# Patient Record
Sex: FEMALE | Race: WHITE | Employment: FULL TIME | URBAN - METROPOLITAN AREA
[De-identification: names, ages, dates, MRNs, and addresses within clinical notes are randomized per-mention and may not be internally consistent; named-entity substitution may affect disease eponyms.]

---

## 2017-01-18 ENCOUNTER — TELEPHONE (OUTPATIENT)
Dept: FAMILY MEDICINE CLINIC | Age: 37
End: 2017-01-18

## 2017-01-18 RX ORDER — DESOGESTREL AND ETHINYL ESTRADIOL 21-5 (28)
1 KIT ORAL DAILY
Qty: 3 PACKET | Refills: 3 | Status: SHIPPED | OUTPATIENT
Start: 2017-01-18 | End: 2017-05-17

## 2017-02-16 ENCOUNTER — OFFICE VISIT (OUTPATIENT)
Dept: FAMILY MEDICINE CLINIC | Age: 37
End: 2017-02-16

## 2017-02-16 VITALS
DIASTOLIC BLOOD PRESSURE: 68 MMHG | HEART RATE: 76 BPM | WEIGHT: 263 LBS | BODY MASS INDEX: 38.84 KG/M2 | TEMPERATURE: 98.2 F | RESPIRATION RATE: 14 BRPM | SYSTOLIC BLOOD PRESSURE: 110 MMHG

## 2017-02-16 DIAGNOSIS — J01.90 ACUTE SINUSITIS, RECURRENCE NOT SPECIFIED, UNSPECIFIED LOCATION: Primary | ICD-10-CM

## 2017-02-16 DIAGNOSIS — K50.819 CROHN'S DISEASE OF SMALL AND LARGE INTESTINES WITH COMPLICATION (HCC): ICD-10-CM

## 2017-02-16 PROCEDURE — 99213 OFFICE O/P EST LOW 20 MIN: CPT | Performed by: FAMILY MEDICINE

## 2017-02-16 RX ORDER — HYOSCYAMINE SULFATE 0.125 MG
125 TABLET ORAL DAILY
COMMUNITY
End: 2017-05-17

## 2017-02-16 RX ORDER — AMOXICILLIN AND CLAVULANATE POTASSIUM 875; 125 MG/1; MG/1
1 TABLET, FILM COATED ORAL EVERY 12 HOURS
Qty: 20 TABLET | Refills: 0 | Status: SHIPPED | OUTPATIENT
Start: 2017-02-16 | End: 2017-02-26

## 2017-03-27 ENCOUNTER — PATIENT MESSAGE (OUTPATIENT)
Dept: FAMILY MEDICINE CLINIC | Age: 37
End: 2017-03-27

## 2017-05-17 ENCOUNTER — OFFICE VISIT (OUTPATIENT)
Dept: FAMILY MEDICINE CLINIC | Age: 37
End: 2017-05-17

## 2017-05-17 VITALS
OXYGEN SATURATION: 98 % | WEIGHT: 264.4 LBS | SYSTOLIC BLOOD PRESSURE: 120 MMHG | BODY MASS INDEX: 39.05 KG/M2 | DIASTOLIC BLOOD PRESSURE: 77 MMHG | HEART RATE: 73 BPM | RESPIRATION RATE: 20 BRPM

## 2017-05-17 DIAGNOSIS — M17.11 PRIMARY OSTEOARTHRITIS OF RIGHT KNEE: ICD-10-CM

## 2017-05-17 DIAGNOSIS — R06.83 SNORING: ICD-10-CM

## 2017-05-17 DIAGNOSIS — K58.9 IRRITABLE BOWEL SYNDROME, UNSPECIFIED TYPE: ICD-10-CM

## 2017-05-17 DIAGNOSIS — F41.1 GENERALIZED ANXIETY DISORDER: ICD-10-CM

## 2017-05-17 DIAGNOSIS — E66.9 NON MORBID OBESITY, UNSPECIFIED OBESITY TYPE: ICD-10-CM

## 2017-05-17 DIAGNOSIS — E28.319 PREMATURE MENOPAUSE: ICD-10-CM

## 2017-05-17 DIAGNOSIS — K50.819 CROHN'S DISEASE OF SMALL AND LARGE INTESTINES WITH COMPLICATION (HCC): Primary | ICD-10-CM

## 2017-05-17 LAB
A/G RATIO: 1.9 (ref 1.1–2.2)
ALBUMIN SERPL-MCNC: 4.4 G/DL (ref 3.4–5)
ALP BLD-CCNC: 58 U/L (ref 40–129)
ALT SERPL-CCNC: 14 U/L (ref 10–40)
ANION GAP SERPL CALCULATED.3IONS-SCNC: 16 MMOL/L (ref 3–16)
AST SERPL-CCNC: 17 U/L (ref 15–37)
BILIRUB SERPL-MCNC: 0.4 MG/DL (ref 0–1)
BUN BLDV-MCNC: 8 MG/DL (ref 7–20)
CALCIUM SERPL-MCNC: 8.8 MG/DL (ref 8.3–10.6)
CHLORIDE BLD-SCNC: 102 MMOL/L (ref 99–110)
CHOLESTEROL, TOTAL: 212 MG/DL (ref 0–199)
CO2: 22 MMOL/L (ref 21–32)
CREAT SERPL-MCNC: 0.7 MG/DL (ref 0.6–1.1)
ESTIMATED AVERAGE GLUCOSE: 99.7 MG/DL
GFR AFRICAN AMERICAN: >60
GFR NON-AFRICAN AMERICAN: >60
GLOBULIN: 2.3 G/DL
GLUCOSE BLD-MCNC: 86 MG/DL (ref 70–99)
HBA1C MFR BLD: 5.1 %
HCT VFR BLD CALC: 40.4 % (ref 36–48)
HDLC SERPL-MCNC: 79 MG/DL (ref 40–60)
HEMOGLOBIN: 13.2 G/DL (ref 12–16)
LDL CHOLESTEROL CALCULATED: 103 MG/DL
MCH RBC QN AUTO: 30.8 PG (ref 26–34)
MCHC RBC AUTO-ENTMCNC: 32.8 G/DL (ref 31–36)
MCV RBC AUTO: 94.1 FL (ref 80–100)
PDW BLD-RTO: 13 % (ref 12.4–15.4)
PLATELET # BLD: 190 K/UL (ref 135–450)
PMV BLD AUTO: 9.9 FL (ref 5–10.5)
POTASSIUM SERPL-SCNC: 4.5 MMOL/L (ref 3.5–5.1)
RBC # BLD: 4.3 M/UL (ref 4–5.2)
SODIUM BLD-SCNC: 140 MMOL/L (ref 136–145)
T4 FREE: 1.1 NG/DL (ref 0.9–1.8)
TOTAL PROTEIN: 6.7 G/DL (ref 6.4–8.2)
TRIGL SERPL-MCNC: 151 MG/DL (ref 0–150)
TSH SERPL DL<=0.05 MIU/L-ACNC: 2.3 UIU/ML (ref 0.27–4.2)
VLDLC SERPL CALC-MCNC: 30 MG/DL
WBC # BLD: 5.3 K/UL (ref 4–11)

## 2017-05-17 PROCEDURE — 99214 OFFICE O/P EST MOD 30 MIN: CPT | Performed by: FAMILY MEDICINE

## 2017-05-17 RX ORDER — HYOSCYAMINE SULFATE 0.125 MG
125 TABLET ORAL DAILY
Qty: 180 TABLET | Refills: 5
Start: 2017-05-17 | End: 2018-02-15

## 2017-05-17 RX ORDER — NORETHINDRONE ACETATE AND ETHINYL ESTRADIOL 1MG-20(21)
1 KIT ORAL DAILY
Qty: 3 PACKET | Refills: 3 | Status: SHIPPED | OUTPATIENT
Start: 2017-05-17 | End: 2017-05-17

## 2017-05-17 RX ORDER — BUPROPION HYDROCHLORIDE 150 MG/1
150 TABLET ORAL EVERY MORNING
Qty: 90 TABLET | Refills: 2
Start: 2017-05-17 | End: 2017-08-22 | Stop reason: SDUPTHER

## 2017-05-17 RX ORDER — NORETHINDRONE ACETATE AND ETHINYL ESTRADIOL 1MG-20(21)
1 KIT ORAL DAILY
Qty: 3 PACKET | Refills: 3
Start: 2017-05-17 | End: 2018-02-15

## 2017-06-15 ENCOUNTER — TELEPHONE (OUTPATIENT)
Dept: BARIATRICS/WEIGHT MGMT | Age: 37
End: 2017-06-15

## 2017-06-22 ENCOUNTER — OFFICE VISIT (OUTPATIENT)
Dept: BARIATRICS/WEIGHT MGMT | Age: 37
End: 2017-06-22

## 2017-06-22 VITALS
HEIGHT: 68 IN | HEART RATE: 87 BPM | WEIGHT: 267.4 LBS | DIASTOLIC BLOOD PRESSURE: 75 MMHG | BODY MASS INDEX: 40.53 KG/M2 | SYSTOLIC BLOOD PRESSURE: 122 MMHG

## 2017-06-22 DIAGNOSIS — E66.01 MORBID OBESITY WITH BMI OF 40.0-44.9, ADULT (HCC): Primary | ICD-10-CM

## 2017-06-22 DIAGNOSIS — M17.11 PRIMARY OSTEOARTHRITIS OF RIGHT KNEE: ICD-10-CM

## 2017-06-22 DIAGNOSIS — E78.2 MIXED HYPERLIPIDEMIA: ICD-10-CM

## 2017-06-22 DIAGNOSIS — Z01.818 PRE-OPERATIVE CLEARANCE: ICD-10-CM

## 2017-06-22 PROCEDURE — 99244 OFF/OP CNSLTJ NEW/EST MOD 40: CPT | Performed by: SURGERY

## 2017-06-27 ENCOUNTER — HOSPITAL ENCOUNTER (OUTPATIENT)
Dept: ULTRASOUND IMAGING | Age: 37
Discharge: HOME | End: 2017-06-27
Attending: SURGERY | Admitting: SURGERY

## 2017-06-27 DIAGNOSIS — E78.2 MIXED HYPERLIPIDEMIA: ICD-10-CM

## 2017-06-27 DIAGNOSIS — E66.01 MORBID OBESITY WITH BMI OF 40.0-44.9, ADULT (HCC): ICD-10-CM

## 2017-06-27 DIAGNOSIS — M17.11 PRIMARY OSTEOARTHRITIS OF RIGHT KNEE: ICD-10-CM

## 2017-06-27 DIAGNOSIS — E66.01 MORBID (SEVERE) OBESITY DUE TO EXCESS CALORIES (HCC): ICD-10-CM

## 2017-06-27 DIAGNOSIS — Z01.818 PRE-OPERATIVE CLEARANCE: ICD-10-CM

## 2017-06-27 LAB
A/G RATIO: 1.4 (ref 1.1–2.2)
ALBUMIN SERPL-MCNC: 4.3 G/DL (ref 3.4–5)
ALP BLD-CCNC: 59 U/L (ref 40–129)
ALT SERPL-CCNC: 17 U/L (ref 10–40)
ANION GAP SERPL CALCULATED.3IONS-SCNC: 14 MMOL/L (ref 3–16)
AST SERPL-CCNC: 18 U/L (ref 15–37)
BASOPHILS ABSOLUTE: 0 K/UL (ref 0–0.2)
BASOPHILS RELATIVE PERCENT: 0.4 %
BILIRUB SERPL-MCNC: 0.4 MG/DL (ref 0–1)
BUN BLDV-MCNC: 15 MG/DL (ref 7–20)
CALCIUM SERPL-MCNC: 9 MG/DL (ref 8.3–10.6)
CHLORIDE BLD-SCNC: 104 MMOL/L (ref 99–110)
CHOLESTEROL, TOTAL: 220 MG/DL (ref 0–199)
CO2: 23 MMOL/L (ref 21–32)
CREAT SERPL-MCNC: 0.7 MG/DL (ref 0.6–1.1)
EOSINOPHILS ABSOLUTE: 0.1 K/UL (ref 0–0.6)
EOSINOPHILS RELATIVE PERCENT: 1.1 %
ESTIMATED AVERAGE GLUCOSE: 102.5 MG/DL
FOLATE: 19.16 NG/ML (ref 4.78–24.2)
GFR AFRICAN AMERICAN: >60
GFR NON-AFRICAN AMERICAN: >60
GLOBULIN: 3 G/DL
GLUCOSE BLD-MCNC: 82 MG/DL (ref 70–99)
HBA1C MFR BLD: 5.2 %
HCT VFR BLD CALC: 39.4 % (ref 36–48)
HDLC SERPL-MCNC: 77 MG/DL (ref 40–60)
HEMOGLOBIN: 13.2 G/DL (ref 12–16)
IRON SATURATION: 30 % (ref 15–50)
IRON: 107 UG/DL (ref 37–145)
LDL CHOLESTEROL CALCULATED: 125 MG/DL
LYMPHOCYTES ABSOLUTE: 2.1 K/UL (ref 1–5.1)
LYMPHOCYTES RELATIVE PERCENT: 34.7 %
MCH RBC QN AUTO: 31.1 PG (ref 26–34)
MCHC RBC AUTO-ENTMCNC: 33.5 G/DL (ref 31–36)
MCV RBC AUTO: 92.9 FL (ref 80–100)
MONOCYTES ABSOLUTE: 0.4 K/UL (ref 0–1.3)
MONOCYTES RELATIVE PERCENT: 6.8 %
NEUTROPHILS ABSOLUTE: 3.5 K/UL (ref 1.7–7.7)
NEUTROPHILS RELATIVE PERCENT: 57 %
PDW BLD-RTO: 13.7 % (ref 12.4–15.4)
PLATELET # BLD: 208 K/UL (ref 135–450)
PMV BLD AUTO: 10.1 FL (ref 5–10.5)
POTASSIUM SERPL-SCNC: 4.1 MMOL/L (ref 3.5–5.1)
RBC # BLD: 4.24 M/UL (ref 4–5.2)
SODIUM BLD-SCNC: 141 MMOL/L (ref 136–145)
TOTAL IRON BINDING CAPACITY: 357 UG/DL (ref 260–445)
TOTAL PROTEIN: 7.3 G/DL (ref 6.4–8.2)
TRIGL SERPL-MCNC: 88 MG/DL (ref 0–150)
TSH REFLEX: 3.46 UIU/ML (ref 0.27–4.2)
VITAMIN B-12: 216 PG/ML (ref 211–911)
VITAMIN D 25-HYDROXY: 48 NG/ML
VLDLC SERPL CALC-MCNC: 18 MG/DL
WBC # BLD: 6.2 K/UL (ref 4–11)

## 2017-07-10 ENCOUNTER — TELEPHONE (OUTPATIENT)
Dept: BARIATRICS/WEIGHT MGMT | Age: 37
End: 2017-07-10

## 2017-07-10 DIAGNOSIS — E53.8 VITAMIN B12 DEFICIENCY: Primary | ICD-10-CM

## 2017-07-10 RX ORDER — LANOLIN ALCOHOL/MO/W.PET/CERES
1000 CREAM (GRAM) TOPICAL DAILY
Qty: 90 TABLET | Refills: 0 | Status: SHIPPED | OUTPATIENT
Start: 2017-07-10 | End: 2018-10-14

## 2017-07-14 ENCOUNTER — OFFICE VISIT (OUTPATIENT)
Dept: BARIATRICS/WEIGHT MGMT | Age: 37
End: 2017-07-14

## 2017-07-14 VITALS
WEIGHT: 268 LBS | HEART RATE: 80 BPM | SYSTOLIC BLOOD PRESSURE: 110 MMHG | HEIGHT: 68 IN | DIASTOLIC BLOOD PRESSURE: 70 MMHG | BODY MASS INDEX: 40.62 KG/M2

## 2017-07-14 DIAGNOSIS — E66.01 MORBID OBESITY WITH BMI OF 40.0-44.9, ADULT (HCC): Primary | ICD-10-CM

## 2017-07-14 DIAGNOSIS — E53.8 VITAMIN B12 DEFICIENCY: ICD-10-CM

## 2017-07-14 DIAGNOSIS — E78.2 MIXED HYPERLIPIDEMIA: ICD-10-CM

## 2017-07-14 PROCEDURE — 99213 OFFICE O/P EST LOW 20 MIN: CPT | Performed by: NURSE PRACTITIONER

## 2017-07-14 RX ORDER — NORETHINDRONE ACETATE AND ETHINYL ESTRADIOL 1MG-20(21)
1 KIT ORAL DAILY
COMMUNITY
End: 2018-11-02 | Stop reason: SDUPTHER

## 2017-07-14 ASSESSMENT — ENCOUNTER SYMPTOMS
EYES NEGATIVE: 1
GASTROINTESTINAL NEGATIVE: 1
RESPIRATORY NEGATIVE: 1

## 2017-07-25 ENCOUNTER — TELEPHONE (OUTPATIENT)
Dept: BARIATRICS/WEIGHT MGMT | Age: 37
End: 2017-07-25

## 2017-08-17 ENCOUNTER — OFFICE VISIT (OUTPATIENT)
Dept: BARIATRICS/WEIGHT MGMT | Age: 37
End: 2017-08-17

## 2017-08-17 VITALS
BODY MASS INDEX: 41.22 KG/M2 | RESPIRATION RATE: 16 BRPM | HEIGHT: 68 IN | DIASTOLIC BLOOD PRESSURE: 72 MMHG | WEIGHT: 272 LBS | HEART RATE: 80 BPM | SYSTOLIC BLOOD PRESSURE: 114 MMHG

## 2017-08-17 DIAGNOSIS — E53.8 VITAMIN B12 DEFICIENCY: ICD-10-CM

## 2017-08-17 DIAGNOSIS — E78.2 MIXED HYPERLIPIDEMIA: ICD-10-CM

## 2017-08-17 DIAGNOSIS — E66.01 MORBID OBESITY WITH BMI OF 40.0-44.9, ADULT (HCC): Primary | ICD-10-CM

## 2017-08-17 PROCEDURE — 99213 OFFICE O/P EST LOW 20 MIN: CPT | Performed by: NURSE PRACTITIONER

## 2017-08-17 ASSESSMENT — ENCOUNTER SYMPTOMS
RESPIRATORY NEGATIVE: 1
GASTROINTESTINAL NEGATIVE: 1
EYES NEGATIVE: 1

## 2017-08-22 DIAGNOSIS — F41.1 GENERALIZED ANXIETY DISORDER: ICD-10-CM

## 2017-08-22 RX ORDER — BUPROPION HYDROCHLORIDE 150 MG/1
150 TABLET ORAL EVERY MORNING
Qty: 90 TABLET | Refills: 3 | Status: SHIPPED | OUTPATIENT
Start: 2017-08-22 | End: 2017-08-30 | Stop reason: SDUPTHER

## 2017-08-30 DIAGNOSIS — F41.1 GENERALIZED ANXIETY DISORDER: ICD-10-CM

## 2017-08-30 RX ORDER — BUPROPION HYDROCHLORIDE 150 MG/1
150 TABLET ORAL EVERY MORNING
Qty: 90 TABLET | Refills: 1 | Status: SHIPPED | OUTPATIENT
Start: 2017-08-30 | End: 2019-01-29

## 2017-08-31 ENCOUNTER — TELEPHONE (OUTPATIENT)
Dept: FAMILY MEDICINE CLINIC | Age: 37
End: 2017-08-31

## 2017-09-11 ENCOUNTER — TELEPHONE (OUTPATIENT)
Dept: BARIATRICS/WEIGHT MGMT | Age: 37
End: 2017-09-11

## 2017-09-12 ENCOUNTER — INITIAL CONSULT (OUTPATIENT)
Dept: BARIATRICS/WEIGHT MGMT | Age: 37
End: 2017-09-12

## 2017-09-12 DIAGNOSIS — E66.01 MORBID OBESITY WITH BMI OF 40.0-44.9, ADULT (HCC): ICD-10-CM

## 2017-09-12 DIAGNOSIS — F41.9 ANXIETY AND DEPRESSION: Primary | ICD-10-CM

## 2017-09-12 DIAGNOSIS — F32.A ANXIETY AND DEPRESSION: Primary | ICD-10-CM

## 2017-09-12 PROCEDURE — 96101 PR PSYCHOLOGIC TESTING BY PSYCH/PHYS: CPT | Performed by: PSYCHOLOGIST

## 2017-09-12 PROCEDURE — 90791 PSYCH DIAGNOSTIC EVALUATION: CPT | Performed by: PSYCHOLOGIST

## 2017-09-14 ENCOUNTER — OFFICE VISIT (OUTPATIENT)
Dept: BARIATRICS/WEIGHT MGMT | Age: 37
End: 2017-09-14

## 2017-09-14 VITALS
HEIGHT: 68 IN | SYSTOLIC BLOOD PRESSURE: 110 MMHG | BODY MASS INDEX: 40.92 KG/M2 | DIASTOLIC BLOOD PRESSURE: 60 MMHG | HEART RATE: 78 BPM | WEIGHT: 270 LBS | RESPIRATION RATE: 16 BRPM

## 2017-09-14 DIAGNOSIS — E53.8 VITAMIN B12 DEFICIENCY: ICD-10-CM

## 2017-09-14 DIAGNOSIS — E66.01 MORBID OBESITY WITH BMI OF 40.0-44.9, ADULT (HCC): Primary | ICD-10-CM

## 2017-09-14 DIAGNOSIS — E78.2 MIXED HYPERLIPIDEMIA: ICD-10-CM

## 2017-09-14 PROCEDURE — 99213 OFFICE O/P EST LOW 20 MIN: CPT | Performed by: NURSE PRACTITIONER

## 2017-09-14 ASSESSMENT — ENCOUNTER SYMPTOMS
EYES NEGATIVE: 1
GASTROINTESTINAL NEGATIVE: 1
RESPIRATORY NEGATIVE: 1

## 2017-10-05 ENCOUNTER — HOSPITAL ENCOUNTER (OUTPATIENT)
Dept: NUCLEAR MEDICINE | Age: 37
Discharge: HOME | End: 2017-10-05
Attending: FAMILY MEDICINE | Admitting: INTERNAL MEDICINE

## 2017-10-05 VITALS — WEIGHT: 265 LBS | BODY MASS INDEX: 39.25 KG/M2 | HEIGHT: 69 IN

## 2017-10-05 DIAGNOSIS — K50.019 CROHN'S DISEASE OF SMALL INTESTINE WITH COMPLICATION (HCC): ICD-10-CM

## 2017-10-05 DIAGNOSIS — K50.00 CROHN'S DISEASE OF SMALL INTESTINE WITHOUT COMPLICATION (HCC): ICD-10-CM

## 2017-10-05 DIAGNOSIS — R10.0 ABDOMINAL CRISIS: ICD-10-CM

## 2017-10-05 DIAGNOSIS — R10.10 INTERMITTENT UPPER ABDOMINAL PAIN: ICD-10-CM

## 2017-10-05 RX ADMIN — Medication 6 MILLICURIE: at 09:11

## 2017-10-10 ENCOUNTER — OFFICE VISIT (OUTPATIENT)
Dept: BARIATRICS/WEIGHT MGMT | Age: 37
End: 2017-10-10

## 2017-10-10 VITALS
WEIGHT: 271.4 LBS | SYSTOLIC BLOOD PRESSURE: 120 MMHG | HEIGHT: 69 IN | HEART RATE: 78 BPM | DIASTOLIC BLOOD PRESSURE: 79 MMHG | BODY MASS INDEX: 40.2 KG/M2 | RESPIRATION RATE: 16 BRPM

## 2017-10-10 DIAGNOSIS — K50.812 CROHN'S DISEASE OF BOTH SMALL AND LARGE INTESTINE WITH INTESTINAL OBSTRUCTION (HCC): ICD-10-CM

## 2017-10-10 DIAGNOSIS — E66.01 MORBID OBESITY WITH BMI OF 40.0-44.9, ADULT (HCC): Primary | ICD-10-CM

## 2017-10-10 PROCEDURE — 99213 OFFICE O/P EST LOW 20 MIN: CPT | Performed by: SURGERY

## 2017-10-10 NOTE — PROGRESS NOTES
[address] [facility]  [employer]         Chief Complaint   Patient presents with   [facility] Post Op Follow Up     possible bowel obstruction UGI completed         HPI:     Norma Sykes is a very pleasant 42 y.o. female with Body mass index is 40.08 kg/m². / Chronic Obesity. Alverto Holm has been struggling for several years now with obesity. Alverto Holm feels the weight is an obstacle to achieve and perform things in daily living as well risk on health. Patient  is very determined to lose weight and be healthy, and is working towards  surgical weight loss to achieve this goal. Pre-operative clearance and work up pending. Working hard to keep good dietary habits as well level of activity. Patient denies any nausea, vomiting, fevers, chills, shortness of breath, chest pain, cough, constipation or difficulty urinating.     Pain Assessment   Diffuse on/off dull abdominal pain for past few weeks      Past Medical History:   Diagnosis Date    Anxiety     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     Crohn's disease ([address]     Depression     Early menopause     follows with endocrine/gyne    IBS (irritable bowel syndrome)     IBS (irritable bowel syndrome)     Mixed hyperlipidemia 6/22/2017    Obesity     Osteoarthritis, knee      Past Surgical History:   Procedure Laterality Date    ANKLE SURGERY Left     APPENDECTOMY      BREAST REDUCTION SURGERY      KNEE ARTHROSCOPY Right 1/2015    KNEE ARTHROSCOPY Right 7/2015    SALPINGECTOMY Left 2009    d/t scar tissue    TOTAL KNEE ARTHROPLASTY Right 9/2015     Family History   Problem Relation Age of Onset    Hypertension Father     Elevated Lipids Father     Coronary Art Dis Maternal Grandfather 36    Heart Failure Paternal Grandfather 76    Hypertension Brother     Coronary Art Dis Maternal Uncle      Social History   Substance Use Topics    Smoking status: Former Smoker     Packs/day: 0.50     Years: 15.00 Types: Cigarettes    Smokeless tobacco: Never Used    Alcohol use Yes      Comment: rare     I counseled the patient on the importance of not smoking and risks of ETOH. Allergies   Allergen Reactions    Topamax [Topiramate] Hives, Itching and Rash     Vitals:    10/10/17 0912   BP: 120/79   Pulse: 78   Resp: 16   Weight: 271 lb 6.4 oz (123.1 kg)   Height: 5' 9\" (1.753 m)       Body mass index is 40.08 kg/m². Current Outpatient Prescriptions:     buPROPion (WELLBUTRIN XL) 150 MG extended release tablet, TAKE 1 TABLET EVERY MORNING, Disp: 90 tablet, Rfl: 1    buPROPion (WELLBUTRIN XL) 150 MG extended release tablet, Take 1 tablet by mouth every morning, Disp: 90 tablet, Rfl: 1    norethindrone-ethinyl estradiol (JUNEL FE 1/20) 1-20 MG-MCG per tablet, Take 1 tablet by mouth daily, Disp: , Rfl:     aspirin 81 MG tablet, Take 81 mg by mouth daily, Disp: , Rfl:     norethindrone-ethinyl estradiol (LOESTRIN FE 1/20) 1-20 MG-MCG per tablet, Take 1 tablet by mouth daily, Disp: 3 packet, Rfl: 3    adalimumab (HUMIRA) 40 MG/0.8ML injection, Inject 0.8 mLs into the skin every 14 days, Disp: 1 kit, Rfl: 5    hyoscyamine (LEVSIN) 125 MCG tablet, Take 1 tablet by mouth daily, Disp: 180 tablet, Rfl: 5    vitamin B-12 (CYANOCOBALAMIN) 1000 MCG tablet, Take 1 tablet by mouth daily, Disp: 90 tablet, Rfl: 0      Review of Systems - History obtained from the patient  General ROS: negative  Psychological ROS: negative  Ophthalmic ROS: negative  Neurological ROS: negative  ENT ROS: negative  Allergy and Immunology ROS: negative  Hematological and Lymphatic ROS: negative  Endocrine ROS: negative  Breast ROS: negative  Respiratory ROS: negative  Cardiovascular ROS: negative  Gastrointestinal ROS:negative  Genito-Urinary ROS: negative  Musculoskeletal ROS: negative   Skin ROS: negative    Physical Exam   Vitals Reviewed   Constitutional: Patient is oriented to person, place, and time.  Patient appears well-developed and including:  Patient Active Problem List   Diagnosis    Premature menopause    Anxiety disorder    Osteoarthritis, knee    [address]'s [address] [address]    IBS (irritable bowel syndrome)    Morbid obesity with BMI of 40.0-44.9, adult ([facility] [address]    Pre-operative clearance    Mixed hyperlipidemia    Vitamin B12 deficiency    and importance of weight loss to alleviate those co morbid conditions. I encouraged the patient to continue exercise and keeping healthy eating habits. Discussed pre-op labs and work up till now. Also counseled the patient extensively on Surgery. I spent a total of 15 minutes face to face with the patient and greater than 50% of the time was spent in counseling, answering questions, addressing concerns, reviewing labs and/or other studies with the patient as well as coordinating care. John Ada overall stable and trying hard to keep good dietary habits despite being now facing potential bowel surgery due to her crohn's. She had recent SBFT showing possible stricture, she does feel bloating and mild pain over past few weeks, still having bowel movements and passing flatus as well. On exam today she is not distended nor peritoneal, does have mild pain on deep palpation. At this point she needs to see colorectal (Dr. Araceli Rodriguez) to discuss surgical option ( e.g. Laparoscopic Stricturoplasty vs other options),  I discussed the case with him and he will kindly see her this week. Sleeve surgery will have to be put off for now with all this going on. Recommend the patient meets with our [facility] Dr. Silvia Prieto for medical weight loss to continue weight loss towards their goal. Patient understands that there is no further surgical options at this point that we can offer. RTC in 4 weeks for Medical weight loss  Diet and Exercise   F/U with Dr. Araceli Rodriguez ( colorectal)       Patient advised that its their responsibility to follow up for studies, referrals and/or labs ordered today.

## 2017-10-11 ENCOUNTER — OFFICE VISIT (OUTPATIENT)
Dept: SURGERY | Age: 37
End: 2017-10-11

## 2017-10-11 VITALS
WEIGHT: 270.8 LBS | BODY MASS INDEX: 40.11 KG/M2 | SYSTOLIC BLOOD PRESSURE: 112 MMHG | DIASTOLIC BLOOD PRESSURE: 86 MMHG | HEIGHT: 69 IN

## 2017-10-11 DIAGNOSIS — K50.019 CROHN'S DISEASE OF SMALL INTESTINE WITH COMPLICATION (HCC): Primary | ICD-10-CM

## 2017-10-11 DIAGNOSIS — E66.01 MORBID OBESITY WITH BMI OF 40.0-44.9, ADULT (HCC): ICD-10-CM

## 2017-10-11 PROBLEM — K56.609 SMALL BOWEL OBSTRUCTION (HCC): Status: ACTIVE | Noted: 2017-10-11

## 2017-10-11 PROCEDURE — 99244 OFF/OP CNSLTJ NEW/EST MOD 40: CPT | Performed by: SURGERY

## 2017-10-11 NOTE — Clinical Note
Ramón Beavers, and Teddy,  Plan for MRe/CTe, then surgery in next few weeks. Will coordinate with Dequan Thurman as far as her Crohn's medications. Planning for lap SBR/ileocecectomy. Thanks!  Erna Back

## 2017-10-12 ENCOUNTER — HOSPITAL ENCOUNTER (OUTPATIENT)
Dept: MRI IMAGING | Age: 37
Discharge: HOME | End: 2017-10-12
Attending: SURGERY | Admitting: SURGERY

## 2017-10-12 DIAGNOSIS — K56.609 SMALL BOWEL OBSTRUCTION (HCC): ICD-10-CM

## 2017-10-24 ENCOUNTER — TELEPHONE (OUTPATIENT)
Dept: SURGERY | Age: 37
End: 2017-10-24

## 2017-10-26 ENCOUNTER — TELEPHONE (OUTPATIENT)
Dept: SURGERY | Age: 37
End: 2017-10-26

## 2017-11-17 ENCOUNTER — OFFICE VISIT (OUTPATIENT)
Dept: BARIATRICS/WEIGHT MGMT | Age: 37
End: 2017-11-17

## 2017-11-17 ENCOUNTER — OFFICE VISIT (OUTPATIENT)
Dept: FAMILY MEDICINE CLINIC | Age: 37
End: 2017-11-17

## 2017-11-17 VITALS
BODY MASS INDEX: 40.84 KG/M2 | RESPIRATION RATE: 16 BRPM | SYSTOLIC BLOOD PRESSURE: 120 MMHG | WEIGHT: 268.6 LBS | DIASTOLIC BLOOD PRESSURE: 78 MMHG | TEMPERATURE: 98.2 F | HEART RATE: 84 BPM

## 2017-11-17 VITALS
WEIGHT: 270 LBS | DIASTOLIC BLOOD PRESSURE: 82 MMHG | RESPIRATION RATE: 16 BRPM | SYSTOLIC BLOOD PRESSURE: 130 MMHG | HEIGHT: 68 IN | BODY MASS INDEX: 40.92 KG/M2 | HEART RATE: 86 BPM

## 2017-11-17 DIAGNOSIS — E66.01 MORBID OBESITY WITH BMI OF 40.0-44.9, ADULT (HCC): Primary | ICD-10-CM

## 2017-11-17 DIAGNOSIS — K50.812 CROHN'S DISEASE OF BOTH SMALL AND LARGE INTESTINE WITH INTESTINAL OBSTRUCTION (HCC): ICD-10-CM

## 2017-11-17 DIAGNOSIS — E53.8 VITAMIN B12 DEFICIENCY: ICD-10-CM

## 2017-11-17 DIAGNOSIS — E78.2 MIXED HYPERLIPIDEMIA: ICD-10-CM

## 2017-11-17 DIAGNOSIS — R22.42 MASS OF LEFT LOWER EXTREMITY: Primary | ICD-10-CM

## 2017-11-17 PROBLEM — Z01.818 PRE-OPERATIVE CLEARANCE: Status: RESOLVED | Noted: 2017-06-22 | Resolved: 2017-11-17

## 2017-11-17 PROCEDURE — 99213 OFFICE O/P EST LOW 20 MIN: CPT | Performed by: NURSE PRACTITIONER

## 2017-11-17 PROCEDURE — 99214 OFFICE O/P EST MOD 30 MIN: CPT | Performed by: FAMILY MEDICINE

## 2017-11-17 ASSESSMENT — ENCOUNTER SYMPTOMS
GASTROINTESTINAL NEGATIVE: 1
EYES NEGATIVE: 1
RESPIRATORY NEGATIVE: 1

## 2017-11-17 NOTE — Clinical Note
Dr. Greg Goncalves,  Patient would like to proceed with her sleeve procedure. Your last note indicates no surgical options. I do think she would be a good candidate apart from her GI issues. I have asked her to have her endoscopy results faxed to us. What do you think?   Thanks, Sheryl Lemus

## 2017-11-17 NOTE — PROGRESS NOTES
[facility]) Physicians   [employer]    Subjective:      Patient ID: Keon Garcia is a 42 y.o. female    HPI    Presurgery    Keon Garcia is a very pleasant 42 y.o. female with Body mass index is 41.05 kg/m². .    Of note, she saw Dr. Yong Upton last month after being told she had a bowel obstruction (seen on UGI). She was having intermittent diffuse and sharp abdominal pain. He stated that her bariatric surgery was on hold for now and that she should follow up with Dr. Earline Mckeon for medical weight loss. She was sent to Dr. Dylan Serna who had recommended a lap bowel resection vs stricturoplasty vs ileocectomy vs ileocecectomy. She then had MRI of abdomen and capsule endoscopy with Dr. Mary Haney ,GI, which showed no obstruction. She states Dr. Mary Haney believes her symptoms are due to her Humira because they resolved after she stopped taking this medication. She was told the possible obstruction seen on UGI was likely a twisted bowel which resolved itself. She is now symptom free as she is doing a trial off of Humira. She is working with Dr. Mary Haney to determine whether the Humira is the true cause. She plans to restart Humira in 2 weeks to see if symptoms return. She will continue to work with him to determine new treatment options if this is the case. She would like to continue to proceed with her bariatric surgery, once her new treatment regimen is decided upon.       Past Medical History:   Diagnosis Date    Anxiety     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     [address]'s disease ([facility] [address]     Depression     Early menopause     follows with endocrine/gyne    IBS (irritable bowel syndrome)     IBS (irritable bowel syndrome)     Mixed hyperlipidemia 6/22/2017    Obesity     Osteoarthritis, knee      Past Surgical History:   Procedure Laterality Date    ANKLE SURGERY Left     APPENDECTOMY      BREAST REDUCTION SURGERY      KNEE ARTHROSCOPY Right 1/2015    KNEE ARTHROSCOPY Right 7/2015    SALPINGECTOMY Left 2009    d/t scar tissue    TOTAL KNEE ARTHROPLASTY Right 9/2015     Family History   Problem Relation Age of Onset    Hypertension Father     Elevated Lipids Father     Coronary Art Dis Maternal Grandfather 36    Heart Failure Paternal Grandfather 76    Hypertension Brother     Coronary Art Dis Maternal Uncle      Social History   Substance Use Topics    Smoking status: Former Smoker     Packs/day: 0.50     Years: 15.00     Types: Cigarettes    Smokeless tobacco: Never Used    Alcohol use Yes      Comment: rare     I counseled the patient on the importance of not smoking and risks of ETOH. Allergies   Allergen Reactions    Topamax [Topiramate] Hives, Itching and Rash     Vitals:    11/17/17 1444   BP: 130/82   Pulse: 86   Resp: 16   Weight: 270 lb (122.5 kg)   Height: 5' 8\" (1.727 m)        Body mass index is 41.05 kg/m². Current Outpatient Prescriptions:     vitamin D (CHOLECALCIFEROL) 1000 UNIT TABS tablet, Take 1,000 Units by mouth daily, Disp: , Rfl:     buPROPion (WELLBUTRIN XL) 150 MG extended release tablet, TAKE 1 TABLET EVERY MORNING, Disp: 90 tablet, Rfl: 1    buPROPion (WELLBUTRIN XL) 150 MG extended release tablet, Take 1 tablet by mouth every morning, Disp: 90 tablet, Rfl: 1    norethindrone-ethinyl estradiol (JUNEL FE 1/20) 1-20 MG-MCG per tablet, Take 1 tablet by mouth daily, Disp: , Rfl:     aspirin 81 MG tablet, Take 81 mg by mouth daily, Disp: , Rfl:     norethindrone-ethinyl estradiol (LOESTRIN FE 1/20) 1-20 MG-MCG per tablet, Take 1 tablet by mouth daily, Disp: 3 packet, Rfl: 3    adalimumab (HUMIRA) 40 MG/0.8ML injection, Inject 0.8 mLs into the skin every 14 days, Disp: 1 kit, Rfl: 5    vitamin B-12 (CYANOCOBALAMIN) 1000 MCG tablet, Take 1 tablet by mouth daily, Disp: 90 tablet, Rfl: 0    hyoscyamine (LEVSIN) 125 MCG tablet, Take 1 tablet by mouth daily, Disp: 180 tablet, Rfl: 5      Review of Systems   Constitutional: Negative. HENT: Negative. Eyes: Negative.

## 2017-11-17 NOTE — PATIENT INSTRUCTIONS
Goals in preparing for bariatric surgery    You should be giving up all beverages that have carbonation, sugar, and caffeine. (Refer to the approved liquids list provided at initial visit)   You should be drinking 64 ounces of calorie free fluids per day. Suggestions include:  o Water (you may add fresh lemon or lime)  o Crystal Light  o [place] Liquid Water Enhancer  o Propel Zero  o Powerade Zero  o Isopure  o [id number]  o SOBE Lifewater Zero  o Vitamin Water Zero  o Sugar Free Wayne-Aid      You should be eating 4-6 times per day.  Three small meals plus 1-2 snacks per day is your goal. This balances your calories and nutrients evenly throughout the day and helps to boost your metabolism. Snacking is a good thing if you are choosing healthful options. Refer to the snack list provided at your initial visit. Aim for a protein at every snack, plus a fruit, vegetable or starch. You should be eating protein at every meal and snack.  Protein is typically found in animal sources, i.e. chicken, beef, pork, fish and seafood, eggs. It is also found in low-fat dairy sources such as skim or 1% milk, low-fat yogurt, low-fat cheese, and low-fat cottage cheese. Plant based sources of protein include peanut butter, beans, and soy. You should be utilizing the 9-inch plate method.  Eating on a smaller plate will help you control portion size. But what you put on your plate counts also. Make ¼ of your plate protein, ¼ starch and ½ the plate non-starchy vegetables. You should be eliminating caffeine.  Caffeine is dehydrating. After surgery, its very important to stay hydrated. Giving up caffeine before surgery will help you focus on the changes necessary to be successful after surgery. There are many decaffeinated coffee and tea products available in grocery stores. You should be reducing added fat and sugar in your diet.  Frying foods adds too much fat and calories.  Baking, broiling, or grilling meats add flavor without unhealthy fats. Using cooking oil spray and spray butter products are also healthful changes that will aid in your weight loss. Foods high in sugar are often also high in calories and low in nutrients. Eating habits after surgery will have to be a permanent and long-term change. Eating habits are so ingrained that it can be difficult to change. It is important to practice new eating habits prior to surgery to mentally prepare yourself for the challenge ahead. Also remember that overall health, age, and genetics make each persons weight loss progress different. Do not compare your progress (pre or post-operatively), the amount you eat, or exercise to other patients. Patient received dietary handouts and education.

## 2017-11-17 NOTE — PROGRESS NOTES
GI           Follow-up appointment:   Pending MRI results    Discussed use, benefit, and side effects of all prescribed medications. Barriers to medication compliance addressed. All patient questions answered. Pt voiced understanding. When applicable, patient's outside records were [facility] [place]. The patient has signed appropriate paperworks/consents. Dragon dictation software was used for parts of this progress note. All attempts were made to correct any errors and ensure accuracy.

## 2017-11-21 ENCOUNTER — PATIENT MESSAGE (OUTPATIENT)
Dept: FAMILY MEDICINE CLINIC | Age: 37
End: 2017-11-21

## 2017-11-22 NOTE — TELEPHONE ENCOUNTER
Tried to move appt up.   roman'ding was unable to get pt in today. Per BP ok for pt to wait until Friday. I informed pt. Pt just would like to insure that as soon as BP gets the results she will be called.   I informed pt that she would be contacted once he has them

## 2017-11-24 ENCOUNTER — HOSPITAL ENCOUNTER (OUTPATIENT)
Dept: MRI IMAGING | Age: 37
Discharge: HOME | End: 2017-11-24
Attending: FAMILY MEDICINE | Admitting: FAMILY MEDICINE

## 2017-11-24 DIAGNOSIS — R22.42 MASS OF LEFT LOWER EXTREMITY: ICD-10-CM

## 2017-11-24 DIAGNOSIS — R22.42 LOCALIZED SWELLING, MASS AND LUMP, LEFT LOWER LIMB: ICD-10-CM

## 2017-11-24 DIAGNOSIS — C49.22 SARCOMA OF LEFT LOWER EXTREMITY (HCC): Primary | ICD-10-CM

## 2017-11-30 ENCOUNTER — TELEPHONE (OUTPATIENT)
Dept: BARIATRICS/WEIGHT MGMT | Age: 37
End: 2017-11-30

## 2017-12-04 ENCOUNTER — TELEPHONE (OUTPATIENT)
Dept: FAMILY MEDICINE CLINIC | Age: 37
End: 2017-12-04

## 2017-12-12 ENCOUNTER — TELEPHONE (OUTPATIENT)
Dept: BARIATRICS/WEIGHT MGMT | Age: 37
End: 2017-12-12

## 2018-01-04 ENCOUNTER — HOSPITAL ENCOUNTER (OUTPATIENT)
Dept: MAMMOGRAPHY | Age: 38
Discharge: HOME | End: 2018-01-04
Attending: FAMILY MEDICINE | Admitting: FAMILY MEDICINE

## 2018-01-04 DIAGNOSIS — Z12.31 VISIT FOR SCREENING MAMMOGRAM: ICD-10-CM

## 2018-02-07 ENCOUNTER — TELEPHONE (OUTPATIENT)
Dept: FAMILY MEDICINE CLINIC | Age: 38
End: 2018-02-07

## 2018-02-15 ENCOUNTER — OFFICE VISIT (OUTPATIENT)
Dept: FAMILY MEDICINE CLINIC | Age: 38
End: 2018-02-15

## 2018-02-15 VITALS
SYSTOLIC BLOOD PRESSURE: 128 MMHG | RESPIRATION RATE: 20 BRPM | OXYGEN SATURATION: 96 % | WEIGHT: 274 LBS | HEART RATE: 98 BPM | BODY MASS INDEX: 41.52 KG/M2 | DIASTOLIC BLOOD PRESSURE: 88 MMHG | HEIGHT: 68 IN | TEMPERATURE: 97.6 F

## 2018-02-15 DIAGNOSIS — Z01.818 PREOP GENERAL PHYSICAL EXAM: Primary | ICD-10-CM

## 2018-02-15 DIAGNOSIS — Z00.00 ROUTINE GENERAL MEDICAL EXAMINATION AT A HEALTH CARE FACILITY: ICD-10-CM

## 2018-02-15 DIAGNOSIS — K50.812 CROHN'S DISEASE OF BOTH SMALL AND LARGE INTESTINE WITH INTESTINAL OBSTRUCTION (HCC): ICD-10-CM

## 2018-02-15 DIAGNOSIS — L59.9 DISORDER OF THE SKIN, SUBCU RELATED TO RADIATION, UNSP: ICD-10-CM

## 2018-02-15 DIAGNOSIS — C49.22 SARCOMA OF LEFT LOWER EXTREMITY (HCC): ICD-10-CM

## 2018-02-15 PROCEDURE — 99244 OFF/OP CNSLTJ NEW/EST MOD 40: CPT | Performed by: FAMILY MEDICINE

## 2018-02-15 RX ORDER — LANOLIN ALCOHOL/MO/W.PET/CERES
1000 CREAM (GRAM) TOPICAL DAILY
COMMUNITY
End: 2019-09-19

## 2018-02-15 NOTE — LETTER
[address] [address]  Phone: [phone]  Fax: [phone]    Dennis James MD        February 15, 2018     Patient: Soheila Mims   YOB: 1980   Date of Visit: 2/15/2018       To Whom It May Concern: It is my medical opinion that Jaxon Perry should remain out of work from 2/26/18 through 3/26/18. She is undergoing a medical procedure and will need to be off for surgical procedure and recovery. If you have any questions or concerns, please don't hesitate to call.     Sincerely,        Dennis James MD

## 2018-02-15 NOTE — PROGRESS NOTES
Subjective:    Chief Complaint:     Ashley Garcia is a 42 y.o. female who presents for a preoperative physical examination. She is scheduled to have L post thigh sarcoma done by Dr. Alice Larry at [facility] on 2/26/2018. Pt was seen here for eval of large post leg mass, was referred for MRI. Pt noted to have large mass in hamstring, was referred to Dr. Alice Larry. Pt underwent biopsy of lesion and was dx with osteosarcoma. Pt was referred to XRT, underwent therapy for 5 wks. Pt went M-F, went early in the AM, and had treatment qAM.  Pt did tolerate therapy but has some very significant irritation to to ant thigh. Pt did also have moderate fatigue, but has improved. Pt is done with XRT and now set for resection of the tumor. Pt anticipates will stay in the hospital for 1-2d, and then go home to recover for a few weeks. Pt states that despite stressors, does feel that she is handling the stress very well. Pt does have her moments where she 'loses it'. Pt is functional, and able to go to work, and take care of the kids.   Pt is functional.  pts support system is great and feels that she is ready to take this on aggressively    History of Present Illness:          Past Medical History:   Diagnosis Date    Anxiety     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     Crohn's disease ([address]     Depression     Early menopause     follows with endocrine/gyne    IBS (irritable bowel syndrome)     IBS (irritable bowel syndrome)     Mixed hyperlipidemia 6/22/2017    Obesity     Osteoarthritis, knee     PONV (postoperative nausea and vomiting)     Sarcoma of left lower extremity ([address] 11/24/2017        Review of patient's past surgical history indicates:     Past Surgical History:   Procedure Laterality Date    ANKLE SURGERY Left     APPENDECTOMY      BREAST REDUCTION SURGERY      KNEE ARTHROSCOPY Right 1/2015    KNEE ARTHROSCOPY Right 7/2015    MUSCLE BIOPSY Left 11/2017    for sarcoma    OTHER SURGICAL HISTORY Left     biopsy of left thigh mass    SALPINGECTOMY Left 2009    d/t scar tissue    TOTAL KNEE ARTHROPLASTY Right 9/2015                                                   Current Outpatient Prescriptions   Medication Sig Dispense Refill    vitamin B-12 (CYANOCOBALAMIN) 1000 MCG tablet Take 1,000 mcg by mouth daily      BUDESONIDE PO Take 9 mg by mouth      vitamin D (CHOLECALCIFEROL) 1000 UNIT TABS tablet Take 1,000 Units by mouth daily      buPROPion (WELLBUTRIN XL) 150 MG extended release tablet Take 1 tablet by mouth every morning 90 tablet 1    norethindrone-ethinyl estradiol (JUNEL FE 1/20) 1-20 MG-MCG per tablet Take 1 tablet by mouth daily      aspirin 81 MG tablet Take 81 mg by mouth daily      buPROPion (WELLBUTRIN XL) 150 MG extended release tablet TAKE 1 TABLET EVERY MORNING 90 tablet 1    vitamin B-12 (CYANOCOBALAMIN) 1000 MCG tablet Take 1 tablet by mouth daily 90 tablet 0     No current facility-administered medications for this visit.         Allergies   Allergen Reactions    Nsaids      Pt has Crohn's    Topamax [Topiramate] Hives, Itching and Rash       Social History   Substance Use Topics    Smoking status: Former Smoker     Packs/day: 0.50     Years: 15.00     Types: Cigarettes    Smokeless tobacco: Never Used    Alcohol use Yes      Comment: rare        Family History   Problem Relation Age of Onset    Hypertension Father     Elevated Lipids Father     Coronary Art Dis Maternal Grandfather 36    Heart Failure Paternal Grandfather 76    Hypertension Brother     Coronary Art Dis Maternal Uncle         Review Of Systems    Skin: no abnormal pigmentation, rash, scaling, itching, masses, hair or nail changes  Eyes: negative  Ears/Nose/Throat: negative  Respiratory: negative  Cardiovascular: negative  Gastrointestinal: negative  Genitourinary: negative  Musculoskeletal: + as above   Neurologic: negative  Psychiatric: negative  Hematologic/Lymphatic/Immunologic: negative  Endocrine:

## 2018-02-21 DIAGNOSIS — Z00.00 ROUTINE GENERAL MEDICAL EXAMINATION AT A HEALTH CARE FACILITY: ICD-10-CM

## 2018-02-21 DIAGNOSIS — C49.22 SARCOMA OF LEFT LOWER EXTREMITY (HCC): ICD-10-CM

## 2018-02-21 LAB
A/G RATIO: 1.8 (ref 1.1–2.2)
ALBUMIN SERPL-MCNC: 4.6 G/DL (ref 3.4–5)
ALP BLD-CCNC: 66 U/L (ref 40–129)
ALT SERPL-CCNC: 18 U/L (ref 10–40)
ANION GAP SERPL CALCULATED.3IONS-SCNC: 16 MMOL/L (ref 3–16)
APTT: 28 SEC (ref 24.1–34.9)
AST SERPL-CCNC: 14 U/L (ref 15–37)
BILIRUB SERPL-MCNC: 0.3 MG/DL (ref 0–1)
BUN BLDV-MCNC: 12 MG/DL (ref 7–20)
CALCIUM SERPL-MCNC: 9.4 MG/DL (ref 8.3–10.6)
CHLORIDE BLD-SCNC: 103 MMOL/L (ref 99–110)
CHOLESTEROL, TOTAL: 212 MG/DL (ref 0–199)
CO2: 24 MMOL/L (ref 21–32)
CREAT SERPL-MCNC: 0.7 MG/DL (ref 0.6–1.1)
ESTIMATED AVERAGE GLUCOSE: 105.4 MG/DL
GFR AFRICAN AMERICAN: >60
GFR NON-AFRICAN AMERICAN: >60
GLOBULIN: 2.5 G/DL
GLUCOSE BLD-MCNC: 86 MG/DL (ref 70–99)
HBA1C MFR BLD: 5.3 %
HCT VFR BLD CALC: 38 % (ref 36–48)
HDLC SERPL-MCNC: 100 MG/DL (ref 40–60)
HEMOGLOBIN: 12.9 G/DL (ref 12–16)
INR BLD: 0.92 (ref 0.85–1.15)
LDL CHOLESTEROL CALCULATED: 95 MG/DL
MCH RBC QN AUTO: 31.5 PG (ref 26–34)
MCHC RBC AUTO-ENTMCNC: 34 G/DL (ref 31–36)
MCV RBC AUTO: 92.7 FL (ref 80–100)
PDW BLD-RTO: 13.4 % (ref 12.4–15.4)
PLATELET # BLD: 231 K/UL (ref 135–450)
PMV BLD AUTO: 9.2 FL (ref 5–10.5)
POTASSIUM SERPL-SCNC: 4.5 MMOL/L (ref 3.5–5.1)
PROTHROMBIN TIME: 10.4 SEC (ref 9.6–13)
RBC # BLD: 4.1 M/UL (ref 4–5.2)
SODIUM BLD-SCNC: 143 MMOL/L (ref 136–145)
TOTAL PROTEIN: 7.1 G/DL (ref 6.4–8.2)
TRIGL SERPL-MCNC: 85 MG/DL (ref 0–150)
VLDLC SERPL CALC-MCNC: 17 MG/DL
WBC # BLD: 6.8 K/UL (ref 4–11)

## 2018-02-26 PROBLEM — C49.9 SARCOMA (HCC): Status: ACTIVE | Noted: 2018-02-26

## 2018-03-10 ENCOUNTER — OFFICE VISIT (OUTPATIENT)
Dept: ORTHOPEDIC SURGERY | Age: 38
End: 2018-03-10

## 2018-03-10 VITALS
SYSTOLIC BLOOD PRESSURE: 145 MMHG | HEART RATE: 106 BPM | DIASTOLIC BLOOD PRESSURE: 82 MMHG | HEIGHT: 68 IN | WEIGHT: 274.91 LBS | BODY MASS INDEX: 41.67 KG/M2

## 2018-03-10 DIAGNOSIS — C49.22 SARCOMA OF LEFT LOWER EXTREMITY (HCC): Primary | ICD-10-CM

## 2018-03-10 PROCEDURE — 99024 POSTOP FOLLOW-UP VISIT: CPT | Performed by: PHYSICIAN ASSISTANT

## 2018-03-10 RX ORDER — SULFAMETHOXAZOLE AND TRIMETHOPRIM 800; 160 MG/1; MG/1
1 TABLET ORAL 2 TIMES DAILY
Qty: 20 TABLET | Refills: 0 | Status: SHIPPED | OUTPATIENT
Start: 2018-03-10 | End: 2018-03-10 | Stop reason: SDUPTHER

## 2018-03-10 RX ORDER — SULFAMETHOXAZOLE AND TRIMETHOPRIM 800; 160 MG/1; MG/1
1 TABLET ORAL 2 TIMES DAILY
Qty: 20 TABLET | Refills: 0 | Status: SHIPPED | OUTPATIENT
Start: 2018-03-10 | End: 2018-03-20

## 2018-03-10 RX ORDER — MELOXICAM 7.5 MG/1
7.5 TABLET ORAL DAILY
Qty: 30 TABLET | Refills: 0 | Status: ON HOLD | OUTPATIENT
Start: 2018-03-10 | End: 2018-03-22

## 2018-03-10 RX ORDER — MELOXICAM 7.5 MG/1
7.5 TABLET ORAL DAILY
Qty: 30 TABLET | Refills: 0 | Status: SHIPPED | OUTPATIENT
Start: 2018-03-10 | End: 2018-03-10 | Stop reason: SDUPTHER

## 2018-03-14 ENCOUNTER — HOSPITAL ENCOUNTER (OUTPATIENT)
Dept: SURGERY | Age: 38
Discharge: HOME | End: 2018-03-14
Attending: ORTHOPAEDIC SURGERY | Admitting: ORTHOPAEDIC SURGERY

## 2018-03-14 VITALS
BODY MASS INDEX: 43 KG/M2 | RESPIRATION RATE: 12 BRPM | HEART RATE: 86 BPM | OXYGEN SATURATION: 98 % | HEIGHT: 67 IN | DIASTOLIC BLOOD PRESSURE: 84 MMHG | SYSTOLIC BLOOD PRESSURE: 124 MMHG | WEIGHT: 274 LBS | TEMPERATURE: 97.5 F

## 2018-03-14 RX ORDER — LIDOCAINE HYDROCHLORIDE 10 MG/ML
1 INJECTION, SOLUTION EPIDURAL; INFILTRATION; INTRACAUDAL; PERINEURAL
Status: DISCONTINUED | OUTPATIENT
Start: 2018-03-14 | End: 2018-03-14

## 2018-03-14 RX ORDER — APREPITANT 40 MG/1
40 CAPSULE ORAL DAILY
Status: DISCONTINUED | OUTPATIENT
Start: 2018-03-14 | End: 2018-03-14

## 2018-03-14 RX ORDER — LIDOCAINE HYDROCHLORIDE 10 MG/ML
1 INJECTION, SOLUTION EPIDURAL; INFILTRATION; INTRACAUDAL; PERINEURAL
Status: ACTIVE | OUTPATIENT
Start: 2018-03-14 | End: 2018-03-14

## 2018-03-14 RX ORDER — CEFAZOLIN SODIUM 1 G/3ML
2 INJECTION, POWDER, FOR SOLUTION INTRAMUSCULAR; INTRAVENOUS ONCE
Status: DISCONTINUED | OUTPATIENT
Start: 2018-03-14 | End: 2018-03-14 | Stop reason: ALTCHOICE

## 2018-03-14 RX ORDER — MIDAZOLAM HYDROCHLORIDE 1 MG/ML
2 INJECTION INTRAMUSCULAR; INTRAVENOUS ONCE
Status: COMPLETED | OUTPATIENT
Start: 2018-03-14 | End: 2018-03-14

## 2018-03-14 RX ORDER — SODIUM CHLORIDE 0.9 % (FLUSH) 0.9 %
10 SYRINGE (ML) INJECTION PRN
Status: DISCONTINUED | OUTPATIENT
Start: 2018-03-14 | End: 2018-03-15 | Stop reason: HOSPADM

## 2018-03-14 RX ORDER — SODIUM CHLORIDE, SODIUM LACTATE, POTASSIUM CHLORIDE, CALCIUM CHLORIDE 600; 310; 30; 20 MG/100ML; MG/100ML; MG/100ML; MG/100ML
INJECTION, SOLUTION INTRAVENOUS CONTINUOUS
Status: DISCONTINUED | OUTPATIENT
Start: 2018-03-14 | End: 2018-03-15 | Stop reason: HOSPADM

## 2018-03-14 RX ORDER — APREPITANT 40 MG/1
40 CAPSULE ORAL ONCE
Status: COMPLETED | OUTPATIENT
Start: 2018-03-14 | End: 2018-03-14

## 2018-03-14 RX ORDER — SODIUM CHLORIDE 0.9 % (FLUSH) 0.9 %
10 SYRINGE (ML) INJECTION EVERY 12 HOURS SCHEDULED
Status: DISCONTINUED | OUTPATIENT
Start: 2018-03-14 | End: 2018-03-15 | Stop reason: HOSPADM

## 2018-03-14 RX ORDER — SCOLOPAMINE TRANSDERMAL SYSTEM 1 MG/1
1 PATCH, EXTENDED RELEASE TRANSDERMAL
Status: DISCONTINUED | OUTPATIENT
Start: 2018-03-14 | End: 2018-03-15 | Stop reason: HOSPADM

## 2018-03-14 RX ORDER — DEXAMETHASONE SODIUM PHOSPHATE 4 MG/ML
10 INJECTION, SOLUTION INTRA-ARTICULAR; INTRALESIONAL; INTRAMUSCULAR; INTRAVENOUS; SOFT TISSUE ONCE
Status: COMPLETED | OUTPATIENT
Start: 2018-03-14 | End: 2018-03-14

## 2018-03-14 RX ADMIN — Medication 0.5 MG: at 16:16

## 2018-03-14 RX ADMIN — DEXAMETHASONE SODIUM PHOSPHATE 10 MG: 4 INJECTION, SOLUTION INTRA-ARTICULAR; INTRALESIONAL; INTRAMUSCULAR; INTRAVENOUS; SOFT TISSUE at 16:41

## 2018-03-14 RX ADMIN — MIDAZOLAM HYDROCHLORIDE 2 MG: 1 INJECTION INTRAMUSCULAR; INTRAVENOUS at 16:21

## 2018-03-14 RX ADMIN — APREPITANT 40 MG: 40 CAPSULE ORAL at 16:16

## 2018-03-14 ASSESSMENT — PAIN SCALES - GENERAL
PAINLEVEL_OUTOF10: 0
PAINLEVEL_OUTOF10: 7

## 2018-03-14 ASSESSMENT — PAIN - FUNCTIONAL ASSESSMENT: PAIN_FUNCTIONAL_ASSESSMENT: 0-10

## 2018-03-14 NOTE — ANESTHESIA POST-OP
Anesthesia Post-op Note    Patient Name: Marquis La YOB: 1980   Sex: Female Age: 42 yrs      Medical Record Number: [id number] Acct Number: [de-identified]      Date of Procedure: 03/15/18        Procedure:  INCISION AND DRAINAGE OF LEFT THIGH     Surgeon: Carrie Luu    Anesthesia type: general    Post-op assessment:  Anesthetic Problems: no   Last Vitals:     BP: 124/84 Pulse: 86   Resp: 12 SpO2: 98   Temp: 97.5     Cardiovascular System Stable: yes  Respiratory Function: Airway Patent yes  ETT no  Ventilator no  Level of consciousness: awake, alert and oriented  Post-op pain: adequate analgesia  Hydration Adequate: yes  Nausea/Vomiting:no  Other Issues:     Jaqui Johnson MD

## 2018-03-14 NOTE — PROCEDURES
Left Sciatic Nerve Block      Patient Name: Pavel Bull YOB: 1980   Sex: Female Age: 42 yrs     Medical Record Number: [id number] Acct Number: [de-identified]     Date of Procedure: 03/15/18      Procedure:  INCISION AND DRAINAGE OF LEFT THIGH    Surgeon: Dolly Elaine    --- Pt. agrees to risks, benefits and alternatives to block    -- Block performed at the request of the surgeon for post-operative pain management    --- Immediately prior to procedure a \"time out\" was called to verify the correct patient, procedure, equipment, support staff. --- Site/side marked as required    Side: left    Site/Approach: Posterior thigh in the subgluteal crease approximately 10-12 co from the gluteal cleft    Position: RLD    Sedation: Midazolam 2 mg  (+  Midazolam 2 mg  +  Dilaudid 0.5 mg IV)    Local Anesthetic Dose:  Bupi 0.25% 3 ml sc    Aseptic technique: prepped with chlorhexidine    Ultrasound: no    Needle: 21 g   150 mm insulated block needle    Initial stimulation at 2.0 mA, decreased to 0.5 mA before loss of stimulation. Good stimulation --> Plantar flexion      Local Anesthetic:   0.25%  Bupivacaine   Amount: 35 ml  in 5 ml increments after negative aspiration each time. Easy injection w/o resistance and w/o pain/paresthesias. Pt tolerated procedure well. No complications.     Sapna Canales MD

## 2018-03-15 NOTE — PROGRESS NOTES
Discharge instructions given to patient and patients family. Patient and patients family instructed how to empty and record JONY drain. Both deny any questions at this time. IV d/c'd intact without redness or edema noted.

## 2018-03-19 LAB
ANAEROBIC CULTURE: ABNORMAL
CULTURE SURGICAL: ABNORMAL
GRAM STAIN RESULT: ABNORMAL
ORGANISM: ABNORMAL

## 2018-03-23 PROBLEM — E44.0 MODERATE MALNUTRITION (HCC): Status: ACTIVE | Noted: 2018-03-23

## 2018-04-17 ENCOUNTER — TELEPHONE (OUTPATIENT)
Dept: FAMILY MEDICINE CLINIC | Age: 38
End: 2018-04-17

## 2018-05-02 DIAGNOSIS — R52 PAIN: Primary | ICD-10-CM

## 2018-09-04 ENCOUNTER — HOSPITAL ENCOUNTER (OUTPATIENT)
Dept: OTHER | Age: 38
Discharge: HOME | End: 2018-09-04
Attending: ORTHOPAEDIC SURGERY | Admitting: ORTHOPAEDIC SURGERY

## 2018-09-04 DIAGNOSIS — C49.22 SARCOMA OF LEFT THIGH (HCC): ICD-10-CM

## 2018-09-05 DIAGNOSIS — F41.1 GENERALIZED ANXIETY DISORDER: ICD-10-CM

## 2018-09-05 RX ORDER — BUPROPION HYDROCHLORIDE 150 MG/1
TABLET ORAL
Qty: 90 TABLET | Refills: 1 | Status: SHIPPED | OUTPATIENT
Start: 2018-09-05 | End: 2018-10-14

## 2018-09-17 ENCOUNTER — HOSPITAL ENCOUNTER (OUTPATIENT)
Dept: PHYSICAL THERAPY | Age: 38
Setting detail: THERAPIES SERIES
Discharge: HOME | End: 2018-09-17
Payer: COMMERCIAL

## 2018-09-17 PROCEDURE — 97161 PT EVAL LOW COMPLEX 20 MIN: CPT | Performed by: PHYSICAL THERAPIST

## 2018-09-17 PROCEDURE — G8979 MOBILITY GOAL STATUS: HCPCS | Performed by: PHYSICAL THERAPIST

## 2018-09-17 PROCEDURE — 97140 MANUAL THERAPY 1/> REGIONS: CPT | Performed by: PHYSICAL THERAPIST

## 2018-09-17 PROCEDURE — G8978 MOBILITY CURRENT STATUS: HCPCS | Performed by: PHYSICAL THERAPIST

## 2018-09-17 PROCEDURE — 97110 THERAPEUTIC EXERCISES: CPT | Performed by: PHYSICAL THERAPIST

## 2018-09-17 NOTE — PLAN OF CARE
Disability Index:LEFS: 36%    Pain Scale: 4/10  Easing factors:   Provocative factors: stairs, walking, standing     Type: []Constant   [x]Intermittent  []Radiating []Localized []other:     Numbness/Tingling: Numbness posterior thigh    Functional Limitations/Impairments: []Sitting [x]Standing [x]Walking    [x]Squatting/bending  [x]Stairs           []ADL's  []Transfers [x]Sports/Recreation [x]Other: biking    Occupation/School:  Desk work     Living Status/Prior Level of Function: Independent with ADLs and IADLs  (insert highest prior level of function)    OBJECTIVE:     ROM LEFT RIGHT   HIP Flex     HIP Abd     HIP Ext     HIP IR     HIP ER     Knee ext 0 0   Knee Flex 125 125   Ankle PF     Ankle DF     Ankle In     Ankle Ev     Strength  LEFT RIGHT   HIP Flexors 4 5   HIP Abductors 3+ 5   HIP Ext 4- 5   Hip ER 4- 5   Knee EXT (quad) 4- 5   Knee Flex (HS) 3+ 5   Ankle DF     Ankle PF     Ankle Inv     Ankle EV          Circumference  Mid  7 cm       LE Dermatomes     LE myotomes       Single Leg Squat: n/a    Joint mobility:    [x]Normal    []Hypo   []Hyper    Palpation: Distal aspect of medial hamstrings     Functional Mobility/Transfers: independent    Posture: n/a    Bandages/Dressings/Incisions: 14 inch incision left posterior thigh is well healed    Gait: (include devices/WB status) WNL    Orthopedic Special Tests: n/a                       [x] Patient history, allergies, meds reviewed. Medical chart reviewed. See intake form. Review Of Systems (ROS):  [x]Performed Review of systems (Integumentary, CardioPulmonary, Neurological) by intake and observation. Intake form has been scanned into medical record. Patient has been instructed to contact their primary care physician regarding ROS issues if not already being addressed at this time.         Co-morbidities/Complexities (which will affect course of rehabilitation):   []None           Arthritic conditions   []Rheumatoid arthritis (M05.9)  [x]Osteoarthritis tendinitis/tendinosis    []signs/symptoms consistent with pathology which may benefit from Dry needling      []other:      Prognosis/Rehab Potential:      []Excellent   [x]Good    []Fair   []Poor    Tolerance of evaluation/treatment:    []Excellent   [x]Good    []Fair   []Poor    Physical Therapy Evaluation Complexity Justification  [x] A history of present problem with:  [] no personal factors and/or comorbidities that impact the plan of care;  []1-2 personal factors and/or comorbidities that impact the plan of care  [x]3 personal factors and/or comorbidities that impact the plan of care  [x] An examination of body systems using standardized tests and measures addressing any of the following: body structures and functions (impairments), activity limitations, and/or participation restrictions;:  [] a total of 1-2 or more elements   [] a total of 3 or more elements   [x] a total of 4 or more elements   [x] A clinical presentation with:  [x] stable and/or uncomplicated characteristics   [] evolving clinical presentation with changing characteristics  [] unstable and unpredictable characteristics;   [x] Clinical decision making of [x] low, [] moderate, [] high complexity using standardized patient assessment instrument and/or measurable assessment of functional outcome. [x] EVAL (LOW) 98474 (typically 20 minutes face-to-face)  [] EVAL (MOD) 16327 (typically 30 minutes face-to-face)  [] EVAL (HIGH) 55926 (typically 45 minutes face-to-face)  [] RE-EVAL     PLAN  Frequency/Duration:  2 days per week for 5 Weeks:  Interventions:  1. Therapeutic exercise including: strength training, ROM/flexibility, NMR and proprioception for the proximal hip, core and Lower extremity  2. Manual therapy as indicated including Dry Needling/IASTM, STM, PROM, Gr I-IV mobilizations, spinal mobilization/manipulation. 3. Modalities as needed including: thermal agents, E-stim, US, iontophoresis as indicated.    4. Patient education on joint protection, activity modification, progression of HEP. HEP instruction: Can be found scanned in media file. (see scanned forms)    GOALS:  Patient stated goal: walk up/down stairs with normal gait      Therapist goals for Patient:   Short Term Goals: To be achieved in: 2 weeks  1. Independent in HEP and progression per patient tolerance, in order to prevent re-injury. 2. Patient will have a decrease in pain to facilitate improvement in movement, function, and ADLs as indicated by Functional Deficits. Long Term Goals: To be achieved in: 5 weeks  1. Disability index score of 20% or less for the LEFS to assist with reaching prior level of function. 2. Patient will demonstrate an increase in Strength to 5/5 L knee ext, flex, hip abd to allow for proper functional mobility as indicated by patients Functional Deficits. 3. Patient will return to walking up/down one flight of stairs without increased symptoms or restriction.          Electronically signed by:  Thuan Cee PT

## 2018-09-17 NOTE — FLOWSHEET NOTE
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  · Treatment Diagnosis: PT practice pattern: 4I,   left leg pain/weakness  Insurance/Certification information:  PT Insurance Information: [employer]   30 visits/yr,  $0 copay  Physician Information:  Referring Practitioner: Dr Tuan Cotter of care signed (Y/N):     Date of Patient follow up with Physician:     G-Code (if applicable):  Vianey Cordoba    Date G-Code Applied:  18  PT G-Codes  Functional Assessment Tool Used: LEFS  Score: 36%  Functional Limitation: Mobility: Walking and moving around  Mobility: Walking and Moving Around Current Status (): At least 20 percent but less than 40 percent impaired, limited or restricted  Mobility: Walking and Moving Around Goal Status (): At least 1 percent but less than 20 percent impaired, limited or restricted    Progress Note: [x]  Yes  []  No  Next due by: Visit #10       Latex Allergy:  [x]NO      []YES  Preferred Language for Healthcare:   [x]English       []other:    Visit # Insurance Allowable   1 30     Auth Required   []  Yes    [x] No    Visits Approved  Date Ranged-       Pain level:  4/10     SUBJECTIVE:  See eval    OBJECTIVE: See eval  Observation:   Test measurements:      RESTRICTIONS/PRECAUTIONS: Cancer, OA, R TKR    Exercises/Interventions:     Exercise Sets/Reps Notes Last Progression   Gastroc,/Soleus Stretch 3x30''     Heelslides       LLLD Wallslide      Fig-4 fallout stretch 3x30''     HS Stretch:  Tableside  5x30''     SAQ      LAQ      SLR Flex 3x10     SLR Abd 3x10      SLR Ext      SLR Add.       Clamshells      Bridges 3x10     HR/TR      Ankle Theraband      BAPS      Bike      Elliptical      Standing

## 2018-09-20 DIAGNOSIS — F41.1 GENERALIZED ANXIETY DISORDER: ICD-10-CM

## 2018-09-21 RX ORDER — BUPROPION HYDROCHLORIDE 150 MG/1
TABLET ORAL
Qty: 90 TABLET | Refills: 1 | Status: SHIPPED | OUTPATIENT
Start: 2018-09-21 | End: 2018-10-12 | Stop reason: SDUPTHER

## 2018-09-24 ENCOUNTER — HOSPITAL ENCOUNTER (OUTPATIENT)
Dept: PHYSICAL THERAPY | Age: 38
Setting detail: THERAPIES SERIES
Discharge: HOME | End: 2018-09-24
Payer: COMMERCIAL

## 2018-09-24 PROCEDURE — 97110 THERAPEUTIC EXERCISES: CPT | Performed by: PHYSICAL THERAPIST

## 2018-09-24 PROCEDURE — 97140 MANUAL THERAPY 1/> REGIONS: CPT | Performed by: PHYSICAL THERAPIST

## 2018-09-24 NOTE — FLOWSHEET NOTE
Graham loop 30x     Bridges on SB 3x10     HR/TR      Ankle Theraband      BAPS      Bike 5'     Elliptical      Standing Stretch:  (insert muscles)      Weight Shifting      Lateral Walk Orange loop 2x     Squats      Single Leg Stance Balance 3x30'' floor                          Therapeutic Exercise and NMR EXR  [x] (65985) Provided verbal/tactile cueing for activities related to strengthening, flexibility, endurance, ROM for improvements in LE, proximal hip, and core control with self care, mobility, lifting, ambulation.  [] (02423) Provided verbal/tactile cueing for activities related to improving balance, coordination, kinesthetic sense, posture, motor skill, proprioception  to assist with LE, proximal hip, and core control in self care, mobility, lifting, ambulation and eccentric single leg control.      NMR and Therapeutic Activities:    [] (82912 or 70532) Provided verbal/tactile cueing for activities related to improving balance, coordination, kinesthetic sense, posture, motor skill, proprioception and motor activation to allow for proper function of core, proximal hip and LE with self care and ADLs  [] (03149) Gait Re-education- Provided training and instruction to the patient for proper LE, core and proximal hip recruitment and positioning and eccentric body weight control with ambulation re-education including up and down stairs     Home Exercise Program:    [x] (59217) Reviewed/Progressed HEP activities related to strengthening, flexibility, endurance, ROM of core, proximal hip and LE for functional self-care, mobility, lifting and ambulation/stair navigation   [] (59725)Reviewed/Progressed HEP activities related to improving balance, coordination, kinesthetic sense, posture, motor skill, proprioception of core, proximal hip and LE for self care, mobility, lifting, and ambulation/stair navigation      Manual Treatments:  PROM / Scar Mobs / STM- Medial Hamstring/Rollerstick / Knee (Flex./Ext.)  Stretch: H.S. / ITB / Piriformis / Quad / Groin / Hip Flexor   [] (38835) Provided manual therapy to mobilize LE, proximal hip and/or LS spine soft tissue/joints for the purpose of modulating pain, promoting relaxation,  increasing ROM, reducing/eliminating soft tissue swelling/inflammation/restriction, improving soft tissue extensibility and allowing for proper ROM for normal function with self care, mobility, lifting and ambulation. Modalities:      Charges:  Timed Code Treatment Minutes: 40   Total Treatment Minutes: 40     [] EVAL (LOW) 52512 (typically 20 minutes face-to-face)  [] EVAL (MOD) 49030 (typically 30 minutes face-to-face)  [] EVAL (HIGH) 95215 (typically 45 minutes face-to-face)  [] RE-EVAL      [x] GG(81555) x  2   [] IONTO  [] NMR (31805) x      [] VASO  [x] Manual (95350) x  1    [] Other:  [] TA x       [] Mech Traction (06131)  [] ES(attended) (48347)      [] ES (un) (71006):     GOALS:   Short Term Goals: To be achieved in: 2 weeks  1. Independent in HEP and progression per patient tolerance, in order to prevent re-injury. 2. Patient will have a decrease in pain to facilitate improvement in movement, function, and ADLs as indicated by Functional Deficits. Long Term Goals: To be achieved in: 5 weeks  1. Disability index score of 20% or less for the LEFS to assist with reaching prior level of function. 2. Patient will demonstrate an increase in Strength to 5/5 L knee ext, flex, hip abd to allow for proper functional mobility as indicated by patients Functional Deficits. 3. Patient will return to walking up/down one flight of stairs without increased symptoms or restriction. Progression Towards Functional goals:  [] Patient is progressing as expected towards functional goals listed. [] Progression is slowed due to complexities listed. [] Progression has been slowed due to co-morbidities.   [x] Plan just implemented, too soon to assess goals progression  [] Other:     ASSESSMENT:

## 2018-09-26 ENCOUNTER — HOSPITAL ENCOUNTER (OUTPATIENT)
Dept: PHYSICAL THERAPY | Age: 38
Setting detail: THERAPIES SERIES
Discharge: HOME | End: 2018-09-26
Payer: COMMERCIAL

## 2018-09-26 PROCEDURE — 97110 THERAPEUTIC EXERCISES: CPT | Performed by: SPECIALIST/TECHNOLOGIST

## 2018-09-26 PROCEDURE — 97140 MANUAL THERAPY 1/> REGIONS: CPT | Performed by: SPECIALIST/TECHNOLOGIST

## 2018-09-26 NOTE — FLOWSHEET NOTE
3x10     HR/TR      Ankle Theraband      BAPS      Bike 5'     Elliptical      Standing Stretch:  (insert muscles)      Weight Shifting      Lateral Walk Orange loop 2x     Squats      Single Leg Stance Balance 3x30'' floor    FSU 4' x 15 New                     Therapeutic Exercise and NMR EXR  [x] (37199) Provided verbal/tactile cueing for activities related to strengthening, flexibility, endurance, ROM for improvements in LE, proximal hip, and core control with self care, mobility, lifting, ambulation.  [] (89609) Provided verbal/tactile cueing for activities related to improving balance, coordination, kinesthetic sense, posture, motor skill, proprioception  to assist with LE, proximal hip, and core control in self care, mobility, lifting, ambulation and eccentric single leg control.      NMR and Therapeutic Activities:    [] (27057 or 65330) Provided verbal/tactile cueing for activities related to improving balance, coordination, kinesthetic sense, posture, motor skill, proprioception and motor activation to allow for proper function of core, proximal hip and LE with self care and ADLs  [] (36860) Gait Re-education- Provided training and instruction to the patient for proper LE, core and proximal hip recruitment and positioning and eccentric body weight control with ambulation re-education including up and down stairs     Home Exercise Program:    [x] (16949) Reviewed/Progressed HEP activities related to strengthening, flexibility, endurance, ROM of core, proximal hip and LE for functional self-care, mobility, lifting and ambulation/stair navigation   [] (73608)Reviewed/Progressed HEP activities related to improving balance, coordination, kinesthetic sense, posture, motor skill, proprioception of core, proximal hip and LE for self care, mobility, lifting, and ambulation/stair navigation      Manual Treatments:  PROM / Scar Mobs / STM- Medial Hamstring/Rollerstick / Knee (Flex./Ext.)  Stretch: H.S. / ITB / functional weakness     Treatment/Activity Tolerance:  [x] Patient tolerated treatment well [] Patient limited by fatique  [] Patient limited by pain  [] Patient limited by other medical complications  [] Other:     Prognosis: [x] Good [] Fair  [] Poor    Patient Requires Follow-up: [x] Yes  [] No    PLAN FOR NEXT SESSION:     PLAN: See eval  [x] Continue per plan of care [] Alter current plan (see comments)  [] Plan of care initiated [] Hold pending MD visit [] Discharge    *If patient does not return for further follow ups after this date. Please consider this as the patients discharge from physical therapy.      Electronically signed by: Chelo Barr, Yasmin Foy 85, Anne Tan 1

## 2018-10-01 ENCOUNTER — HOSPITAL ENCOUNTER (OUTPATIENT)
Dept: PHYSICAL THERAPY | Age: 38
Setting detail: THERAPIES SERIES
Discharge: HOME | End: 2018-10-01
Payer: COMMERCIAL

## 2018-10-01 PROCEDURE — 97140 MANUAL THERAPY 1/> REGIONS: CPT | Performed by: PHYSICAL THERAPIST

## 2018-10-01 PROCEDURE — 97110 THERAPEUTIC EXERCISES: CPT | Performed by: PHYSICAL THERAPIST

## 2018-10-01 NOTE — FLOWSHEET NOTE
Piriformis / Quad / Groin / Hip Flexor   [] (99459) Provided manual therapy to mobilize LE, proximal hip and/or LS spine soft tissue/joints for the purpose of modulating pain, promoting relaxation,  increasing ROM, reducing/eliminating soft tissue swelling/inflammation/restriction, improving soft tissue extensibility and allowing for proper ROM for normal function with self care, mobility, lifting and ambulation. Modalities:      Charges:  Timed Code Treatment Minutes: 40   Total Treatment Minutes: 40     [] EVAL (LOW) 04953 (typically 20 minutes face-to-face)  [] EVAL (MOD) 13223 (typically 30 minutes face-to-face)  [] EVAL (HIGH) 00213 (typically 45 minutes face-to-face)  [] RE-EVAL      [x] GA(91745) x  2   [] IONTO  [] NMR (99376) x      [] VASO   [x] Manual (48518) x  1    [] Other:  [] TA x       [] Mech Traction (31972)  [] ES(attended) (96698)      [] ES (un) (29297):     GOALS:   Short Term Goals: To be achieved in: 2 weeks  1. Independent in HEP and progression per patient tolerance, in order to prevent re-injury. 2. Patient will have a decrease in pain to facilitate improvement in movement, function, and ADLs as indicated by Functional Deficits. Long Term Goals: To be achieved in: 5 weeks  1. Disability index score of 20% or less for the LEFS to assist with reaching prior level of function. 2. Patient will demonstrate an increase in Strength to 5/5 L knee ext, flex, hip abd to allow for proper functional mobility as indicated by patients Functional Deficits. 3. Patient will return to walking up/down one flight of stairs without increased symptoms or restriction. Progression Towards Functional goals:  [] Patient is progressing as expected towards functional goals listed. [] Progression is slowed due to complexities listed. [] Progression has been slowed due to co-morbidities.   [x] Plan just implemented, too soon to assess goals progression  [] Other:     ASSESSMENT: BLE functional weakness     Treatment/Activity Tolerance:  [x] Patient tolerated treatment well [] Patient limited by fatique  [] Patient limited by pain  [] Patient limited by other medical complications  [] Other:     Prognosis: [x] Good [] Fair  [] Poor    Patient Requires Follow-up: [x] Yes  [] No    PLAN FOR NEXT SESSION:     PLAN: See eval  [x] Continue per plan of care [] Alter current plan (see comments)  [] Plan of care initiated [] Hold pending MD visit [] Discharge    *If patient does not return for further follow ups after this date. Please consider this as the patients discharge from physical therapy.      Electronically signed by: Gi Lovelace PT [id number]

## 2018-10-03 ENCOUNTER — HOSPITAL ENCOUNTER (OUTPATIENT)
Dept: PHYSICAL THERAPY | Age: 38
Setting detail: THERAPIES SERIES
Discharge: HOME | End: 2018-10-03
Payer: COMMERCIAL

## 2018-10-03 PROCEDURE — 97140 MANUAL THERAPY 1/> REGIONS: CPT | Performed by: SPECIALIST/TECHNOLOGIST

## 2018-10-03 PROCEDURE — 97110 THERAPEUTIC EXERCISES: CPT | Performed by: SPECIALIST/TECHNOLOGIST

## 2018-10-03 NOTE — FLOWSHEET NOTE
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  · Treatment Diagnosis: PT practice pattern: 4I,   left leg pain/weakness  Insurance/Certification information:  PT Insurance Information: [employer]   30 visits/yr,  $0 copay  Physician Information:  Referring Practitioner: Dr Yannick Wick of care signed (Y/N):     Date of Patient follow up with Physician:     G-Code (if applicable):  Stevie Martinez    Date G-Code Applied:  18  PT G-Codes  Functional Assessment Tool Used: LEFS  Score: 36%  Functional Limitation: Mobility: Walking and moving around  Mobility: Walking and Moving Around Current Status (): At least 20 percent but less than 40 percent impaired, limited or restricted  Mobility: Walking and Moving Around Goal Status (): At least 1 percent but less than 20 percent impaired, limited or restricted    Progress Note: [x]  Yes  []  No  Next due by: Visit #10       Latex Allergy:  [x]NO      []YES  Preferred Language for Healthcare:   [x]English       []other:    Visit # Insurance Allowable   5 30     Auth Required   []  Yes    [x] No    Visits Approved  Date Ranged-       Pain level:  5/10     SUBJECTIVE:   Pt. Reports her LLE still feels weak and sore but she has increased her actively level. OBJECTIVE: See eval  Observation:   Test measurements:      RESTRICTIONS/PRECAUTIONS: Cancer, OA, R TKR    Exercises/Interventions:  Rx 10/22/18    Exercise Sets/Reps Notes Last Progression   Gastroc Stretch 3x30''- slant      Heelslides       LLLD Wallslide      Fig-4 fallout stretch 3x30''     HS Stretch:  Tableside  5x30''     SAQ      LAQ      SLR Flex 3x10 staggered    SLR Abd 3x10      SLR Ext      SLR Add.       Clamshells Other: ASSESSMENT: BLE functional weakness     Treatment/Activity Tolerance:  [x] Patient tolerated treatment well [] Patient limited by fatique  [] Patient limited by pain  [] Patient limited by other medical complications  [] Other:     Prognosis: [x] Good [] Fair  [] Poor    Patient Requires Follow-up: [x] Yes  [] No    PLAN FOR NEXT SESSION:     PLAN: See eval  [x] Continue per plan of care [] Alter current plan (see comments)  [] Plan of care initiated [] Hold pending MD visit [] Discharge    *If patient does not return for further follow ups after this date. Please consider this as the patients discharge from physical therapy.      Electronically signed by: Rosalva Sheehan, Yasmin Foy 85, Griselda Costain, [address]

## 2018-10-10 ENCOUNTER — HOSPITAL ENCOUNTER (OUTPATIENT)
Dept: PHYSICAL THERAPY | Age: 38
Setting detail: THERAPIES SERIES
Discharge: HOME | End: 2018-10-10
Payer: COMMERCIAL

## 2018-10-10 PROCEDURE — 97140 MANUAL THERAPY 1/> REGIONS: CPT | Performed by: SPECIALIST/TECHNOLOGIST

## 2018-10-10 PROCEDURE — 97110 THERAPEUTIC EXERCISES: CPT | Performed by: SPECIALIST/TECHNOLOGIST

## 2018-10-12 ENCOUNTER — OFFICE VISIT (OUTPATIENT)
Dept: FAMILY MEDICINE CLINIC | Age: 38
End: 2018-10-12
Payer: COMMERCIAL

## 2018-10-12 VITALS
HEIGHT: 69 IN | TEMPERATURE: 96.9 F | RESPIRATION RATE: 12 BRPM | BODY MASS INDEX: 40.73 KG/M2 | DIASTOLIC BLOOD PRESSURE: 60 MMHG | WEIGHT: 275 LBS | SYSTOLIC BLOOD PRESSURE: 132 MMHG | HEART RATE: 80 BPM | OXYGEN SATURATION: 97 %

## 2018-10-12 DIAGNOSIS — E66.01 MORBID OBESITY WITH BMI OF 40.0-44.9, ADULT (HCC): ICD-10-CM

## 2018-10-12 DIAGNOSIS — C49.22 SARCOMA OF LEFT LOWER EXTREMITY (HCC): ICD-10-CM

## 2018-10-12 DIAGNOSIS — I83.93 VARICOSE VEINS OF BOTH LOWER EXTREMITIES, UNSPECIFIED WHETHER COMPLICATED: ICD-10-CM

## 2018-10-12 DIAGNOSIS — M17.11 PRIMARY OSTEOARTHRITIS OF RIGHT KNEE: ICD-10-CM

## 2018-10-12 DIAGNOSIS — F41.1 GENERALIZED ANXIETY DISORDER: ICD-10-CM

## 2018-10-12 DIAGNOSIS — K50.812 CROHN'S DISEASE OF BOTH SMALL AND LARGE INTESTINE WITH INTESTINAL OBSTRUCTION (HCC): Primary | ICD-10-CM

## 2018-10-12 DIAGNOSIS — L65.9 ALOPECIA: ICD-10-CM

## 2018-10-12 DIAGNOSIS — Z23 NEED FOR INFLUENZA VACCINATION: ICD-10-CM

## 2018-10-12 PROBLEM — C49.9 SARCOMA (HCC): Status: RESOLVED | Noted: 2018-02-26 | Resolved: 2018-10-12

## 2018-10-12 LAB
A/G RATIO: 1.7 (ref 1.1–2.2)
ALBUMIN SERPL-MCNC: 4.6 G/DL (ref 3.4–5)
ALP BLD-CCNC: 74 U/L (ref 40–129)
ALT SERPL-CCNC: 13 U/L (ref 10–40)
ANION GAP SERPL CALCULATED.3IONS-SCNC: 16 MMOL/L (ref 3–16)
AST SERPL-CCNC: 16 U/L (ref 15–37)
BILIRUB SERPL-MCNC: <0.2 MG/DL (ref 0–1)
BUN BLDV-MCNC: 12 MG/DL (ref 7–20)
CALCIUM SERPL-MCNC: 9.4 MG/DL (ref 8.3–10.6)
CHLORIDE BLD-SCNC: 103 MMOL/L (ref 99–110)
CO2: 22 MMOL/L (ref 21–32)
CREAT SERPL-MCNC: 0.8 MG/DL (ref 0.6–1.1)
FOLATE: 8.82 NG/ML (ref 4.78–24.2)
GFR AFRICAN AMERICAN: >60
GFR NON-AFRICAN AMERICAN: >60
GLOBULIN: 2.7 G/DL
GLUCOSE BLD-MCNC: 86 MG/DL (ref 70–99)
HCT VFR BLD CALC: 40.9 % (ref 36–48)
HEMOGLOBIN: 13.1 G/DL (ref 12–16)
MCH RBC QN AUTO: 29.2 PG (ref 26–34)
MCHC RBC AUTO-ENTMCNC: 32.1 G/DL (ref 31–36)
MCV RBC AUTO: 91.1 FL (ref 80–100)
PDW BLD-RTO: 15.4 % (ref 12.4–15.4)
PLATELET # BLD: 272 K/UL (ref 135–450)
PMV BLD AUTO: 9.4 FL (ref 5–10.5)
POTASSIUM SERPL-SCNC: 4.6 MMOL/L (ref 3.5–5.1)
PROLACTIN: 12.4 NG/ML
RBC # BLD: 4.49 M/UL (ref 4–5.2)
SEDIMENTATION RATE, ERYTHROCYTE: 12 MM/HR (ref 0–20)
SODIUM BLD-SCNC: 141 MMOL/L (ref 136–145)
T4 FREE: 1.1 NG/DL (ref 0.9–1.8)
TOTAL PROTEIN: 7.3 G/DL (ref 6.4–8.2)
TSH SERPL DL<=0.05 MIU/L-ACNC: 2.01 UIU/ML (ref 0.27–4.2)
VITAMIN B-12: 326 PG/ML (ref 211–911)
WBC # BLD: 7.4 K/UL (ref 4–11)

## 2018-10-12 PROCEDURE — 90686 IIV4 VACC NO PRSV 0.5 ML IM: CPT | Performed by: FAMILY MEDICINE

## 2018-10-12 PROCEDURE — 90471 IMMUNIZATION ADMIN: CPT | Performed by: FAMILY MEDICINE

## 2018-10-12 PROCEDURE — 99214 OFFICE O/P EST MOD 30 MIN: CPT | Performed by: FAMILY MEDICINE

## 2018-10-12 RX ORDER — BUPROPION HYDROCHLORIDE 150 MG/1
TABLET ORAL
Qty: 90 TABLET | Refills: 3 | Status: SHIPPED | OUTPATIENT
Start: 2018-10-12 | End: 2019-04-09 | Stop reason: SDUPTHER

## 2018-10-12 RX ORDER — HYOSCYAMINE SULFATE EXTENDED-RELEASE 0.38 MG/1
375 TABLET ORAL EVERY 12 HOURS PRN
COMMUNITY
End: 2019-04-09 | Stop reason: SDUPTHER

## 2018-10-12 NOTE — PROGRESS NOTES
Vaccine Information Sheet, \"Influenza - Inactivated\"  given to Kathryn Martinez, or parent/legal guardian of  Kathryn Martinez and verbalized understanding. Patient responses:    Have you ever had a reaction to a flu vaccine? No  Are you able to eat eggs without adverse effects? Yes  Do you have any current illness? 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 Please see immunization tab. Current Influenza vaccine VIS given to patient. Influenza consent form/questionnaire completed and signed. Patient responses to  Influenza consent form / questionnaire  reviewed. Vaccine given per protocol.

## 2018-10-16 ENCOUNTER — HOSPITAL ENCOUNTER (OUTPATIENT)
Dept: PHYSICAL THERAPY | Age: 38
Setting detail: THERAPIES SERIES
Discharge: HOME | End: 2018-10-16
Payer: COMMERCIAL

## 2018-10-16 PROCEDURE — 97110 THERAPEUTIC EXERCISES: CPT | Performed by: PHYSICAL THERAPIST

## 2018-10-16 PROCEDURE — 97140 MANUAL THERAPY 1/> REGIONS: CPT | Performed by: PHYSICAL THERAPIST

## 2018-10-16 NOTE — FLOWSHEET NOTE
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  · Treatment Diagnosis: PT practice pattern: 4I,   left leg pain/weakness  Insurance/Certification information:  PT Insurance Information: [employer]   30 visits/yr,  $0 copay  Physician Information:  Referring Practitioner: Dr Nato Garcia of care signed (Y/N):     Date of Patient follow up with Physician:     G-Code (if applicable):  Manuel Danielson    Date G-Code Applied:  18  PT G-Codes  Functional Assessment Tool Used: LEFS  Score: 36%  Functional Limitation: Mobility: Walking and moving around  Mobility: Walking and Moving Around Current Status (): At least 20 percent but less than 40 percent impaired, limited or restricted  Mobility: Walking and Moving Around Goal Status (): At least 1 percent but less than 20 percent impaired, limited or restricted    Progress Note: [x]  Yes  []  No  Next due by: Visit #10       Latex Allergy:  [x]NO      []YES  Preferred Language for Healthcare:   [x]English       []other:    Visit # Insurance Allowable   7 30     Auth Required   []  Yes    [x] No    Visits Approved  Date Ranged-       Pain level:  5/10     SUBJECTIVE:   Did 13,00 steps over the weekend and was very fatigued the next day. OBJECTIVE: See eval  Observation:   Test measurements:      RESTRICTIONS/PRECAUTIONS: Cancer, OA, R TKR    Exercises/Interventions:  Rx 10/22/18    Exercise Sets/Reps Notes Last Progression   Gastroc Stretch 3x30''- slant      Heelslides       LLLD Wallslide      Fig-4 fallout stretch 3x30''     HS Stretch:  Tableside  4x30''     SAQ      LAQ      SLR Flex 3x10 staggered         SLR Ext      SLR Add.           Bridges on SB 3x10     HR/TR      Ankle

## 2018-10-24 ENCOUNTER — APPOINTMENT (OUTPATIENT)
Dept: PHYSICAL THERAPY | Age: 38
End: 2018-10-24
Payer: COMMERCIAL

## 2018-11-02 RX ORDER — NORETHINDRONE ACETATE AND ETHINYL ESTRADIOL 1MG-20(21)
1 KIT ORAL DAILY
Qty: 3 PACKET | Refills: 3 | Status: SHIPPED | OUTPATIENT
Start: 2018-11-02 | End: 2019-01-23 | Stop reason: SDUPTHER

## 2018-12-18 ENCOUNTER — TELEPHONE (OUTPATIENT)
Dept: FAMILY MEDICINE CLINIC | Age: 38
End: 2018-12-18

## 2018-12-18 RX ORDER — BENZONATATE 200 MG/1
200 CAPSULE ORAL 3 TIMES DAILY PRN
Qty: 30 CAPSULE | Refills: 0 | Status: SHIPPED | OUTPATIENT
Start: 2018-12-18 | End: 2019-01-29

## 2018-12-18 RX ORDER — BENZONATATE 200 MG/1
200 CAPSULE ORAL 3 TIMES DAILY PRN
Qty: 30 CAPSULE | Refills: 0 | Status: SHIPPED | OUTPATIENT
Start: 2018-12-18 | End: 2018-12-18 | Stop reason: SDUPTHER

## 2018-12-18 NOTE — TELEPHONE ENCOUNTER
Please advise    10/12/18 last ov  No upcoming ov __________________________________________________





Discharge Summary


Admission Date


Jan 23, 2018 at 02:50


Discharge Date:  Jan 27, 2018


Admitting Diagnosis





hypoglycemia, elevated troponin





(1) NSTEMI (non-ST elevation myocardial infarction)


ICD Code:  I21.4 - Non-ST elevation (NSTEMI) myocardial infarction


(2) Hypoglycemia


ICD Code:  E16.2 - Hypoglycemia, unspecified


(3) CKD (chronic kidney disease) stage 3, GFR 30-59 ml/min


ICD Code:  N18.3 - Chronic kidney disease, stage 3 (moderate)


Status:  Acute


(4) DM (diabetes mellitus)


ICD Code:  E11.9 - Type 2 diabetes mellitus without complications


Status:  Acute


(5) Syncope


ICD Code:  R55 - Syncope and collapse


Procedures


None


Brief History - From Admission


The patient is a 68-year-old male with known history of coronary artery disease 

and diabetes mellitus. He presented to the emergency department following a 

syncopal episode that occurred at work. He states that his blood sugar dropped 

and he apparently passed out. He states that he does not remember any of the 

syncopal episode. He did not have acute onset of dizziness or lightheadedness 

prior to the syncopal episode. Denies chest pain or dyspnea. He states that he 

takes glimepiride daily. Most days he checks his blood sugar and if the glucose 

is low, he does not take the glimepiride. Over the past 4 days, he has not been 

checking his sugar, but he has been taking the glimepiride. He does report a 

recent head cold.


CBC/BMP:  


1/26/18 0445                                                                   

             1/26/18 0445





Significant Findings





Laboratory Tests








Test


  1/25/18


03:47 1/26/18


04:45


 


Red Blood Count


  4.18 MIL/MM3


(4.50-5.90) 4.33 MIL/MM3


(4.50-5.90)


 


Hemoglobin


  12.3 GM/DL


(13.0-17.0) 12.8 GM/DL


(13.0-17.0)


 


Hematocrit


  37.5 %


(39.0-51.0) 38.7 %


(39.0-51.0)


 


Red Cell Distribution Width


  17.5 %


(11.6-17.2) 


 


 


Platelet Count


  95 TH/MM3


(150-450) 101 TH/MM3


(150-450)


 


Monocytes (%) (Auto)


  8.9 %


(0.0-8.0) 


 


 


Band Neutrophils % 15 % (0-6)  


 


Platelet Estimate LOW (NORMAL)  


 


Ovalocytes 1+ (NORMAL)  


 


Blood Urea Nitrogen


  35 MG/DL


(7-18) 40 MG/DL


(7-18)


 


Creatinine


  2.05 MG/DL


(0.60-1.30) 2.26 MG/DL


(0.60-1.30)


 


Random Glucose


  117 MG/DL


() 


 


 


Chloride Level


  108 MEQ/L


() 


 


 


Estimat Glomerular Filtration


Rate 32 ML/MIN


(>89) 29 ML/MIN


(>89)








Imaging





Last Impressions








Ankle MRI 1/26/18 0000 Signed





Impressions: 





 Service Date/Time:  Friday, January 26, 2018 13:29 - CONCLUSION:  Generalized 





 soft tissue edema as described above with small hematoma along the mid shaft 

of 





 the fibula without occult fracture Fluid along the medial tendon sheaths as 





 described above. No osteomyelitis.     Pilo García MD  FACR


 


Ankle X-Ray 1/25/18 0000 Signed





Impressions: 





 Service Date/Time:  Thursday, January 25, 2018 17:24 - CONCLUSION: No evidence 





 of fracture.     Hugo Holland MD 


 


Lower Extremity Ultrasound 1/24/18 0000 Signed





Impressions: 





 Service Date/Time:  Wednesday, January 24, 2018 09:26 - CONCLUSION:  1. No 

deep 





 venous thrombosis. 2. Hypoechoic lesion along the lateral left ankle could be 





 hematoma or less likely abscess.     Terence Ellis MD 


 


Chest X-Ray 1/23/18 0016 Signed





Impressions: 





 Service Date/Time:  Tuesday, January 23, 2018 00:27 - CONCLUSION: The lungs 

are 





 clear.     Isacc Archuleta MD 








PE at Discharge


General: No acute distress. 


Heart: Regular rate and rhythm. No murmur.


Lungs: Clear to auscultation bilaterally. No wheezes, rales, or rhonchi. 

Breathing is nonlabored.


Abdomen: Soft, nontender, nondistended.


Extremities: Trace left lower extremity edema. 1+ right lower extremity edema 

with a small contusion on the lateral right lower leg. No open wound.


Psych: Alert and oriented.


Pt update on day of discharge


The patient is still having some pain in the right lower leg. No chest pain. 

Some shortness of breath, which he attributes to a head cold. Was coughing 

earlier this morning. Feels that he is ready to go home.


Hospital Course


The patient was admitted for further evaluation of syncopal episode. This was 

felt to be secondary to hypoglycemia due to glimepiride. He was given IV 

dextrose and continued to have low blood sugars. He was transferred to the 

intensive care unit for closer monitoring. His glucose stabilized and he was 

transitioned off of the IV dextrose. Cardiology was consulted regarding non-ST 

elevation MI. Medical management was recommended. Nephrology was consulted 

regarding acute kidney injury superimposed on chronic kidney disease stage III. 

The patient's creatinine was monitored during hospitalization. He complained of 

pain and swelling in the right lower leg. Ultrasound showed no DVT, but did 

show a small fluid collection, likely a hematoma. Podiatry was consulted. MRI 

was done. Patient was felt to not need surgical intervention and was cleared 

for discharge by podiatry. Patient was also cleared for discharge by nephrology.


Pt Condition on Discharge:  Stable


Discharge Disposition:  Discharge Home


Discharge Time:  > 30 minutes


Discharge Instructions


DIET: Follow Instructions for:  Diabetic Diet


Activities you can perform:  Regular-No Restrictions


Follow up Referrals:  


Cardiology - 1 Week with Sara Thomas MD


Nephrology - 1 Week with Vernon Oden MD


PCP Follow-up - 1 Week with Katlin Morrow D.o.


Podiatry - 1 Month with Dedrick Rodriguez DPM





New Medications:  


Carvedilol (Coreg) 3.125 Mg Tab


3.125 MG PO BID for Heart, #60 TAB 0 Refills





 


Continued Medications:  


Aspirin (Aspirin Low Strength) 81 Mg Chew


81 MG PO DAILY for CAD, #30 EA





Atorvastatin (Atorvastatin) 40 Mg Tab


80 MG PO HS for hyperlipidemia, #30 TAB





Finasteride (Finasteride) 5 Mg Tab


5 MG PO DAILY for Manage Prostate Problems, #30 TAB 0 Refills


Do not crush.


Isosorbide Mononitrate (Isosorbide Mononitrate) 20 Mg Tab


60 MG PO BID for Prevent Chest Pain, #60 TAB 0 Refills


Take 2 doses 7 hours apart.


Lisinopril (Lisinopril) 5 Mg Tab


2.5 MG PO DAILY for CAD, #31 TAB





Prasugrel (Effient) 10 Mg Tab


10 MG PO DAILY for CAD, #31 TAB





 


Discontinued Medications:  


Bumetanide (Bumetanide) 0.5 Mg Tab


2 MG PO DAILY, TAB 0 Refills





Glimepiride (Glimepiride) 4 Mg Tab


4 MG PO DAILY for Blood Sugar Management, #30 TAB 0 Refills


Take with breakfast or first main meal














Earnest Padilla MD Jan 27, 2018 09:43

## 2019-01-17 ENCOUNTER — PATIENT MESSAGE (OUTPATIENT)
Dept: FAMILY MEDICINE CLINIC | Age: 39
End: 2019-01-17

## 2019-01-18 RX ORDER — PREDNISONE 20 MG/1
TABLET ORAL
Qty: 18 TABLET | Refills: 0 | Status: SHIPPED | OUTPATIENT
Start: 2019-01-18 | End: 2019-01-28

## 2019-01-22 ENCOUNTER — PATIENT MESSAGE (OUTPATIENT)
Dept: FAMILY MEDICINE CLINIC | Age: 39
End: 2019-01-22

## 2019-01-22 DIAGNOSIS — Z30.9 ENCOUNTER FOR CONTRACEPTIVE MANAGEMENT, UNSPECIFIED TYPE: Primary | ICD-10-CM

## 2019-01-23 RX ORDER — NORETHINDRONE ACETATE AND ETHINYL ESTRADIOL 1MG-20(21)
1 KIT ORAL DAILY
Qty: 3 PACKET | Refills: 3 | Status: SHIPPED | OUTPATIENT
Start: 2019-01-23 | End: 2019-03-13 | Stop reason: SDUPTHER

## 2019-01-28 ENCOUNTER — TELEPHONE (OUTPATIENT)
Dept: FAMILY MEDICINE CLINIC | Age: 39
End: 2019-01-28

## 2019-01-29 ENCOUNTER — TELEPHONE (OUTPATIENT)
Dept: FAMILY MEDICINE CLINIC | Age: 39
End: 2019-01-29

## 2019-01-29 ENCOUNTER — HOSPITAL ENCOUNTER (OUTPATIENT)
Dept: CT IMAGING | Age: 39
Discharge: HOME | End: 2019-01-29
Payer: COMMERCIAL

## 2019-01-29 ENCOUNTER — OFFICE VISIT (OUTPATIENT)
Dept: FAMILY MEDICINE CLINIC | Age: 39
End: 2019-01-29
Payer: COMMERCIAL

## 2019-01-29 VITALS
RESPIRATION RATE: 14 BRPM | OXYGEN SATURATION: 98 % | TEMPERATURE: 99.8 F | BODY MASS INDEX: 40.7 KG/M2 | HEART RATE: 80 BPM | WEIGHT: 275.6 LBS | DIASTOLIC BLOOD PRESSURE: 86 MMHG | SYSTOLIC BLOOD PRESSURE: 138 MMHG

## 2019-01-29 DIAGNOSIS — K50.812 CROHN'S DISEASE OF BOTH SMALL AND LARGE INTESTINE WITH INTESTINAL OBSTRUCTION (HCC): ICD-10-CM

## 2019-01-29 DIAGNOSIS — C49.22 SARCOMA OF LEFT LOWER EXTREMITY (HCC): ICD-10-CM

## 2019-01-29 DIAGNOSIS — R05.3 CHRONIC COUGH: Primary | ICD-10-CM

## 2019-01-29 DIAGNOSIS — R16.0 LIVER MASS: ICD-10-CM

## 2019-01-29 DIAGNOSIS — R05.3 CHRONIC COUGH: ICD-10-CM

## 2019-01-29 PROCEDURE — 71260 CT THORAX DX C+: CPT

## 2019-01-29 PROCEDURE — 6360000004 HC RX CONTRAST MEDICATION: Performed by: FAMILY MEDICINE

## 2019-01-29 PROCEDURE — 99214 OFFICE O/P EST MOD 30 MIN: CPT | Performed by: FAMILY MEDICINE

## 2019-01-29 RX ORDER — BUDESONIDE AND FORMOTEROL FUMARATE DIHYDRATE 160; 4.5 UG/1; UG/1
2 AEROSOL RESPIRATORY (INHALATION) 2 TIMES DAILY
Qty: 3 INHALER | Refills: 1 | Status: SHIPPED | OUTPATIENT
Start: 2019-01-29 | End: 2019-04-07

## 2019-01-29 RX ADMIN — IOPAMIDOL 80 ML: 755 INJECTION, SOLUTION INTRAVENOUS at 10:05

## 2019-01-30 ENCOUNTER — HOSPITAL ENCOUNTER (OUTPATIENT)
Dept: MRI IMAGING | Age: 39
Discharge: HOME | End: 2019-01-30
Payer: COMMERCIAL

## 2019-01-30 PROCEDURE — 74183 MRI ABD W/O CNTR FLWD CNTR: CPT

## 2019-01-30 PROCEDURE — A9581 GADOXETATE DISODIUM INJ: HCPCS | Performed by: FAMILY MEDICINE

## 2019-01-30 PROCEDURE — 6360000004 HC RX CONTRAST MEDICATION: Performed by: FAMILY MEDICINE

## 2019-01-30 RX ADMIN — GADOXETATE DISODIUM 10 ML: 181.43 INJECTION, SOLUTION INTRAVENOUS at 15:00

## 2019-03-13 ENCOUNTER — PATIENT MESSAGE (OUTPATIENT)
Dept: FAMILY MEDICINE CLINIC | Age: 39
End: 2019-03-13

## 2019-03-13 DIAGNOSIS — Z30.9 ENCOUNTER FOR CONTRACEPTIVE MANAGEMENT, UNSPECIFIED TYPE: ICD-10-CM

## 2019-03-13 RX ORDER — NORETHINDRONE ACETATE AND ETHINYL ESTRADIOL 1MG-20(21)
1 KIT ORAL DAILY
Qty: 3 PACKET | Refills: 3 | Status: SHIPPED | OUTPATIENT
Start: 2019-03-13 | End: 2019-09-19

## 2019-04-05 ENCOUNTER — OFFICE VISIT (OUTPATIENT)
Dept: FAMILY MEDICINE CLINIC | Age: 39
End: 2019-04-05
Payer: COMMERCIAL

## 2019-04-05 VITALS
SYSTOLIC BLOOD PRESSURE: 130 MMHG | DIASTOLIC BLOOD PRESSURE: 78 MMHG | BODY MASS INDEX: 41.03 KG/M2 | HEART RATE: 75 BPM | OXYGEN SATURATION: 100 % | RESPIRATION RATE: 8 BRPM | WEIGHT: 277 LBS | TEMPERATURE: 98.1 F | HEIGHT: 69 IN

## 2019-04-05 DIAGNOSIS — R16.0 LIVER MASS: ICD-10-CM

## 2019-04-05 DIAGNOSIS — I10 ELEVATED BLOOD PRESSURE READING IN OFFICE WITH DIAGNOSIS OF HYPERTENSION: ICD-10-CM

## 2019-04-05 DIAGNOSIS — S16.1XXA STRAIN OF STERNOCLEIDOMASTOID MUSCLE, INITIAL ENCOUNTER: Primary | ICD-10-CM

## 2019-04-05 DIAGNOSIS — R51.9 NEW ONSET HEADACHE: ICD-10-CM

## 2019-04-05 DIAGNOSIS — C49.22 SARCOMA OF LEFT LOWER EXTREMITY (HCC): ICD-10-CM

## 2019-04-05 DIAGNOSIS — K50.812 CROHN'S DISEASE OF BOTH SMALL AND LARGE INTESTINE WITH INTESTINAL OBSTRUCTION (HCC): ICD-10-CM

## 2019-04-05 PROCEDURE — 99214 OFFICE O/P EST MOD 30 MIN: CPT | Performed by: FAMILY MEDICINE

## 2019-04-05 PROCEDURE — 1036F TOBACCO NON-USER: CPT | Performed by: FAMILY MEDICINE

## 2019-04-05 PROCEDURE — G8427 DOCREV CUR MEDS BY ELIG CLIN: HCPCS | Performed by: FAMILY MEDICINE

## 2019-04-05 PROCEDURE — G8417 CALC BMI ABV UP PARAM F/U: HCPCS | Performed by: FAMILY MEDICINE

## 2019-04-05 RX ORDER — PREDNISONE 20 MG/1
TABLET ORAL
Qty: 18 TABLET | Refills: 0 | Status: SHIPPED | OUTPATIENT
Start: 2019-04-05 | End: 2019-04-15 | Stop reason: SDUPTHER

## 2019-04-05 NOTE — PROGRESS NOTES
Here for eval of 2 wks of HA. Pt states sx are new onset for about 2 wks. Pt states sx are not daily, intermittent but today has increased. Pt stteas it starts with some pain behind ear and then moves around and up L side of head. Pt has had some increase irritation in L temple and forehead as well. These are different from prior HA. Pt has had no consistent light sensitivity and no noise sensitive but has occurred. Pt noted some discomfort to L and a small lump behind L ear. No fever, chills. No URI sx, sinus congestion and PND. No chest pain, shortness of breath. Pt is taking tylenol prn, but not NSAIDs due to crohns hx. Vision is ok. No nausea/vomiting. These are pretty bad HA. Some mild tightness in back of neck and shoulders. Pt does have sarcoma hx and will have f/u imaging in 6/2019 for leg, liver lesion and chest and will see him in a them afterward. Leg is yearly, chest is every 3 months and liver is just a followup     Pt here for follow up of blood pressure. Pt states doing great with adherence to therapy and feels well. No issues of chest pain, shortness of breath. No vision changes, headache, swelling in legs. Except as noted above in the history of present illness, the review of systems is  negative for headache, vision changes, chest pain, shortness of breath, abdominal pain, urinary sx, bowel changes. Past medical, surgical, and social history reviewed and updated  Medications and allergies reviewed and updated         O: /78   Pulse 75   Temp 98.1 °F (36.7 °C) (Oral)   Resp 8   Ht 5' 9\" (1.753 m)   Wt 277 lb (125.6 kg)   SpO2 100%   Breastfeeding? No   BMI 40.91 kg/m²   GEN: No acute distress, cooperative, well nourished, alert. HEENT: PEERLA, EOMI , normocephalic/atraumatic, nares and oropharynx clear. Mucous membranes normal, Tympanic membranes clear bilaterally. + tender to palpation to L SCM at proximal attachment. No mass, lesion.   No lymph node swelling  Neck: soft, supple, no thyromegaly, mass, no Lymphadenopathy  CV: Regular rate and rhythm, no murmur, rubs, gallops. No edema. Resp: Clear to auscultation bilaterally good air entry bilaterally  No crackles, wheeze. Breathing comfortably. Ext: no clubbing, cyanosis, edema   Psych: mood stable        Current Outpatient Medications   Medication Sig Dispense Refill    predniSONE (DELTASONE) 20 MG tablet Take 3 tabs by mouth po qd x 3d, then 2tabs po qd x 3d, then 1 tab po qd x 3d. 18 tablet 0    norethindrone-ethinyl estradiol (JUNEL FE 1/20) 1-20 MG-MCG per tablet Take 1 tablet by mouth daily 3 packet 3    hyoscyamine (LEVBID) 375 MCG extended release tablet Take 375 mcg by mouth every 12 hours as needed for Cramping      buPROPion (WELLBUTRIN XL) 150 MG extended release tablet TAKE 1 TABLET EVERY MORNING 90 tablet 3    vitamin B-12 (CYANOCOBALAMIN) 1000 MCG tablet Take 1,000 mcg by mouth daily      aspirin 81 MG tablet Take 81 mg by mouth daily       No current facility-administered medications for this visit. ASSESSMENT / PLAN:    1. Strain of sternocleidomastoid muscle, initial encounter  The patient is reassured that these symptoms do not appear to represent a serious or threatening condition. tx with rest, range of motion exercises  Prednisone taper as discussed below  F/u persistent sx    2. New onset headache  D/t above  Monitor with prednisone taper and f/u increased sx    3. Sarcoma of left lower extremity ([address]  Doing well w/o s/s of recurrent, mets  F/u with dr. Perry Conner serial CT scans    4. Crohn's disease of both small and large intestine with intestinal obstruction ([address]  Doing well, stable  F/u with GI as scheduled    5. Liver mass  MRI negative  Will have f/u CT scan in 1-2 months    6.  Elevated blood pressure reading in office with diagnosis of hypertension  Recheck normal           Follow-up appointment:   Call or return to clinic prn if these symptoms worsen or fail to improve as anticipated. Discussed use, benefit, and side effects of all prescribed medications. Barriers to medication compliance addressed. All patient questions answered. Pt voiced understanding. When applicable, patient's outside records were reviewed through [place]. The patient has signed appropriate paperworks/consents.

## 2019-04-09 ENCOUNTER — OFFICE VISIT (OUTPATIENT)
Dept: FAMILY MEDICINE CLINIC | Age: 39
End: 2019-04-09
Payer: COMMERCIAL

## 2019-04-09 VITALS
HEART RATE: 77 BPM | SYSTOLIC BLOOD PRESSURE: 129 MMHG | DIASTOLIC BLOOD PRESSURE: 85 MMHG | RESPIRATION RATE: 12 BRPM | BODY MASS INDEX: 40.32 KG/M2 | TEMPERATURE: 98.2 F | OXYGEN SATURATION: 96 % | WEIGHT: 273 LBS

## 2019-04-09 DIAGNOSIS — B02.9 HERPES ZOSTER WITHOUT COMPLICATION: ICD-10-CM

## 2019-04-09 DIAGNOSIS — F41.1 GENERALIZED ANXIETY DISORDER: ICD-10-CM

## 2019-04-09 DIAGNOSIS — R51.9 LEFT FACIAL PAIN: Primary | ICD-10-CM

## 2019-04-09 PROCEDURE — 1036F TOBACCO NON-USER: CPT | Performed by: FAMILY MEDICINE

## 2019-04-09 PROCEDURE — 99214 OFFICE O/P EST MOD 30 MIN: CPT | Performed by: FAMILY MEDICINE

## 2019-04-09 PROCEDURE — G8427 DOCREV CUR MEDS BY ELIG CLIN: HCPCS | Performed by: FAMILY MEDICINE

## 2019-04-09 PROCEDURE — G8417 CALC BMI ABV UP PARAM F/U: HCPCS | Performed by: FAMILY MEDICINE

## 2019-04-09 RX ORDER — BUPROPION HYDROCHLORIDE 150 MG/1
TABLET ORAL
Qty: 90 TABLET | Refills: 3 | Status: SHIPPED | OUTPATIENT
Start: 2019-04-09 | End: 2019-09-19

## 2019-04-09 RX ORDER — HYOSCYAMINE SULFATE EXTENDED-RELEASE 0.38 MG/1
375 TABLET ORAL EVERY 12 HOURS PRN
Qty: 180 TABLET | Refills: 3 | Status: SHIPPED | OUTPATIENT
Start: 2019-04-09 | End: 2019-09-19

## 2019-04-09 RX ORDER — VALACYCLOVIR HYDROCHLORIDE 1 G/1
1000 TABLET, FILM COATED ORAL 3 TIMES DAILY
Qty: 21 TABLET | Refills: 0 | Status: SHIPPED | OUTPATIENT
Start: 2019-04-09 | End: 2019-09-19

## 2019-04-09 NOTE — PROGRESS NOTES
Here for f/u of neck rash and HA. Pt was seen a few days ago for neck rash and pain to back of head and side of head over insertion of SCM muscle. Pt states that she feels that rash has spread a bit and started directly after visit4/5 and then this AM, felt some facial irritation and discomfort. Now with pain around eye and feels swelling. Vision is off a little bit. Pt did try tylenol without help. Pt did start prednisone and is on day 4, but does not seem to help much at all. No nausea/vomiting but feels poorly. Appetite is low. No sx on R side, everything on the left. Feels sensitivity to the area. No fever, chills. Moderate malaise. On first few days of steroid, felt it was helping around and behind ear. Stabbing pain is better but HA has continued      Except as noted above in the history of present illness, the review of systems is  negative for headache, vision changes, chest pain, shortness of breath, abdominal pain, urinary sx, bowel changes. Past medical, surgical, and social history reviewed and updated  Medications and allergies reviewed and updated         O: /85   Pulse 77   Temp 98.2 °F (36.8 °C) (Oral)   Resp 12   Wt 273 lb (123.8 kg)   SpO2 96%   Breastfeeding? No   BMI 40.32 kg/m²   GEN: No acute distress, cooperative, well nourished, alert. HEENT: PEERLA, EOMI , normocephalic/atraumatic, nares and oropharynx clear. Mucous membranes normal, Tympanic membranes clear bilaterally. L External auditory canals normal.  Minimal swelling around eye w/o focal mass. No active lesions. Top of scalp left with scaly irritated rash w/o discharge. Minimal crusting  Neck: soft, supple, no thyromegaly, mass, no Lymphadenopathy  CV: Regular rate and rhythm, no murmur, rubs, gallops. No edema. Resp: Clear to auscultation bilaterally good air entry bilaterally  No crackles, wheeze. Breathing comfortably.          Current Outpatient Medications   Medication Sig Dispense Refill    hyoscyamine (LEVBID) 375 MCG extended release tablet Take 1 tablet by mouth every 12 hours as needed for Cramping 180 tablet 3    buPROPion (WELLBUTRIN XL) 150 MG extended release tablet TAKE 1 TABLET EVERY MORNING 90 tablet 3    valACYclovir (VALTREX) 1 g tablet Take 1 tablet by mouth 3 times daily 21 tablet 0    predniSONE (DELTASONE) 20 MG tablet Take 3 tabs by mouth po qd x 3d, then 2tabs po qd x 3d, then 1 tab po qd x 3d. 18 tablet 0    norethindrone-ethinyl estradiol (JUNEL FE 1/20) 1-20 MG-MCG per tablet Take 1 tablet by mouth daily 3 packet 3    vitamin B-12 (CYANOCOBALAMIN) 1000 MCG tablet Take 1,000 mcg by mouth daily      aspirin 81 MG tablet Take 81 mg by mouth daily       No current facility-administered medications for this visit. ASSESSMENT / PLAN:    1. Generalized anxiety disorder  Stable w  Current therapy  refills gvien as below  - buPROPion (WELLBUTRIN XL) 150 MG extended release tablet; TAKE 1 TABLET EVERY MORNING  Dispense: 90 tablet; Refill: 3    2. Left facial pain  Concern for shingles d/t neuropathic pain and crusted lesion to scalp. No other vesicles noted  No lesions around eye. tx with supportive therapy and valtrex 1gm TID x 7d  Cont steroid pack  Check bloodwork   Call w/ update in AM and if increased sx or vesicles develop around eye, will need immediate eval by ophth  - CBC Auto Differential; Future  - Sedimentation Rate; Future  - Comprehensive Metabolic Panel; Future    3. Herpes zoster without complication  - CBC Auto Differential; Future  - Sedimentation Rate; Future  - Comprehensive Metabolic Panel; Future           Follow-up appointment:   Pending bloodwork reuslts     Discussed use, benefit, and side effects of all prescribed medications. Barriers to medication compliance addressed. All patient questions answered. Pt voiced understanding. When applicable, patient's outside records were reviewed through [facility].   The patient has signed appropriate

## 2019-04-10 ENCOUNTER — TELEPHONE (OUTPATIENT)
Dept: FAMILY MEDICINE CLINIC | Age: 39
End: 2019-04-10

## 2019-04-10 DIAGNOSIS — B02.9 HERPES ZOSTER WITHOUT COMPLICATION: ICD-10-CM

## 2019-04-10 DIAGNOSIS — R51.9 LEFT FACIAL PAIN: ICD-10-CM

## 2019-04-10 LAB
A/G RATIO: 1.8 (ref 1.1–2.2)
ALBUMIN SERPL-MCNC: 4.8 G/DL (ref 3.4–5)
ALP BLD-CCNC: 69 U/L (ref 40–129)
ALT SERPL-CCNC: 56 U/L (ref 10–40)
ANION GAP SERPL CALCULATED.3IONS-SCNC: 14 MMOL/L (ref 3–16)
AST SERPL-CCNC: 36 U/L (ref 15–37)
BASOPHILS ABSOLUTE: 0 K/UL (ref 0–0.2)
BASOPHILS RELATIVE PERCENT: 0.3 %
BILIRUB SERPL-MCNC: <0.2 MG/DL (ref 0–1)
BUN BLDV-MCNC: 11 MG/DL (ref 7–20)
CALCIUM SERPL-MCNC: 9.9 MG/DL (ref 8.3–10.6)
CHLORIDE BLD-SCNC: 104 MMOL/L (ref 99–110)
CO2: 24 MMOL/L (ref 21–32)
CREAT SERPL-MCNC: 0.8 MG/DL (ref 0.6–1.1)
EOSINOPHILS ABSOLUTE: 0 K/UL (ref 0–0.6)
EOSINOPHILS RELATIVE PERCENT: 0 %
GFR AFRICAN AMERICAN: >60
GFR NON-AFRICAN AMERICAN: >60
GLOBULIN: 2.7 G/DL
GLUCOSE BLD-MCNC: 117 MG/DL (ref 70–99)
HCT VFR BLD CALC: 40.4 % (ref 36–48)
HEMOGLOBIN: 13.4 G/DL (ref 12–16)
LYMPHOCYTES ABSOLUTE: 0.9 K/UL (ref 1–5.1)
LYMPHOCYTES RELATIVE PERCENT: 11 %
MCH RBC QN AUTO: 30.7 PG (ref 26–34)
MCHC RBC AUTO-ENTMCNC: 33.2 G/DL (ref 31–36)
MCV RBC AUTO: 92.5 FL (ref 80–100)
MONOCYTES ABSOLUTE: 0.3 K/UL (ref 0–1.3)
MONOCYTES RELATIVE PERCENT: 2.9 %
NEUTROPHILS ABSOLUTE: 7.3 K/UL (ref 1.7–7.7)
NEUTROPHILS RELATIVE PERCENT: 85.8 %
PDW BLD-RTO: 13.8 % (ref 12.4–15.4)
PLATELET # BLD: 254 K/UL (ref 135–450)
PMV BLD AUTO: 10 FL (ref 5–10.5)
POTASSIUM SERPL-SCNC: 4.3 MMOL/L (ref 3.5–5.1)
RBC # BLD: 4.37 M/UL (ref 4–5.2)
SEDIMENTATION RATE, ERYTHROCYTE: 10 MM/HR (ref 0–20)
SODIUM BLD-SCNC: 142 MMOL/L (ref 136–145)
TOTAL PROTEIN: 7.5 G/DL (ref 6.4–8.2)
WBC # BLD: 8.6 K/UL (ref 4–11)

## 2019-04-10 NOTE — TELEPHONE ENCOUNTER
Status update:  OV 4.9.19  Left eye is blood shot, eyelid is a lot more swollen than it was yesterday  One spot is forming into a blister on bottom part of eyebrow  Pt is taking valtrex

## 2019-04-10 NOTE — TELEPHONE ENCOUNTER
Called Dr. Mayra Rodriguez office   [address] [facility]  2:00 today   Patient advised about appointment     [id number]

## 2019-04-15 RX ORDER — PREDNISONE 20 MG/1
TABLET ORAL
Qty: 18 TABLET | Refills: 0 | Status: SHIPPED | OUTPATIENT
Start: 2019-04-15 | End: 2019-04-25

## 2019-06-18 ENCOUNTER — TELEPHONE (OUTPATIENT)
Dept: FAMILY MEDICINE CLINIC | Age: 39
End: 2019-06-18

## 2019-06-18 DIAGNOSIS — R16.0 LIVER MASS: Primary | ICD-10-CM

## 2019-06-19 ENCOUNTER — HOSPITAL ENCOUNTER (OUTPATIENT)
Dept: MRI IMAGING | Age: 39
Discharge: HOME | End: 2019-06-19
Payer: COMMERCIAL

## 2019-06-19 DIAGNOSIS — R16.0 LIVER MASS: ICD-10-CM

## 2019-06-19 PROCEDURE — 74183 MRI ABD W/O CNTR FLWD CNTR: CPT

## 2019-06-19 PROCEDURE — 6360000004 HC RX CONTRAST MEDICATION: Performed by: FAMILY MEDICINE

## 2019-06-19 PROCEDURE — A9581 GADOXETATE DISODIUM INJ: HCPCS | Performed by: FAMILY MEDICINE

## 2019-06-19 RX ADMIN — GADOXETATE DISODIUM 9 ML: 181.43 INJECTION, SOLUTION INTRAVENOUS at 18:11

## 2019-06-20 DIAGNOSIS — F41.9 ANXIETY: ICD-10-CM

## 2019-06-20 DIAGNOSIS — R16.0 LIVER MASS: Primary | ICD-10-CM

## 2019-06-20 RX ORDER — LORAZEPAM 0.5 MG/1
0.5 TABLET ORAL EVERY 8 HOURS PRN
Qty: 20 TABLET | Refills: 0 | Status: SHIPPED | OUTPATIENT
Start: 2019-06-20 | End: 2019-09-06 | Stop reason: SDUPTHER

## 2019-06-24 ENCOUNTER — OFFICE VISIT (OUTPATIENT)
Dept: FAMILY MEDICINE CLINIC | Age: 39
End: 2019-06-24
Payer: COMMERCIAL

## 2019-06-24 ENCOUNTER — CLINICAL DOCUMENTATION (OUTPATIENT)
Dept: PSYCHOLOGY | Age: 39
End: 2019-06-24

## 2019-06-24 VITALS
RESPIRATION RATE: 20 BRPM | BODY MASS INDEX: 40.54 KG/M2 | SYSTOLIC BLOOD PRESSURE: 126 MMHG | OXYGEN SATURATION: 98 % | DIASTOLIC BLOOD PRESSURE: 86 MMHG | TEMPERATURE: 98.4 F | HEART RATE: 87 BPM | WEIGHT: 274.5 LBS

## 2019-06-24 DIAGNOSIS — R16.0 LIVER MASS: ICD-10-CM

## 2019-06-24 DIAGNOSIS — F41.9 ANXIETY: ICD-10-CM

## 2019-06-24 DIAGNOSIS — R10.12 LUQ ABDOMINAL PAIN: ICD-10-CM

## 2019-06-24 DIAGNOSIS — R16.0 LIVER MASS: Primary | ICD-10-CM

## 2019-06-24 DIAGNOSIS — F41.1 GENERALIZED ANXIETY DISORDER: ICD-10-CM

## 2019-06-24 LAB
A/G RATIO: 1.7 (ref 1.1–2.2)
ALBUMIN SERPL-MCNC: 4.3 G/DL (ref 3.4–5)
ALP BLD-CCNC: 71 U/L (ref 40–129)
ALT SERPL-CCNC: 15 U/L (ref 10–40)
ANION GAP SERPL CALCULATED.3IONS-SCNC: 12 MMOL/L (ref 3–16)
APTT: 29.1 SEC (ref 26–36)
AST SERPL-CCNC: 17 U/L (ref 15–37)
BILIRUB SERPL-MCNC: <0.2 MG/DL (ref 0–1)
BUN BLDV-MCNC: 12 MG/DL (ref 7–20)
CALCIUM SERPL-MCNC: 9.3 MG/DL (ref 8.3–10.6)
CHLORIDE BLD-SCNC: 105 MMOL/L (ref 99–110)
CO2: 23 MMOL/L (ref 21–32)
CREAT SERPL-MCNC: 0.8 MG/DL (ref 0.6–1.1)
GFR AFRICAN AMERICAN: >60
GFR NON-AFRICAN AMERICAN: >60
GLOBULIN: 2.6 G/DL
GLUCOSE BLD-MCNC: 88 MG/DL (ref 70–99)
HCT VFR BLD CALC: 37 % (ref 36–48)
HEMOGLOBIN: 12.4 G/DL (ref 12–16)
INR BLD: 0.91 (ref 0.86–1.14)
MCH RBC QN AUTO: 30.6 PG (ref 26–34)
MCHC RBC AUTO-ENTMCNC: 33.5 G/DL (ref 31–36)
MCV RBC AUTO: 91.2 FL (ref 80–100)
PDW BLD-RTO: 13.8 % (ref 12.4–15.4)
PLATELET # BLD: 250 K/UL (ref 135–450)
PMV BLD AUTO: 9.5 FL (ref 5–10.5)
POTASSIUM SERPL-SCNC: 4.2 MMOL/L (ref 3.5–5.1)
PROTHROMBIN TIME: 10.4 SEC (ref 9.8–13)
RBC # BLD: 4.06 M/UL (ref 4–5.2)
SODIUM BLD-SCNC: 140 MMOL/L (ref 136–145)
TOTAL PROTEIN: 6.9 G/DL (ref 6.4–8.2)
WBC # BLD: 5.7 K/UL (ref 4–11)

## 2019-06-24 PROCEDURE — 1036F TOBACCO NON-USER: CPT | Performed by: FAMILY MEDICINE

## 2019-06-24 PROCEDURE — 99214 OFFICE O/P EST MOD 30 MIN: CPT | Performed by: FAMILY MEDICINE

## 2019-06-24 PROCEDURE — G8417 CALC BMI ABV UP PARAM F/U: HCPCS | Performed by: FAMILY MEDICINE

## 2019-06-24 PROCEDURE — G8427 DOCREV CUR MEDS BY ELIG CLIN: HCPCS | Performed by: FAMILY MEDICINE

## 2019-06-24 RX ORDER — TRAZODONE HYDROCHLORIDE 50 MG/1
50-100 TABLET ORAL NIGHTLY
Qty: 60 TABLET | Refills: 5 | Status: SHIPPED | OUTPATIENT
Start: 2019-06-24 | End: 2019-09-19

## 2019-06-24 NOTE — PROGRESS NOTES
Generalized anxiety disorder  Mild flare of sx d/t stressors, anxiety  Discussed tx options. Will have pt cont ativan TID prn, no refills given today  Trazodone 50-100mg qhs prn insomnia    3. Anxiety  As above    4. LUQ abdominal pain  Exam nonfocal, reviewed MRI and CT of area that are negative  Monitor at this time           Follow-up appointment:   Pending pathology of liver bx    Discussed use, benefit, and side effects of all prescribed medications. Barriers to medication compliance addressed. All patient questions answered. Pt voiced understanding. When applicable, patient's outside records were reviewed through [facility]. The patient has signed appropriate paperworks/consents.

## 2019-06-24 NOTE — PROGRESS NOTES
At PCP's request, this writer provided a warm hand-off to introduce myself and provide information to the patient about [facility] services. Pt has a history of major health issues, including a sarcoma in her leg. Scheduled to get a biopsy of a growing liver mass in 3 days to determine whether the mass is malignant. Pt expressed interest in calling to schedule a [facility] appointment after she gets her biopsy results.

## 2019-06-25 DIAGNOSIS — R73.9 HYPERGLYCEMIA: ICD-10-CM

## 2019-06-25 DIAGNOSIS — R73.9 HYPERGLYCEMIA: Primary | ICD-10-CM

## 2019-06-25 LAB
ESTIMATED AVERAGE GLUCOSE: 114 MG/DL
HBA1C MFR BLD: 5.6 %

## 2019-06-26 LAB — AFP: 3.1 UG/L

## 2019-06-27 ENCOUNTER — HOSPITAL ENCOUNTER (OUTPATIENT)
Dept: CT IMAGING | Age: 39
Discharge: HOME | End: 2019-06-27
Payer: COMMERCIAL

## 2019-06-27 VITALS
OXYGEN SATURATION: 100 % | HEART RATE: 76 BPM | RESPIRATION RATE: 16 BRPM | HEIGHT: 69 IN | SYSTOLIC BLOOD PRESSURE: 122 MMHG | TEMPERATURE: 97.6 F | BODY MASS INDEX: 39.69 KG/M2 | DIASTOLIC BLOOD PRESSURE: 82 MMHG | WEIGHT: 268 LBS

## 2019-06-27 DIAGNOSIS — R16.0 LIVER MASS: ICD-10-CM

## 2019-06-27 LAB
APTT: 30.9 SEC (ref 26–36)
INR BLD: 0.93 (ref 0.86–1.14)
PLATELET # BLD: 227 K/UL (ref 135–450)
PROTHROMBIN TIME: 10.6 SEC (ref 9.8–13)

## 2019-06-27 PROCEDURE — 2709999900 CT NEEDLE BIOPSY LIVER PERCUTANEOUS

## 2019-06-27 PROCEDURE — 88305 TISSUE EXAM BY PATHOLOGIST: CPT

## 2019-06-27 PROCEDURE — 2500000003 HC RX 250 WO HCPCS: Performed by: RADIOLOGY

## 2019-06-27 PROCEDURE — 85730 THROMBOPLASTIN TIME PARTIAL: CPT

## 2019-06-27 PROCEDURE — 36415 COLL VENOUS BLD VENIPUNCTURE: CPT

## 2019-06-27 PROCEDURE — 7100000011 HC PHASE II RECOVERY - ADDTL 15 MIN

## 2019-06-27 PROCEDURE — 7100000010 HC PHASE II RECOVERY - FIRST 15 MIN

## 2019-06-27 PROCEDURE — 88173 CYTOPATH EVAL FNA REPORT: CPT

## 2019-06-27 PROCEDURE — 88360 TUMOR IMMUNOHISTOCHEM/MANUAL: CPT

## 2019-06-27 PROCEDURE — 88341 IMHCHEM/IMCYTCHM EA ADD ANTB: CPT

## 2019-06-27 PROCEDURE — 85610 PROTHROMBIN TIME: CPT

## 2019-06-27 PROCEDURE — 6360000002 HC RX W HCPCS

## 2019-06-27 PROCEDURE — 6360000002 HC RX W HCPCS: Performed by: RADIOLOGY

## 2019-06-27 PROCEDURE — 88307 TISSUE EXAM BY PATHOLOGIST: CPT

## 2019-06-27 PROCEDURE — 85049 AUTOMATED PLATELET COUNT: CPT

## 2019-06-27 PROCEDURE — 6370000000 HC RX 637 (ALT 250 FOR IP): Performed by: RADIOLOGY

## 2019-06-27 PROCEDURE — 88342 IMHCHEM/IMCYTCHM 1ST ANTB: CPT

## 2019-06-27 RX ORDER — MIDAZOLAM HYDROCHLORIDE 1 MG/ML
INJECTION INTRAMUSCULAR; INTRAVENOUS
Status: COMPLETED | OUTPATIENT
Start: 2019-06-27 | End: 2019-06-27

## 2019-06-27 RX ORDER — LIDOCAINE HYDROCHLORIDE 20 MG/ML
INJECTION, SOLUTION EPIDURAL; INFILTRATION; INTRACAUDAL; PERINEURAL
Status: COMPLETED | OUTPATIENT
Start: 2019-06-27 | End: 2019-06-27

## 2019-06-27 RX ORDER — LORAZEPAM 2 MG/ML
INJECTION INTRAMUSCULAR
Status: DISCONTINUED
Start: 2019-06-27 | End: 2019-06-28 | Stop reason: HOSPADM

## 2019-06-27 RX ORDER — FENTANYL CITRATE 50 UG/ML
INJECTION, SOLUTION INTRAMUSCULAR; INTRAVENOUS
Status: COMPLETED | OUTPATIENT
Start: 2019-06-27 | End: 2019-06-27

## 2019-06-27 RX ORDER — MORPHINE SULFATE 2 MG/ML
2 INJECTION, SOLUTION INTRAMUSCULAR; INTRAVENOUS ONCE
Status: DISCONTINUED | OUTPATIENT
Start: 2019-06-27 | End: 2019-06-28 | Stop reason: HOSPADM

## 2019-06-27 RX ORDER — OXYCODONE HYDROCHLORIDE AND ACETAMINOPHEN 5; 325 MG/1; MG/1
1 TABLET ORAL
Status: COMPLETED | OUTPATIENT
Start: 2019-06-27 | End: 2019-06-27

## 2019-06-27 RX ORDER — LORAZEPAM 2 MG/ML
1 INJECTION INTRAMUSCULAR ONCE
Status: COMPLETED | OUTPATIENT
Start: 2019-06-27 | End: 2019-06-27

## 2019-06-27 RX ORDER — MORPHINE SULFATE 2 MG/ML
INJECTION, SOLUTION INTRAMUSCULAR; INTRAVENOUS
Status: COMPLETED
Start: 2019-06-27 | End: 2019-06-27

## 2019-06-27 RX ORDER — ONDANSETRON 2 MG/ML
INJECTION INTRAMUSCULAR; INTRAVENOUS
Status: COMPLETED
Start: 2019-06-27 | End: 2019-06-27

## 2019-06-27 RX ORDER — ONDANSETRON 2 MG/ML
4 INJECTION INTRAMUSCULAR; INTRAVENOUS ONCE
Status: DISCONTINUED | OUTPATIENT
Start: 2019-06-27 | End: 2019-06-28 | Stop reason: HOSPADM

## 2019-06-27 RX ADMIN — LIDOCAINE HYDROCHLORIDE 5 ML: 20 INJECTION, SOLUTION EPIDURAL; INFILTRATION; INTRACAUDAL; PERINEURAL at 09:41

## 2019-06-27 RX ADMIN — LIDOCAINE HYDROCHLORIDE 5 ML: 20 INJECTION, SOLUTION EPIDURAL; INFILTRATION; INTRACAUDAL; PERINEURAL at 09:55

## 2019-06-27 RX ADMIN — LIDOCAINE HYDROCHLORIDE 5 ML: 20 INJECTION, SOLUTION EPIDURAL; INFILTRATION; INTRACAUDAL; PERINEURAL at 09:50

## 2019-06-27 RX ADMIN — ONDANSETRON 4 MG: 2 INJECTION INTRAMUSCULAR; INTRAVENOUS at 12:16

## 2019-06-27 RX ADMIN — FENTANYL CITRATE 25 MCG: 50 INJECTION, SOLUTION INTRAMUSCULAR; INTRAVENOUS at 09:56

## 2019-06-27 RX ADMIN — MIDAZOLAM HYDROCHLORIDE 1 MG: 2 INJECTION, SOLUTION INTRAMUSCULAR; INTRAVENOUS at 09:49

## 2019-06-27 RX ADMIN — MORPHINE SULFATE 2 MG: 2 INJECTION, SOLUTION INTRAMUSCULAR; INTRAVENOUS at 12:17

## 2019-06-27 RX ADMIN — LIDOCAINE HYDROCHLORIDE 5 ML: 20 INJECTION, SOLUTION EPIDURAL; INFILTRATION; INTRACAUDAL; PERINEURAL at 09:47

## 2019-06-27 RX ADMIN — LIDOCAINE HYDROCHLORIDE 5 ML: 20 INJECTION, SOLUTION EPIDURAL; INFILTRATION; INTRACAUDAL; PERINEURAL at 10:08

## 2019-06-27 RX ADMIN — LIDOCAINE HYDROCHLORIDE 5 ML: 20 INJECTION, SOLUTION EPIDURAL; INFILTRATION; INTRACAUDAL; PERINEURAL at 09:54

## 2019-06-27 RX ADMIN — MIDAZOLAM HYDROCHLORIDE 1 MG: 2 INJECTION, SOLUTION INTRAMUSCULAR; INTRAVENOUS at 09:56

## 2019-06-27 RX ADMIN — FENTANYL CITRATE 50 MCG: 50 INJECTION, SOLUTION INTRAMUSCULAR; INTRAVENOUS at 09:31

## 2019-06-27 RX ADMIN — LIDOCAINE HYDROCHLORIDE 5 ML: 20 INJECTION, SOLUTION EPIDURAL; INFILTRATION; INTRACAUDAL; PERINEURAL at 10:06

## 2019-06-27 RX ADMIN — LORAZEPAM 1 MG: 2 INJECTION INTRAMUSCULAR; INTRAVENOUS at 12:32

## 2019-06-27 RX ADMIN — MIDAZOLAM HYDROCHLORIDE 1 MG: 2 INJECTION, SOLUTION INTRAMUSCULAR; INTRAVENOUS at 09:31

## 2019-06-27 RX ADMIN — FENTANYL CITRATE 50 MCG: 50 INJECTION, SOLUTION INTRAMUSCULAR; INTRAVENOUS at 09:49

## 2019-06-27 RX ADMIN — LIDOCAINE HYDROCHLORIDE 5 ML: 20 INJECTION, SOLUTION EPIDURAL; INFILTRATION; INTRACAUDAL; PERINEURAL at 09:52

## 2019-06-27 RX ADMIN — OXYCODONE HYDROCHLORIDE AND ACETAMINOPHEN 1 TABLET: 5; 325 TABLET ORAL at 10:43

## 2019-06-27 ASSESSMENT — PAIN DESCRIPTION - ORIENTATION
ORIENTATION: RIGHT;UPPER
ORIENTATION: ANTERIOR
ORIENTATION: RIGHT;UPPER
ORIENTATION: RIGHT;UPPER
ORIENTATION: MID
ORIENTATION: RIGHT
ORIENTATION: MID
ORIENTATION: RIGHT;UPPER
ORIENTATION: RIGHT
ORIENTATION: MID

## 2019-06-27 ASSESSMENT — PAIN DESCRIPTION - PAIN TYPE
TYPE: ACUTE PAIN
TYPE: ACUTE PAIN
TYPE: ACUTE PAIN;SURGICAL PAIN
TYPE: ACUTE PAIN
TYPE: ACUTE PAIN;SURGICAL PAIN
TYPE: ACUTE PAIN
TYPE: ACUTE PAIN

## 2019-06-27 ASSESSMENT — PAIN SCALES - GENERAL
PAINLEVEL_OUTOF10: 4
PAINLEVEL_OUTOF10: 2
PAINLEVEL_OUTOF10: 6
PAINLEVEL_OUTOF10: 7
PAINLEVEL_OUTOF10: 8
PAINLEVEL_OUTOF10: 4
PAINLEVEL_OUTOF10: 5
PAINLEVEL_OUTOF10: 6
PAINLEVEL_OUTOF10: 4
PAINLEVEL_OUTOF10: 7

## 2019-06-27 ASSESSMENT — PAIN DESCRIPTION - LOCATION
LOCATION: ABDOMEN

## 2019-06-27 ASSESSMENT — PAIN DESCRIPTION - FREQUENCY
FREQUENCY: CONTINUOUS

## 2019-06-27 ASSESSMENT — PAIN DESCRIPTION - DESCRIPTORS
DESCRIPTORS: STABBING;SHOOTING;CONSTANT
DESCRIPTORS: CONSTANT
DESCRIPTORS: CONSTANT;SHOOTING;THROBBING

## 2019-06-27 ASSESSMENT — PAIN - FUNCTIONAL ASSESSMENT
PAIN_FUNCTIONAL_ASSESSMENT: PREVENTS OR INTERFERES SOME ACTIVE ACTIVITIES AND ADLS
PAIN_FUNCTIONAL_ASSESSMENT: PREVENTS OR INTERFERES SOME ACTIVE ACTIVITIES AND ADLS

## 2019-06-27 ASSESSMENT — PAIN DESCRIPTION - PROGRESSION
CLINICAL_PROGRESSION: GRADUALLY IMPROVING
CLINICAL_PROGRESSION: GRADUALLY IMPROVING
CLINICAL_PROGRESSION: GRADUALLY WORSENING

## 2019-06-27 ASSESSMENT — PAIN DESCRIPTION - ONSET
ONSET: ON-GOING

## 2019-06-27 NOTE — H&P
IR  H & P      Patient:  Mani School   :   1980      Relevant patient history reviewed and discussed. CC: Liver mass in need of biopsy    The procedure including risks and benefits was discussed at length with the patient (or designated family member) and all questions were answered. Informed consent to proceed with the procedure was given. Heart : regular rate and rhythm  Lungs : clear, breathing easily  Airway Assessment: Mallampati 2  Condition : stable    Adalgisa Scale: Activity:  2 - Able to move 4 extremities voluntarily on command  Respiration:  2 - Able to breathe deeply and cough freely  Circulation:  2 - BP+/- 20mmHg of normal  Consciousness:  2 - Fully awake  Oxygen Saturation (color):  2 - Able to maintain oxygen saturation >92% on room air      [facility] nurses notes reviewed and agreed. Medications reviewed. Current Outpatient Medications on File Prior to Encounter   Medication Sig Dispense Refill    traZODone (DESYREL) 50 MG tablet Take 1-2 tablets by mouth nightly 60 tablet 5    LORazepam (ATIVAN) 0.5 MG tablet Take 1 tablet by mouth every 8 hours as needed for Anxiety for up to 30 days. 20 tablet 0    hyoscyamine (LEVBID) 375 MCG extended release tablet Take 1 tablet by mouth every 12 hours as needed for Cramping 180 tablet 3    buPROPion (WELLBUTRIN XL) 150 MG extended release tablet TAKE 1 TABLET EVERY MORNING 90 tablet 3    valACYclovir (VALTREX) 1 g tablet Take 1 tablet by mouth 3 times daily 21 tablet 0    norethindrone-ethinyl estradiol (JUNEL FE 1/20) 1-20 MG-MCG per tablet Take 1 tablet by mouth daily 3 packet 3    vitamin B-12 (CYANOCOBALAMIN) 1000 MCG tablet Take 1,000 mcg by mouth daily      aspirin 81 MG tablet Take 81 mg by mouth daily       No current facility-administered medications on file prior to encounter. Allergies:    Allergies   Allergen Reactions    Nsaids      Pt has Crohn's    Topamax [Topiramate] Hives, Itching and Rash     Sedation : Moderate sedation planned  ASA 1 - Normal health patient

## 2019-06-27 NOTE — PROGRESS NOTES
No drainage noted on drsg D&I and pt states 'feeling better at 2 on pain scale of 1-10'. Notified Roxene Early in radiology and states Dr. Mele oBrrero says 'Ok to DC patient' with verbalization of understanding of pt and family. Removed SL per protocol with catheter intact for discharge after pt voids QS X1 with assist. .

## 2019-06-27 NOTE — PROGRESS NOTES
Pt resting with full easy respirations in bed with side rails up, spouse in [facility] in front to take a break. Deshawn to return after next case to evaluate pt for discharge.

## 2019-06-27 NOTE — PROGRESS NOTES
Pt called out c/o nausea then crying c/o pain 7/10 mid abd and rt over drsg  drsg dry Radiology nurse and DR Michael Morrow called  Lips pale IV fluids started LR O2 started 2 L

## 2019-06-27 NOTE — PROGRESS NOTES
Thrashing in Pain states she is going to pass out  IV fluids open wide in contact with DR Manjinder Ferreira

## 2019-06-27 NOTE — PROGRESS NOTES
Ativan given as ordered more calm lips pink 02 at 3 L 139/87 hr 60 38530%     More relaxed pain 6 /10

## 2019-06-27 NOTE — FLOWSHEET NOTE
Patient arrived alert and orientated x 4, ambulatory, steady gait, breathing easily on room air, denies pain. Spoke to Dr. Lazaro Mistry prior to procedure. Labs and medications reviewed. Tolerated procedure well, Liver biopsy done, dsd applied,no bleeding at site, breathing easily on room air. Report called to [facility] and patient transported in stable condition to \[facility]".     Sedation: Versed:3 mg  Fentanyl: 125 mcg

## 2019-06-27 NOTE — BRIEF OP NOTE
Brief Postoperative Note    Bayron Howard  YOB: 1980  [id number]    Pre-operative Diagnosis: Liver mass    Post-operative Diagnosis: Same    Procedure: CT guided liver mass biopsy    Anesthesia: Moderate    Surgeons/Assistants: Teo Boles    Estimated Blood Loss: Minimal    Complications: none    Specimens: were obtained      Teo Boles MD  6/27/2019

## 2019-07-12 ENCOUNTER — OFFICE VISIT (OUTPATIENT)
Dept: SURGERY | Age: 39
End: 2019-07-12
Payer: COMMERCIAL

## 2019-07-12 VITALS
OXYGEN SATURATION: 98 % | HEART RATE: 96 BPM | HEIGHT: 69 IN | BODY MASS INDEX: 40.88 KG/M2 | TEMPERATURE: 97.9 F | SYSTOLIC BLOOD PRESSURE: 110 MMHG | WEIGHT: 276 LBS | DIASTOLIC BLOOD PRESSURE: 85 MMHG

## 2019-07-12 DIAGNOSIS — C49.22 SARCOMA OF LEFT LOWER EXTREMITY (HCC): Primary | ICD-10-CM

## 2019-07-12 DIAGNOSIS — C78.7 LIVER METASTASIS (HCC): ICD-10-CM

## 2019-07-12 DIAGNOSIS — R19.06 EPIGASTRIC MASS: ICD-10-CM

## 2019-07-12 PROCEDURE — 99245 OFF/OP CONSLTJ NEW/EST HI 55: CPT | Performed by: SURGERY

## 2019-07-12 PROCEDURE — G8427 DOCREV CUR MEDS BY ELIG CLIN: HCPCS | Performed by: SURGERY

## 2019-07-12 PROCEDURE — G8417 CALC BMI ABV UP PARAM F/U: HCPCS | Performed by: SURGERY

## 2019-07-12 NOTE — LETTER
[facility]) Surgical Oncology and General Surgery   [phone]   [id number] MD Adalberto    SURGERY ORDER --  2019    12:00pm       Facility:  [facility].  # _________________                                  Scheduled by: ____________    Surgery Date & Time:    2019    9:00am                                                             Pt arrival:   7:00am     Patient Name: Abena Muniz             :                1980           PCP:                 Yong Botello MD   Home Ph:         [phone] (home)                                                     PROCEDURE: Robotic assisted partial liver resection and abdominal mass resection possible open             CPT - 01360, 51433    DIAGNOSIS: Liver metastasis, abdominal mass            C78.7, R19.00    Anesthesia: _GA_ Time Needed: _5 hrs_Pt Position: _supine_         Outpatient __ Admit _ICU_  Assist._____  Pre-Op H & P to be done by: _PCP__      Labs Needed: CBC [x] PT/PTT [x]  INR [x] CMP [x] EKG [x] CXR [x] Type & screen [x]     Cardiac Clearance ___  (if Riya Paiz to be done by) __________________    Medications to be stopped 5 days before surgery:  _________    Additional/Special Orders: Arterial line, central venous catheter, ? epidural  Ancef 3 gm IV aysha Travis MD  2019 10:47 AM

## 2019-07-12 NOTE — PROGRESS NOTES
Eyes: EOM are normal. Pupils are equal, round, and no icterus. Neck: Normal range of motion. Neck supple. No thyromegaly present. Cardiovascular: Normal rate and regular rhythm by peripheral pulses. Pulmonary/Chest: No respiratory distress. Bilateral symmetrical chest rise. Abdominal: Soft. She exhibits no distension and no mass. There is no hepatosplenomegaly. No tenderness. Incisional scar present. Extremities: She exhibits no bilateral edema. Lymphadenopathy: She has no bilateral cervical adenopathy. Neurological: Grossly intact motor and sensory exam.   Skin: Skin is warm and dry. Psychiatric: She has a normal mood and affect. Appropriate thought process and judgement capacity. CT Chest 6/7/19:   No evidence of metastatic disease in the chest.    Rounded low-attenuation focus in the dome of the liver measuring 5.7 x 4.9 cm most consistent with metastatic disease    MRI Abdomen 6/19/19:     1. Increasing size of enhancing mass in the dome of the liver. Enhancement characteristics and increased size, evidence of metastasis.     2. Tissue sampling or biopsy is suggested. Triangulation using ultrasound or computed tomography may be feasible due to the location of this lesion in the dome of the liver    Cytology 6/27/19 FINAL DIAGNOSIS:    Liver Fine Needle Aspiration:  Atypical cell population present in cell block specimen. Pathology Final Diagnosis:  Core biopsy, liver lesion:     - Involved with metastatic poorly differentiated malignancy. 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 Reviewed all the imaging. I also noted a small mass in peritoneum on my review of MRI. Patient is young and other wise healthy. Thus it is appropriate to be aggressive in treatment approach.  Option of going for up front chemo and radiation discussed. I explained her about laparoscopic and open Liver resection. Printed information was given. All the complications including bleeding, infection, clot's, pneumonia, heart attack and stroke were explained. Also discussed about Therasphere treatment. All the questions of the patient are answered to her apparent satisfaction. Patient verbalized understanding of the management plan. Pramod Ge has an appointment at the [address] [place] [address],[address] on 7/29/19. I will review images with radiologist.   Follow up with Dr Elle Garland. Follow up with Dr Sathish Bajwa.     Jose Bautista MD  Surgical Oncologist

## 2019-07-15 ENCOUNTER — TELEPHONE (OUTPATIENT)
Dept: SURGERY | Age: 39
End: 2019-07-15

## 2019-07-15 NOTE — TELEPHONE ENCOUNTER
Surgery scheduled for 7-22-19. Patient instructed to take dulcolax 2 days prior and the day prior to surgery. Instructions emailed as well. Forms for patients employer have been completed and faxed as requested.

## 2019-07-17 ENCOUNTER — TELEPHONE (OUTPATIENT)
Dept: FAMILY MEDICINE CLINIC | Age: 39
End: 2019-07-17

## 2019-07-18 ENCOUNTER — HOSPITAL ENCOUNTER (OUTPATIENT)
Dept: CT IMAGING | Age: 39
Discharge: HOME | End: 2019-07-18
Payer: COMMERCIAL

## 2019-07-18 DIAGNOSIS — C49.22 SARCOMA OF LEFT LOWER EXTREMITY (HCC): ICD-10-CM

## 2019-07-18 DIAGNOSIS — R19.06 EPIGASTRIC MASS: ICD-10-CM

## 2019-07-18 DIAGNOSIS — C78.7 LIVER METASTASIS (HCC): ICD-10-CM

## 2019-07-18 PROCEDURE — 74178 CT ABD&PLV WO CNTR FLWD CNTR: CPT

## 2019-07-18 PROCEDURE — 6360000004 HC RX CONTRAST MEDICATION: Performed by: SURGERY

## 2019-07-18 RX ADMIN — IOPAMIDOL 80 ML: 755 INJECTION, SOLUTION INTRAVENOUS at 15:55

## 2019-07-19 ENCOUNTER — OFFICE VISIT (OUTPATIENT)
Dept: SURGERY | Age: 39
End: 2019-07-19
Payer: COMMERCIAL

## 2019-07-19 ENCOUNTER — APPOINTMENT (OUTPATIENT)
Dept: CT IMAGING | Age: 39
DRG: 406 | End: 2019-07-19
Payer: COMMERCIAL

## 2019-07-19 ENCOUNTER — HOSPITAL ENCOUNTER (OUTPATIENT)
Dept: PREADMISSION TESTING | Age: 39
Discharge: HOME | DRG: 406 | End: 2019-07-23
Payer: COMMERCIAL

## 2019-07-19 ENCOUNTER — HOSPITAL ENCOUNTER (OUTPATIENT)
Dept: GENERAL RADIOLOGY | Age: 39
Discharge: HOME | DRG: 406 | End: 2019-07-19
Payer: COMMERCIAL

## 2019-07-19 VITALS
TEMPERATURE: 97.8 F | BODY MASS INDEX: 39.87 KG/M2 | WEIGHT: 270 LBS | DIASTOLIC BLOOD PRESSURE: 80 MMHG | OXYGEN SATURATION: 99 % | SYSTOLIC BLOOD PRESSURE: 135 MMHG | HEART RATE: 84 BPM

## 2019-07-19 DIAGNOSIS — C78.7 LIVER METASTASIS (HCC): Primary | ICD-10-CM

## 2019-07-19 LAB
A/G RATIO: 1.4 (ref 1.1–2.2)
ABO/RH: NORMAL
ALBUMIN SERPL-MCNC: 4.2 G/DL (ref 3.4–5)
ALP BLD-CCNC: 74 U/L (ref 40–129)
ALT SERPL-CCNC: 16 U/L (ref 10–40)
ANION GAP SERPL CALCULATED.3IONS-SCNC: 12 MMOL/L (ref 3–16)
ANTIBODY SCREEN: NORMAL
APTT: 26.9 SEC (ref 26–36)
AST SERPL-CCNC: 17 U/L (ref 15–37)
BASOPHILS ABSOLUTE: 0 K/UL (ref 0–0.2)
BASOPHILS RELATIVE PERCENT: 1 %
BILIRUB SERPL-MCNC: <0.2 MG/DL (ref 0–1)
BUN BLDV-MCNC: 12 MG/DL (ref 7–20)
CALCIUM SERPL-MCNC: 9 MG/DL (ref 8.3–10.6)
CHLORIDE BLD-SCNC: 105 MMOL/L (ref 99–110)
CO2: 22 MMOL/L (ref 21–32)
CREAT SERPL-MCNC: 0.7 MG/DL (ref 0.6–1.1)
EKG ATRIAL RATE: 74 BPM
EKG DIAGNOSIS: NORMAL
EKG P AXIS: 54 DEGREES
EKG P-R INTERVAL: 144 MS
EKG Q-T INTERVAL: 392 MS
EKG QRS DURATION: 84 MS
EKG QTC CALCULATION (BAZETT): 435 MS
EKG R AXIS: 67 DEGREES
EKG T AXIS: 54 DEGREES
EKG VENTRICULAR RATE: 74 BPM
EOSINOPHILS ABSOLUTE: 0 K/UL (ref 0–0.6)
EOSINOPHILS RELATIVE PERCENT: 0.4 %
GFR AFRICAN AMERICAN: >60
GFR NON-AFRICAN AMERICAN: >60
GLOBULIN: 3 G/DL
GLUCOSE BLD-MCNC: 94 MG/DL (ref 70–99)
HCT VFR BLD CALC: 38.1 % (ref 36–48)
HEMOGLOBIN: 12.4 G/DL (ref 12–16)
INR BLD: 0.94 (ref 0.86–1.14)
LYMPHOCYTES ABSOLUTE: 1.5 K/UL (ref 1–5.1)
LYMPHOCYTES RELATIVE PERCENT: 30.1 %
MCH RBC QN AUTO: 29.1 PG (ref 26–34)
MCHC RBC AUTO-ENTMCNC: 32.4 G/DL (ref 31–36)
MCV RBC AUTO: 89.9 FL (ref 80–100)
MONOCYTES ABSOLUTE: 0.3 K/UL (ref 0–1.3)
MONOCYTES RELATIVE PERCENT: 5.9 %
NEUTROPHILS ABSOLUTE: 3 K/UL (ref 1.7–7.7)
NEUTROPHILS RELATIVE PERCENT: 62.6 %
PDW BLD-RTO: 13.8 % (ref 12.4–15.4)
PLATELET # BLD: 249 K/UL (ref 135–450)
PMV BLD AUTO: 8.7 FL (ref 5–10.5)
POTASSIUM SERPL-SCNC: 4.2 MMOL/L (ref 3.5–5.1)
PROTHROMBIN TIME: 10.7 SEC (ref 9.8–13)
RBC # BLD: 4.24 M/UL (ref 4–5.2)
SODIUM BLD-SCNC: 139 MMOL/L (ref 136–145)
TOTAL PROTEIN: 7.2 G/DL (ref 6.4–8.2)
WBC # BLD: 4.9 K/UL (ref 4–11)

## 2019-07-19 PROCEDURE — 99215 OFFICE O/P EST HI 40 MIN: CPT | Performed by: SURGERY

## 2019-07-19 PROCEDURE — G8427 DOCREV CUR MEDS BY ELIG CLIN: HCPCS | Performed by: SURGERY

## 2019-07-19 PROCEDURE — 71046 X-RAY EXAM CHEST 2 VIEWS: CPT

## 2019-07-19 PROCEDURE — 85025 COMPLETE CBC W/AUTO DIFF WBC: CPT

## 2019-07-19 PROCEDURE — 86901 BLOOD TYPING SEROLOGIC RH(D): CPT

## 2019-07-19 PROCEDURE — 1036F TOBACCO NON-USER: CPT | Performed by: SURGERY

## 2019-07-19 PROCEDURE — 86850 RBC ANTIBODY SCREEN: CPT

## 2019-07-19 PROCEDURE — 86900 BLOOD TYPING SEROLOGIC ABO: CPT

## 2019-07-19 PROCEDURE — 85730 THROMBOPLASTIN TIME PARTIAL: CPT

## 2019-07-19 PROCEDURE — 80053 COMPREHEN METABOLIC PANEL: CPT

## 2019-07-19 PROCEDURE — G8417 CALC BMI ABV UP PARAM F/U: HCPCS | Performed by: SURGERY

## 2019-07-19 PROCEDURE — 93005 ELECTROCARDIOGRAM TRACING: CPT | Performed by: SURGERY

## 2019-07-19 PROCEDURE — 85610 PROTHROMBIN TIME: CPT

## 2019-07-19 PROCEDURE — 93010 ELECTROCARDIOGRAM REPORT: CPT | Performed by: INTERNAL MEDICINE

## 2019-07-19 NOTE — PROGRESS NOTES
surgery. May have 8 ounces of water 4 hours prior to surgery (exception would be medication instructions below only)     5. MEDICATIONS    Take the following medications with a SMALL sip of water: Wellbutrin, Junelle, Ativan   Use your usual dose of inhalers the morning of surgery. BRING your rescue inhaler with you to hospital.    Anesthesia does NOT want you to take insulin the morning of surgery. They will control your blood sugar while you are at the hospital. Please contact your ordering physician for instructions regarding your insulin the night before your procedure. If you have an insulin pump, please keep it set on basal rate. 6. Do not swallow water when brushing teeth. No gum, candy, mints or ice chips. Refrain from smoking or at least decrease the amount. 7. Dress in loose, comfortable clothing appropriate for redressing after your procedure. Do not wear jewelry (including body piercings), make-up (especially NO eye make-up), fingernail polish (NO toenail polish if foot/leg surgery), lotion, powders or metal hairclips. 8. Dentures, glasses, or contacts will need to be removed before your procedure. Bring cases for your glasses, contacts, dentures, or hearing aids to protect them while you are in surgery. 9. If you use a CPAP, please bring it with you on the day of your procedure. 10. We recommend that valuable personal  belongings, such as cash, cell phones, e-tablets or jewelry, be left at home during your stay. The hospital will not be responsible for valuables that are not secured in the hospital safe. However, if your insurance requires a co-pay, you may want to bring a method of payment, i.e. Check or credit card, if you wish to pay your co-pay the day of surgery. 11. If you are to stay overnight, you may bring a bag with personal items. Please have any large items you may need brought in by your family after your arrival to your hospital room.     12. If you have a Living Will or Durable Power of , please bring a copy on the day of your procedure. 15.  With your permission, one family member may accompany you while you are being prepared for surgery. Once you are ready, additional family members may join you. HOW WE KEEP YOU SAFE and WORK TO PREVENT SURGICAL SITE INFECTIONS:  1. Health care workers should always check your ID bracelet to verify your name and birth date. You will be asked many times to state your name, date of birth, and allergies. 2. Health care workers should always clean their hands with soap or alcohol gel before providing care to you. It is okay to ask anyone if they cleaned their hands before they touch you. 3. You will be actively involved in verifying the type of procedure you are having and ensuring the correct surgical site. This will be confirmed multiple times prior to your procedure. Do NOT flaca your surgery site UNLESS instructed to by your surgeon. 4. Do not shave or wax for 72 hours prior to procedure near your operative site. Shaving with a razor can irritate your skin and make it easier to develop an infection. On the day of your procedure, any hair that needs to be removed near the surgical site will be clipped by a healthcare worker using a special clippers designed to avoid skin irritation. 5. When you are in the operating room, your surgical site will be cleansed with a special soap, and in most cases, you will be given an antibiotic before the surgery begins. What to expect AFTER YOUR PROCEDURE:  1. Immediately following your procedure, your will be taken to the PACU for the first phase of your recovery. Your nurse will help you recover from any potential side effects of anesthesia, such as extreme drowsiness, changes in your vital signs or breathing patterns. Nausea, headache, muscle aches, or sore throat may also occur after anesthesia.   Your nurse will help you manage these potential side

## 2019-07-19 NOTE — PROGRESS NOTES
Bud Nunez is here to review her CT scan from yesterday and to discuss her upcoming surgery. No new medical issue. Denies jaundice or other liver issues currently. No other complaints. Review of systems is otherwise negative    Past medical history, Past surgical history, Social history, Family history and allergies reviewed and updated. Vitals:    07/19/19 1006   BP: 135/80   Pulse: 84   Temp: 97.8 °F (36.6 °C)   TempSrc: Oral   SpO2: 99%   Weight: 270 lb (122.5 kg)     Wt Readings from Last 3 Encounters:   07/19/19 270 lb (122.5 kg)   07/12/19 276 lb (125.2 kg)   06/27/19 268 lb (121.6 kg)     Body mass index is 39.87 kg/m². O/E:   Constitutional: Patient is oriented to person, place, and time. No distress. HENT: mucus membranes - moist. No scleral icterus. Neck: Supple and normal range of motion. No lymphadenopathy present. Pulmonary/Chest: Effort normal. No respiratory distress. Abdominal: Soft. No distension and no mass. No tenderness. No Hepatosplenomegaly. Extremities: no edema and no tenderness. Neurological: Grossly intact motor and sensory exam  Skin: Skin is warm and dry. No rash noted. She is not diaphoretic. Psychiatric: She has a normal mood and affect. Her behavior is normal. Judgment and thought content normal.    CT ABD/Pelvis 7/18/19:    Interval increase in size of the hypoenhancing mass within the hepatic dome now measuring 6.7 x 7.7 cm.     2 additional enhancing hepatic lesions identified only on the arterial portion of the exam without definite MR correlate, indeterminate.     Stable soft tissue nodule within the omental fat just inferior to the lateral left hepatic lobe suspicious for metastatic disease.     No additional suspicious findings. A/P:    Diagnosis Orders   1. Liver metastasis ([address]       After last office visit of pt, I reviewed MRI again and noted to have possible additional liver lesions. A CT was done and I again reviewed all the imaging.  There appears

## 2019-07-22 ENCOUNTER — APPOINTMENT (OUTPATIENT)
Dept: ULTRASOUND IMAGING | Age: 39
DRG: 406 | End: 2019-07-22
Attending: SURGERY
Payer: COMMERCIAL

## 2019-07-22 ENCOUNTER — HOSPITAL ENCOUNTER (INPATIENT)
Age: 39
LOS: 5 days | Discharge: HOME | DRG: 406 | End: 2019-07-27
Attending: SURGERY | Admitting: SURGERY
Payer: COMMERCIAL

## 2019-07-22 ENCOUNTER — ANESTHESIA EVENT (OUTPATIENT)
Dept: OPERATING ROOM | Age: 39
DRG: 406 | End: 2019-07-22
Payer: COMMERCIAL

## 2019-07-22 ENCOUNTER — ANESTHESIA (OUTPATIENT)
Dept: OPERATING ROOM | Age: 39
DRG: 406 | End: 2019-07-22
Payer: COMMERCIAL

## 2019-07-22 ENCOUNTER — APPOINTMENT (OUTPATIENT)
Dept: GENERAL RADIOLOGY | Age: 39
DRG: 406 | End: 2019-07-22
Attending: SURGERY
Payer: COMMERCIAL

## 2019-07-22 VITALS — TEMPERATURE: 98.4 F | DIASTOLIC BLOOD PRESSURE: 58 MMHG | OXYGEN SATURATION: 100 % | SYSTOLIC BLOOD PRESSURE: 123 MMHG

## 2019-07-22 DIAGNOSIS — R16.0 LIVER MASS: Primary | ICD-10-CM

## 2019-07-22 DIAGNOSIS — R19.00 ABDOMINAL MASS, UNSPECIFIED ABDOMINAL LOCATION: ICD-10-CM

## 2019-07-22 LAB
ABO/RH: NORMAL
ALBUMIN SERPL-MCNC: 4.1 G/DL (ref 3.4–5)
ALP BLD-CCNC: 68 U/L (ref 40–129)
ALT SERPL-CCNC: 157 U/L (ref 10–40)
ANION GAP SERPL CALCULATED.3IONS-SCNC: 9 MMOL/L (ref 3–16)
ANTIBODY SCREEN: NORMAL
AST SERPL-CCNC: 241 U/L (ref 15–37)
BASOPHILS ABSOLUTE: 0 K/UL (ref 0–0.2)
BASOPHILS RELATIVE PERCENT: 0 %
BILIRUB SERPL-MCNC: 0.5 MG/DL (ref 0–1)
BILIRUBIN DIRECT: 0.4 MG/DL (ref 0–0.3)
BILIRUBIN, INDIRECT: 0.1 MG/DL (ref 0–1)
BUN BLDV-MCNC: 7 MG/DL (ref 7–20)
CALCIUM SERPL-MCNC: 7.7 MG/DL (ref 8.3–10.6)
CHLORIDE BLD-SCNC: 111 MMOL/L (ref 99–110)
CO2: 20 MMOL/L (ref 21–32)
CREAT SERPL-MCNC: 0.7 MG/DL (ref 0.6–1.1)
EOSINOPHILS ABSOLUTE: 0 K/UL (ref 0–0.6)
EOSINOPHILS RELATIVE PERCENT: 0 %
GFR AFRICAN AMERICAN: >60
GFR NON-AFRICAN AMERICAN: >60
GLUCOSE BLD-MCNC: 152 MG/DL (ref 70–99)
HCT VFR BLD CALC: 33.7 % (ref 36–48)
HEMOGLOBIN: 10.8 G/DL (ref 12–16)
INR BLD: 1.02 (ref 0.86–1.14)
LACTIC ACID: 2 MMOL/L (ref 0.4–2)
LYMPHOCYTES ABSOLUTE: 0.5 K/UL (ref 1–5.1)
LYMPHOCYTES RELATIVE PERCENT: 4 %
MAGNESIUM: 1.8 MG/DL (ref 1.8–2.4)
MCH RBC QN AUTO: 29.5 PG (ref 26–34)
MCHC RBC AUTO-ENTMCNC: 32.1 G/DL (ref 31–36)
MCV RBC AUTO: 91.7 FL (ref 80–100)
MONOCYTES ABSOLUTE: 0.4 K/UL (ref 0–1.3)
MONOCYTES RELATIVE PERCENT: 3.2 %
NEUTROPHILS ABSOLUTE: 11.3 K/UL (ref 1.7–7.7)
NEUTROPHILS RELATIVE PERCENT: 92.8 %
PDW BLD-RTO: 13.9 % (ref 12.4–15.4)
PHOSPHORUS: 4.1 MG/DL (ref 2.5–4.9)
PLATELET # BLD: 208 K/UL (ref 135–450)
PMV BLD AUTO: 9 FL (ref 5–10.5)
POTASSIUM SERPL-SCNC: 4.7 MMOL/L (ref 3.5–5.1)
PREGNANCY, URINE: NEGATIVE
PROTHROMBIN TIME: 11.6 SEC (ref 9.8–13)
RBC # BLD: 3.67 M/UL (ref 4–5.2)
SODIUM BLD-SCNC: 140 MMOL/L (ref 136–145)
TOTAL PROTEIN: 6.2 G/DL (ref 6.4–8.2)
WBC # BLD: 12.2 K/UL (ref 4–11)

## 2019-07-22 PROCEDURE — 7100000001 HC PACU RECOVERY - ADDTL 15 MIN: Performed by: SURGERY

## 2019-07-22 PROCEDURE — 49329 UNLSTD LAPS PX ABD PERTM&OMN: CPT | Performed by: SURGERY

## 2019-07-22 PROCEDURE — 85025 COMPLETE CBC W/AUTO DIFF WBC: CPT

## 2019-07-22 PROCEDURE — 80069 RENAL FUNCTION PANEL: CPT

## 2019-07-22 PROCEDURE — 6360000002 HC RX W HCPCS

## 2019-07-22 PROCEDURE — 47379 UNLISTED LAPS PX LIVER: CPT | Performed by: SURGERY

## 2019-07-22 PROCEDURE — 71045 X-RAY EXAM CHEST 1 VIEW: CPT

## 2019-07-22 PROCEDURE — 6360000002 HC RX W HCPCS: Performed by: ANESTHESIOLOGY

## 2019-07-22 PROCEDURE — 6360000002 HC RX W HCPCS: Performed by: NURSE ANESTHETIST, CERTIFIED REGISTERED

## 2019-07-22 PROCEDURE — 88305 TISSUE EXAM BY PATHOLOGIST: CPT

## 2019-07-22 PROCEDURE — 2580000003 HC RX 258: Performed by: ANESTHESIOLOGY

## 2019-07-22 PROCEDURE — 76998 US GUIDE INTRAOP: CPT | Performed by: SURGERY

## 2019-07-22 PROCEDURE — 3600000019 HC SURGERY ROBOT ADDTL 15MIN: Performed by: SURGERY

## 2019-07-22 PROCEDURE — 6360000002 HC RX W HCPCS: Performed by: SURGERY

## 2019-07-22 PROCEDURE — 2709999900 HC NON-CHARGEABLE SUPPLY: Performed by: SURGERY

## 2019-07-22 PROCEDURE — 02HV33Z INSERTION OF INFUSION DEVICE INTO SUPERIOR VENA CAVA, PERCUTANEOUS APPROACH: ICD-10-PCS | Performed by: ANESTHESIOLOGY

## 2019-07-22 PROCEDURE — 86901 BLOOD TYPING SEROLOGIC RH(D): CPT

## 2019-07-22 PROCEDURE — 2500000003 HC RX 250 WO HCPCS: Performed by: ANESTHESIOLOGY

## 2019-07-22 PROCEDURE — 2720000010 HC SURG SUPPLY STERILE: Performed by: SURGERY

## 2019-07-22 PROCEDURE — 83605 ASSAY OF LACTIC ACID: CPT

## 2019-07-22 PROCEDURE — 2500000003 HC RX 250 WO HCPCS: Performed by: SURGERY

## 2019-07-22 PROCEDURE — 3700000001 HC ADD 15 MINUTES (ANESTHESIA): Performed by: SURGERY

## 2019-07-22 PROCEDURE — 85610 PROTHROMBIN TIME: CPT

## 2019-07-22 PROCEDURE — S2900 ROBOTIC SURGICAL SYSTEM: HCPCS | Performed by: SURGERY

## 2019-07-22 PROCEDURE — 0FB24ZZ EXCISION OF LEFT LOBE LIVER, PERCUTANEOUS ENDOSCOPIC APPROACH: ICD-10-PCS | Performed by: SURGERY

## 2019-07-22 PROCEDURE — 88331 PATH CONSLTJ SURG 1 BLK 1SPC: CPT

## 2019-07-22 PROCEDURE — 2500000003 HC RX 250 WO HCPCS: Performed by: NURSE ANESTHETIST, CERTIFIED REGISTERED

## 2019-07-22 PROCEDURE — 2000000000 HC ICU R&B

## 2019-07-22 PROCEDURE — 0FB14ZZ EXCISION OF RIGHT LOBE LIVER, PERCUTANEOUS ENDOSCOPIC APPROACH: ICD-10-PCS | Performed by: SURGERY

## 2019-07-22 PROCEDURE — 80076 HEPATIC FUNCTION PANEL: CPT

## 2019-07-22 PROCEDURE — 2580000003 HC RX 258: Performed by: NURSE ANESTHETIST, CERTIFIED REGISTERED

## 2019-07-22 PROCEDURE — 3700000000 HC ANESTHESIA ATTENDED CARE: Performed by: SURGERY

## 2019-07-22 PROCEDURE — 88342 IMHCHEM/IMCYTCHM 1ST ANTB: CPT

## 2019-07-22 PROCEDURE — P9041 ALBUMIN (HUMAN),5%, 50ML: HCPCS | Performed by: NURSE ANESTHETIST, CERTIFIED REGISTERED

## 2019-07-22 PROCEDURE — 83735 ASSAY OF MAGNESIUM: CPT

## 2019-07-22 PROCEDURE — 84703 CHORIONIC GONADOTROPIN ASSAY: CPT

## 2019-07-22 PROCEDURE — 86900 BLOOD TYPING SEROLOGIC ABO: CPT

## 2019-07-22 PROCEDURE — 2780000010 HC IMPLANT OTHER: Performed by: SURGERY

## 2019-07-22 PROCEDURE — 86923 COMPATIBILITY TEST ELECTRIC: CPT

## 2019-07-22 PROCEDURE — 88313 SPECIAL STAINS GROUP 2: CPT

## 2019-07-22 PROCEDURE — 76705 ECHO EXAM OF ABDOMEN: CPT

## 2019-07-22 PROCEDURE — 6360000002 HC RX W HCPCS: Performed by: STUDENT IN AN ORGANIZED HEALTH CARE EDUCATION/TRAINING PROGRAM

## 2019-07-22 PROCEDURE — 0DBW4ZZ EXCISION OF PERITONEUM, PERCUTANEOUS ENDOSCOPIC APPROACH: ICD-10-PCS | Performed by: SURGERY

## 2019-07-22 PROCEDURE — 2580000003 HC RX 258: Performed by: STUDENT IN AN ORGANIZED HEALTH CARE EDUCATION/TRAINING PROGRAM

## 2019-07-22 PROCEDURE — 88307 TISSUE EXAM BY PATHOLOGIST: CPT

## 2019-07-22 PROCEDURE — 3600000009 HC SURGERY ROBOT BASE: Performed by: SURGERY

## 2019-07-22 PROCEDURE — 2580000003 HC RX 258: Performed by: SURGERY

## 2019-07-22 PROCEDURE — 8E0W4CZ ROBOTIC ASSISTED PROCEDURE OF TRUNK REGION, PERCUTANEOUS ENDOSCOPIC APPROACH: ICD-10-PCS | Performed by: SURGERY

## 2019-07-22 PROCEDURE — 6370000000 HC RX 637 (ALT 250 FOR IP): Performed by: SURGERY

## 2019-07-22 PROCEDURE — 88341 IMHCHEM/IMCYTCHM EA ADD ANTB: CPT

## 2019-07-22 PROCEDURE — 7100000000 HC PACU RECOVERY - FIRST 15 MIN: Performed by: SURGERY

## 2019-07-22 PROCEDURE — 49905 OMENTAL FLAP INTRA-ABDOM: CPT | Performed by: SURGERY

## 2019-07-22 PROCEDURE — 86850 RBC ANTIBODY SCREEN: CPT

## 2019-07-22 DEVICE — SEALANT HEMSTAT 5ML HUM FIBRIN THROM 2 VI APPL DEV EVICEL: Type: IMPLANTABLE DEVICE | Site: ABDOMEN | Status: FUNCTIONAL

## 2019-07-22 RX ORDER — ONDANSETRON 2 MG/ML
INJECTION INTRAMUSCULAR; INTRAVENOUS PRN
Status: DISCONTINUED | OUTPATIENT
Start: 2019-07-22 | End: 2019-07-22 | Stop reason: SDUPTHER

## 2019-07-22 RX ORDER — MIDAZOLAM HYDROCHLORIDE 1 MG/ML
INJECTION INTRAMUSCULAR; INTRAVENOUS PRN
Status: DISCONTINUED | OUTPATIENT
Start: 2019-07-22 | End: 2019-07-22 | Stop reason: SDUPTHER

## 2019-07-22 RX ORDER — LABETALOL 20 MG/4 ML (5 MG/ML) INTRAVENOUS SYRINGE
5 EVERY 10 MIN PRN
Status: DISCONTINUED | OUTPATIENT
Start: 2019-07-22 | End: 2019-07-22 | Stop reason: HOSPADM

## 2019-07-22 RX ORDER — SODIUM CHLORIDE, SODIUM LACTATE, POTASSIUM CHLORIDE, AND CALCIUM CHLORIDE .6; .31; .03; .02 G/100ML; G/100ML; G/100ML; G/100ML
IRRIGANT IRRIGATION PRN
Status: DISCONTINUED | OUTPATIENT
Start: 2019-07-22 | End: 2019-07-22 | Stop reason: HOSPADM

## 2019-07-22 RX ORDER — ONDANSETRON 2 MG/ML
4 INJECTION INTRAMUSCULAR; INTRAVENOUS EVERY 30 MIN PRN
Status: DISCONTINUED | OUTPATIENT
Start: 2019-07-22 | End: 2019-07-22 | Stop reason: HOSPADM

## 2019-07-22 RX ORDER — ACETAMINOPHEN 10 MG/ML
1000 INJECTION, SOLUTION INTRAVENOUS EVERY 6 HOURS PRN
Status: DISCONTINUED | OUTPATIENT
Start: 2019-07-22 | End: 2019-07-22

## 2019-07-22 RX ORDER — SODIUM CHLORIDE 0.9 % (FLUSH) 0.9 %
10 SYRINGE (ML) INJECTION PRN
Status: DISCONTINUED | OUTPATIENT
Start: 2019-07-22 | End: 2019-07-22 | Stop reason: HOSPADM

## 2019-07-22 RX ORDER — BUPIVACAINE HYDROCHLORIDE AND EPINEPHRINE 2.5; 5 MG/ML; UG/ML
INJECTION, SOLUTION EPIDURAL; INFILTRATION; INTRACAUDAL; PERINEURAL
Status: DISCONTINUED
Start: 2019-07-22 | End: 2019-07-22

## 2019-07-22 RX ORDER — LIDOCAINE HYDROCHLORIDE 20 MG/ML
INJECTION, SOLUTION INTRAVENOUS PRN
Status: DISCONTINUED | OUTPATIENT
Start: 2019-07-22 | End: 2019-07-22 | Stop reason: SDUPTHER

## 2019-07-22 RX ORDER — SODIUM CHLORIDE, SODIUM LACTATE, POTASSIUM CHLORIDE, CALCIUM CHLORIDE 600; 310; 30; 20 MG/100ML; MG/100ML; MG/100ML; MG/100ML
INJECTION, SOLUTION INTRAVENOUS CONTINUOUS PRN
Status: DISCONTINUED | OUTPATIENT
Start: 2019-07-22 | End: 2019-07-22 | Stop reason: SDUPTHER

## 2019-07-22 RX ORDER — PROPOFOL 10 MG/ML
INJECTION, EMULSION INTRAVENOUS PRN
Status: DISCONTINUED | OUTPATIENT
Start: 2019-07-22 | End: 2019-07-22 | Stop reason: SDUPTHER

## 2019-07-22 RX ORDER — DIPHENHYDRAMINE HYDROCHLORIDE 50 MG/ML
INJECTION INTRAMUSCULAR; INTRAVENOUS
Status: DISCONTINUED
Start: 2019-07-22 | End: 2019-07-23

## 2019-07-22 RX ORDER — ROCURONIUM BROMIDE 10 MG/ML
INJECTION, SOLUTION INTRAVENOUS PRN
Status: DISCONTINUED | OUTPATIENT
Start: 2019-07-22 | End: 2019-07-22 | Stop reason: SDUPTHER

## 2019-07-22 RX ORDER — OXYCODONE HYDROCHLORIDE AND ACETAMINOPHEN 5; 325 MG/1; MG/1
1 TABLET ORAL ONCE
Status: DISCONTINUED | OUTPATIENT
Start: 2019-07-22 | End: 2019-07-22 | Stop reason: HOSPADM

## 2019-07-22 RX ORDER — SODIUM CHLORIDE, SODIUM LACTATE, POTASSIUM CHLORIDE, CALCIUM CHLORIDE 600; 310; 30; 20 MG/100ML; MG/100ML; MG/100ML; MG/100ML
INJECTION, SOLUTION INTRAVENOUS CONTINUOUS
Status: DISCONTINUED | OUTPATIENT
Start: 2019-07-22 | End: 2019-07-22

## 2019-07-22 RX ORDER — FENTANYL CITRATE 50 UG/ML
100 INJECTION, SOLUTION INTRAMUSCULAR; INTRAVENOUS ONCE
Status: COMPLETED | OUTPATIENT
Start: 2019-07-22 | End: 2019-07-22

## 2019-07-22 RX ORDER — HYDROMORPHONE HCL 110MG/55ML
PATIENT CONTROLLED ANALGESIA SYRINGE INTRAVENOUS PRN
Status: DISCONTINUED | OUTPATIENT
Start: 2019-07-22 | End: 2019-07-22 | Stop reason: SDUPTHER

## 2019-07-22 RX ORDER — 0.9 % SODIUM CHLORIDE 0.9 %
500 INTRAVENOUS SOLUTION INTRAVENOUS
Status: DISCONTINUED | OUTPATIENT
Start: 2019-07-22 | End: 2019-07-22 | Stop reason: HOSPADM

## 2019-07-22 RX ORDER — DIPHENHYDRAMINE HYDROCHLORIDE 50 MG/ML
12.5 INJECTION INTRAMUSCULAR; INTRAVENOUS
Status: DISCONTINUED | OUTPATIENT
Start: 2019-07-22 | End: 2019-07-22 | Stop reason: HOSPADM

## 2019-07-22 RX ORDER — INDOCYANINE GREEN AND WATER 25 MG
KIT INJECTION PRN
Status: DISCONTINUED | OUTPATIENT
Start: 2019-07-22 | End: 2019-07-22 | Stop reason: HOSPADM

## 2019-07-22 RX ORDER — SODIUM CHLORIDE 0.9 % (FLUSH) 0.9 %
10 SYRINGE (ML) INJECTION PRN
Status: DISCONTINUED | OUTPATIENT
Start: 2019-07-22 | End: 2019-07-27 | Stop reason: HOSPADM

## 2019-07-22 RX ORDER — BUPIVACAINE HYDROCHLORIDE AND EPINEPHRINE 5; 5 MG/ML; UG/ML
INJECTION, SOLUTION EPIDURAL; INTRACAUDAL; PERINEURAL PRN
Status: DISCONTINUED | OUTPATIENT
Start: 2019-07-22 | End: 2019-07-22 | Stop reason: HOSPADM

## 2019-07-22 RX ORDER — FENTANYL CITRATE 50 UG/ML
INJECTION, SOLUTION INTRAMUSCULAR; INTRAVENOUS
Status: COMPLETED
Start: 2019-07-22 | End: 2019-07-22

## 2019-07-22 RX ORDER — SODIUM CHLORIDE, SODIUM LACTATE, POTASSIUM CHLORIDE, CALCIUM CHLORIDE 600; 310; 30; 20 MG/100ML; MG/100ML; MG/100ML; MG/100ML
INJECTION, SOLUTION INTRAVENOUS CONTINUOUS
Status: DISCONTINUED | OUTPATIENT
Start: 2019-07-22 | End: 2019-07-23

## 2019-07-22 RX ORDER — FENTANYL CITRATE 50 UG/ML
25 INJECTION, SOLUTION INTRAMUSCULAR; INTRAVENOUS EVERY 5 MIN PRN
Status: DISCONTINUED | OUTPATIENT
Start: 2019-07-22 | End: 2019-07-22 | Stop reason: HOSPADM

## 2019-07-22 RX ORDER — SODIUM CHLORIDE 0.9 % (FLUSH) 0.9 %
10 SYRINGE (ML) INJECTION EVERY 12 HOURS SCHEDULED
Status: DISCONTINUED | OUTPATIENT
Start: 2019-07-22 | End: 2019-07-27 | Stop reason: HOSPADM

## 2019-07-22 RX ORDER — SODIUM CHLORIDE 0.9 % (FLUSH) 0.9 %
10 SYRINGE (ML) INJECTION EVERY 12 HOURS SCHEDULED
Status: DISCONTINUED | OUTPATIENT
Start: 2019-07-22 | End: 2019-07-22 | Stop reason: HOSPADM

## 2019-07-22 RX ORDER — HYDRALAZINE HYDROCHLORIDE 20 MG/ML
5 INJECTION INTRAMUSCULAR; INTRAVENOUS EVERY 10 MIN PRN
Status: DISCONTINUED | OUTPATIENT
Start: 2019-07-22 | End: 2019-07-22 | Stop reason: HOSPADM

## 2019-07-22 RX ORDER — MAGNESIUM HYDROXIDE 1200 MG/15ML
LIQUID ORAL CONTINUOUS PRN
Status: DISCONTINUED | OUTPATIENT
Start: 2019-07-22 | End: 2019-07-22 | Stop reason: HOSPADM

## 2019-07-22 RX ORDER — ALBUMIN, HUMAN INJ 5% 5 %
SOLUTION INTRAVENOUS PRN
Status: DISCONTINUED | OUTPATIENT
Start: 2019-07-22 | End: 2019-07-22 | Stop reason: SDUPTHER

## 2019-07-22 RX ORDER — HYOSCYAMINE SULFATE EXTENDED-RELEASE 0.38 MG/1
375 TABLET ORAL EVERY 12 HOURS PRN
Status: DISCONTINUED | OUTPATIENT
Start: 2019-07-22 | End: 2019-07-27 | Stop reason: HOSPADM

## 2019-07-22 RX ORDER — NALOXONE HYDROCHLORIDE 0.4 MG/ML
0.4 INJECTION, SOLUTION INTRAMUSCULAR; INTRAVENOUS; SUBCUTANEOUS PRN
Status: DISCONTINUED | OUTPATIENT
Start: 2019-07-22 | End: 2019-07-25 | Stop reason: ALTCHOICE

## 2019-07-22 RX ORDER — MIDAZOLAM HYDROCHLORIDE 1 MG/ML
2 INJECTION INTRAMUSCULAR; INTRAVENOUS
Status: COMPLETED | OUTPATIENT
Start: 2019-07-22 | End: 2019-07-22

## 2019-07-22 RX ORDER — LIDOCAINE HYDROCHLORIDE 10 MG/ML
1 INJECTION, SOLUTION EPIDURAL; INFILTRATION; INTRACAUDAL; PERINEURAL
Status: DISCONTINUED | OUTPATIENT
Start: 2019-07-22 | End: 2019-07-22 | Stop reason: HOSPADM

## 2019-07-22 RX ORDER — TRAZODONE HYDROCHLORIDE 50 MG/1
50 TABLET ORAL NIGHTLY PRN
Status: DISCONTINUED | OUTPATIENT
Start: 2019-07-23 | End: 2019-07-27 | Stop reason: HOSPADM

## 2019-07-22 RX ORDER — DEXAMETHASONE SODIUM PHOSPHATE 4 MG/ML
INJECTION, SOLUTION INTRA-ARTICULAR; INTRALESIONAL; INTRAMUSCULAR; INTRAVENOUS; SOFT TISSUE PRN
Status: DISCONTINUED | OUTPATIENT
Start: 2019-07-22 | End: 2019-07-22 | Stop reason: SDUPTHER

## 2019-07-22 RX ORDER — FENTANYL CITRATE 50 UG/ML
INJECTION, SOLUTION INTRAMUSCULAR; INTRAVENOUS PRN
Status: DISCONTINUED | OUTPATIENT
Start: 2019-07-22 | End: 2019-07-22 | Stop reason: SDUPTHER

## 2019-07-22 RX ORDER — DIPHENHYDRAMINE HYDROCHLORIDE 50 MG/ML
12.5 INJECTION INTRAMUSCULAR; INTRAVENOUS ONCE
Status: COMPLETED | OUTPATIENT
Start: 2019-07-23 | End: 2019-07-22

## 2019-07-22 RX ORDER — ONDANSETRON 2 MG/ML
4 INJECTION INTRAMUSCULAR; INTRAVENOUS EVERY 6 HOURS PRN
Status: DISCONTINUED | OUTPATIENT
Start: 2019-07-22 | End: 2019-07-27 | Stop reason: HOSPADM

## 2019-07-22 RX ORDER — MAGNESIUM SULFATE IN WATER 40 MG/ML
2 INJECTION, SOLUTION INTRAVENOUS ONCE
Status: COMPLETED | OUTPATIENT
Start: 2019-07-22 | End: 2019-07-22

## 2019-07-22 RX ORDER — SODIUM CHLORIDE 9 MG/ML
INJECTION, SOLUTION INTRAVENOUS CONTINUOUS PRN
Status: DISCONTINUED | OUTPATIENT
Start: 2019-07-22 | End: 2019-07-22 | Stop reason: SDUPTHER

## 2019-07-22 RX ORDER — MEPERIDINE HYDROCHLORIDE 25 MG/ML
12.5 INJECTION INTRAMUSCULAR; INTRAVENOUS; SUBCUTANEOUS EVERY 5 MIN PRN
Status: DISCONTINUED | OUTPATIENT
Start: 2019-07-22 | End: 2019-07-22 | Stop reason: HOSPADM

## 2019-07-22 RX ORDER — BUPROPION HYDROCHLORIDE 150 MG/1
150 TABLET ORAL DAILY
Status: DISCONTINUED | OUTPATIENT
Start: 2019-07-23 | End: 2019-07-27 | Stop reason: HOSPADM

## 2019-07-22 RX ORDER — SODIUM CHLORIDE 9 MG/ML
INJECTION, SOLUTION INTRAVENOUS
Status: DISCONTINUED
Start: 2019-07-22 | End: 2019-07-23

## 2019-07-22 RX ORDER — DIPHENHYDRAMINE HYDROCHLORIDE 50 MG/ML
INJECTION INTRAMUSCULAR; INTRAVENOUS PRN
Status: DISCONTINUED | OUTPATIENT
Start: 2019-07-22 | End: 2019-07-22 | Stop reason: SDUPTHER

## 2019-07-22 RX ORDER — ONDANSETRON 2 MG/ML
4 INJECTION INTRAMUSCULAR; INTRAVENOUS EVERY 6 HOURS PRN
Status: DISCONTINUED | OUTPATIENT
Start: 2019-07-22 | End: 2019-07-22

## 2019-07-22 RX ORDER — PROMETHAZINE HYDROCHLORIDE 25 MG/ML
6.25 INJECTION, SOLUTION INTRAMUSCULAR; INTRAVENOUS
Status: DISCONTINUED | OUTPATIENT
Start: 2019-07-22 | End: 2019-07-22 | Stop reason: HOSPADM

## 2019-07-22 RX ADMIN — SODIUM CHLORIDE, SODIUM LACTATE, POTASSIUM CHLORIDE, AND CALCIUM CHLORIDE: 600; 310; 30; 20 INJECTION, SOLUTION INTRAVENOUS at 09:41

## 2019-07-22 RX ADMIN — DIPHENHYDRAMINE HYDROCHLORIDE 12.5 MG: 50 INJECTION, SOLUTION INTRAMUSCULAR; INTRAVENOUS at 23:46

## 2019-07-22 RX ADMIN — MIDAZOLAM 2 MG: 1 INJECTION INTRAMUSCULAR; INTRAVENOUS at 08:11

## 2019-07-22 RX ADMIN — SODIUM CHLORIDE, SODIUM LACTATE, POTASSIUM CHLORIDE, AND CALCIUM CHLORIDE: 600; 310; 30; 20 INJECTION, SOLUTION INTRAVENOUS at 10:29

## 2019-07-22 RX ADMIN — SODIUM CHLORIDE, POTASSIUM CHLORIDE, SODIUM LACTATE AND CALCIUM CHLORIDE: 600; 310; 30; 20 INJECTION, SOLUTION INTRAVENOUS at 19:21

## 2019-07-22 RX ADMIN — PHENYLEPHRINE HYDROCHLORIDE 100 MCG: 10 INJECTION, SOLUTION INTRAMUSCULAR; INTRAVENOUS; SUBCUTANEOUS at 13:17

## 2019-07-22 RX ADMIN — HYDROMORPHONE HYDROCHLORIDE 0.25 MG: 1 INJECTION, SOLUTION INTRAMUSCULAR; INTRAVENOUS; SUBCUTANEOUS at 20:00

## 2019-07-22 RX ADMIN — LIDOCAINE HYDROCHLORIDE 100 MG: 20 INJECTION, SOLUTION INTRAVENOUS at 09:48

## 2019-07-22 RX ADMIN — CEFAZOLIN SODIUM 2 G: 1 POWDER, FOR SOLUTION INTRAMUSCULAR; INTRAVENOUS at 13:46

## 2019-07-22 RX ADMIN — CEFAZOLIN SODIUM 2 G: 1 POWDER, FOR SOLUTION INTRAMUSCULAR; INTRAVENOUS at 16:50

## 2019-07-22 RX ADMIN — ROCURONIUM BROMIDE 100 MG: 10 INJECTION, SOLUTION INTRAVENOUS at 09:48

## 2019-07-22 RX ADMIN — FENTANYL CITRATE: 50 INJECTION, SOLUTION INTRAMUSCULAR; INTRAVENOUS at 18:44

## 2019-07-22 RX ADMIN — CEFAZOLIN SODIUM 3 G: 1 POWDER, FOR SOLUTION INTRAMUSCULAR; INTRAVENOUS at 09:46

## 2019-07-22 RX ADMIN — HYDROMORPHONE HYDROCHLORIDE 1 MG: 2 INJECTION, SOLUTION INTRAMUSCULAR; INTRAVENOUS; SUBCUTANEOUS at 17:30

## 2019-07-22 RX ADMIN — PROPOFOL 100 MG: 10 INJECTION, EMULSION INTRAVENOUS at 09:48

## 2019-07-22 RX ADMIN — ROCURONIUM BROMIDE 20 MG: 10 INJECTION, SOLUTION INTRAVENOUS at 15:07

## 2019-07-22 RX ADMIN — MIDAZOLAM HYDROCHLORIDE 2 MG: 2 INJECTION, SOLUTION INTRAMUSCULAR; INTRAVENOUS at 17:25

## 2019-07-22 RX ADMIN — MIDAZOLAM 2 MG: 1 INJECTION INTRAMUSCULAR; INTRAVENOUS at 08:05

## 2019-07-22 RX ADMIN — DEXAMETHASONE SODIUM PHOSPHATE 12 MG: 4 INJECTION, SOLUTION INTRAMUSCULAR; INTRAVENOUS at 09:58

## 2019-07-22 RX ADMIN — SODIUM CHLORIDE: 900 INJECTION, SOLUTION INTRAVENOUS at 15:34

## 2019-07-22 RX ADMIN — FENTANYL CITRATE 100 MCG: 50 INJECTION INTRAMUSCULAR; INTRAVENOUS at 09:48

## 2019-07-22 RX ADMIN — ALBUMIN (HUMAN) 250 ML: 12.5 INJECTION, SOLUTION INTRAVENOUS at 11:11

## 2019-07-22 RX ADMIN — SODIUM CHLORIDE, SODIUM LACTATE, POTASSIUM CHLORIDE, AND CALCIUM CHLORIDE: 600; 310; 30; 20 INJECTION, SOLUTION INTRAVENOUS at 10:08

## 2019-07-22 RX ADMIN — PHENYLEPHRINE HYDROCHLORIDE 100 MCG: 10 INJECTION, SOLUTION INTRAMUSCULAR; INTRAVENOUS; SUBCUTANEOUS at 16:13

## 2019-07-22 RX ADMIN — MIDAZOLAM HYDROCHLORIDE 2 MG: 2 INJECTION, SOLUTION INTRAMUSCULAR; INTRAVENOUS at 09:42

## 2019-07-22 RX ADMIN — FENTANYL CITRATE 100 MCG: 50 INJECTION INTRAMUSCULAR; INTRAVENOUS at 08:05

## 2019-07-22 RX ADMIN — ROCURONIUM BROMIDE 50 MG: 10 INJECTION, SOLUTION INTRAVENOUS at 13:04

## 2019-07-22 RX ADMIN — FENTANYL CITRATE 50 MCG: 50 INJECTION INTRAMUSCULAR; INTRAVENOUS at 08:33

## 2019-07-22 RX ADMIN — SODIUM CHLORIDE: 900 INJECTION, SOLUTION INTRAVENOUS at 17:08

## 2019-07-22 RX ADMIN — SUGAMMADEX 200 MG: 100 INJECTION, SOLUTION INTRAVENOUS at 18:01

## 2019-07-22 RX ADMIN — ONDANSETRON 4 MG: 2 INJECTION INTRAMUSCULAR; INTRAVENOUS at 15:34

## 2019-07-22 RX ADMIN — HYDROMORPHONE HYDROCHLORIDE 0.5 MG: 1 INJECTION, SOLUTION INTRAMUSCULAR; INTRAVENOUS; SUBCUTANEOUS at 23:36

## 2019-07-22 RX ADMIN — PHENYLEPHRINE HYDROCHLORIDE 100 MCG: 10 INJECTION, SOLUTION INTRAMUSCULAR; INTRAVENOUS; SUBCUTANEOUS at 16:07

## 2019-07-22 RX ADMIN — ONDANSETRON 4 MG: 2 INJECTION INTRAMUSCULAR; INTRAVENOUS at 09:58

## 2019-07-22 RX ADMIN — HYDROMORPHONE HYDROCHLORIDE 0.5 MG: 1 INJECTION, SOLUTION INTRAMUSCULAR; INTRAVENOUS; SUBCUTANEOUS at 18:44

## 2019-07-22 RX ADMIN — DIPHENHYDRAMINE HYDROCHLORIDE 12.5 MG: 50 INJECTION, SOLUTION INTRAMUSCULAR; INTRAVENOUS at 09:58

## 2019-07-22 RX ADMIN — SODIUM CHLORIDE, SODIUM LACTATE, POTASSIUM CHLORIDE, AND CALCIUM CHLORIDE: 600; 310; 30; 20 INJECTION, SOLUTION INTRAVENOUS at 13:35

## 2019-07-22 RX ADMIN — PHENYLEPHRINE HYDROCHLORIDE 100 MCG: 10 INJECTION, SOLUTION INTRAMUSCULAR; INTRAVENOUS; SUBCUTANEOUS at 15:52

## 2019-07-22 RX ADMIN — HYDROMORPHONE HYDROCHLORIDE 1 MG: 2 INJECTION, SOLUTION INTRAMUSCULAR; INTRAVENOUS; SUBCUTANEOUS at 17:57

## 2019-07-22 RX ADMIN — HYDROMORPHONE HYDROCHLORIDE 1 MG: 2 INJECTION, SOLUTION INTRAMUSCULAR; INTRAVENOUS; SUBCUTANEOUS at 17:38

## 2019-07-22 RX ADMIN — ALBUMIN (HUMAN) 250 ML: 12.5 INJECTION, SOLUTION INTRAVENOUS at 13:13

## 2019-07-22 RX ADMIN — PHENYLEPHRINE HYDROCHLORIDE 200 MCG: 10 INJECTION, SOLUTION INTRAMUSCULAR; INTRAVENOUS; SUBCUTANEOUS at 15:48

## 2019-07-22 RX ADMIN — MAGNESIUM SULFATE HEPTAHYDRATE 2 G: 40 INJECTION, SOLUTION INTRAVENOUS at 20:35

## 2019-07-22 RX ADMIN — ROCURONIUM BROMIDE 50 MG: 10 INJECTION, SOLUTION INTRAVENOUS at 11:11

## 2019-07-22 RX ADMIN — PHENYLEPHRINE HYDROCHLORIDE 100 MCG: 10 INJECTION, SOLUTION INTRAMUSCULAR; INTRAVENOUS; SUBCUTANEOUS at 16:03

## 2019-07-22 RX ADMIN — CALCIUM GLUCONATE 1 G: 98 INJECTION, SOLUTION INTRAVENOUS at 20:44

## 2019-07-22 RX ADMIN — FENTANYL CITRATE 100 MCG: 50 INJECTION INTRAMUSCULAR; INTRAVENOUS at 17:57

## 2019-07-22 RX ADMIN — PHENYLEPHRINE HYDROCHLORIDE 80 MCG: 10 INJECTION, SOLUTION INTRAMUSCULAR; INTRAVENOUS; SUBCUTANEOUS at 16:34

## 2019-07-22 RX ADMIN — PHENYLEPHRINE HYDROCHLORIDE 100 MCG: 10 INJECTION, SOLUTION INTRAMUSCULAR; INTRAVENOUS; SUBCUTANEOUS at 16:11

## 2019-07-22 ASSESSMENT — PULMONARY FUNCTION TESTS
PIF_VALUE: 22
PIF_VALUE: 15
PIF_VALUE: 25
PIF_VALUE: 20
PIF_VALUE: 25
PIF_VALUE: 25
PIF_VALUE: 1
PIF_VALUE: 25
PIF_VALUE: 24
PIF_VALUE: 24
PIF_VALUE: 21
PIF_VALUE: 24
PIF_VALUE: 26
PIF_VALUE: 20
PIF_VALUE: 20
PIF_VALUE: 26
PIF_VALUE: 25
PIF_VALUE: 27
PIF_VALUE: 20
PIF_VALUE: 25
PIF_VALUE: 21
PIF_VALUE: 28
PIF_VALUE: 27
PIF_VALUE: 26
PIF_VALUE: 22
PIF_VALUE: 25
PIF_VALUE: 22
PIF_VALUE: 23
PIF_VALUE: 28
PIF_VALUE: 24
PIF_VALUE: 26
PIF_VALUE: 28
PIF_VALUE: 22
PIF_VALUE: 0
PIF_VALUE: 23
PIF_VALUE: 24
PIF_VALUE: 26
PIF_VALUE: 22
PIF_VALUE: 24
PIF_VALUE: 27
PIF_VALUE: 23
PIF_VALUE: 25
PIF_VALUE: 24
PIF_VALUE: 25
PIF_VALUE: 22
PIF_VALUE: 20
PIF_VALUE: 25
PIF_VALUE: 27
PIF_VALUE: 25
PIF_VALUE: 20
PIF_VALUE: 24
PIF_VALUE: 25
PIF_VALUE: 27
PIF_VALUE: 26
PIF_VALUE: 25
PIF_VALUE: 25
PIF_VALUE: 24
PIF_VALUE: 25
PIF_VALUE: 28
PIF_VALUE: 35
PIF_VALUE: 26
PIF_VALUE: 25
PIF_VALUE: 24
PIF_VALUE: 23
PIF_VALUE: 22
PIF_VALUE: 25
PIF_VALUE: 24
PIF_VALUE: 22
PIF_VALUE: 25
PIF_VALUE: 20
PIF_VALUE: 33
PIF_VALUE: 25
PIF_VALUE: 20
PIF_VALUE: 24
PIF_VALUE: 20
PIF_VALUE: 25
PIF_VALUE: 27
PIF_VALUE: 21
PIF_VALUE: 24
PIF_VALUE: 25
PIF_VALUE: 24
PIF_VALUE: 24
PIF_VALUE: 29
PIF_VALUE: 27
PIF_VALUE: 26
PIF_VALUE: 25
PIF_VALUE: 24
PIF_VALUE: 25
PIF_VALUE: 27
PIF_VALUE: 26
PIF_VALUE: 25
PIF_VALUE: 25
PIF_VALUE: 26
PIF_VALUE: 27
PIF_VALUE: 25
PIF_VALUE: 24
PIF_VALUE: 27
PIF_VALUE: 26
PIF_VALUE: 22
PIF_VALUE: 22
PIF_VALUE: 25
PIF_VALUE: 30
PIF_VALUE: 25
PIF_VALUE: 27
PIF_VALUE: 24
PIF_VALUE: 27
PIF_VALUE: 23
PIF_VALUE: 24
PIF_VALUE: 33
PIF_VALUE: 27
PIF_VALUE: 22
PIF_VALUE: 24
PIF_VALUE: 25
PIF_VALUE: 24
PIF_VALUE: 25
PIF_VALUE: 23
PIF_VALUE: 27
PIF_VALUE: 24
PIF_VALUE: 23
PIF_VALUE: 24
PIF_VALUE: 26
PIF_VALUE: 9
PIF_VALUE: 25
PIF_VALUE: 26
PIF_VALUE: 24
PIF_VALUE: 14
PIF_VALUE: 26
PIF_VALUE: 28
PIF_VALUE: 21
PIF_VALUE: 25
PIF_VALUE: 21
PIF_VALUE: 25
PIF_VALUE: 24
PIF_VALUE: 26
PIF_VALUE: 21
PIF_VALUE: 24
PIF_VALUE: 27
PIF_VALUE: 25
PIF_VALUE: 11
PIF_VALUE: 25
PIF_VALUE: 24
PIF_VALUE: 25
PIF_VALUE: 26
PIF_VALUE: 25
PIF_VALUE: 25
PIF_VALUE: 28
PIF_VALUE: 25
PIF_VALUE: 25
PIF_VALUE: 26
PIF_VALUE: 24
PIF_VALUE: 27
PIF_VALUE: 27
PIF_VALUE: 23
PIF_VALUE: 26
PIF_VALUE: 29
PIF_VALUE: 25
PIF_VALUE: 22
PIF_VALUE: 23
PIF_VALUE: 22
PIF_VALUE: 26
PIF_VALUE: 25
PIF_VALUE: 22
PIF_VALUE: 27
PIF_VALUE: 25
PIF_VALUE: 25
PIF_VALUE: 28
PIF_VALUE: 22
PIF_VALUE: 1
PIF_VALUE: 22
PIF_VALUE: 25
PIF_VALUE: 21
PIF_VALUE: 21
PIF_VALUE: 25
PIF_VALUE: 22
PIF_VALUE: 22
PIF_VALUE: 25
PIF_VALUE: 24
PIF_VALUE: 1
PIF_VALUE: 33
PIF_VALUE: 26
PIF_VALUE: 22
PIF_VALUE: 25
PIF_VALUE: 26
PIF_VALUE: 22
PIF_VALUE: 27
PIF_VALUE: 25
PIF_VALUE: 26
PIF_VALUE: 28
PIF_VALUE: 33
PIF_VALUE: 27
PIF_VALUE: 21
PIF_VALUE: 30
PIF_VALUE: 22
PIF_VALUE: 20
PIF_VALUE: 4
PIF_VALUE: 19
PIF_VALUE: 22
PIF_VALUE: 24
PIF_VALUE: 25
PIF_VALUE: 21
PIF_VALUE: 27
PIF_VALUE: 26
PIF_VALUE: 1
PIF_VALUE: 26
PIF_VALUE: 19
PIF_VALUE: 25
PIF_VALUE: 22
PIF_VALUE: 25
PIF_VALUE: 27
PIF_VALUE: 25
PIF_VALUE: 24
PIF_VALUE: 21
PIF_VALUE: 22
PIF_VALUE: 25
PIF_VALUE: 25
PIF_VALUE: 28
PIF_VALUE: 27
PIF_VALUE: 32
PIF_VALUE: 27
PIF_VALUE: 24
PIF_VALUE: 1
PIF_VALUE: 22
PIF_VALUE: 29
PIF_VALUE: 24
PIF_VALUE: 25
PIF_VALUE: 26
PIF_VALUE: 24
PIF_VALUE: 27
PIF_VALUE: 25
PIF_VALUE: 26
PIF_VALUE: 25
PIF_VALUE: 36
PIF_VALUE: 24
PIF_VALUE: 25
PIF_VALUE: 23
PIF_VALUE: 25
PIF_VALUE: 24
PIF_VALUE: 27
PIF_VALUE: 25
PIF_VALUE: 27
PIF_VALUE: 25
PIF_VALUE: 27
PIF_VALUE: 27
PIF_VALUE: 24
PIF_VALUE: 13
PIF_VALUE: 25
PIF_VALUE: 24
PIF_VALUE: 25
PIF_VALUE: 26
PIF_VALUE: 24
PIF_VALUE: 25
PIF_VALUE: 24
PIF_VALUE: 25
PIF_VALUE: 21
PIF_VALUE: 13
PIF_VALUE: 29
PIF_VALUE: 21
PIF_VALUE: 27
PIF_VALUE: 25
PIF_VALUE: 25
PIF_VALUE: 20
PIF_VALUE: 27
PIF_VALUE: 11
PIF_VALUE: 20
PIF_VALUE: 0
PIF_VALUE: 1
PIF_VALUE: 25
PIF_VALUE: 27
PIF_VALUE: 25
PIF_VALUE: 16
PIF_VALUE: 24
PIF_VALUE: 25
PIF_VALUE: 27
PIF_VALUE: 25
PIF_VALUE: 19
PIF_VALUE: 28
PIF_VALUE: 26
PIF_VALUE: 25
PIF_VALUE: 20
PIF_VALUE: 22
PIF_VALUE: 25
PIF_VALUE: 26
PIF_VALUE: 24
PIF_VALUE: 27
PIF_VALUE: 29
PIF_VALUE: 25
PIF_VALUE: 25
PIF_VALUE: 27
PIF_VALUE: 26
PIF_VALUE: 22
PIF_VALUE: 25
PIF_VALUE: 27
PIF_VALUE: 22
PIF_VALUE: 21
PIF_VALUE: 21
PIF_VALUE: 26
PIF_VALUE: 26
PIF_VALUE: 27
PIF_VALUE: 21
PIF_VALUE: 27
PIF_VALUE: 24
PIF_VALUE: 24
PIF_VALUE: 22
PIF_VALUE: 26
PIF_VALUE: 27
PIF_VALUE: 24
PIF_VALUE: 25
PIF_VALUE: 22
PIF_VALUE: 23
PIF_VALUE: 23
PIF_VALUE: 26
PIF_VALUE: 29
PIF_VALUE: 25
PIF_VALUE: 27
PIF_VALUE: 23
PIF_VALUE: 19
PIF_VALUE: 27
PIF_VALUE: 25
PIF_VALUE: 25
PIF_VALUE: 23
PIF_VALUE: 22
PIF_VALUE: 22
PIF_VALUE: 29
PIF_VALUE: 1
PIF_VALUE: 27
PIF_VALUE: 13
PIF_VALUE: 24
PIF_VALUE: 26
PIF_VALUE: 32
PIF_VALUE: 22
PIF_VALUE: 27
PIF_VALUE: 32
PIF_VALUE: 28
PIF_VALUE: 22
PIF_VALUE: 27
PIF_VALUE: 24
PIF_VALUE: 27
PIF_VALUE: 31
PIF_VALUE: 25
PIF_VALUE: 23
PIF_VALUE: 22
PIF_VALUE: 25
PIF_VALUE: 24
PIF_VALUE: 23
PIF_VALUE: 27
PIF_VALUE: 27
PIF_VALUE: 22
PIF_VALUE: 31
PIF_VALUE: 25
PIF_VALUE: 26
PIF_VALUE: 24
PIF_VALUE: 27
PIF_VALUE: 22
PIF_VALUE: 26
PIF_VALUE: 26
PIF_VALUE: 25
PIF_VALUE: 24
PIF_VALUE: 28
PIF_VALUE: 20
PIF_VALUE: 25
PIF_VALUE: 27
PIF_VALUE: 25
PIF_VALUE: 25
PIF_VALUE: 26
PIF_VALUE: 24
PIF_VALUE: 13
PIF_VALUE: 22
PIF_VALUE: 25
PIF_VALUE: 22
PIF_VALUE: 20
PIF_VALUE: 27
PIF_VALUE: 22
PIF_VALUE: 23
PIF_VALUE: 21
PIF_VALUE: 27
PIF_VALUE: 24
PIF_VALUE: 20
PIF_VALUE: 22
PIF_VALUE: 28
PIF_VALUE: 26
PIF_VALUE: 27
PIF_VALUE: 25
PIF_VALUE: 25
PIF_VALUE: 28
PIF_VALUE: 25
PIF_VALUE: 24
PIF_VALUE: 26
PIF_VALUE: 27
PIF_VALUE: 25
PIF_VALUE: 28
PIF_VALUE: 27
PIF_VALUE: 25
PIF_VALUE: 22
PIF_VALUE: 25
PIF_VALUE: 24
PIF_VALUE: 28
PIF_VALUE: 26
PIF_VALUE: 24
PIF_VALUE: 30
PIF_VALUE: 25
PIF_VALUE: 24
PIF_VALUE: 27
PIF_VALUE: 24
PIF_VALUE: 28
PIF_VALUE: 24
PIF_VALUE: 25
PIF_VALUE: 24
PIF_VALUE: 25
PIF_VALUE: 25
PIF_VALUE: 27
PIF_VALUE: 24
PIF_VALUE: 27
PIF_VALUE: 25
PIF_VALUE: 22
PIF_VALUE: 28
PIF_VALUE: 26
PIF_VALUE: 24
PIF_VALUE: 24
PIF_VALUE: 25
PIF_VALUE: 24
PIF_VALUE: 25
PIF_VALUE: 22
PIF_VALUE: 22
PIF_VALUE: 14
PIF_VALUE: 27
PIF_VALUE: 26
PIF_VALUE: 27
PIF_VALUE: 26
PIF_VALUE: 21
PIF_VALUE: 20
PIF_VALUE: 22
PIF_VALUE: 27
PIF_VALUE: 22
PIF_VALUE: 24
PIF_VALUE: 27
PIF_VALUE: 25
PIF_VALUE: 25
PIF_VALUE: 24
PIF_VALUE: 26
PIF_VALUE: 22
PIF_VALUE: 26
PIF_VALUE: 24
PIF_VALUE: 27
PIF_VALUE: 27
PIF_VALUE: 25
PIF_VALUE: 26
PIF_VALUE: 23
PIF_VALUE: 21
PIF_VALUE: 25
PIF_VALUE: 29
PIF_VALUE: 24
PIF_VALUE: 25
PIF_VALUE: 24
PIF_VALUE: 32
PIF_VALUE: 25
PIF_VALUE: 26
PIF_VALUE: 26
PIF_VALUE: 21
PIF_VALUE: 28
PIF_VALUE: 26
PIF_VALUE: 22
PIF_VALUE: 20
PIF_VALUE: 25
PIF_VALUE: 24
PIF_VALUE: 26
PIF_VALUE: 25
PIF_VALUE: 27
PIF_VALUE: 25
PIF_VALUE: 22
PIF_VALUE: 24
PIF_VALUE: 25
PIF_VALUE: 26
PIF_VALUE: 25
PIF_VALUE: 24
PIF_VALUE: 15
PIF_VALUE: 25
PIF_VALUE: 20
PIF_VALUE: 26
PIF_VALUE: 1
PIF_VALUE: 26
PIF_VALUE: 24
PIF_VALUE: 28
PIF_VALUE: 25
PIF_VALUE: 22
PIF_VALUE: 27
PIF_VALUE: 25
PIF_VALUE: 24
PIF_VALUE: 26
PIF_VALUE: 27
PIF_VALUE: 25
PIF_VALUE: 25
PIF_VALUE: 27
PIF_VALUE: 24
PIF_VALUE: 24
PIF_VALUE: 22
PIF_VALUE: 27
PIF_VALUE: 25
PIF_VALUE: 25
PIF_VALUE: 31
PIF_VALUE: 27
PIF_VALUE: 31
PIF_VALUE: 24
PIF_VALUE: 25
PIF_VALUE: 22
PIF_VALUE: 25
PIF_VALUE: 25
PIF_VALUE: 14
PIF_VALUE: 28
PIF_VALUE: 25
PIF_VALUE: 23
PIF_VALUE: 27
PIF_VALUE: 28
PIF_VALUE: 20
PIF_VALUE: 26

## 2019-07-22 ASSESSMENT — PAIN SCALES - GENERAL
PAINLEVEL_OUTOF10: 8
PAINLEVEL_OUTOF10: 0
PAINLEVEL_OUTOF10: 5
PAINLEVEL_OUTOF10: 0
PAINLEVEL_OUTOF10: 7
PAINLEVEL_OUTOF10: 0

## 2019-07-22 ASSESSMENT — PAIN DESCRIPTION - LOCATION: LOCATION: SHOULDER

## 2019-07-22 ASSESSMENT — PAIN DESCRIPTION - ORIENTATION: ORIENTATION: RIGHT

## 2019-07-22 ASSESSMENT — PAIN DESCRIPTION - PROGRESSION: CLINICAL_PROGRESSION: GRADUALLY WORSENING

## 2019-07-22 ASSESSMENT — PAIN DESCRIPTION - FREQUENCY: FREQUENCY: INTERMITTENT

## 2019-07-22 ASSESSMENT — PAIN DESCRIPTION - ONSET: ONSET: SUDDEN

## 2019-07-22 ASSESSMENT — PAIN DESCRIPTION - PAIN TYPE: TYPE: ACUTE PAIN;SURGICAL PAIN

## 2019-07-22 NOTE — H&P
Katharina Larry MD   norethindrone-ethinyl estradiol (JUNEL FE 1/20) 1-20 MG-MCG per tablet Take 1 tablet by mouth daily 3/13/19  Yes Katharina Larry MD   aspirin 81 MG tablet Take 81 mg by mouth daily   Yes Historical Provider, MD   Loratadine (CLARITIN PO) Take by mouth    Historical Provider, MD   vitamin B-12 (CYANOCOBALAMIN) 1000 MCG tablet Take 1,000 mcg by mouth daily    Historical Provider, MD         Allergies:  Nsaids and Topamax [topiramate]    PHYSICAL EXAM:      /82   Pulse 79   Temp 98.3 °F (36.8 °C) (Oral)   Resp 16   Ht 5' 9\" (1.753 m)   Wt 270 lb (122.5 kg)   LMP 07/19/2007   SpO2 98%   BMI 39.87 kg/m²      Heart:  regular rate and rhythm    Lungs:  No increased work of breathing, good air exchange, clear to auscultation bilaterally, no crackles or wheezing    Abdomen:  soft, non-distended, non-tender, no rebound tenderness or guarding, normal active bowel sounds and no masses palpated    ASSESSMENT AND PLAN:    1. Patient seen and focused exam done today- last physical exam on 7/8/19    2. Access to ancillary services are available per request of the provider. 3. Patient does have a rash of the right UE consistent with poison ivy after doing yard work. RN aware and will make Dr. Jaswinder Lucia aware.      TRACY Parrish - CNP     7/22/2019

## 2019-07-22 NOTE — ANESTHESIA PRE PROCEDURE
Comment: rare                                Counseling given: Not Answered      Vital Signs (Current):   Vitals:    07/19/19 1333 07/22/19 0705   BP:  123/82   Pulse:  79   Resp:  16   Temp:  98.3 °F (36.8 °C)   TempSrc:  Oral   SpO2:  98%   Weight: 270 lb (122.5 kg) 270 lb (122.5 kg)   Height: 5' 9\" (1.753 m) 5' 9\" (1.753 m)                                              BP Readings from Last 3 Encounters:   07/22/19 123/82   07/19/19 135/80   07/12/19 110/85       NPO Status: Time of last liquid consumption: 2330                        Time of last solid consumption: 2330                        Date of last liquid consumption: 07/21/19                        Date of last solid food consumption: 07/21/19    BMI:   Wt Readings from Last 3 Encounters:   07/22/19 270 lb (122.5 kg)   07/19/19 270 lb (122.5 kg)   07/12/19 276 lb (125.2 kg)     Body mass index is 39.87 kg/m². CBC:   Lab Results   Component Value Date    WBC 4.9 07/19/2019    RBC 4.24 07/19/2019    HGB 12.4 07/19/2019    HCT 38.1 07/19/2019    MCV 89.9 07/19/2019    RDW 13.8 07/19/2019     07/19/2019       CMP:   Lab Results   Component Value Date     07/19/2019    K 4.2 07/19/2019    K 4.4 03/23/2018     07/19/2019    CO2 22 07/19/2019    BUN 12 07/19/2019    CREATININE 0.7 07/19/2019    GFRAA >60 07/19/2019    AGRATIO 1.4 07/19/2019    LABGLOM >60 07/19/2019    LABGLOM 107 03/08/2013    GLUCOSE 94 07/19/2019    PROT 7.2 07/19/2019    PROT 7.1 03/08/2013    CALCIUM 9.0 07/19/2019    BILITOT <0.2 07/19/2019    ALKPHOS 74 07/19/2019    AST 17 07/19/2019    ALT 16 07/19/2019       POC Tests: No results for input(s): POCGLU, POCNA, POCK, POCCL, POCBUN, POCHEMO, POCHCT in the last 72 hours.     Coags:   Lab Results   Component Value Date    PROTIME 10.7 07/19/2019    INR 0.94 07/19/2019    APTT 26.9 07/19/2019       HCG (If Applicable):   Lab Results   Component Value Date    PREGTESTUR Negative 07/22/2019        ABGs: No results found

## 2019-07-23 LAB
ALBUMIN SERPL-MCNC: 3.6 G/DL (ref 3.4–5)
ALP BLD-CCNC: 58 U/L (ref 40–129)
ALT SERPL-CCNC: 123 U/L (ref 10–40)
ANION GAP SERPL CALCULATED.3IONS-SCNC: 8 MMOL/L (ref 3–16)
AST SERPL-CCNC: 195 U/L (ref 15–37)
BASOPHILS ABSOLUTE: 0 K/UL (ref 0–0.2)
BASOPHILS RELATIVE PERCENT: 0 %
BILIRUB SERPL-MCNC: 0.3 MG/DL (ref 0–1)
BILIRUBIN DIRECT: <0.2 MG/DL (ref 0–0.3)
BILIRUBIN, INDIRECT: ABNORMAL MG/DL (ref 0–1)
BUN BLDV-MCNC: 6 MG/DL (ref 7–20)
CALCIUM SERPL-MCNC: 7.8 MG/DL (ref 8.3–10.6)
CHLORIDE BLD-SCNC: 109 MMOL/L (ref 99–110)
CO2: 23 MMOL/L (ref 21–32)
CREAT SERPL-MCNC: 0.7 MG/DL (ref 0.6–1.1)
EOSINOPHILS ABSOLUTE: 0 K/UL (ref 0–0.6)
EOSINOPHILS RELATIVE PERCENT: 0 %
GFR AFRICAN AMERICAN: >60
GFR NON-AFRICAN AMERICAN: >60
GLUCOSE BLD-MCNC: 123 MG/DL (ref 70–99)
HCT VFR BLD CALC: 30 % (ref 36–48)
HEMOGLOBIN: 9.8 G/DL (ref 12–16)
INR BLD: 1.06 (ref 0.86–1.14)
LACTIC ACID: 2.1 MMOL/L (ref 0.4–2)
LYMPHOCYTES ABSOLUTE: 0.7 K/UL (ref 1–5.1)
LYMPHOCYTES RELATIVE PERCENT: 7.4 %
MAGNESIUM: 2.3 MG/DL (ref 1.8–2.4)
MCH RBC QN AUTO: 29.9 PG (ref 26–34)
MCHC RBC AUTO-ENTMCNC: 32.7 G/DL (ref 31–36)
MCV RBC AUTO: 91.4 FL (ref 80–100)
MONOCYTES ABSOLUTE: 0.6 K/UL (ref 0–1.3)
MONOCYTES RELATIVE PERCENT: 6.9 %
NEUTROPHILS ABSOLUTE: 8 K/UL (ref 1.7–7.7)
NEUTROPHILS RELATIVE PERCENT: 85.7 %
PDW BLD-RTO: 14.2 % (ref 12.4–15.4)
PHOSPHORUS: 2.9 MG/DL (ref 2.5–4.9)
PLATELET # BLD: 174 K/UL (ref 135–450)
PMV BLD AUTO: 9.1 FL (ref 5–10.5)
POTASSIUM SERPL-SCNC: 4.2 MMOL/L (ref 3.5–5.1)
PROTHROMBIN TIME: 12.1 SEC (ref 9.8–13)
RBC # BLD: 3.29 M/UL (ref 4–5.2)
SODIUM BLD-SCNC: 140 MMOL/L (ref 136–145)
TOTAL PROTEIN: 5.6 G/DL (ref 6.4–8.2)
WBC # BLD: 9.4 K/UL (ref 4–11)

## 2019-07-23 PROCEDURE — 97116 GAIT TRAINING THERAPY: CPT

## 2019-07-23 PROCEDURE — 2500000003 HC RX 250 WO HCPCS: Performed by: STUDENT IN AN ORGANIZED HEALTH CARE EDUCATION/TRAINING PROGRAM

## 2019-07-23 PROCEDURE — 37799 UNLISTED PX VASCULAR SURGERY: CPT

## 2019-07-23 PROCEDURE — 97530 THERAPEUTIC ACTIVITIES: CPT

## 2019-07-23 PROCEDURE — 2580000003 HC RX 258: Performed by: STUDENT IN AN ORGANIZED HEALTH CARE EDUCATION/TRAINING PROGRAM

## 2019-07-23 PROCEDURE — 2500000003 HC RX 250 WO HCPCS: Performed by: ANESTHESIOLOGY

## 2019-07-23 PROCEDURE — 85610 PROTHROMBIN TIME: CPT

## 2019-07-23 PROCEDURE — 83605 ASSAY OF LACTIC ACID: CPT

## 2019-07-23 PROCEDURE — 97166 OT EVAL MOD COMPLEX 45 MIN: CPT

## 2019-07-23 PROCEDURE — 6360000002 HC RX W HCPCS: Performed by: STUDENT IN AN ORGANIZED HEALTH CARE EDUCATION/TRAINING PROGRAM

## 2019-07-23 PROCEDURE — 99024 POSTOP FOLLOW-UP VISIT: CPT | Performed by: SURGERY

## 2019-07-23 PROCEDURE — 80069 RENAL FUNCTION PANEL: CPT

## 2019-07-23 PROCEDURE — 80076 HEPATIC FUNCTION PANEL: CPT

## 2019-07-23 PROCEDURE — 6370000000 HC RX 637 (ALT 250 FOR IP): Performed by: STUDENT IN AN ORGANIZED HEALTH CARE EDUCATION/TRAINING PROGRAM

## 2019-07-23 PROCEDURE — 2000000000 HC ICU R&B

## 2019-07-23 PROCEDURE — 97162 PT EVAL MOD COMPLEX 30 MIN: CPT

## 2019-07-23 PROCEDURE — 2580000003 HC RX 258: Performed by: ANESTHESIOLOGY

## 2019-07-23 PROCEDURE — 36415 COLL VENOUS BLD VENIPUNCTURE: CPT

## 2019-07-23 PROCEDURE — 85025 COMPLETE CBC W/AUTO DIFF WBC: CPT

## 2019-07-23 PROCEDURE — 83735 ASSAY OF MAGNESIUM: CPT

## 2019-07-23 RX ORDER — DIPHENHYDRAMINE HYDROCHLORIDE 50 MG/ML
25 INJECTION INTRAMUSCULAR; INTRAVENOUS EVERY 6 HOURS PRN
Status: DISCONTINUED | OUTPATIENT
Start: 2019-07-23 | End: 2019-07-24

## 2019-07-23 RX ORDER — LORAZEPAM 0.5 MG/1
0.5 TABLET ORAL EVERY 6 HOURS PRN
Status: DISCONTINUED | OUTPATIENT
Start: 2019-07-23 | End: 2019-07-23

## 2019-07-23 RX ORDER — DIPHENHYDRAMINE HYDROCHLORIDE 50 MG/ML
25 INJECTION INTRAMUSCULAR; INTRAVENOUS EVERY 4 HOURS PRN
Status: DISCONTINUED | OUTPATIENT
Start: 2019-07-23 | End: 2019-07-23

## 2019-07-23 RX ORDER — ACETAMINOPHEN 10 MG/ML
1000 INJECTION, SOLUTION INTRAVENOUS ONCE
Status: COMPLETED | OUTPATIENT
Start: 2019-07-23 | End: 2019-07-23

## 2019-07-23 RX ORDER — DIPHENHYDRAMINE HYDROCHLORIDE 50 MG/ML
25 INJECTION INTRAMUSCULAR; INTRAVENOUS EVERY 6 HOURS PRN
Status: DISCONTINUED | OUTPATIENT
Start: 2019-07-23 | End: 2019-07-27 | Stop reason: HOSPADM

## 2019-07-23 RX ORDER — DIPHENHYDRAMINE HYDROCHLORIDE 50 MG/ML
12.5 INJECTION INTRAMUSCULAR; INTRAVENOUS ONCE
Status: COMPLETED | OUTPATIENT
Start: 2019-07-23 | End: 2019-07-23

## 2019-07-23 RX ORDER — LORAZEPAM 0.5 MG/1
0.5 TABLET ORAL EVERY 6 HOURS PRN
Status: DISCONTINUED | OUTPATIENT
Start: 2019-07-23 | End: 2019-07-27 | Stop reason: HOSPADM

## 2019-07-23 RX ORDER — DEXTROSE, SODIUM CHLORIDE, AND POTASSIUM CHLORIDE 5; .45; .15 G/100ML; G/100ML; G/100ML
INJECTION INTRAVENOUS CONTINUOUS
Status: DISCONTINUED | OUTPATIENT
Start: 2019-07-23 | End: 2019-07-24

## 2019-07-23 RX ORDER — OXYCODONE HYDROCHLORIDE 5 MG/1
5 TABLET ORAL EVERY 4 HOURS PRN
Status: DISCONTINUED | OUTPATIENT
Start: 2019-07-23 | End: 2019-07-25

## 2019-07-23 RX ORDER — DIPHENHYDRAMINE HYDROCHLORIDE 50 MG/ML
INJECTION INTRAMUSCULAR; INTRAVENOUS
Status: DISCONTINUED
Start: 2019-07-23 | End: 2019-07-23

## 2019-07-23 RX ORDER — ACETAMINOPHEN 10 MG/ML
1000 INJECTION, SOLUTION INTRAVENOUS EVERY 6 HOURS PRN
Status: DISCONTINUED | OUTPATIENT
Start: 2019-07-23 | End: 2019-07-24

## 2019-07-23 RX ORDER — DIPHENHYDRAMINE HYDROCHLORIDE 50 MG/ML
12.5 INJECTION INTRAMUSCULAR; INTRAVENOUS EVERY 4 HOURS PRN
Status: DISCONTINUED | OUTPATIENT
Start: 2019-07-23 | End: 2019-07-23

## 2019-07-23 RX ORDER — HYDROXYZINE HYDROCHLORIDE 25 MG/1
25 TABLET, FILM COATED ORAL 3 TIMES DAILY PRN
Status: DISCONTINUED | OUTPATIENT
Start: 2019-07-23 | End: 2019-07-27 | Stop reason: HOSPADM

## 2019-07-23 RX ORDER — OXYCODONE HYDROCHLORIDE 5 MG/1
10 TABLET ORAL EVERY 4 HOURS PRN
Status: DISCONTINUED | OUTPATIENT
Start: 2019-07-23 | End: 2019-07-25

## 2019-07-23 RX ADMIN — ACETAMINOPHEN 1000 MG: 10 INJECTION, SOLUTION INTRAVENOUS at 14:05

## 2019-07-23 RX ADMIN — Medication 10 ML: at 08:29

## 2019-07-23 RX ADMIN — HYDROMORPHONE HYDROCHLORIDE 0.5 MG: 1 INJECTION, SOLUTION INTRAMUSCULAR; INTRAVENOUS; SUBCUTANEOUS at 18:58

## 2019-07-23 RX ADMIN — DIPHENHYDRAMINE HYDROCHLORIDE 12.5 MG: 50 INJECTION, SOLUTION INTRAMUSCULAR; INTRAVENOUS at 04:15

## 2019-07-23 RX ADMIN — CEFAZOLIN 2 G: 10 INJECTION, POWDER, FOR SOLUTION INTRAVENOUS; PARENTERAL at 08:28

## 2019-07-23 RX ADMIN — SODIUM PHOSPHATE, MONOBASIC, MONOHYDRATE 15 MMOL: 276; 142 INJECTION, SOLUTION INTRAVENOUS at 06:18

## 2019-07-23 RX ADMIN — HYDROMORPHONE HYDROCHLORIDE 0.5 MG: 1 INJECTION, SOLUTION INTRAMUSCULAR; INTRAVENOUS; SUBCUTANEOUS at 08:29

## 2019-07-23 RX ADMIN — OXYCODONE HYDROCHLORIDE 5 MG: 5 TABLET ORAL at 21:06

## 2019-07-23 RX ADMIN — CEFAZOLIN 2 G: 10 INJECTION, POWDER, FOR SOLUTION INTRAVENOUS; PARENTERAL at 00:00

## 2019-07-23 RX ADMIN — POTASSIUM CHLORIDE, DEXTROSE MONOHYDRATE AND SODIUM CHLORIDE: 150; 5; 450 INJECTION, SOLUTION INTRAVENOUS at 21:08

## 2019-07-23 RX ADMIN — BUPROPION HYDROCHLORIDE 150 MG: 150 TABLET, FILM COATED, EXTENDED RELEASE ORAL at 08:29

## 2019-07-23 RX ADMIN — ONDANSETRON 4 MG: 2 INJECTION INTRAMUSCULAR; INTRAVENOUS at 08:38

## 2019-07-23 RX ADMIN — HYDROMORPHONE HYDROCHLORIDE 0.5 MG: 1 INJECTION, SOLUTION INTRAMUSCULAR; INTRAVENOUS; SUBCUTANEOUS at 02:55

## 2019-07-23 RX ADMIN — HYDROMORPHONE HYDROCHLORIDE 0.5 MG: 1 INJECTION, SOLUTION INTRAMUSCULAR; INTRAVENOUS; SUBCUTANEOUS at 12:38

## 2019-07-23 RX ADMIN — METHOCARBAMOL 500 MG: 100 INJECTION, SOLUTION INTRAMUSCULAR; INTRAVENOUS at 14:50

## 2019-07-23 RX ADMIN — HYDROMORPHONE HYDROCHLORIDE 0.25 MG: 1 INJECTION, SOLUTION INTRAMUSCULAR; INTRAVENOUS; SUBCUTANEOUS at 15:53

## 2019-07-23 RX ADMIN — SODIUM CHLORIDE: 900 INJECTION, SOLUTION INTRAVENOUS at 10:49

## 2019-07-23 RX ADMIN — SODIUM CHLORIDE, POTASSIUM CHLORIDE, SODIUM LACTATE AND CALCIUM CHLORIDE: 600; 310; 30; 20 INJECTION, SOLUTION INTRAVENOUS at 04:01

## 2019-07-23 RX ADMIN — METHOCARBAMOL 1000 MG: 100 INJECTION, SOLUTION INTRAMUSCULAR; INTRAVENOUS at 23:53

## 2019-07-23 RX ADMIN — ENOXAPARIN SODIUM 40 MG: 40 INJECTION SUBCUTANEOUS at 08:28

## 2019-07-23 RX ADMIN — POTASSIUM CHLORIDE, DEXTROSE MONOHYDRATE AND SODIUM CHLORIDE: 150; 5; 450 INJECTION, SOLUTION INTRAVENOUS at 08:28

## 2019-07-23 RX ADMIN — ACETAMINOPHEN 1000 MG: 10 INJECTION, SOLUTION INTRAVENOUS at 20:06

## 2019-07-23 RX ADMIN — DIPHENHYDRAMINE HYDROCHLORIDE 12.5 MG: 50 INJECTION, SOLUTION INTRAMUSCULAR; INTRAVENOUS at 08:39

## 2019-07-23 RX ADMIN — Medication 10 ML: at 19:57

## 2019-07-23 RX ADMIN — CALCIUM GLUCONATE 1 G: 98 INJECTION, SOLUTION INTRAVENOUS at 06:53

## 2019-07-23 RX ADMIN — LORAZEPAM 0.5 MG: 0.5 TABLET ORAL at 14:42

## 2019-07-23 RX ADMIN — HYDROXYZINE HYDROCHLORIDE 25 MG: 25 TABLET, FILM COATED ORAL at 10:37

## 2019-07-23 ASSESSMENT — PAIN DESCRIPTION - PROGRESSION
CLINICAL_PROGRESSION: GRADUALLY IMPROVING
CLINICAL_PROGRESSION: GRADUALLY WORSENING
CLINICAL_PROGRESSION: NOT CHANGED
CLINICAL_PROGRESSION: RESOLVED
CLINICAL_PROGRESSION: GRADUALLY WORSENING
CLINICAL_PROGRESSION: RAPIDLY WORSENING
CLINICAL_PROGRESSION: GRADUALLY WORSENING
CLINICAL_PROGRESSION: GRADUALLY IMPROVING
CLINICAL_PROGRESSION: RAPIDLY WORSENING

## 2019-07-23 ASSESSMENT — PAIN DESCRIPTION - FREQUENCY
FREQUENCY: CONTINUOUS
FREQUENCY: CONTINUOUS
FREQUENCY: INTERMITTENT
FREQUENCY: CONTINUOUS
FREQUENCY: INTERMITTENT

## 2019-07-23 ASSESSMENT — PAIN SCALES - GENERAL
PAINLEVEL_OUTOF10: 8
PAINLEVEL_OUTOF10: 5
PAINLEVEL_OUTOF10: 4
PAINLEVEL_OUTOF10: 6
PAINLEVEL_OUTOF10: 0
PAINLEVEL_OUTOF10: 8
PAINLEVEL_OUTOF10: 3
PAINLEVEL_OUTOF10: 6
PAINLEVEL_OUTOF10: 4
PAINLEVEL_OUTOF10: 7
PAINLEVEL_OUTOF10: 7
PAINLEVEL_OUTOF10: 6
PAINLEVEL_OUTOF10: 6
PAINLEVEL_OUTOF10: 7

## 2019-07-23 ASSESSMENT — PAIN DESCRIPTION - PAIN TYPE
TYPE: ACUTE PAIN;SURGICAL PAIN
TYPE: SURGICAL PAIN
TYPE: ACUTE PAIN;SURGICAL PAIN;REFERRED PAIN
TYPE: SURGICAL PAIN
TYPE: ACUTE PAIN;SURGICAL PAIN;REFERRED PAIN
TYPE: ACUTE PAIN;REFERRED PAIN
TYPE: SURGICAL PAIN
TYPE: ACUTE PAIN;SURGICAL PAIN
TYPE: SURGICAL PAIN
TYPE: ACUTE PAIN;SURGICAL PAIN
TYPE: SURGICAL PAIN

## 2019-07-23 ASSESSMENT — PAIN - FUNCTIONAL ASSESSMENT
PAIN_FUNCTIONAL_ASSESSMENT: ACTIVITIES ARE NOT PREVENTED
PAIN_FUNCTIONAL_ASSESSMENT: PREVENTS OR INTERFERES SOME ACTIVE ACTIVITIES AND ADLS
PAIN_FUNCTIONAL_ASSESSMENT: ACTIVITIES ARE NOT PREVENTED
PAIN_FUNCTIONAL_ASSESSMENT: PREVENTS OR INTERFERES SOME ACTIVE ACTIVITIES AND ADLS
PAIN_FUNCTIONAL_ASSESSMENT: PREVENTS OR INTERFERES SOME ACTIVE ACTIVITIES AND ADLS
PAIN_FUNCTIONAL_ASSESSMENT: ACTIVITIES ARE NOT PREVENTED
PAIN_FUNCTIONAL_ASSESSMENT: PREVENTS OR INTERFERES WITH MANY ACTIVE NOT PASSIVE ACTIVITIES
PAIN_FUNCTIONAL_ASSESSMENT: PREVENTS OR INTERFERES WITH MANY ACTIVE NOT PASSIVE ACTIVITIES

## 2019-07-23 ASSESSMENT — PAIN DESCRIPTION - LOCATION
LOCATION: SHOULDER
LOCATION: ABDOMEN;SHOULDER
LOCATION: ABDOMEN
LOCATION: SHOULDER
LOCATION: ABDOMEN;SHOULDER
LOCATION: SHOULDER;ABDOMEN
LOCATION: ABDOMEN;SHOULDER
LOCATION: ABDOMEN
LOCATION: SHOULDER

## 2019-07-23 ASSESSMENT — PAIN DESCRIPTION - ONSET
ONSET: ON-GOING
ONSET: GRADUAL
ONSET: ON-GOING
ONSET: SUDDEN
ONSET: GRADUAL
ONSET: ON-GOING

## 2019-07-23 ASSESSMENT — PAIN DESCRIPTION - DESCRIPTORS
DESCRIPTORS: SHOOTING;SHARP
DESCRIPTORS: DISCOMFORT;SHOOTING;SHARP
DESCRIPTORS: SHARP
DESCRIPTORS: DISCOMFORT
DESCRIPTORS: SHARP
DESCRIPTORS: SHARP;SHOOTING
DESCRIPTORS: SHARP;SHOOTING
DESCRIPTORS: DISCOMFORT
DESCRIPTORS: SHOOTING;SHARP
DESCRIPTORS: SHARP;SHOOTING
DESCRIPTORS: CONSTANT

## 2019-07-23 ASSESSMENT — PAIN DESCRIPTION - ORIENTATION
ORIENTATION: RIGHT;LEFT
ORIENTATION: RIGHT;MID
ORIENTATION: MID
ORIENTATION: RIGHT;MID
ORIENTATION: RIGHT;MID
ORIENTATION: MID
ORIENTATION: RIGHT;LEFT;MID
ORIENTATION: RIGHT
ORIENTATION: RIGHT;LEFT;MID
ORIENTATION: RIGHT;MID
ORIENTATION: RIGHT;LEFT;MID

## 2019-07-23 NOTE — PLAN OF CARE
Problem: Falls - Risk of:  Goal: Will remain free from falls  Description  Will remain free from falls  7/23/2019 1736 by Rodney Pederson RN  Outcome: Met This Shift  Note:   Patient has been free from falls and injuries this shift. Patient uses call light appropriately. Call light and bedside table/belongings within reach. Room free from clutter. Bed locked and in lowest position, alarm engaged. Orange blanket on bed. Fall light illuminated at door. Chavez Fall Risk score determined every shift. Will continue with hourly rounding to assess needs and monitor. Problem: Risk for Impaired Skin Integrity  Goal: Tissue integrity - skin and mucous membranes  Description  Structural intactness and normal physiological function of skin and  mucous membranes. 7/23/2019 1736 by Rodney Pederson RN  Outcome: Met This Shift  Note:   Patient's skin is assessed q4h and prn. Skin is kept clean and dry. Pt is educated on the importance of frequent repositioning to prevent skin breakdown. Pt is assisted to reposition q2h and as needed. Sacral heart placed on pt for protection, CDI. Hitesh Scale assessed every shift. Pt's skin integrity was maintained this shift. Problem: Pain:  Description  Pain management should include both nonpharmacologic and pharmacologic interventions. Goal: Pain level will decrease  Description  Pain level will decrease  Outcome: Met This Shift     Problem: Pain:  Description  Pain management should include both nonpharmacologic and pharmacologic interventions. Goal: Control of acute pain  Description  Control of acute pain  Outcome: Met This Shift  Note:   Patient's pain is assessed every shift, post procedure, prn, or any changes in status. Pt's pain is assessed using 0-10 pain scale. Pt understands how to communicate pain to RN using this scale. Pt understands to notify RN with onset of new pain. Pt's pain has been controlled this shift.  Pt is receiving a continuous epidural with bupivicaine, q6h ofirmev, q8h robaxin, and q3h dilaudid for pain.

## 2019-07-24 LAB
ALBUMIN SERPL-MCNC: 3.4 G/DL (ref 3.4–5)
ALP BLD-CCNC: 58 U/L (ref 40–129)
ALT SERPL-CCNC: 94 U/L (ref 10–40)
ANION GAP SERPL CALCULATED.3IONS-SCNC: 9 MMOL/L (ref 3–16)
AST SERPL-CCNC: 118 U/L (ref 15–37)
BASOPHILS ABSOLUTE: 0 K/UL (ref 0–0.2)
BASOPHILS RELATIVE PERCENT: 0.1 %
BILIRUB SERPL-MCNC: 0.4 MG/DL (ref 0–1)
BILIRUBIN DIRECT: <0.2 MG/DL (ref 0–0.3)
BILIRUBIN, INDIRECT: ABNORMAL MG/DL (ref 0–1)
BUN BLDV-MCNC: 3 MG/DL (ref 7–20)
CALCIUM SERPL-MCNC: 7.9 MG/DL (ref 8.3–10.6)
CHLORIDE BLD-SCNC: 109 MMOL/L (ref 99–110)
CO2: 24 MMOL/L (ref 21–32)
CREAT SERPL-MCNC: 0.6 MG/DL (ref 0.6–1.1)
EOSINOPHILS ABSOLUTE: 0.1 K/UL (ref 0–0.6)
EOSINOPHILS RELATIVE PERCENT: 0.6 %
GFR AFRICAN AMERICAN: >60
GFR NON-AFRICAN AMERICAN: >60
GLUCOSE BLD-MCNC: 120 MG/DL (ref 70–99)
HCT VFR BLD CALC: 28.9 % (ref 36–48)
HEMOGLOBIN: 9.5 G/DL (ref 12–16)
LYMPHOCYTES ABSOLUTE: 0.8 K/UL (ref 1–5.1)
LYMPHOCYTES RELATIVE PERCENT: 9.6 %
MAGNESIUM: 2.1 MG/DL (ref 1.8–2.4)
MCH RBC QN AUTO: 29.7 PG (ref 26–34)
MCHC RBC AUTO-ENTMCNC: 32.8 G/DL (ref 31–36)
MCV RBC AUTO: 90.3 FL (ref 80–100)
MONOCYTES ABSOLUTE: 0.7 K/UL (ref 0–1.3)
MONOCYTES RELATIVE PERCENT: 7.5 %
NEUTROPHILS ABSOLUTE: 7.2 K/UL (ref 1.7–7.7)
NEUTROPHILS RELATIVE PERCENT: 82.2 %
PDW BLD-RTO: 14.2 % (ref 12.4–15.4)
PHOSPHORUS: 1.7 MG/DL (ref 2.5–4.9)
PLATELET # BLD: 145 K/UL (ref 135–450)
PMV BLD AUTO: 9.2 FL (ref 5–10.5)
POTASSIUM SERPL-SCNC: 3.7 MMOL/L (ref 3.5–5.1)
RBC # BLD: 3.2 M/UL (ref 4–5.2)
SODIUM BLD-SCNC: 142 MMOL/L (ref 136–145)
TOTAL PROTEIN: 5.6 G/DL (ref 6.4–8.2)
WBC # BLD: 8.7 K/UL (ref 4–11)

## 2019-07-24 PROCEDURE — 99024 POSTOP FOLLOW-UP VISIT: CPT | Performed by: SURGERY

## 2019-07-24 PROCEDURE — 6360000002 HC RX W HCPCS: Performed by: STUDENT IN AN ORGANIZED HEALTH CARE EDUCATION/TRAINING PROGRAM

## 2019-07-24 PROCEDURE — 85025 COMPLETE CBC W/AUTO DIFF WBC: CPT

## 2019-07-24 PROCEDURE — 6370000000 HC RX 637 (ALT 250 FOR IP): Performed by: STUDENT IN AN ORGANIZED HEALTH CARE EDUCATION/TRAINING PROGRAM

## 2019-07-24 PROCEDURE — 83735 ASSAY OF MAGNESIUM: CPT

## 2019-07-24 PROCEDURE — 2580000003 HC RX 258: Performed by: STUDENT IN AN ORGANIZED HEALTH CARE EDUCATION/TRAINING PROGRAM

## 2019-07-24 PROCEDURE — 2500000003 HC RX 250 WO HCPCS: Performed by: STUDENT IN AN ORGANIZED HEALTH CARE EDUCATION/TRAINING PROGRAM

## 2019-07-24 PROCEDURE — 1200000000 HC SEMI PRIVATE

## 2019-07-24 PROCEDURE — 80069 RENAL FUNCTION PANEL: CPT

## 2019-07-24 PROCEDURE — 80076 HEPATIC FUNCTION PANEL: CPT

## 2019-07-24 PROCEDURE — 36415 COLL VENOUS BLD VENIPUNCTURE: CPT

## 2019-07-24 RX ORDER — SODIUM CHLORIDE 9 MG/ML
INJECTION, SOLUTION INTRAVENOUS
Status: DISPENSED
Start: 2019-07-24 | End: 2019-07-24

## 2019-07-24 RX ORDER — DOCUSATE SODIUM 100 MG/1
100 CAPSULE, LIQUID FILLED ORAL 2 TIMES DAILY
Status: CANCELLED | OUTPATIENT
Start: 2019-07-24

## 2019-07-24 RX ORDER — ACETAMINOPHEN 500 MG
1000 TABLET ORAL EVERY 6 HOURS
Status: DISCONTINUED | OUTPATIENT
Start: 2019-07-24 | End: 2019-07-25

## 2019-07-24 RX ORDER — DOCUSATE SODIUM 100 MG/1
100 CAPSULE, LIQUID FILLED ORAL 2 TIMES DAILY
Status: DISCONTINUED | OUTPATIENT
Start: 2019-07-24 | End: 2019-07-27 | Stop reason: HOSPADM

## 2019-07-24 RX ORDER — POLYETHYLENE GLYCOL 3350 17 G/17G
17 POWDER, FOR SOLUTION ORAL DAILY
Status: DISCONTINUED | OUTPATIENT
Start: 2019-07-24 | End: 2019-07-27 | Stop reason: HOSPADM

## 2019-07-24 RX ADMIN — SODIUM CHLORIDE: 900 INJECTION, SOLUTION INTRAVENOUS at 08:50

## 2019-07-24 RX ADMIN — OXYCODONE HYDROCHLORIDE 5 MG: 5 TABLET ORAL at 03:42

## 2019-07-24 RX ADMIN — DOCUSATE SODIUM 100 MG: 100 CAPSULE, LIQUID FILLED ORAL at 10:14

## 2019-07-24 RX ADMIN — ACETAMINOPHEN 1000 MG: 10 INJECTION, SOLUTION INTRAVENOUS at 03:43

## 2019-07-24 RX ADMIN — Medication 10 ML: at 21:09

## 2019-07-24 RX ADMIN — OXYCODONE HYDROCHLORIDE 5 MG: 5 TABLET ORAL at 19:24

## 2019-07-24 RX ADMIN — BUPROPION HYDROCHLORIDE 150 MG: 150 TABLET, FILM COATED, EXTENDED RELEASE ORAL at 10:14

## 2019-07-24 RX ADMIN — DIBASIC SODIUM PHOSPHATE, MONOBASIC POTASSIUM PHOSPHATE AND MONOBASIC SODIUM PHOSPHATE 2 MG: 852; 155; 130 TABLET ORAL at 10:29

## 2019-07-24 RX ADMIN — OXYCODONE HYDROCHLORIDE 10 MG: 5 TABLET ORAL at 23:22

## 2019-07-24 RX ADMIN — ENOXAPARIN SODIUM 40 MG: 40 INJECTION SUBCUTANEOUS at 10:15

## 2019-07-24 RX ADMIN — HYDROMORPHONE HYDROCHLORIDE 0.5 MG: 1 INJECTION, SOLUTION INTRAMUSCULAR; INTRAVENOUS; SUBCUTANEOUS at 21:09

## 2019-07-24 RX ADMIN — METHOCARBAMOL 1000 MG: 100 INJECTION, SOLUTION INTRAMUSCULAR; INTRAVENOUS at 06:37

## 2019-07-24 RX ADMIN — Medication 10 ML: at 10:37

## 2019-07-24 RX ADMIN — HYDROMORPHONE HYDROCHLORIDE 0.5 MG: 1 INJECTION, SOLUTION INTRAMUSCULAR; INTRAVENOUS; SUBCUTANEOUS at 10:33

## 2019-07-24 RX ADMIN — DOCUSATE SODIUM 100 MG: 100 CAPSULE, LIQUID FILLED ORAL at 20:14

## 2019-07-24 RX ADMIN — DIBASIC SODIUM PHOSPHATE, MONOBASIC POTASSIUM PHOSPHATE AND MONOBASIC SODIUM PHOSPHATE 2 TABLET: 852; 155; 130 TABLET ORAL at 20:14

## 2019-07-24 RX ADMIN — HYDROMORPHONE HYDROCHLORIDE 0.5 MG: 1 INJECTION, SOLUTION INTRAMUSCULAR; INTRAVENOUS; SUBCUTANEOUS at 17:35

## 2019-07-24 RX ADMIN — POTASSIUM CHLORIDE 30 MEQ: 10 TABLET, EXTENDED RELEASE ORAL at 10:19

## 2019-07-24 RX ADMIN — Medication 10 ML: at 20:15

## 2019-07-24 RX ADMIN — METHOCARBAMOL 1000 MG: 100 INJECTION, SOLUTION INTRAMUSCULAR; INTRAVENOUS at 23:18

## 2019-07-24 RX ADMIN — OXYCODONE HYDROCHLORIDE 10 MG: 5 TABLET ORAL at 13:40

## 2019-07-24 ASSESSMENT — PAIN DESCRIPTION - ONSET
ONSET: ON-GOING
ONSET: GRADUAL
ONSET: ON-GOING
ONSET: ON-GOING
ONSET: GRADUAL
ONSET: ON-GOING
ONSET: ON-GOING

## 2019-07-24 ASSESSMENT — PAIN DESCRIPTION - ORIENTATION
ORIENTATION: MID
ORIENTATION: RIGHT;LEFT;MID
ORIENTATION: RIGHT;LOWER
ORIENTATION: RIGHT;LEFT;MID
ORIENTATION: RIGHT;LEFT;MID
ORIENTATION: RIGHT;LOWER
ORIENTATION: RIGHT;LEFT;MID

## 2019-07-24 ASSESSMENT — PAIN SCALES - GENERAL
PAINLEVEL_OUTOF10: 8
PAINLEVEL_OUTOF10: 3
PAINLEVEL_OUTOF10: 5
PAINLEVEL_OUTOF10: 7
PAINLEVEL_OUTOF10: 7
PAINLEVEL_OUTOF10: 4
PAINLEVEL_OUTOF10: 7
PAINLEVEL_OUTOF10: 4
PAINLEVEL_OUTOF10: 6
PAINLEVEL_OUTOF10: 4
PAINLEVEL_OUTOF10: 4
PAINLEVEL_OUTOF10: 8
PAINLEVEL_OUTOF10: 6
PAINLEVEL_OUTOF10: 7

## 2019-07-24 ASSESSMENT — PAIN DESCRIPTION - PROGRESSION
CLINICAL_PROGRESSION: GRADUALLY WORSENING
CLINICAL_PROGRESSION: GRADUALLY IMPROVING
CLINICAL_PROGRESSION: GRADUALLY IMPROVING
CLINICAL_PROGRESSION: GRADUALLY WORSENING
CLINICAL_PROGRESSION: RAPIDLY WORSENING
CLINICAL_PROGRESSION: GRADUALLY IMPROVING
CLINICAL_PROGRESSION: GRADUALLY WORSENING

## 2019-07-24 ASSESSMENT — PAIN DESCRIPTION - DESCRIPTORS
DESCRIPTORS: SHARP;SHOOTING
DESCRIPTORS: ACHING
DESCRIPTORS: ACHING
DESCRIPTORS: STABBING;SHOOTING
DESCRIPTORS: SHARP;SHOOTING
DESCRIPTORS: ACHING
DESCRIPTORS: SHARP;SHOOTING

## 2019-07-24 ASSESSMENT — PAIN DESCRIPTION - LOCATION
LOCATION: SHOULDER
LOCATION: ABDOMEN
LOCATION: ABDOMEN;SHOULDER
LOCATION: ABDOMEN
LOCATION: ABDOMEN
LOCATION: SHOULDER;ABDOMEN
LOCATION: ABDOMEN;SHOULDER

## 2019-07-24 ASSESSMENT — PAIN DESCRIPTION - PAIN TYPE
TYPE: SURGICAL PAIN
TYPE: ACUTE PAIN;SURGICAL PAIN;REFERRED PAIN
TYPE: ACUTE PAIN;SURGICAL PAIN
TYPE: ACUTE PAIN;SURGICAL PAIN
TYPE: SURGICAL PAIN
TYPE: ACUTE PAIN;SURGICAL PAIN
TYPE: SURGICAL PAIN

## 2019-07-24 ASSESSMENT — PAIN DESCRIPTION - DIRECTION
RADIATING_TOWARDS: RIGHT AND LEFT SHOULDER

## 2019-07-24 ASSESSMENT — PAIN - FUNCTIONAL ASSESSMENT
PAIN_FUNCTIONAL_ASSESSMENT: ACTIVITIES ARE NOT PREVENTED

## 2019-07-24 ASSESSMENT — PAIN DESCRIPTION - FREQUENCY
FREQUENCY: CONTINUOUS
FREQUENCY: INTERMITTENT
FREQUENCY: INTERMITTENT
FREQUENCY: CONTINUOUS

## 2019-07-24 NOTE — ANESTHESIA POSTPROCEDURE EVALUATION
Department of Anesthesiology  Postprocedure Note    Patient: Abena Muniz  MRN: [id number]  YOB: 1980  Date of evaluation: 7/24/2019  Time:  6:30 PM     Procedure Summary     Date:  07/22/19 Room / Location:  [facility] OR  / [facility] OR    Anesthesia Start:  0941 Anesthesia Stop:  6177    Procedure:  ROBOTIC ASSISTED PARTIAL LIVER RESECTION AND ABDOMINAL MASS RESECTION (N/A ) Diagnosis:  (LIVER METASTASIS, ABDOMINAL MASS)    Surgeon:  Alice Travis MD Responsible Provider:  Lisa Styles MD    Anesthesia Type:  general ASA Status:  3          Anesthesia Type: general    Adalgisa Phase I: Adalgisa Score: 8    Adalgisa Phase II:      Last vitals: Reviewed and per EMR flowsheets. Anesthesia Post Evaluation    Patient location during evaluation: PACU  Patient participation: complete - patient participated  Level of consciousness: awake and alert  Pain scale: please refer to nursing notes. Airway patency: patent  Nausea & Vomiting: no nausea and no vomiting  Complications: no  Cardiovascular status: hemodynamically stable  Respiratory status: spontaneous ventilation  Hydration status: stable  Comments: No phone calls received from PACU RN regarding patient.

## 2019-07-25 LAB
ALBUMIN SERPL-MCNC: 3.1 G/DL (ref 3.4–5)
ALBUMIN SERPL-MCNC: 3.1 G/DL (ref 3.4–5)
ALP BLD-CCNC: 63 U/L (ref 40–129)
ALT SERPL-CCNC: 71 U/L (ref 10–40)
ANION GAP SERPL CALCULATED.3IONS-SCNC: 9 MMOL/L (ref 3–16)
AST SERPL-CCNC: 78 U/L (ref 15–37)
BASOPHILS ABSOLUTE: 0 K/UL (ref 0–0.2)
BASOPHILS RELATIVE PERCENT: 0.2 %
BILIRUB SERPL-MCNC: 0.3 MG/DL (ref 0–1)
BILIRUBIN DIRECT: <0.2 MG/DL (ref 0–0.3)
BILIRUBIN, INDIRECT: ABNORMAL MG/DL (ref 0–1)
BUN BLDV-MCNC: 3 MG/DL (ref 7–20)
CALCIUM SERPL-MCNC: 8.2 MG/DL (ref 8.3–10.6)
CHLORIDE BLD-SCNC: 105 MMOL/L (ref 99–110)
CO2: 25 MMOL/L (ref 21–32)
CREAT SERPL-MCNC: 0.6 MG/DL (ref 0.6–1.1)
EOSINOPHILS ABSOLUTE: 0.1 K/UL (ref 0–0.6)
EOSINOPHILS RELATIVE PERCENT: 1.3 %
GFR AFRICAN AMERICAN: >60
GFR NON-AFRICAN AMERICAN: >60
GLUCOSE BLD-MCNC: 99 MG/DL (ref 70–99)
HCT VFR BLD CALC: 29.5 % (ref 36–48)
HEMOGLOBIN: 9.7 G/DL (ref 12–16)
LYMPHOCYTES ABSOLUTE: 1.2 K/UL (ref 1–5.1)
LYMPHOCYTES RELATIVE PERCENT: 15.8 %
MAGNESIUM: 2.1 MG/DL (ref 1.8–2.4)
MCH RBC QN AUTO: 29.8 PG (ref 26–34)
MCHC RBC AUTO-ENTMCNC: 32.8 G/DL (ref 31–36)
MCV RBC AUTO: 90.9 FL (ref 80–100)
MONOCYTES ABSOLUTE: 0.7 K/UL (ref 0–1.3)
MONOCYTES RELATIVE PERCENT: 9 %
NEUTROPHILS ABSOLUTE: 5.6 K/UL (ref 1.7–7.7)
NEUTROPHILS RELATIVE PERCENT: 73.7 %
PDW BLD-RTO: 14.2 % (ref 12.4–15.4)
PHOSPHORUS: 3 MG/DL (ref 2.5–4.9)
PLATELET # BLD: 147 K/UL (ref 135–450)
PMV BLD AUTO: 9.2 FL (ref 5–10.5)
POTASSIUM SERPL-SCNC: 3.7 MMOL/L (ref 3.5–5.1)
RBC # BLD: 3.25 M/UL (ref 4–5.2)
SODIUM BLD-SCNC: 139 MMOL/L (ref 136–145)
TOTAL PROTEIN: 5.8 G/DL (ref 6.4–8.2)
WBC # BLD: 7.6 K/UL (ref 4–11)

## 2019-07-25 PROCEDURE — 6370000000 HC RX 637 (ALT 250 FOR IP): Performed by: STUDENT IN AN ORGANIZED HEALTH CARE EDUCATION/TRAINING PROGRAM

## 2019-07-25 PROCEDURE — 36415 COLL VENOUS BLD VENIPUNCTURE: CPT

## 2019-07-25 PROCEDURE — 99024 POSTOP FOLLOW-UP VISIT: CPT | Performed by: SURGERY

## 2019-07-25 PROCEDURE — 80069 RENAL FUNCTION PANEL: CPT

## 2019-07-25 PROCEDURE — 6360000002 HC RX W HCPCS: Performed by: STUDENT IN AN ORGANIZED HEALTH CARE EDUCATION/TRAINING PROGRAM

## 2019-07-25 PROCEDURE — 2500000003 HC RX 250 WO HCPCS: Performed by: ANESTHESIOLOGY

## 2019-07-25 PROCEDURE — 2580000003 HC RX 258: Performed by: STUDENT IN AN ORGANIZED HEALTH CARE EDUCATION/TRAINING PROGRAM

## 2019-07-25 PROCEDURE — 83735 ASSAY OF MAGNESIUM: CPT

## 2019-07-25 PROCEDURE — 1200000000 HC SEMI PRIVATE

## 2019-07-25 PROCEDURE — 80076 HEPATIC FUNCTION PANEL: CPT

## 2019-07-25 PROCEDURE — 2580000003 HC RX 258: Performed by: ANESTHESIOLOGY

## 2019-07-25 PROCEDURE — 85025 COMPLETE CBC W/AUTO DIFF WBC: CPT

## 2019-07-25 PROCEDURE — 2500000003 HC RX 250 WO HCPCS: Performed by: STUDENT IN AN ORGANIZED HEALTH CARE EDUCATION/TRAINING PROGRAM

## 2019-07-25 RX ORDER — OXYCODONE HYDROCHLORIDE AND ACETAMINOPHEN 5; 325 MG/1; MG/1
2 TABLET ORAL EVERY 4 HOURS PRN
Status: DISCONTINUED | OUTPATIENT
Start: 2019-07-25 | End: 2019-07-27 | Stop reason: HOSPADM

## 2019-07-25 RX ORDER — METHOCARBAMOL 750 MG/1
1500 TABLET, FILM COATED ORAL 4 TIMES DAILY
Status: DISCONTINUED | OUTPATIENT
Start: 2019-07-25 | End: 2019-07-27 | Stop reason: HOSPADM

## 2019-07-25 RX ORDER — OXYCODONE HYDROCHLORIDE AND ACETAMINOPHEN 5; 325 MG/1; MG/1
1 TABLET ORAL EVERY 4 HOURS PRN
Status: DISCONTINUED | OUTPATIENT
Start: 2019-07-25 | End: 2019-07-27 | Stop reason: HOSPADM

## 2019-07-25 RX ADMIN — ENOXAPARIN SODIUM 40 MG: 40 INJECTION SUBCUTANEOUS at 21:23

## 2019-07-25 RX ADMIN — OXYCODONE HYDROCHLORIDE 10 MG: 5 TABLET ORAL at 13:17

## 2019-07-25 RX ADMIN — POLYETHYLENE GLYCOL (3350) 17 G: 17 POWDER, FOR SOLUTION ORAL at 09:28

## 2019-07-25 RX ADMIN — HYDROMORPHONE HYDROCHLORIDE 1 MG: 1 INJECTION, SOLUTION INTRAMUSCULAR; INTRAVENOUS; SUBCUTANEOUS at 11:11

## 2019-07-25 RX ADMIN — POTASSIUM CHLORIDE 30 MEQ: 10 TABLET, EXTENDED RELEASE ORAL at 09:28

## 2019-07-25 RX ADMIN — METHOCARBAMOL TABLETS 1500 MG: 750 TABLET, COATED ORAL at 09:28

## 2019-07-25 RX ADMIN — OXYCODONE HYDROCHLORIDE AND ACETAMINOPHEN 2 TABLET: 5; 325 TABLET ORAL at 22:04

## 2019-07-25 RX ADMIN — DOCUSATE SODIUM 100 MG: 100 CAPSULE, LIQUID FILLED ORAL at 09:28

## 2019-07-25 RX ADMIN — METHOCARBAMOL TABLETS 1500 MG: 750 TABLET, COATED ORAL at 21:23

## 2019-07-25 RX ADMIN — OXYCODONE HYDROCHLORIDE 10 MG: 5 TABLET ORAL at 04:22

## 2019-07-25 RX ADMIN — METHOCARBAMOL TABLETS 1500 MG: 750 TABLET, COATED ORAL at 18:27

## 2019-07-25 RX ADMIN — BUPROPION HYDROCHLORIDE 150 MG: 150 TABLET, FILM COATED, EXTENDED RELEASE ORAL at 09:28

## 2019-07-25 RX ADMIN — HYDROMORPHONE HYDROCHLORIDE 0.5 MG: 1 INJECTION, SOLUTION INTRAMUSCULAR; INTRAVENOUS; SUBCUTANEOUS at 03:15

## 2019-07-25 RX ADMIN — HYDROMORPHONE HYDROCHLORIDE 1 MG: 1 INJECTION, SOLUTION INTRAMUSCULAR; INTRAVENOUS; SUBCUTANEOUS at 19:36

## 2019-07-25 RX ADMIN — DOCUSATE SODIUM 100 MG: 100 CAPSULE, LIQUID FILLED ORAL at 21:23

## 2019-07-25 RX ADMIN — Medication 10 ML: at 06:38

## 2019-07-25 RX ADMIN — HYDROMORPHONE HYDROCHLORIDE 1 MG: 1 INJECTION, SOLUTION INTRAMUSCULAR; INTRAVENOUS; SUBCUTANEOUS at 14:28

## 2019-07-25 RX ADMIN — Medication 10 ML: at 21:24

## 2019-07-25 RX ADMIN — OXYCODONE HYDROCHLORIDE 10 MG: 5 TABLET ORAL at 17:29

## 2019-07-25 RX ADMIN — LORAZEPAM 0.5 MG: 0.5 TABLET ORAL at 03:18

## 2019-07-25 RX ADMIN — Medication 10 ML: at 11:11

## 2019-07-25 RX ADMIN — METHOCARBAMOL TABLETS 1500 MG: 750 TABLET, COATED ORAL at 14:34

## 2019-07-25 RX ADMIN — OXYCODONE HYDROCHLORIDE 10 MG: 5 TABLET ORAL at 08:31

## 2019-07-25 RX ADMIN — LORAZEPAM 0.5 MG: 0.5 TABLET ORAL at 21:23

## 2019-07-25 RX ADMIN — HYDROMORPHONE HYDROCHLORIDE 1 MG: 1 INJECTION, SOLUTION INTRAMUSCULAR; INTRAVENOUS; SUBCUTANEOUS at 06:38

## 2019-07-25 RX ADMIN — HYDROMORPHONE HYDROCHLORIDE 0.5 MG: 1 INJECTION, SOLUTION INTRAMUSCULAR; INTRAVENOUS; SUBCUTANEOUS at 00:25

## 2019-07-25 RX ADMIN — LORAZEPAM 0.5 MG: 0.5 TABLET ORAL at 16:15

## 2019-07-25 ASSESSMENT — PAIN DESCRIPTION - LOCATION
LOCATION: ABDOMEN

## 2019-07-25 ASSESSMENT — PAIN DESCRIPTION - PAIN TYPE
TYPE: SURGICAL PAIN

## 2019-07-25 ASSESSMENT — PAIN DESCRIPTION - DESCRIPTORS
DESCRIPTORS: ACHING;PRESSURE
DESCRIPTORS: ACHING
DESCRIPTORS: ACHING
DESCRIPTORS: PRESSURE
DESCRIPTORS: PRESSURE
DESCRIPTORS: ACHING;PRESSURE
DESCRIPTORS: ACHING

## 2019-07-25 ASSESSMENT — PAIN DESCRIPTION - PROGRESSION
CLINICAL_PROGRESSION: NOT CHANGED
CLINICAL_PROGRESSION: GRADUALLY WORSENING
CLINICAL_PROGRESSION: NOT CHANGED
CLINICAL_PROGRESSION: GRADUALLY WORSENING
CLINICAL_PROGRESSION: GRADUALLY IMPROVING

## 2019-07-25 ASSESSMENT — PAIN DESCRIPTION - FREQUENCY
FREQUENCY: INTERMITTENT
FREQUENCY: CONTINUOUS
FREQUENCY: INTERMITTENT
FREQUENCY: CONTINUOUS
FREQUENCY: INTERMITTENT

## 2019-07-25 ASSESSMENT — PAIN SCALES - GENERAL
PAINLEVEL_OUTOF10: 8
PAINLEVEL_OUTOF10: 7
PAINLEVEL_OUTOF10: 0
PAINLEVEL_OUTOF10: 5
PAINLEVEL_OUTOF10: 7
PAINLEVEL_OUTOF10: 8
PAINLEVEL_OUTOF10: 4
PAINLEVEL_OUTOF10: 8
PAINLEVEL_OUTOF10: 8
PAINLEVEL_OUTOF10: 7
PAINLEVEL_OUTOF10: 6
PAINLEVEL_OUTOF10: 7
PAINLEVEL_OUTOF10: 0
PAINLEVEL_OUTOF10: 7

## 2019-07-25 ASSESSMENT — PAIN DESCRIPTION - ORIENTATION
ORIENTATION: RIGHT;LOWER
ORIENTATION: RIGHT;LOWER
ORIENTATION: RIGHT
ORIENTATION: RIGHT;LOWER

## 2019-07-25 ASSESSMENT — PAIN - FUNCTIONAL ASSESSMENT
PAIN_FUNCTIONAL_ASSESSMENT: ACTIVITIES ARE NOT PREVENTED
PAIN_FUNCTIONAL_ASSESSMENT: ACTIVITIES ARE NOT PREVENTED

## 2019-07-25 ASSESSMENT — PAIN DESCRIPTION - ONSET
ONSET: ON-GOING
ONSET: GRADUAL
ONSET: ON-GOING
ONSET: GRADUAL
ONSET: GRADUAL

## 2019-07-26 LAB
ALBUMIN SERPL-MCNC: 3.2 G/DL (ref 3.4–5)
ALP BLD-CCNC: 69 U/L (ref 40–129)
ALT SERPL-CCNC: 92 U/L (ref 10–40)
ANION GAP SERPL CALCULATED.3IONS-SCNC: 11 MMOL/L (ref 3–16)
AST SERPL-CCNC: 92 U/L (ref 15–37)
BASOPHILS ABSOLUTE: 0 K/UL (ref 0–0.2)
BASOPHILS RELATIVE PERCENT: 0.3 %
BILIRUB SERPL-MCNC: 0.3 MG/DL (ref 0–1)
BILIRUBIN DIRECT: <0.2 MG/DL (ref 0–0.3)
BILIRUBIN, INDIRECT: ABNORMAL MG/DL (ref 0–1)
BLOOD BANK DISPENSE STATUS: NORMAL
BLOOD BANK DISPENSE STATUS: NORMAL
BLOOD BANK PRODUCT CODE: NORMAL
BLOOD BANK PRODUCT CODE: NORMAL
BPU ID: NORMAL
BPU ID: NORMAL
BUN BLDV-MCNC: 3 MG/DL (ref 7–20)
CALCIUM SERPL-MCNC: 8.7 MG/DL (ref 8.3–10.6)
CHLORIDE BLD-SCNC: 104 MMOL/L (ref 99–110)
CO2: 25 MMOL/L (ref 21–32)
CREAT SERPL-MCNC: 0.7 MG/DL (ref 0.6–1.1)
DESCRIPTION BLOOD BANK: NORMAL
DESCRIPTION BLOOD BANK: NORMAL
EOSINOPHILS ABSOLUTE: 0.2 K/UL (ref 0–0.6)
EOSINOPHILS RELATIVE PERCENT: 2.3 %
GFR AFRICAN AMERICAN: >60
GFR NON-AFRICAN AMERICAN: >60
GLUCOSE BLD-MCNC: 151 MG/DL (ref 70–99)
HCT VFR BLD CALC: 31.7 % (ref 36–48)
HEMOGLOBIN: 10.4 G/DL (ref 12–16)
LYMPHOCYTES ABSOLUTE: 1.1 K/UL (ref 1–5.1)
LYMPHOCYTES RELATIVE PERCENT: 16.2 %
MAGNESIUM: 2 MG/DL (ref 1.8–2.4)
MCH RBC QN AUTO: 29.7 PG (ref 26–34)
MCHC RBC AUTO-ENTMCNC: 32.9 G/DL (ref 31–36)
MCV RBC AUTO: 90.6 FL (ref 80–100)
MONOCYTES ABSOLUTE: 0.6 K/UL (ref 0–1.3)
MONOCYTES RELATIVE PERCENT: 8.9 %
NEUTROPHILS ABSOLUTE: 4.9 K/UL (ref 1.7–7.7)
NEUTROPHILS RELATIVE PERCENT: 72.3 %
PDW BLD-RTO: 14.1 % (ref 12.4–15.4)
PHOSPHORUS: 3.7 MG/DL (ref 2.5–4.9)
PLATELET # BLD: 170 K/UL (ref 135–450)
PMV BLD AUTO: 9.2 FL (ref 5–10.5)
POTASSIUM SERPL-SCNC: 3.9 MMOL/L (ref 3.5–5.1)
RBC # BLD: 3.5 M/UL (ref 4–5.2)
SODIUM BLD-SCNC: 140 MMOL/L (ref 136–145)
TOTAL PROTEIN: 6.1 G/DL (ref 6.4–8.2)
WBC # BLD: 6.8 K/UL (ref 4–11)

## 2019-07-26 PROCEDURE — 6370000000 HC RX 637 (ALT 250 FOR IP): Performed by: STUDENT IN AN ORGANIZED HEALTH CARE EDUCATION/TRAINING PROGRAM

## 2019-07-26 PROCEDURE — 80076 HEPATIC FUNCTION PANEL: CPT

## 2019-07-26 PROCEDURE — 85025 COMPLETE CBC W/AUTO DIFF WBC: CPT

## 2019-07-26 PROCEDURE — 6360000002 HC RX W HCPCS: Performed by: STUDENT IN AN ORGANIZED HEALTH CARE EDUCATION/TRAINING PROGRAM

## 2019-07-26 PROCEDURE — 99024 POSTOP FOLLOW-UP VISIT: CPT | Performed by: SURGERY

## 2019-07-26 PROCEDURE — 2580000003 HC RX 258: Performed by: STUDENT IN AN ORGANIZED HEALTH CARE EDUCATION/TRAINING PROGRAM

## 2019-07-26 PROCEDURE — 80069 RENAL FUNCTION PANEL: CPT

## 2019-07-26 PROCEDURE — 36415 COLL VENOUS BLD VENIPUNCTURE: CPT

## 2019-07-26 PROCEDURE — 83735 ASSAY OF MAGNESIUM: CPT

## 2019-07-26 PROCEDURE — 1200000000 HC SEMI PRIVATE

## 2019-07-26 RX ORDER — POLYETHYLENE GLYCOL 3350 17 G/17G
17 POWDER, FOR SOLUTION ORAL DAILY
Qty: 527 G | Refills: 1 | Status: SHIPPED | OUTPATIENT
Start: 2019-07-27 | End: 2019-08-26

## 2019-07-26 RX ORDER — DIAPER,BRIEF,INFANT-TODD,DISP
EACH MISCELLANEOUS 2 TIMES DAILY
Status: DISCONTINUED | OUTPATIENT
Start: 2019-07-26 | End: 2019-07-27 | Stop reason: HOSPADM

## 2019-07-26 RX ORDER — LORAZEPAM 0.5 MG/1
0.5 TABLET ORAL EVERY 6 HOURS PRN
Qty: 10 TABLET | Refills: 0 | Status: SHIPPED | OUTPATIENT
Start: 2019-07-26 | End: 2019-08-02

## 2019-07-26 RX ORDER — OXYCODONE HYDROCHLORIDE AND ACETAMINOPHEN 5; 325 MG/1; MG/1
1 TABLET ORAL EVERY 4 HOURS PRN
Qty: 40 TABLET | Refills: 0 | Status: SHIPPED | OUTPATIENT
Start: 2019-07-26 | End: 2019-08-02

## 2019-07-26 RX ORDER — POTASSIUM CHLORIDE 750 MG/1
10 TABLET, EXTENDED RELEASE ORAL ONCE
Status: COMPLETED | OUTPATIENT
Start: 2019-07-26 | End: 2019-07-26

## 2019-07-26 RX ORDER — PSEUDOEPHEDRINE HCL 30 MG
100 TABLET ORAL 2 TIMES DAILY
Qty: 60 CAPSULE | Refills: 1 | Status: SHIPPED | OUTPATIENT
Start: 2019-07-26 | End: 2019-09-19

## 2019-07-26 RX ORDER — METHOCARBAMOL 750 MG/1
1500 TABLET, FILM COATED ORAL 4 TIMES DAILY
Qty: 112 TABLET | Refills: 0 | Status: SHIPPED | OUTPATIENT
Start: 2019-07-26 | End: 2019-08-09

## 2019-07-26 RX ADMIN — LORAZEPAM 0.5 MG: 0.5 TABLET ORAL at 18:24

## 2019-07-26 RX ADMIN — HYDROCORTISONE: 1 CREAM TOPICAL at 09:29

## 2019-07-26 RX ADMIN — MAGNESIUM CITRATE 296 ML: 1.75 LIQUID ORAL at 12:58

## 2019-07-26 RX ADMIN — DOCUSATE SODIUM 100 MG: 100 CAPSULE, LIQUID FILLED ORAL at 08:28

## 2019-07-26 RX ADMIN — OXYCODONE HYDROCHLORIDE AND ACETAMINOPHEN 2 TABLET: 5; 325 TABLET ORAL at 06:53

## 2019-07-26 RX ADMIN — METHOCARBAMOL TABLETS 1500 MG: 750 TABLET, COATED ORAL at 12:58

## 2019-07-26 RX ADMIN — METHOCARBAMOL TABLETS 1500 MG: 750 TABLET, COATED ORAL at 17:41

## 2019-07-26 RX ADMIN — DOCUSATE SODIUM 100 MG: 100 CAPSULE, LIQUID FILLED ORAL at 20:47

## 2019-07-26 RX ADMIN — POTASSIUM CHLORIDE 10 MEQ: 10 TABLET, EXTENDED RELEASE ORAL at 08:27

## 2019-07-26 RX ADMIN — OXYCODONE HYDROCHLORIDE AND ACETAMINOPHEN 2 TABLET: 5; 325 TABLET ORAL at 15:14

## 2019-07-26 RX ADMIN — OXYCODONE HYDROCHLORIDE AND ACETAMINOPHEN 2 TABLET: 5; 325 TABLET ORAL at 11:04

## 2019-07-26 RX ADMIN — METHOCARBAMOL TABLETS 1500 MG: 750 TABLET, COATED ORAL at 20:47

## 2019-07-26 RX ADMIN — POLYETHYLENE GLYCOL (3350) 17 G: 17 POWDER, FOR SOLUTION ORAL at 08:28

## 2019-07-26 RX ADMIN — METHOCARBAMOL TABLETS 1500 MG: 750 TABLET, COATED ORAL at 08:27

## 2019-07-26 RX ADMIN — BUPROPION HYDROCHLORIDE 150 MG: 150 TABLET, FILM COATED, EXTENDED RELEASE ORAL at 08:27

## 2019-07-26 RX ADMIN — HYDROCORTISONE: 1 CREAM TOPICAL at 20:47

## 2019-07-26 RX ADMIN — OXYCODONE HYDROCHLORIDE AND ACETAMINOPHEN 2 TABLET: 5; 325 TABLET ORAL at 02:46

## 2019-07-26 RX ADMIN — LORAZEPAM 0.5 MG: 0.5 TABLET ORAL at 11:18

## 2019-07-26 RX ADMIN — Medication 10 ML: at 08:28

## 2019-07-26 RX ADMIN — Medication 10 ML: at 20:47

## 2019-07-26 RX ADMIN — ENOXAPARIN SODIUM 40 MG: 40 INJECTION SUBCUTANEOUS at 08:28

## 2019-07-26 RX ADMIN — OXYCODONE HYDROCHLORIDE AND ACETAMINOPHEN 2 TABLET: 5; 325 TABLET ORAL at 19:22

## 2019-07-26 ASSESSMENT — PAIN SCALES - GENERAL
PAINLEVEL_OUTOF10: 7
PAINLEVEL_OUTOF10: 7
PAINLEVEL_OUTOF10: 6
PAINLEVEL_OUTOF10: 10
PAINLEVEL_OUTOF10: 7
PAINLEVEL_OUTOF10: 4
PAINLEVEL_OUTOF10: 7
PAINLEVEL_OUTOF10: 0
PAINLEVEL_OUTOF10: 5

## 2019-07-26 ASSESSMENT — PAIN DESCRIPTION - LOCATION
LOCATION: ABDOMEN

## 2019-07-26 ASSESSMENT — PAIN DESCRIPTION - PAIN TYPE
TYPE: SURGICAL PAIN
TYPE: ACUTE PAIN;SURGICAL PAIN
TYPE: SURGICAL PAIN
TYPE: ACUTE PAIN;SURGICAL PAIN

## 2019-07-26 ASSESSMENT — PAIN DESCRIPTION - DESCRIPTORS
DESCRIPTORS: SHARP
DESCRIPTORS: PRESSURE
DESCRIPTORS: PRESSURE
DESCRIPTORS: PRESSURE;ACHING

## 2019-07-26 ASSESSMENT — PAIN DESCRIPTION - PROGRESSION
CLINICAL_PROGRESSION: GRADUALLY WORSENING
CLINICAL_PROGRESSION: NOT CHANGED

## 2019-07-26 ASSESSMENT — PAIN DESCRIPTION - ONSET
ONSET: ON-GOING

## 2019-07-26 ASSESSMENT — PAIN DESCRIPTION - ORIENTATION
ORIENTATION: RIGHT;LOWER

## 2019-07-26 ASSESSMENT — PAIN DESCRIPTION - FREQUENCY
FREQUENCY: CONTINUOUS

## 2019-07-27 VITALS
DIASTOLIC BLOOD PRESSURE: 89 MMHG | HEART RATE: 79 BPM | WEIGHT: 285.06 LBS | BODY MASS INDEX: 42.22 KG/M2 | RESPIRATION RATE: 18 BRPM | HEIGHT: 69 IN | SYSTOLIC BLOOD PRESSURE: 127 MMHG | OXYGEN SATURATION: 97 % | TEMPERATURE: 98.6 F

## 2019-07-27 LAB
ALBUMIN SERPL-MCNC: 3.6 G/DL (ref 3.4–5)
ANION GAP SERPL CALCULATED.3IONS-SCNC: 13 MMOL/L (ref 3–16)
BASOPHILS ABSOLUTE: 0 K/UL (ref 0–0.2)
BASOPHILS RELATIVE PERCENT: 0.3 %
BUN BLDV-MCNC: 5 MG/DL (ref 7–20)
CALCIUM SERPL-MCNC: 8.8 MG/DL (ref 8.3–10.6)
CHLORIDE BLD-SCNC: 102 MMOL/L (ref 99–110)
CO2: 24 MMOL/L (ref 21–32)
CREAT SERPL-MCNC: <0.5 MG/DL (ref 0.6–1.1)
EOSINOPHILS ABSOLUTE: 0.2 K/UL (ref 0–0.6)
EOSINOPHILS RELATIVE PERCENT: 1.9 %
GFR AFRICAN AMERICAN: >60
GFR NON-AFRICAN AMERICAN: >60
GLUCOSE BLD-MCNC: 104 MG/DL (ref 70–99)
HCT VFR BLD CALC: 33.6 % (ref 36–48)
HEMOGLOBIN: 11 G/DL (ref 12–16)
LYMPHOCYTES ABSOLUTE: 0.9 K/UL (ref 1–5.1)
LYMPHOCYTES RELATIVE PERCENT: 11.9 %
MAGNESIUM: 2.2 MG/DL (ref 1.8–2.4)
MCH RBC QN AUTO: 29.9 PG (ref 26–34)
MCHC RBC AUTO-ENTMCNC: 32.7 G/DL (ref 31–36)
MCV RBC AUTO: 91.5 FL (ref 80–100)
MONOCYTES ABSOLUTE: 0.7 K/UL (ref 0–1.3)
MONOCYTES RELATIVE PERCENT: 8.5 %
NEUTROPHILS ABSOLUTE: 6 K/UL (ref 1.7–7.7)
NEUTROPHILS RELATIVE PERCENT: 77.4 %
PDW BLD-RTO: 14.1 % (ref 12.4–15.4)
PHOSPHORUS: 3.9 MG/DL (ref 2.5–4.9)
PLATELET # BLD: 245 K/UL (ref 135–450)
PMV BLD AUTO: 8.7 FL (ref 5–10.5)
POTASSIUM SERPL-SCNC: 4.5 MMOL/L (ref 3.5–5.1)
RBC # BLD: 3.67 M/UL (ref 4–5.2)
SODIUM BLD-SCNC: 139 MMOL/L (ref 136–145)
WBC # BLD: 7.8 K/UL (ref 4–11)

## 2019-07-27 PROCEDURE — 85025 COMPLETE CBC W/AUTO DIFF WBC: CPT

## 2019-07-27 PROCEDURE — 2580000003 HC RX 258: Performed by: STUDENT IN AN ORGANIZED HEALTH CARE EDUCATION/TRAINING PROGRAM

## 2019-07-27 PROCEDURE — 6370000000 HC RX 637 (ALT 250 FOR IP): Performed by: STUDENT IN AN ORGANIZED HEALTH CARE EDUCATION/TRAINING PROGRAM

## 2019-07-27 PROCEDURE — 83735 ASSAY OF MAGNESIUM: CPT

## 2019-07-27 PROCEDURE — 99024 POSTOP FOLLOW-UP VISIT: CPT | Performed by: SURGERY

## 2019-07-27 PROCEDURE — 80069 RENAL FUNCTION PANEL: CPT

## 2019-07-27 RX ORDER — BISACODYL 10 MG
10 SUPPOSITORY, RECTAL RECTAL ONCE
Status: COMPLETED | OUTPATIENT
Start: 2019-07-27 | End: 2019-07-27

## 2019-07-27 RX ADMIN — OXYCODONE HYDROCHLORIDE AND ACETAMINOPHEN 2 TABLET: 5; 325 TABLET ORAL at 08:57

## 2019-07-27 RX ADMIN — BISACODYL 10 MG: 10 SUPPOSITORY RECTAL at 09:06

## 2019-07-27 RX ADMIN — HYDROCORTISONE: 1 CREAM TOPICAL at 11:01

## 2019-07-27 RX ADMIN — METHOCARBAMOL TABLETS 1500 MG: 750 TABLET, COATED ORAL at 10:58

## 2019-07-27 RX ADMIN — OXYCODONE HYDROCHLORIDE AND ACETAMINOPHEN 2 TABLET: 5; 325 TABLET ORAL at 02:38

## 2019-07-27 RX ADMIN — Medication 10 ML: at 09:02

## 2019-07-27 RX ADMIN — BUPROPION HYDROCHLORIDE 150 MG: 150 TABLET, FILM COATED, EXTENDED RELEASE ORAL at 09:00

## 2019-07-27 RX ADMIN — DOCUSATE SODIUM 100 MG: 100 CAPSULE, LIQUID FILLED ORAL at 09:00

## 2019-07-27 ASSESSMENT — PAIN SCALES - GENERAL
PAINLEVEL_OUTOF10: 7
PAINLEVEL_OUTOF10: 9

## 2019-07-27 NOTE — PROGRESS NOTES
Ancef  To OR
Awaiting Ofirmev from pharmacy.
ICU SURGERY DAILY PROGRESS NOTE - POD#1    CC: Liver Metastasis, Abdominal Mass    SUBJECTIVE:   Interval Hx: Pain has been well-controlled, required some dilaudid for shoulder pain, no abdominal pain. No nausea. No flatus. Tolerating ice chips, has an appetite this AM. Had some itching overnight and this AM.      ROS: A 14 point review of systems was conducted, significant findings as noted above. All other systems negative. OBJECTIVE:   Vitals:   Vitals:    07/23/19 0000 07/23/19 0100 07/23/19 0200 07/23/19 0300   BP: 118/82 118/74 117/74 116/68   Pulse: 74 77 72 82   Resp: 12 14 10 15   Temp: 98.1 °F (36.7 °C)      TempSrc:       SpO2: 95% 95%  99%   Weight:       Height:           I/O:     Intake/Output Summary (Last 24 hours) at 7/23/2019 0639  Last data filed at 7/23/2019 0600  Gross per 24 hour   Intake 5857.35 ml   Output 2360 ml   Net 3497.35 ml     I/O last 3 completed shifts:   In: 4581.7 [P.O.:60; I.V.:4402.7; IV Piggyback:119]  Out: 1660 [Urine:1410; Blood:250]    Diet: Diet NPO Effective Now Exceptions are: Ice Chips, Sips with Meds, Popsicles      Physical Examination:   General appearance: alert, no acute distress  Eyes: no scleral icterus  Neck: trachea midline, no JVD, CVC R IJ  Chest/Lungs:  normal effort, on RA  Cardiovascular: RRR  Abdomen: soft, appropriately-tender, non-distended, no guarding/rigidity, incisions c/d/i with skin glue  : cain in place with clear yellow urine  Skin: warm and dry, no rashes  Extremities: no edema, no cyanosis  Neuro: A&Ox3, no focal deficits, sensation intact    Labs:  CBC:   Recent Labs     07/22/19 1853 07/23/19  0409   WBC 12.2* 9.4   HGB 10.8* 9.8*   HCT 33.7* 30.0*    174       BMP:   Recent Labs     07/22/19 1853 07/23/19  0409    140   K 4.7 4.2   * 109   CO2 20* 23   BUN 7 6*   CREATININE 0.7 0.7   GLUCOSE 152* 123*     LFT's:   Recent Labs     07/22/19  1853 07/23/19  0409   * 195*   * 123*   BILITOT 0.5 0.3   ALKPHOS
Physical Therapy  Discharge     Chart reviewed. Observed pt ambulating napier independently this morning, gait steady. Spoke with pt who reports L foot pain has improved and she is up without difficulty and ambulating halls multiple times a day. Encouraged ambulation for remainder of hospital stay and pt in agreement. Pt feels no other PT needs at this time. Will sign off due to pt up independently. No DME needs for d/c. Will d/c acute PT services. Pt agrees with plan.      Dian Carreon
Pt A&O, VSS. Surgical sites CDI. Voiding w/o issues. Tolerating diet well. Pain has been controlled with just PRN Percocet so far throughout the night. Active bowel tones, passing some flatus-no BM yet. Will continue to monitor.
Pt assisted up to restroom x2 RNs with standard walker for oral care per pt's request. Tolerated ambulation very well. Initially assisted up to chair, however pt stated that was very uncomfortable for her even with waffle cushion on seat. Pt assisted back to bed per request. Continues to c/o R shoulder pain and now some slight abdominal pain as well. Will monitor.
Pt has been A&Ox4, VSS, lungs clear, active bowel sounds, pt has not had a BM after suppository this AM but increased gas. Pt voiced readiness for discharge, cleared by surgical team, pt was educated on discharge instructions and prescriptions and denied any questions, IV was removed, pt was wheeled out to 's car at around 1200 with charted belongings.
Pt transfer from ICU. Pt is alert and oriented. Pt Vs stable, BP only slightly elevated. Pt does have complaints of pain and does have epidural. Anes called for rate change. Pt is able to ambulate and has been tolerating well. Pt tolerating diet as well. Left foot still sore from when she was in OR. Lungs clear, s1, s2 heard. No new skin issues noted. Surgical sites are approximated. RLQ has steristrips, no new drainage. Will continue to monitor.
Surgery Daily Progress Note      CC: Liver Metastasis, Abdominal Mass    SUBJECTIVE:  Epidural removed yesterday. Pain controlled overnight. Small amount of pain in RLQ. Tolerating diet. Voiding appropriately. ROS:   A 14 point review of systems was conducted, significant findings as noted above. All other systems negative. OBJECTIVE:    PHYSICAL EXAM:  Vitals:    07/25/19 1419 07/25/19 2011 07/25/19 2326 07/26/19 0248   BP: 137/82 127/77 118/79 126/82   Pulse: 81 94 83 72   Resp: 16 18 16 16   Temp: 97.6 °F (36.4 °C) 98.4 °F (36.9 °C) 98.3 °F (36.8 °C) 98.2 °F (36.8 °C)   TempSrc: Oral Oral Oral Oral   SpO2: 100% 98% 97% 97%   Weight:       Height:           General appearance: alert, no acute distress, grooming appropriate  Neuro: A&Ox3, no focal deficits, sensation intact  Chest/Lungs: normal effort, no accessory muscle use, on RA  Cardiovascular: RRR, hemodynamically stable  Abdomen: soft, appropriately-tender, non-distended, no guarding/rigidity, incisions c/d/i, well approximated  : no cain  Extremities: no edema, no cyanosis      ASSESSMENT & PLAN:   This is a 37 y.o. female with a diagnosis of Liver Metastasis, Abdominal Mass, Morbid Obesity with BMI: 40- 42.2 s/p ROBOTIC ASSISTED PARTIAL LIVER RESECTION AND ABDOMINAL MASS RESECTION (7/22)    - epidural removed, pain medication changed to percocet  - Cont general diet  - continue bowel regime  - Dr. Hogue Siskiyou to see regarding pain management  - possible discharge later today    Cinthya Bowles DO  07/26/19  6:21 AM  [phone]    I have personally performed the medical history, physical exam and medical decision making and agree with all pertinent clinical information unless otherwise noted. Pt states not having BM  Offered suppository / enema - she doesn't wanted it. Will give Mag Citrate.   Patient should be able to go home today after Stacia Leida AGUAYO  Surgery Attending
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 PPx:  Lovenox tomorrow  SCDs    Dispo:  ICU    Access:  R IJ CVC: 7/22  Essence: R radial 7/22  Epidural 7/22  PIV: 7/22    Cain 7/22    Jan Singleton MD  Gen.  Surgery Resident PGY2  07/22/19  10:28 PM  [phone]
dry, intact and nontender    Did the patient receive any additional medications (IV/PO) overnight? yes    Were any changes made to the epidural overnight? no    Current medication and infusion rate:  Marcaine 0.125% plus fent5 mcg/ml @ 8 ml/hr    Changes or Intervention: eliminate epidural fentanyl    Plan: continue epidural infusion for post op pain control    The anesthesia team will continue to follow patient while epidural is in place. Please call with questions.      If the epidural is to be removed, please stop any anticoagulation 12 hours prior to planned removal.    Assessment completed and signed by:    Thu Smith MD  9:58 AM
effect during my hospitalization, the order may or may not be in effect during this procedure. I give my doctor permission to give me blood or blood products. I understand that there are risks with receiving blood such as hepatitis, AIDS, fever, or allergic reaction. I acknowledge that the risks, benefits, and alternatives of this treatment have been explained to me and that no express or implied warranty has been given by the hospital, any blood bank, or any person or entity as to the blood or blood components transfused. At the discretion of my doctor, I agree to allow observers, equipment/product representatives and allow photographing, and/or televising of the procedure, provided my name or identity is maintained confidentially. I agree the hospital may dispose of or use for scientific or educational purposes any tissue, fluid, or body parts which may be removed.     ________________________________Date________Time______ am/pm  ([place])  Patient or Signature of Closest Relative or Legal Guardian    ________________________________Date________Time______am/pm      Page 1 of  1  Witness
assistance  Sit to Supine: Contact guard assistance  Scooting: Contact guard assistance     Transfers  Sit to Stand: Contact guard assistance  Stand to sit: Contact guard assistance     Ambulation  Ambulation?: Yes  Ambulation 1  Surface: level tile  Device: Standard Walker  Assistance: Contact guard assistance  Quality of Gait: Decreased stance through L LE  Gait Deviations: Slow Rose [place]  Distance: 90 feet    Treatment:  Functional mobility training and pt education    Plan   Plan  Times per week: 2-5  Current Treatment Recommendations: Balance Training, Functional Mobility Training, Transfer Training, Gait Training, Safety Education & Training  Safety Devices  Type of devices: Call light within reach, Bed alarm in place, Nurse notified, Left in bed    Goals  Short term goals  Time Frame for Short term goals: by discharge  Short term goal 1: Bed mobility independently  Short term goal 2: Sit to stand with supervision  Short term goal 3: Pt will ambulate 150 feet with or without wheeled walker and supervision  Patient Goals   Patient goals : Return home     Therapy Time   Individual Concurrent Group Co-treatment   Time In 1000         Time Out 1032         Minutes 32           Timed Code Treatment Minutes:   17    Total Treatment Minutes:  32     If pt d/c'd prior to next treatment, this note serves as a discharge note.   [place], [id number] [facility]

## 2019-08-02 ENCOUNTER — TELEPHONE (OUTPATIENT)
Dept: SURGERY | Age: 39
End: 2019-08-02

## 2019-08-06 ENCOUNTER — OFFICE VISIT (OUTPATIENT)
Dept: SURGERY | Age: 39
End: 2019-08-06

## 2019-08-06 VITALS
HEART RATE: 88 BPM | DIASTOLIC BLOOD PRESSURE: 83 MMHG | WEIGHT: 260 LBS | HEIGHT: 69 IN | BODY MASS INDEX: 38.51 KG/M2 | SYSTOLIC BLOOD PRESSURE: 128 MMHG | TEMPERATURE: 99.8 F | OXYGEN SATURATION: 97 %

## 2019-08-06 DIAGNOSIS — C78.7 LIVER METASTASIS (HCC): Primary | ICD-10-CM

## 2019-08-06 DIAGNOSIS — Z09 POSTOP CHECK: ICD-10-CM

## 2019-08-06 PROCEDURE — 99024 POSTOP FOLLOW-UP VISIT: CPT | Performed by: SURGERY

## 2019-08-15 ENCOUNTER — TELEPHONE (OUTPATIENT)
Dept: SURGERY | Age: 39
End: 2019-08-15

## 2019-08-16 ENCOUNTER — TELEPHONE (OUTPATIENT)
Dept: SURGERY | Age: 39
End: 2019-08-16

## 2019-08-16 NOTE — OP NOTE
[facility] [address] [address]                                OPERATIVE REPORT    PATIENT NAME: Rashid Damon                    :        1980  MED REC NO:   [id number]                          ROOM:       3041  ACCOUNT NO:   [de-identified]                           ADMIT DATE: 2019  PROVIDER:     Julian Mitchell MD    DATE OF PROCEDURE:  2019    PREOPERATIVE DIAGNOSES:  Liver metastasis, liver masses, abdominal mass. POSTOPERATIVE DIAGNOSES:  Liver metastasis, liver masses, abdominal  mass. OPERATION PERFORMED:  1. Diagnostic laparoscopy and intraoperative ultrasound. 2.  Robotic-assisted laparoscopic intraabdominal mass excision. 3.  Wedge resection of segment Luh liver mass. 4.  Partial liver resection of right liver mass. 5.  Partial liver resection of large left and right liver mass. SURGEON:  Julian Mitchell MD    ASSISTANT:  Prema Gonzales MD    ESTIMATED BLOOD LOSS:  50 mL. OPERATIVE FINDINGS AND COMPLEXITY:  The patient had multiple small  lesions on the intraoperative ultrasound. I needed to rule out  metastatic disease and resected one of the lesions in the segment Luh. Pathology was benign. Then, she had two similarly appearing large  lesions, one on the right side and one on the left side and were  different from nonmetastatic lesions. I resected large lesion on the  right side, and again, pathology came as benign. Now, I proceeded with  resection of the large metastatic mass. Given patient's BMI of 42 and  location of the mass, I was able to mobilize it thoroughly robotically. Given sarcoma, I do not want her to have a chance for not able to get negative  margins completely. Thus at the end, I converted it to hand-assisted surgery. Again, I docked the robot and checked on the bleeding and bile leak.    Overall, surgery was intense given multiple decision making steps

## 2019-09-06 ENCOUNTER — OFFICE VISIT (OUTPATIENT)
Dept: FAMILY MEDICINE CLINIC | Age: 39
End: 2019-09-06
Payer: COMMERCIAL

## 2019-09-06 VITALS
RESPIRATION RATE: 18 BRPM | OXYGEN SATURATION: 96 % | HEART RATE: 120 BPM | DIASTOLIC BLOOD PRESSURE: 79 MMHG | WEIGHT: 258.5 LBS | TEMPERATURE: 98.7 F | BODY MASS INDEX: 38.17 KG/M2 | SYSTOLIC BLOOD PRESSURE: 116 MMHG

## 2019-09-06 DIAGNOSIS — C49.22 SARCOMA OF LEFT LOWER EXTREMITY (HCC): ICD-10-CM

## 2019-09-06 DIAGNOSIS — J06.9 ACUTE URI: Primary | ICD-10-CM

## 2019-09-06 DIAGNOSIS — C78.7 METASTATIC CANCER TO LIVER (HCC): ICD-10-CM

## 2019-09-06 DIAGNOSIS — F41.9 ANXIETY: ICD-10-CM

## 2019-09-06 PROCEDURE — G8417 CALC BMI ABV UP PARAM F/U: HCPCS | Performed by: FAMILY MEDICINE

## 2019-09-06 PROCEDURE — G8427 DOCREV CUR MEDS BY ELIG CLIN: HCPCS | Performed by: FAMILY MEDICINE

## 2019-09-06 PROCEDURE — 99214 OFFICE O/P EST MOD 30 MIN: CPT | Performed by: FAMILY MEDICINE

## 2019-09-06 PROCEDURE — 1036F TOBACCO NON-USER: CPT | Performed by: FAMILY MEDICINE

## 2019-09-06 RX ORDER — PREDNISONE 20 MG/1
TABLET ORAL
Qty: 18 TABLET | Refills: 0 | Status: SHIPPED | OUTPATIENT
Start: 2019-09-06 | End: 2019-09-16

## 2019-09-06 RX ORDER — LORAZEPAM 0.5 MG/1
0.5 TABLET ORAL EVERY 6 HOURS PRN
Status: CANCELLED | OUTPATIENT
Start: 2019-09-06

## 2019-09-06 RX ORDER — LORAZEPAM 0.5 MG/1
0.5 TABLET ORAL EVERY 6 HOURS PRN
COMMUNITY
End: 2020-06-19

## 2019-09-06 RX ORDER — LORAZEPAM 0.5 MG/1
0.5 TABLET ORAL EVERY 8 HOURS PRN
Qty: 20 TABLET | Refills: 0 | Status: SHIPPED | OUTPATIENT
Start: 2019-09-06 | End: 2019-10-06

## 2019-09-06 RX ORDER — DOXYCYCLINE HYCLATE 100 MG
100 TABLET ORAL 2 TIMES DAILY
Qty: 20 TABLET | Refills: 0 | Status: SHIPPED | OUTPATIENT
Start: 2019-09-06 | End: 2019-09-16

## 2019-09-06 NOTE — PROGRESS NOTES
Pt is working with specialist at [facility], in [facility] and is working with sarcoma specialist.  She was recently dx with liver mets and and LN in area. Pt did have surgery with dr. Emerita Perrin. Pt did initially had central line in neck and now has double port. Pt gets 3 days of therapy every 3 weeks, goes up for 2-3d for therapy. Starting second treatment in a few weeks, will have 4-6 treatments, will gets scans every 2 treatments. Pt states that she was told that there was 80% chance of microscopic disease. Afterward, will do serial scans. Pt states that she is tolerating treatment well but is concerned that she does have URI sx    Pt with sx about 2 wks, with some progressive sx over the past few weeks. Moderate sinus pressure, moderate nasal congestion and chest congestion. Mild to moderate cough. Cough is essentially dry, rarely productive. Pt does have rx for symbicort, using BID without help. Had temp over the weekend but not recently. Managing stress ok and does feel that she is doing ok. Pt does have great support and working with counselor. Except as noted above in the history of present illness, the review of systems is  negative for headache, vision changes, chest pain, shortness of breath, abdominal pain, urinary sx, bowel changes. Past medical, surgical, and social history reviewed and updated  Medications and allergies reviewed and updated         O: /79   Pulse 120   Temp 98.7 °F (37.1 °C) (Oral)   Resp 18   Wt 258 lb 8 oz (117.3 kg)   SpO2 96%   Breastfeeding? No   BMI 38.17 kg/m²   GEN: No acute distress, cooperative, well nourished, alert. HEENT: PEERLA, EOMI , normocephalic/atraumatic, nares and oropharynx clear. Mucous membranes normal, Tympanic membranes clear bilaterally.     Neck: soft, supple, no thyromegaly, mass, no Lymphadenopathy  Mild to moderate nasal congestion bilaterally, mild postnasal drip in posterior oropharynx   No sinus tender to palpation   CV: Regular rate and rhythm, no murmur, rubs, gallops. No edema. Resp: Clear to auscultation bilaterally good air entry bilaterally  No crackles, wheeze. Breathing comfortably. ASSESSMENT / PLAN:    1. Acute URI  Persistent sx, tx with doxy 100mg BID x 10d  Cont over the counter/symptomatic treatment. Follow up for persistent symptoms in 7 to 10 days or sooner for worsening symptomatology   - doxycycline hyclate (VIBRA-TABS) 100 MG tablet; Take 1 tablet by mouth 2 times daily for 10 days  Dispense: 20 tablet; Refill: 0  - predniSONE (DELTASONE) 20 MG tablet; Take 3 tabs by mouth po qd x 3d, then 2tabs po qd x 3d, then 1 tab po qd x 3d. Dispense: 18 tablet; Refill: 0    2. Anxiety  Stable despite stressors  Cont supportive therapy with xanax prn  Usage intermittent, see [facility]  - LORazepam (ATIVAN) 0.5 MG tablet; Take 1 tablet by mouth every 8 hours as needed for Anxiety for up to 30 days. Dispense: 20 tablet; Refill: 0    3. Sarcoma of left lower extremity ([address]  Mets to liver, cont f/u with oncology at [facility] requested to review tx plan    4. Metastatic cancer to liver [facility])  As above           Follow-up appointment:   Call or return to clinic prn if these symptoms worsen or fail to improve as anticipated. Discussed use, benefit, and side effects of all prescribed medications. Barriers to medication compliance addressed. All patient questions answered. Pt voiced understanding. When applicable, patient's outside records were reviewed through [place]. The patient has signed appropriate paperworks/consents.

## 2019-09-17 ENCOUNTER — TELEPHONE (OUTPATIENT)
Dept: FAMILY MEDICINE CLINIC | Age: 39
End: 2019-09-17

## 2019-09-17 RX ORDER — BENZONATATE 200 MG/1
200 CAPSULE ORAL 3 TIMES DAILY PRN
Qty: 30 CAPSULE | Refills: 0 | Status: SHIPPED | OUTPATIENT
Start: 2019-09-17 | End: 2019-09-24

## 2019-09-19 ENCOUNTER — OFFICE VISIT (OUTPATIENT)
Dept: FAMILY MEDICINE CLINIC | Age: 39
End: 2019-09-19
Payer: COMMERCIAL

## 2019-09-19 VITALS
RESPIRATION RATE: 16 BRPM | WEIGHT: 258.5 LBS | OXYGEN SATURATION: 98 % | DIASTOLIC BLOOD PRESSURE: 81 MMHG | TEMPERATURE: 99.7 F | HEART RATE: 92 BPM | SYSTOLIC BLOOD PRESSURE: 122 MMHG | BODY MASS INDEX: 38.17 KG/M2

## 2019-09-19 DIAGNOSIS — R00.0 TACHYCARDIA: ICD-10-CM

## 2019-09-19 DIAGNOSIS — R05.9 COUGH: ICD-10-CM

## 2019-09-19 DIAGNOSIS — C49.22 SARCOMA OF LEFT LOWER EXTREMITY (HCC): Primary | ICD-10-CM

## 2019-09-19 DIAGNOSIS — C78.7 METASTATIC CANCER TO LIVER (HCC): ICD-10-CM

## 2019-09-19 PROCEDURE — G8427 DOCREV CUR MEDS BY ELIG CLIN: HCPCS | Performed by: FAMILY MEDICINE

## 2019-09-19 PROCEDURE — 99214 OFFICE O/P EST MOD 30 MIN: CPT | Performed by: FAMILY MEDICINE

## 2019-09-19 PROCEDURE — G8417 CALC BMI ABV UP PARAM F/U: HCPCS | Performed by: FAMILY MEDICINE

## 2019-09-19 PROCEDURE — 1036F TOBACCO NON-USER: CPT | Performed by: FAMILY MEDICINE

## 2019-09-19 RX ORDER — PROMETHAZINE HYDROCHLORIDE AND CODEINE PHOSPHATE 6.25; 1 MG/5ML; MG/5ML
5 SYRUP ORAL EVERY 4 HOURS PRN
Qty: 150 ML | Refills: 0 | Status: SHIPPED | OUTPATIENT
Start: 2019-09-19 | End: 2019-09-24

## 2019-09-19 RX ORDER — PREDNISONE 20 MG/1
TABLET ORAL
Qty: 18 TABLET | Refills: 0 | Status: SHIPPED | OUTPATIENT
Start: 2019-09-19 | End: 2019-09-29

## 2019-09-19 RX ORDER — LEVOFLOXACIN 500 MG/1
500 TABLET, FILM COATED ORAL DAILY
Qty: 10 TABLET | Refills: 0 | Status: SHIPPED | OUTPATIENT
Start: 2019-09-19 | End: 2019-09-29

## 2019-09-19 RX ORDER — DOXORUBICIN HYDROCHLORIDE 2 MG/ML
INJECTION, POWDER, LYOPHILIZED, FOR SOLUTION INTRAVENOUS ONCE
COMMUNITY
End: 2020-06-19

## 2019-09-19 NOTE — PROGRESS NOTES
Here for f/u of metastatic sarcoma and recurrent URI sx    Pt states that with treatment, second treatment in specific, has had moderate fatigue, malaise, and palpitiations. Did get a drop in white blood cell count with therapy. Pt does get neulasta shot after therapy, one shot. She feels that when she gets the neulasta, it does help and white blood cell count is higher, but then is back for treatment. Pt was seen a few weeks ago for URI sx, with doxy and prednisone. Pt did feel better during the course of the therapy but off steroid and abx, sx have recurred. Pt states that when she was up in [place] this past time, did have some mild cough but was much better. Now with recurrent sx. They attribute the shortness of breath to chemo, but not the cough.  '      Except as noted above in the history of present illness, the review of systems is  negative for headache, vision changes, chest pain, shortness of breath, abdominal pain, urinary sx, bowel changes. Past medical, surgical, and social history reviewed and updated  Medications and allergies reviewed and updated       O: /81   Pulse 117   Temp 99.7 °F (37.6 °C) (Oral)   Resp 16   Wt 258 lb 8 oz (117.3 kg)   SpO2 98%   Breastfeeding? No   BMI 38.17 kg/m²   GEN: No acute distress, cooperative, well nourished, alert. HEENT: PEERLA, EOMI , normocephalic/atraumatic, nares and oropharynx clear. Mucous membranes normal, Tympanic membranes clear bilaterally. Neck: soft, supple, no thyromegaly, mass, no Lymphadenopathy  CV: Regular rate and rhythm, no murmur, rubs, gallops. No edema. Resp: Clear to auscultation bilaterally good air entry bilaterally  No crackles, wheeze. Breathing comfortably. Ext: no clubbing, cyanosis, edema   No calf pain, neg homans bilaterally      ASSESSMENT / PLAN:    1. Sarcoma of left lower extremity ([address]  Cont chemo through [facility] results of recent eval/appt    2.  Metastatic

## 2019-09-27 ENCOUNTER — HOSPITAL ENCOUNTER (OUTPATIENT)
Dept: WOMENS IMAGING | Age: 39
Discharge: HOME | End: 2019-09-27
Payer: COMMERCIAL

## 2019-09-27 DIAGNOSIS — Z12.31 VISIT FOR SCREENING MAMMOGRAM: ICD-10-CM

## 2019-09-27 PROCEDURE — 77063 BREAST TOMOSYNTHESIS BI: CPT

## 2019-10-01 ENCOUNTER — TELEPHONE (OUTPATIENT)
Dept: SURGERY | Age: 39
End: 2019-10-01

## 2019-10-10 ENCOUNTER — HOSPITAL ENCOUNTER (OUTPATIENT)
Dept: CT IMAGING | Age: 39
Discharge: HOME | DRG: 809 | End: 2019-10-10
Payer: COMMERCIAL

## 2019-10-10 ENCOUNTER — TELEPHONE (OUTPATIENT)
Dept: FAMILY MEDICINE CLINIC | Age: 39
End: 2019-10-10

## 2019-10-10 DIAGNOSIS — C49.22 SARCOMA OF LEFT LOWER EXTREMITY (HCC): ICD-10-CM

## 2019-10-10 DIAGNOSIS — C78.7 METASTATIC CANCER TO LIVER (HCC): ICD-10-CM

## 2019-10-10 PROBLEM — R50.81 NEUTROPENIC FEVER (HCC): Status: ACTIVE | Noted: 2019-10-10

## 2019-10-10 PROBLEM — D70.9 NEUTROPENIC FEVER (HCC): Status: ACTIVE | Noted: 2019-10-10

## 2019-10-10 PROCEDURE — 71260 CT THORAX DX C+: CPT

## 2019-10-10 PROCEDURE — 6360000004 HC RX CONTRAST MEDICATION: Performed by: INTERNAL MEDICINE

## 2019-10-10 RX ADMIN — IOPAMIDOL 80 ML: 755 INJECTION, SOLUTION INTRAVENOUS at 12:59

## 2019-10-10 RX ADMIN — IOHEXOL 50 ML: 240 INJECTION, SOLUTION INTRATHECAL; INTRAVASCULAR; INTRAVENOUS; ORAL at 12:58

## 2019-10-11 ENCOUNTER — APPOINTMENT (OUTPATIENT)
Dept: GENERAL RADIOLOGY | Age: 39
DRG: 809 | End: 2019-10-11
Attending: INTERNAL MEDICINE
Payer: COMMERCIAL

## 2019-10-11 ENCOUNTER — HOSPITAL ENCOUNTER (INPATIENT)
Age: 39
LOS: 1 days | Discharge: HOME | DRG: 809 | End: 2019-10-12
Attending: INTERNAL MEDICINE | Admitting: INTERNAL MEDICINE
Payer: COMMERCIAL

## 2019-10-11 LAB
ABO/RH: NORMAL
ANION GAP SERPL CALCULATED.3IONS-SCNC: 10 MMOL/L (ref 3–16)
ANISOCYTOSIS: ABNORMAL
ANTIBODY SCREEN: NORMAL
ATYPICAL LYMPHOCYTE RELATIVE PERCENT: 2 % (ref 0–6)
BANDED NEUTROPHILS RELATIVE PERCENT: 16 % (ref 0–7)
BASOPHILS ABSOLUTE: 0 K/UL (ref 0–0.2)
BASOPHILS RELATIVE PERCENT: 0 %
BLOOD BANK DISPENSE STATUS: NORMAL
BLOOD BANK PRODUCT CODE: NORMAL
BPU ID: NORMAL
BUN BLDV-MCNC: 7 MG/DL (ref 7–20)
CALCIUM SERPL-MCNC: 8.5 MG/DL (ref 8.3–10.6)
CHLORIDE BLD-SCNC: 108 MMOL/L (ref 99–110)
CO2: 22 MMOL/L (ref 21–32)
CREAT SERPL-MCNC: 0.5 MG/DL (ref 0.6–1.1)
DESCRIPTION BLOOD BANK: NORMAL
EKG ATRIAL RATE: 87 BPM
EKG DIAGNOSIS: NORMAL
EKG P AXIS: 73 DEGREES
EKG P-R INTERVAL: 130 MS
EKG Q-T INTERVAL: 390 MS
EKG QRS DURATION: 90 MS
EKG QTC CALCULATION (BAZETT): 469 MS
EKG R AXIS: 90 DEGREES
EKG T AXIS: 69 DEGREES
EKG VENTRICULAR RATE: 87 BPM
EOSINOPHILS ABSOLUTE: 0 K/UL (ref 0–0.6)
EOSINOPHILS RELATIVE PERCENT: 0 %
FOLATE: 7.61 NG/ML (ref 4.78–24.2)
GFR AFRICAN AMERICAN: >60
GFR NON-AFRICAN AMERICAN: >60
GLUCOSE BLD-MCNC: 121 MG/DL (ref 70–99)
HCT VFR BLD CALC: 21 % (ref 36–48)
HEMATOLOGY PATH CONSULT: YES
HEMOGLOBIN: 6.9 G/DL (ref 12–16)
IRON SATURATION: 52 % (ref 15–50)
IRON: 120 UG/DL (ref 37–145)
LACTIC ACID: 1.7 MMOL/L (ref 0.4–2)
LV EF: 58 %
LVEF MODALITY: NORMAL
LYMPHOCYTES ABSOLUTE: 0.3 K/UL (ref 1–5.1)
LYMPHOCYTES RELATIVE PERCENT: 38 %
MCH RBC QN AUTO: 29.2 PG (ref 26–34)
MCHC RBC AUTO-ENTMCNC: 32.9 G/DL (ref 31–36)
MCV RBC AUTO: 88.8 FL (ref 80–100)
MONOCYTES ABSOLUTE: 0.1 K/UL (ref 0–1.3)
MONOCYTES RELATIVE PERCENT: 8 %
NEUTROPHILS ABSOLUTE: 0.4 K/UL (ref 1.7–7.7)
NEUTROPHILS RELATIVE PERCENT: 36 %
OCCULT BLOOD DIAGNOSTIC: ABNORMAL
PDW BLD-RTO: 18.1 % (ref 12.4–15.4)
PLATELET # BLD: 46 K/UL (ref 135–450)
PLATELET SLIDE REVIEW: ABNORMAL
PMV BLD AUTO: 8.7 FL (ref 5–10.5)
POTASSIUM REFLEX MAGNESIUM: 3.7 MMOL/L (ref 3.5–5.1)
RBC # BLD: 2.37 M/UL (ref 4–5.2)
SODIUM BLD-SCNC: 140 MMOL/L (ref 136–145)
TOTAL IRON BINDING CAPACITY: 229 UG/DL (ref 260–445)
TROPONIN: <0.01 NG/ML
VITAMIN B-12: 1273 PG/ML (ref 211–911)
WBC # BLD: 0.8 K/UL (ref 4–11)

## 2019-10-11 PROCEDURE — 94760 N-INVAS EAR/PLS OXIMETRY 1: CPT

## 2019-10-11 PROCEDURE — 87581 M.PNEUMON DNA AMP PROBE: CPT

## 2019-10-11 PROCEDURE — 86923 COMPATIBILITY TEST ELECTRIC: CPT

## 2019-10-11 PROCEDURE — 86901 BLOOD TYPING SEROLOGIC RH(D): CPT

## 2019-10-11 PROCEDURE — 93010 ELECTROCARDIOGRAM REPORT: CPT | Performed by: INTERNAL MEDICINE

## 2019-10-11 PROCEDURE — 86850 RBC ANTIBODY SCREEN: CPT

## 2019-10-11 PROCEDURE — 87633 RESP VIRUS 12-25 TARGETS: CPT

## 2019-10-11 PROCEDURE — 2580000003 HC RX 258: Performed by: STUDENT IN AN ORGANIZED HEALTH CARE EDUCATION/TRAINING PROGRAM

## 2019-10-11 PROCEDURE — 83540 ASSAY OF IRON: CPT

## 2019-10-11 PROCEDURE — 36430 TRANSFUSION BLD/BLD COMPNT: CPT

## 2019-10-11 PROCEDURE — 6370000000 HC RX 637 (ALT 250 FOR IP): Performed by: STUDENT IN AN ORGANIZED HEALTH CARE EDUCATION/TRAINING PROGRAM

## 2019-10-11 PROCEDURE — 80048 BASIC METABOLIC PNL TOTAL CA: CPT

## 2019-10-11 PROCEDURE — 93306 TTE W/DOPPLER COMPLETE: CPT

## 2019-10-11 PROCEDURE — 84484 ASSAY OF TROPONIN QUANT: CPT

## 2019-10-11 PROCEDURE — 82607 VITAMIN B-12: CPT

## 2019-10-11 PROCEDURE — 74230 X-RAY XM SWLNG FUNCJ C+: CPT

## 2019-10-11 PROCEDURE — P9016 RBC LEUKOCYTES REDUCED: HCPCS

## 2019-10-11 PROCEDURE — 6360000002 HC RX W HCPCS: Performed by: STUDENT IN AN ORGANIZED HEALTH CARE EDUCATION/TRAINING PROGRAM

## 2019-10-11 PROCEDURE — 87486 CHLMYD PNEUM DNA AMP PROBE: CPT

## 2019-10-11 PROCEDURE — C8929 TTE W OR WO FOL WCON,DOPPLER: HCPCS

## 2019-10-11 PROCEDURE — 87798 DETECT AGENT NOS DNA AMP: CPT

## 2019-10-11 PROCEDURE — 31720 CLEARANCE OF AIRWAYS: CPT

## 2019-10-11 PROCEDURE — 86900 BLOOD TYPING SEROLOGIC ABO: CPT

## 2019-10-11 PROCEDURE — 85025 COMPLETE CBC W/AUTO DIFF WBC: CPT

## 2019-10-11 PROCEDURE — 92610 EVALUATE SWALLOWING FUNCTION: CPT

## 2019-10-11 PROCEDURE — 87103 BLOOD FUNGUS CULTURE: CPT

## 2019-10-11 PROCEDURE — 92611 MOTION FLUOROSCOPY/SWALLOW: CPT

## 2019-10-11 PROCEDURE — 83550 IRON BINDING TEST: CPT

## 2019-10-11 PROCEDURE — 82746 ASSAY OF FOLIC ACID SERUM: CPT

## 2019-10-11 PROCEDURE — 87449 NOS EACH ORGANISM AG IA: CPT

## 2019-10-11 PROCEDURE — 93005 ELECTROCARDIOGRAM TRACING: CPT | Performed by: STUDENT IN AN ORGANIZED HEALTH CARE EDUCATION/TRAINING PROGRAM

## 2019-10-11 PROCEDURE — 83605 ASSAY OF LACTIC ACID: CPT

## 2019-10-11 PROCEDURE — G0328 FECAL BLOOD SCRN IMMUNOASSAY: HCPCS

## 2019-10-11 PROCEDURE — 2060000000 HC ICU INTERMEDIATE R&B

## 2019-10-11 RX ORDER — SODIUM CHLORIDE 0.9 % (FLUSH) 0.9 %
10 SYRINGE (ML) INJECTION EVERY 12 HOURS SCHEDULED
Status: DISCONTINUED | OUTPATIENT
Start: 2019-10-11 | End: 2019-10-12 | Stop reason: HOSPADM

## 2019-10-11 RX ORDER — ACETAMINOPHEN 325 MG/1
650 TABLET ORAL EVERY 4 HOURS PRN
Status: DISCONTINUED | OUTPATIENT
Start: 2019-10-11 | End: 2019-10-12 | Stop reason: HOSPADM

## 2019-10-11 RX ORDER — LORAZEPAM 0.5 MG/1
0.5 TABLET ORAL EVERY 6 HOURS PRN
Status: DISCONTINUED | OUTPATIENT
Start: 2019-10-11 | End: 2019-10-12 | Stop reason: HOSPADM

## 2019-10-11 RX ORDER — ACETAMINOPHEN 325 MG/1
650 TABLET ORAL ONCE
Status: COMPLETED | OUTPATIENT
Start: 2019-10-11 | End: 2019-10-11

## 2019-10-11 RX ORDER — DIPHENHYDRAMINE HCL 25 MG
25 TABLET ORAL ONCE
Status: COMPLETED | OUTPATIENT
Start: 2019-10-11 | End: 2019-10-11

## 2019-10-11 RX ORDER — BENZONATATE 100 MG/1
100 CAPSULE ORAL 3 TIMES DAILY PRN
Status: DISCONTINUED | OUTPATIENT
Start: 2019-10-11 | End: 2019-10-12 | Stop reason: HOSPADM

## 2019-10-11 RX ORDER — PANTOPRAZOLE SODIUM 40 MG/1
40 TABLET, DELAYED RELEASE ORAL
Status: DISCONTINUED | OUTPATIENT
Start: 2019-10-12 | End: 2019-10-12 | Stop reason: HOSPADM

## 2019-10-11 RX ORDER — 0.9 % SODIUM CHLORIDE 0.9 %
250 INTRAVENOUS SOLUTION INTRAVENOUS ONCE
Status: COMPLETED | OUTPATIENT
Start: 2019-10-11 | End: 2019-10-12

## 2019-10-11 RX ORDER — SODIUM CHLORIDE 0.9 % (FLUSH) 0.9 %
10 SYRINGE (ML) INJECTION PRN
Status: DISCONTINUED | OUTPATIENT
Start: 2019-10-11 | End: 2019-10-12 | Stop reason: HOSPADM

## 2019-10-11 RX ADMIN — PIPERACILLIN SODIUM AND TAZOBACTAM SODIUM 4.5 G: 4; .5 INJECTION, POWDER, LYOPHILIZED, FOR SOLUTION INTRAVENOUS at 21:24

## 2019-10-11 RX ADMIN — ACETAMINOPHEN 650 MG: 325 TABLET ORAL at 18:30

## 2019-10-11 RX ADMIN — VANCOMYCIN HYDROCHLORIDE 1250 MG: 10 INJECTION, POWDER, LYOPHILIZED, FOR SOLUTION INTRAVENOUS at 14:27

## 2019-10-11 RX ADMIN — Medication 10 ML: at 09:00

## 2019-10-11 RX ADMIN — DIPHENHYDRAMINE HCL 25 MG: 25 TABLET ORAL at 18:30

## 2019-10-11 RX ADMIN — VANCOMYCIN HYDROCHLORIDE 1500 MG: 10 INJECTION, POWDER, LYOPHILIZED, FOR SOLUTION INTRAVENOUS at 06:07

## 2019-10-11 RX ADMIN — PIPERACILLIN SODIUM AND TAZOBACTAM SODIUM 4.5 G: 4; .5 INJECTION, POWDER, LYOPHILIZED, FOR SOLUTION INTRAVENOUS at 05:23

## 2019-10-11 RX ADMIN — PIPERACILLIN SODIUM AND TAZOBACTAM SODIUM 4.5 G: 4; .5 INJECTION, POWDER, LYOPHILIZED, FOR SOLUTION INTRAVENOUS at 10:44

## 2019-10-11 RX ADMIN — PIPERACILLIN SODIUM AND TAZOBACTAM SODIUM 4.5 G: 4; .5 INJECTION, POWDER, LYOPHILIZED, FOR SOLUTION INTRAVENOUS at 17:16

## 2019-10-11 RX ADMIN — SODIUM CHLORIDE 250 ML: 9 INJECTION, SOLUTION INTRAVENOUS at 20:02

## 2019-10-11 RX ADMIN — VANCOMYCIN HYDROCHLORIDE 1250 MG: 10 INJECTION, POWDER, LYOPHILIZED, FOR SOLUTION INTRAVENOUS at 22:42

## 2019-10-11 RX ADMIN — Medication 10 ML: at 21:02

## 2019-10-11 ASSESSMENT — PAIN SCALES - GENERAL
PAINLEVEL_OUTOF10: 0

## 2019-10-12 VITALS
RESPIRATION RATE: 21 BRPM | SYSTOLIC BLOOD PRESSURE: 112 MMHG | BODY MASS INDEX: 39.32 KG/M2 | HEART RATE: 82 BPM | WEIGHT: 265.5 LBS | OXYGEN SATURATION: 95 % | TEMPERATURE: 98.4 F | DIASTOLIC BLOOD PRESSURE: 80 MMHG | HEIGHT: 69 IN

## 2019-10-12 LAB
ANION GAP SERPL CALCULATED.3IONS-SCNC: 15 MMOL/L (ref 3–16)
ANISOCYTOSIS: ABNORMAL
BANDED NEUTROPHILS RELATIVE PERCENT: 1 % (ref 0–7)
BASOPHILS ABSOLUTE: 0.1 K/UL (ref 0–0.2)
BASOPHILS RELATIVE PERCENT: 2 %
BUN BLDV-MCNC: 4 MG/DL (ref 7–20)
CALCIUM SERPL-MCNC: 8.5 MG/DL (ref 8.3–10.6)
CHLORIDE BLD-SCNC: 109 MMOL/L (ref 99–110)
CO2: 18 MMOL/L (ref 21–32)
CREAT SERPL-MCNC: 0.5 MG/DL (ref 0.6–1.1)
EOSINOPHILS ABSOLUTE: 0 K/UL (ref 0–0.6)
EOSINOPHILS RELATIVE PERCENT: 0 %
GFR AFRICAN AMERICAN: >60
GFR NON-AFRICAN AMERICAN: >60
GLUCOSE BLD-MCNC: 100 MG/DL (ref 70–99)
HCT VFR BLD CALC: 26.8 % (ref 36–48)
HEMOGLOBIN: 8.8 G/DL (ref 12–16)
L. PNEUMOPHILA SEROGP 1 UR AG: NORMAL
LYMPHOCYTES ABSOLUTE: 0.8 K/UL (ref 1–5.1)
LYMPHOCYTES RELATIVE PERCENT: 31 %
MCH RBC QN AUTO: 30 PG (ref 26–34)
MCHC RBC AUTO-ENTMCNC: 32.9 G/DL (ref 31–36)
MCV RBC AUTO: 91.2 FL (ref 80–100)
MONOCYTES ABSOLUTE: 0.2 K/UL (ref 0–1.3)
MONOCYTES RELATIVE PERCENT: 6 %
NEUTROPHILS ABSOLUTE: 1.6 K/UL (ref 1.7–7.7)
NEUTROPHILS RELATIVE PERCENT: 60 %
OVALOCYTES: ABNORMAL
PDW BLD-RTO: 17.7 % (ref 12.4–15.4)
PLATELET # BLD: 45 K/UL (ref 135–450)
PLATELET SLIDE REVIEW: ABNORMAL
PMV BLD AUTO: 8.8 FL (ref 5–10.5)
POTASSIUM REFLEX MAGNESIUM: 4.1 MMOL/L (ref 3.5–5.1)
RBC # BLD: 2.94 M/UL (ref 4–5.2)
REPORT: NORMAL
RESPIRATORY PANEL PCR: NORMAL
SODIUM BLD-SCNC: 142 MMOL/L (ref 136–145)
STREP PNEUMONIAE ANTIGEN, URINE: NORMAL
TEAR DROP CELLS: ABNORMAL
WBC # BLD: 2.6 K/UL (ref 4–11)

## 2019-10-12 PROCEDURE — 85025 COMPLETE CBC W/AUTO DIFF WBC: CPT

## 2019-10-12 PROCEDURE — 6370000000 HC RX 637 (ALT 250 FOR IP): Performed by: STUDENT IN AN ORGANIZED HEALTH CARE EDUCATION/TRAINING PROGRAM

## 2019-10-12 PROCEDURE — 6360000002 HC RX W HCPCS: Performed by: STUDENT IN AN ORGANIZED HEALTH CARE EDUCATION/TRAINING PROGRAM

## 2019-10-12 PROCEDURE — 2580000003 HC RX 258: Performed by: STUDENT IN AN ORGANIZED HEALTH CARE EDUCATION/TRAINING PROGRAM

## 2019-10-12 PROCEDURE — 80048 BASIC METABOLIC PNL TOTAL CA: CPT

## 2019-10-12 RX ORDER — BENZONATATE 100 MG/1
100 CAPSULE ORAL 3 TIMES DAILY PRN
Qty: 21 CAPSULE | Refills: 0 | Status: SHIPPED | OUTPATIENT
Start: 2019-10-12 | End: 2019-10-19

## 2019-10-12 RX ADMIN — PIPERACILLIN SODIUM AND TAZOBACTAM SODIUM 4.5 G: 4; .5 INJECTION, POWDER, LYOPHILIZED, FOR SOLUTION INTRAVENOUS at 04:36

## 2019-10-12 RX ADMIN — VANCOMYCIN HYDROCHLORIDE 1250 MG: 10 INJECTION, POWDER, LYOPHILIZED, FOR SOLUTION INTRAVENOUS at 06:16

## 2019-10-12 RX ADMIN — PANTOPRAZOLE SODIUM 40 MG: 40 TABLET, DELAYED RELEASE ORAL at 06:17

## 2019-10-12 RX ADMIN — Medication 10 ML: at 09:05

## 2019-10-12 RX ADMIN — PIPERACILLIN SODIUM AND TAZOBACTAM SODIUM 4.5 G: 4; .5 INJECTION, POWDER, LYOPHILIZED, FOR SOLUTION INTRAVENOUS at 10:30

## 2019-10-12 ASSESSMENT — PAIN SCALES - GENERAL
PAINLEVEL_OUTOF10: 0

## 2019-10-12 ASSESSMENT — ENCOUNTER SYMPTOMS
DIARRHEA: 0
RESPIRATORY NEGATIVE: 1
GASTROINTESTINAL NEGATIVE: 1
ALLERGIC/IMMUNOLOGIC NEGATIVE: 1
BLOOD IN STOOL: 0
COUGH: 0
EYES NEGATIVE: 1

## 2019-10-15 LAB — HEMATOLOGY PATH CONSULT: NORMAL

## 2019-11-11 LAB — CULTURE, FUNGUS BLOOD: NORMAL

## 2019-12-17 ENCOUNTER — OFFICE VISIT (OUTPATIENT)
Dept: FAMILY MEDICINE CLINIC | Age: 39
End: 2019-12-17
Payer: COMMERCIAL

## 2019-12-17 VITALS
OXYGEN SATURATION: 96 % | WEIGHT: 258 LBS | RESPIRATION RATE: 12 BRPM | HEART RATE: 88 BPM | DIASTOLIC BLOOD PRESSURE: 77 MMHG | BODY MASS INDEX: 38.1 KG/M2 | SYSTOLIC BLOOD PRESSURE: 125 MMHG | TEMPERATURE: 98.2 F

## 2019-12-17 DIAGNOSIS — R22.9 LUMP OF SKIN: Primary | ICD-10-CM

## 2019-12-17 DIAGNOSIS — J06.9 ACUTE URI: ICD-10-CM

## 2019-12-17 DIAGNOSIS — C78.7 METASTATIC CANCER TO LIVER (HCC): ICD-10-CM

## 2019-12-17 DIAGNOSIS — C49.22 SARCOMA OF LEFT LOWER EXTREMITY (HCC): ICD-10-CM

## 2019-12-17 PROCEDURE — 1036F TOBACCO NON-USER: CPT | Performed by: FAMILY MEDICINE

## 2019-12-17 PROCEDURE — G8427 DOCREV CUR MEDS BY ELIG CLIN: HCPCS | Performed by: FAMILY MEDICINE

## 2019-12-17 PROCEDURE — 99214 OFFICE O/P EST MOD 30 MIN: CPT | Performed by: FAMILY MEDICINE

## 2019-12-17 PROCEDURE — G8417 CALC BMI ABV UP PARAM F/U: HCPCS | Performed by: FAMILY MEDICINE

## 2019-12-17 PROCEDURE — G8484 FLU IMMUNIZE NO ADMIN: HCPCS | Performed by: FAMILY MEDICINE

## 2019-12-17 RX ORDER — DOXYCYCLINE HYCLATE 100 MG
100 TABLET ORAL 2 TIMES DAILY
Qty: 20 TABLET | Refills: 0 | Status: SHIPPED | OUTPATIENT
Start: 2019-12-17 | End: 2019-12-27

## 2019-12-20 ENCOUNTER — HOSPITAL ENCOUNTER (OUTPATIENT)
Dept: CT IMAGING | Age: 39
Discharge: HOME | End: 2019-12-20
Payer: COMMERCIAL

## 2019-12-20 DIAGNOSIS — C49.9 MYXOID LIPOSARCOMA (HCC): ICD-10-CM

## 2019-12-20 PROCEDURE — 71250 CT THORAX DX C-: CPT

## 2019-12-20 PROCEDURE — 74177 CT ABD & PELVIS W/CONTRAST: CPT

## 2019-12-20 PROCEDURE — 6360000004 HC RX CONTRAST MEDICATION: Performed by: SURGERY

## 2019-12-20 RX ADMIN — IOHEXOL 50 ML: 240 INJECTION, SOLUTION INTRATHECAL; INTRAVASCULAR; INTRAVENOUS; ORAL at 09:38

## 2019-12-20 RX ADMIN — IOPAMIDOL 80 ML: 755 INJECTION, SOLUTION INTRAVENOUS at 09:57

## 2020-01-30 ENCOUNTER — OFFICE VISIT (OUTPATIENT)
Dept: FAMILY MEDICINE CLINIC | Age: 40
End: 2020-01-30
Payer: COMMERCIAL

## 2020-01-30 PROCEDURE — 99214 OFFICE O/P EST MOD 30 MIN: CPT | Performed by: FAMILY MEDICINE

## 2020-01-30 PROCEDURE — 1036F TOBACCO NON-USER: CPT | Performed by: FAMILY MEDICINE

## 2020-01-30 PROCEDURE — G8427 DOCREV CUR MEDS BY ELIG CLIN: HCPCS | Performed by: FAMILY MEDICINE

## 2020-01-30 PROCEDURE — G8484 FLU IMMUNIZE NO ADMIN: HCPCS | Performed by: FAMILY MEDICINE

## 2020-01-30 PROCEDURE — G8417 CALC BMI ABV UP PARAM F/U: HCPCS | Performed by: FAMILY MEDICINE

## 2020-01-30 NOTE — PROGRESS NOTES
Here for eval and fu of lesion on RUE, present for 1-2 months. Pt states it started as a small lump and we reviewed it a few weeks ago, was not concerned initially. Pt states that since then, a few lesions did start up. Pt did have some discomfort in arm a few weeks prior but states that she has noted some numbness/tingling all on R UE. Sx are intermittent and does not find trigger. Pt can struggle with function due the discomfort/numbness/tingling. Pt does get sx daily. Strength does not seem affected, but it is uncomfortable. range of motion of neck is fine. No sx on L. Since last visit here, was in [facility] [facility] [place] at end of December and did have scans done at that time, they were all clear. They did have scan and did have them eval the lesion on arm they were not concerned. Numbness seems to be the whole hand       Except as noted above in the history of present illness, the review of systems is  negative for headache, vision changes, chest pain, shortness of breath, abdominal pain, urinary sx, bowel changes. Past medical, surgical, and social history reviewed and updated  Medications and allergies reviewed and updated         O: /82   Pulse 99   Temp 97.5 °F (36.4 °C)   Resp 12   Wt 260 lb (117.9 kg)   Breastfeeding No   BMI 38.40 kg/m²   GEN: No acute distress, cooperative, well nourished, alert. HEENT: PEERLA, EOMI , normocephalic/atraumatic, nares and oropharynx clear. Mucous membranes normal, Tympanic membranes clear bilaterally. Neck: soft, supple, no thyromegaly, mass, no Lymphadenopathy  CV: Regular rate and rhythm, no murmur, rubs, gallops. No edema. Resp: Clear to auscultation bilaterally good air entry bilaterally  No crackles, wheeze. Breathing comfortably. Skin: RUE with discrete pea-sized deep firm mildly tender nodules. No erythema, no warmth, discharge  Musc: full range of motion bilateral upper extremities, no weakness. DTR 2+      ASSESSMENT / PLAN:    1.  Nodule

## 2020-01-31 VITALS
SYSTOLIC BLOOD PRESSURE: 113 MMHG | DIASTOLIC BLOOD PRESSURE: 82 MMHG | HEART RATE: 99 BPM | RESPIRATION RATE: 12 BRPM | WEIGHT: 260 LBS | BODY MASS INDEX: 38.4 KG/M2 | TEMPERATURE: 97.5 F

## 2020-02-04 ENCOUNTER — HOSPITAL ENCOUNTER (OUTPATIENT)
Age: 40
Setting detail: SPECIMEN
Discharge: HOME | End: 2020-02-04
Payer: COMMERCIAL

## 2020-02-04 ENCOUNTER — PROCEDURE VISIT (OUTPATIENT)
Dept: SURGERY | Age: 40
End: 2020-02-04

## 2020-02-04 ENCOUNTER — OFFICE VISIT (OUTPATIENT)
Dept: SURGERY | Age: 40
End: 2020-02-04
Payer: COMMERCIAL

## 2020-02-04 VITALS
HEART RATE: 90 BPM | WEIGHT: 266 LBS | SYSTOLIC BLOOD PRESSURE: 124 MMHG | OXYGEN SATURATION: 99 % | HEIGHT: 69 IN | BODY MASS INDEX: 39.4 KG/M2 | TEMPERATURE: 98 F | DIASTOLIC BLOOD PRESSURE: 82 MMHG

## 2020-02-04 VITALS
TEMPERATURE: 98 F | DIASTOLIC BLOOD PRESSURE: 82 MMHG | RESPIRATION RATE: 16 BRPM | BODY MASS INDEX: 39.4 KG/M2 | HEART RATE: 90 BPM | OXYGEN SATURATION: 97 % | SYSTOLIC BLOOD PRESSURE: 124 MMHG | HEIGHT: 69 IN | WEIGHT: 266 LBS

## 2020-02-04 PROCEDURE — G8484 FLU IMMUNIZE NO ADMIN: HCPCS | Performed by: SURGERY

## 2020-02-04 PROCEDURE — G8427 DOCREV CUR MEDS BY ELIG CLIN: HCPCS | Performed by: SURGERY

## 2020-02-04 PROCEDURE — 99999 PR OFFICE/OUTPT VISIT,PROCEDURE ONLY: CPT | Performed by: SURGERY

## 2020-02-04 PROCEDURE — 24075 EXC ARM/ELBOW LES SC < 3 CM: CPT | Performed by: SURGERY

## 2020-02-04 PROCEDURE — 99214 OFFICE O/P EST MOD 30 MIN: CPT | Performed by: SURGERY

## 2020-02-04 PROCEDURE — 1036F TOBACCO NON-USER: CPT | Performed by: SURGERY

## 2020-02-04 PROCEDURE — G8417 CALC BMI ABV UP PARAM F/U: HCPCS | Performed by: SURGERY

## 2020-02-04 PROCEDURE — 88307 TISSUE EXAM BY PATHOLOGIST: CPT

## 2020-02-06 ENCOUNTER — TELEPHONE (OUTPATIENT)
Dept: SURGERY | Age: 40
End: 2020-02-06

## 2020-02-07 ENCOUNTER — OFFICE VISIT (OUTPATIENT)
Dept: SURGERY | Age: 40
End: 2020-02-07

## 2020-02-07 VITALS
TEMPERATURE: 97 F | WEIGHT: 267 LBS | BODY MASS INDEX: 39.43 KG/M2 | DIASTOLIC BLOOD PRESSURE: 88 MMHG | OXYGEN SATURATION: 100 % | HEART RATE: 80 BPM | SYSTOLIC BLOOD PRESSURE: 129 MMHG

## 2020-02-07 PROCEDURE — 99024 POSTOP FOLLOW-UP VISIT: CPT | Performed by: SURGERY

## 2020-02-07 NOTE — PROGRESS NOTES
[place]. Follow up with Dr Gabriel Apodaca. Jen Mccullough MD  Surgery Attending    I, Kush Sanford RN, am scribing for and in the presence of Dr Jen Mccullough. Kush Sanford RN    I, Dr. Jen Mccullough, personally performed the services described in this documentation as scribed by Kush Sanford RN in my presence, and it is both accurate and complete.      Jen Mccullough MD  Surgery Attending

## 2020-02-13 ENCOUNTER — TELEPHONE (OUTPATIENT)
Dept: SURGERY | Age: 40
End: 2020-02-13

## 2020-02-13 NOTE — TELEPHONE ENCOUNTER
Patient is requesting a return call regarding obtaining pathology report from [place] [facility] [address] [facility]. [facility]20 and was to be sent to [facility]. They ([facility]are telling patient they cannot find it. Please advise. Thank you!

## 2020-02-14 RX ORDER — BUPROPION HYDROCHLORIDE 150 MG/1
TABLET ORAL
Qty: 90 TABLET | Refills: 0 | Status: SHIPPED | OUTPATIENT
Start: 2020-02-14 | End: 2020-09-22

## 2020-03-16 ENCOUNTER — TELEPHONE (OUTPATIENT)
Dept: SURGERY | Age: 40
End: 2020-03-16

## 2020-03-17 ENCOUNTER — TELEPHONE (OUTPATIENT)
Dept: SURGERY | Age: 40
End: 2020-03-17

## 2020-03-18 ENCOUNTER — HOSPITAL ENCOUNTER (OUTPATIENT)
Dept: CT IMAGING | Age: 40
Discharge: HOME | End: 2020-03-18
Payer: COMMERCIAL

## 2020-03-18 PROCEDURE — 6360000004 HC RX CONTRAST MEDICATION: Performed by: INTERNAL MEDICINE

## 2020-03-18 PROCEDURE — 74177 CT ABD & PELVIS W/CONTRAST: CPT

## 2020-03-18 RX ADMIN — IOHEXOL 50 ML: 240 INJECTION, SOLUTION INTRATHECAL; INTRAVASCULAR; INTRAVENOUS; ORAL at 12:05

## 2020-03-18 RX ADMIN — IOPAMIDOL 80 ML: 755 INJECTION, SOLUTION INTRAVENOUS at 12:05

## 2020-06-08 ENCOUNTER — TELEPHONE (OUTPATIENT)
Dept: SURGERY | Age: 40
End: 2020-06-08

## 2020-06-08 NOTE — TELEPHONE ENCOUNTER
The patient would like to speak to Corrina Seth about getting some scan orders. She was not specific on what types of scans. Please call.

## 2020-06-16 NOTE — PROGRESS NOTES
Brian Mendosa is here for follow up her Liver Cancer and Sarcoma of the left lower extremity. Denies headache, cough, jaundice, abdominal pain, loss of weight, loss of appetite currently. Patient states noticing left lower extremity edema 3 weeks ago. No other complaints. Review of systems is otherwise negative    Past medical history, Past surgical history, Social history, Family history and allergies reviewed and updated. Vitals:    06/19/20 0949   BP: 124/78   Pulse: 82   Temp: 97.5 °F (36.4 °C)   TempSrc: Infrared   SpO2: 97%   Weight: 254 lb (115.2 kg)   Height: 5' 9\" (1.753 m)     Wt Readings from Last 3 Encounters:   06/19/20 254 lb (115.2 kg)   02/07/20 267 lb (121.1 kg)   02/04/20 266 lb (120.7 kg)     Body mass index is 37.51 kg/m². O/E:   Constitutional: Patient is oriented to person, place, and time. No distress. HENT: mucus membranes - moist. No scleral icterus. Neck: Supple and normal range of motion. No lymphadenopathy present. Pulmonary/Chest: Effort normal. No respiratory distress. Abdominal: Soft. No distension and no mass. No tenderness. No Hepatosplenomegaly. Extremities: Left lower extremity edema noted. Neurological: Grossly intact motor and sensory exam  Skin: Skin is warm and dry. No rash noted. She is not diaphoretic. Psychiatric: She has a normal mood and affect. Her behavior is normal. Judgment and thought content normal.     CT C/A/P 6/17/20: CHEST:     1. No acute intra-thoracic abnormality. 2. No evidence for new or progressive disease of the chest.  3. Postinflammatory scarring of the right apex.     ABDOMEN/PELVIS:     1. No acute intra-abdominopelvic abnormality.    2. No evidence for new or progressive disease of the abdomen or pelvis. 3. Postsurgical changes of the hepatic dome. 4. Colonic diverticulosis. MRI Femur Left 6/17/20:     1. Patient status post resection of previously seen posterior compartment thigh mass.  There is no evidence of any residual or recurrent neoplasm. A/P:    Diagnosis Orders   1. Metastatic cancer to liver [facility])     2. Sarcoma of left lower extremity ([facility])       No clear symptoms of recurrence of sarcoma and no obvious evidence on imaging studies. Importance of regular follow-up and attention to any symptoms discussed. Imaging studies reviewed with the patient. Discussed using compression stockings, ace wrap or lymphedema pump. Discussed referral for Lymphedema therapy  Follow up with Dr Donnie Koch. Follow up with Dr Devora Ashton. Follow up in 3 months. Will obtain imaging studies in 3 months    Rick Allen MD  Surgery Attending    I, Milad Dos Santos RN, am scribing for and in the presence of Dr Rick Allen. Milad Dos Santos RN    I, Dr. Rick Allen, personally performed the services described in this documentation as scribed by Milad Dos Santos RN in my presence, and it is both accurate and complete.      Rick Allen MD  Surgery Attending

## 2020-06-17 ENCOUNTER — HOSPITAL ENCOUNTER (OUTPATIENT)
Dept: CT IMAGING | Age: 40
Discharge: HOME | End: 2020-06-17
Payer: COMMERCIAL

## 2020-06-17 ENCOUNTER — HOSPITAL ENCOUNTER (OUTPATIENT)
Dept: MRI IMAGING | Age: 40
Discharge: HOME | End: 2020-06-17
Payer: COMMERCIAL

## 2020-06-17 PROCEDURE — 74177 CT ABD & PELVIS W/CONTRAST: CPT

## 2020-06-17 PROCEDURE — 6360000004 HC RX CONTRAST MEDICATION: Performed by: SURGERY

## 2020-06-17 PROCEDURE — 73720 MRI LWR EXTREMITY W/O&W/DYE: CPT

## 2020-06-17 PROCEDURE — A9576 INJ PROHANCE MULTIPACK: HCPCS | Performed by: SURGERY

## 2020-06-17 RX ADMIN — IOPAMIDOL 80 ML: 755 INJECTION, SOLUTION INTRAVENOUS at 17:59

## 2020-06-17 RX ADMIN — GADOTERIDOL 20 ML: 279.3 INJECTION, SOLUTION INTRAVENOUS at 21:54

## 2020-06-17 RX ADMIN — IOHEXOL 50 ML: 240 INJECTION, SOLUTION INTRATHECAL; INTRAVASCULAR; INTRAVENOUS; ORAL at 17:59

## 2020-06-19 ENCOUNTER — OFFICE VISIT (OUTPATIENT)
Dept: SURGERY | Age: 40
End: 2020-06-19
Payer: COMMERCIAL

## 2020-06-19 ENCOUNTER — HOSPITAL ENCOUNTER (OUTPATIENT)
Dept: MRI IMAGING | Age: 40
Discharge: HOME | End: 2020-06-19
Payer: COMMERCIAL

## 2020-06-19 VITALS
HEIGHT: 69 IN | OXYGEN SATURATION: 97 % | TEMPERATURE: 97.5 F | SYSTOLIC BLOOD PRESSURE: 124 MMHG | BODY MASS INDEX: 37.62 KG/M2 | WEIGHT: 254 LBS | DIASTOLIC BLOOD PRESSURE: 78 MMHG | HEART RATE: 82 BPM

## 2020-06-19 PROCEDURE — 6360000004 HC RX CONTRAST MEDICATION: Performed by: ORTHOPAEDIC SURGERY

## 2020-06-19 PROCEDURE — 73723 MRI JOINT LWR EXTR W/O&W/DYE: CPT

## 2020-06-19 PROCEDURE — 1036F TOBACCO NON-USER: CPT | Performed by: SURGERY

## 2020-06-19 PROCEDURE — A9579 GAD-BASE MR CONTRAST NOS,1ML: HCPCS | Performed by: ORTHOPAEDIC SURGERY

## 2020-06-19 PROCEDURE — G8427 DOCREV CUR MEDS BY ELIG CLIN: HCPCS | Performed by: SURGERY

## 2020-06-19 PROCEDURE — G8417 CALC BMI ABV UP PARAM F/U: HCPCS | Performed by: SURGERY

## 2020-06-19 PROCEDURE — 99214 OFFICE O/P EST MOD 30 MIN: CPT | Performed by: SURGERY

## 2020-06-19 RX ADMIN — GADOTERIDOL 20 ML: 279.3 INJECTION, SOLUTION INTRAVENOUS at 13:41

## 2020-06-25 ENCOUNTER — TELEPHONE (OUTPATIENT)
Dept: FAMILY MEDICINE CLINIC | Age: 40
End: 2020-06-25

## 2020-08-05 ENCOUNTER — TELEPHONE (OUTPATIENT)
Dept: FAMILY MEDICINE CLINIC | Age: 40
End: 2020-08-05

## 2020-08-05 NOTE — TELEPHONE ENCOUNTER
Appointment scheduled . Offered appointment with another physician but patient declined.  Patient has appointment on 8-7-20

## 2020-08-07 ENCOUNTER — OFFICE VISIT (OUTPATIENT)
Dept: FAMILY MEDICINE CLINIC | Age: 40
End: 2020-08-07
Payer: COMMERCIAL

## 2020-08-07 VITALS
WEIGHT: 250 LBS | SYSTOLIC BLOOD PRESSURE: 124 MMHG | BODY MASS INDEX: 36.92 KG/M2 | DIASTOLIC BLOOD PRESSURE: 82 MMHG | TEMPERATURE: 97.2 F | HEART RATE: 85 BPM | RESPIRATION RATE: 16 BRPM | OXYGEN SATURATION: 97 %

## 2020-08-07 PROCEDURE — 99214 OFFICE O/P EST MOD 30 MIN: CPT | Performed by: FAMILY MEDICINE

## 2020-08-07 PROCEDURE — 1036F TOBACCO NON-USER: CPT | Performed by: FAMILY MEDICINE

## 2020-08-07 PROCEDURE — G8427 DOCREV CUR MEDS BY ELIG CLIN: HCPCS | Performed by: FAMILY MEDICINE

## 2020-08-07 PROCEDURE — G8417 CALC BMI ABV UP PARAM F/U: HCPCS | Performed by: FAMILY MEDICINE

## 2020-08-07 RX ORDER — PREDNISONE 20 MG/1
TABLET ORAL
Qty: 18 TABLET | Refills: 0 | Status: SHIPPED | OUTPATIENT
Start: 2020-08-07 | End: 2020-08-12 | Stop reason: SDUPTHER

## 2020-08-07 NOTE — PROGRESS NOTES
Here for f/u of L knee pain, and has had irritation and pain. Pt did TKR on R at age 38, but did not have any GI sx at that time. Pt developed L knee pain a few months ago and did see dr. Hari Meneses and they did MRI of knee showing inflammation and reactive effusion. Pt was told that she should f/u with GI for f/u of her crohns as they felt was related to that. Pt will be seeing GI in 10d, has not had any GI but has had some back pain issues that does happen when she gets flare of IBD. Pt has been on several medications including pentasa, but did not tolerate. Pt did then go on steroids which helped but then caused weight gain. Pt was on humira and helped her joints but felt her sx of crohns flare and has not been back since dx of sarcoma to [facility]. Pt is set to leave for vacation to [place] with family. Pt following closey with heme/onc and dr. Milagros Keller and had CT scan of Chest/abd/pelvis that looked normal, liver was normal with post operative changes. MRI femur was normal as well. Pt was supposed to go hereO Rx in April but cancelled d/t COVID. Next scan would be 9/2020. Pt has lost 21# with lifestyle mgt, diet. Except as noted above in the history of present illness, the review of systems is  negative for headache, vision changes, chest pain, shortness of breath, abdominal pain, urinary sx, bowel changes. Past medical, surgical, and social history reviewed and updated  Medications and allergies reviewed and updated         O: /82   Pulse 85   Temp 97.2 °F (36.2 °C) (Temporal)   Resp 16   Wt 250 lb (113.4 kg)   SpO2 97%   BMI 36.92 kg/m²   GEN: No acute distress, cooperative, well nourished, alert. HEENT: PEERLA, EOMI , normocephalic/atraumatic, nares and oropharynx clear. Mucous membranes normal, Tympanic membranes clear bilaterally. Neck: soft, supple, no thyromegaly, mass, no Lymphadenopathy  CV: Regular rate and rhythm, no murmur, rubs, gallops. No edema.   Resp: Clear to auscultation bilaterally good air entry bilaterally  No crackles, wheeze. Breathing comfortably. Skin: RUE with small soft tissue fullness/nodule w/ mild tender to palpation, mobile      ASSESSMENT / PLAN:    1. Sarcoma of left lower extremity [facility])  Doing great s/p treatment, reviewed recent CT scan showing no residual disease  F/u with heme/onc    2. Crohn's disease of both small and large intestine without intestinal obstruction (HCC)  Mild flare off meds, sx mostly extra-intestinal  Reviewed MRI knee  Short prednisone taper as below and will f/u next week with GI  - External Referral To Gastroenterology    3. Extraintestinal Crohn's disease ([facility] [address]  As above  - External Referral To Gastroenterology    4. Effusion of left knee  D/t crohns  Reviewed MRI  F/u with ortho prn, discuss drainage  F/u with GI for treatment of crohns  Prednisone taper as below    5. Class 2 obesity without serious comorbidity with body mass index (BMI) of 36.0 to 36.9 in adult, unspecified obesity type  Doing well with lifestyle mgt, diet/exercise  Refer dietary  - External Referral to Dietitian    6. Nodule of skin of right upper extremity  The patient is reassured that these symptoms do not appear to represent a serious or threatening condition. Follow-up appointment:   Call or return to clinic prn if these symptoms worsen or fail to improve as anticipated. Discussed use, benefit, and side effects of all prescribed medications. Barriers to medication compliance addressed. All patient questions answered. Pt voiced understanding. When applicable, patient's outside records were reviewed through [facility]. The patient has signed appropriate paperworks/consents.

## 2020-08-12 ENCOUNTER — TELEPHONE (OUTPATIENT)
Dept: FAMILY MEDICINE CLINIC | Age: 40
End: 2020-08-12

## 2020-08-12 ENCOUNTER — PATIENT MESSAGE (OUTPATIENT)
Dept: FAMILY MEDICINE CLINIC | Age: 40
End: 2020-08-12

## 2020-08-12 RX ORDER — PREDNISONE 20 MG/1
TABLET ORAL
Qty: 18 TABLET | Refills: 0 | Status: SHIPPED | OUTPATIENT
Start: 2020-08-12 | End: 2020-08-22

## 2020-08-12 NOTE — TELEPHONE ENCOUNTER
Dairl Hutchinson called to see of Dr. Keven Dempsey will give her another round of prednisone. She states she will run out of her current script while she is out out town on vacation. She would like to have another round with her in case she needs it. Please advise. Thanks. Medication name: prednisone   Medication dose: 20 mg  Frequency: Take 3 tabs by mouth po qd x 3d, then 2tabs po qd x 3d, then 1 tab po qd x 3d.    Quantity: 18 tabs  Pharmacy name: [facility] [address], [id number] [address] [phone] Dean Red[place] [phone]    Last ov: 8/7/2020  Last labs: 6/24/2019

## 2020-08-12 NOTE — TELEPHONE ENCOUNTER
From: Chay Hou  To: Debbie Duarte MD  Sent: 8/12/2020  1:55 PM EDT  Subject: Visit Follow-Up Question    I spoke with Milena Odonnell like you suggested. She doesnt accept insurance so I am going to file a claim after the fact since she is quite expensive. She can put together an invoice for me but she will need a referral from you with the following diagnosis codes:  [phone] [phone]. My insurance will cover this as long as it can be justified for a medical condition that would appropriately include this service as part of the normal course of treatment. The goal is to get me on a nutrition plan that will work to control my Crohns, to keep me off of medicine, and help with weight loss. Can you please fax the referral to her at [phone]? Can you also email me a copy of the referral so I have that for my records? [email or web address]

## 2020-09-08 ENCOUNTER — TELEPHONE (OUTPATIENT)
Dept: SURGERY | Age: 40
End: 2020-09-08

## 2020-09-08 NOTE — TELEPHONE ENCOUNTER
Patient is requesting a return call to schedule 3 mo scan CTA chest abdomen and pelvis for the end of September around the 21st if possible. Thank you!

## 2020-09-14 ENCOUNTER — TELEPHONE (OUTPATIENT)
Dept: SURGERY | Age: 40
End: 2020-09-14

## 2020-09-14 NOTE — TELEPHONE ENCOUNTER
Patient verbalized an understanding that CT is scheduled for 9/21/20 with an 08:00 arrival NPO 4 hours prior. Appointment with Dr. Flower Organ 9/22/20 at 11:45.

## 2020-09-17 NOTE — PROGRESS NOTES
Stephanie Tran is here for follow up her Liver Cancer and Sarcoma of the left lower extremity and to review recent imaging studies. Denies headache, cough, jaundice, abdominal pain, loss of weight, loss of appetite currently. No other complaints. Review of systems is otherwise negative    Past medical history, Past surgical history, Social history, Family history and allergies reviewed and updated. Vitals:    09/22/20 1154   BP: 115/72   Pulse: 69   Temp: 98.4 °F (36.9 °C)   TempSrc: Infrared   SpO2: 98%   Weight: 255 lb (115.7 kg)   Height: 5' 9\" (1.753 m)     Wt Readings from Last 3 Encounters:   09/22/20 255 lb (115.7 kg)   08/07/20 250 lb (113.4 kg)   06/19/20 254 lb (115.2 kg)     Body mass index is 37.66 kg/m². O/E:   Constitutional: Patient is oriented to person, place, and time. No distress. HENT: mucus membranes - moist. No scleral icterus. Neck: Supple and normal range of motion. No lymphadenopathy present. Pulmonary/Chest: Effort normal. No respiratory distress. Abdominal: Soft. No distension and no mass. No tenderness. No Hepatosplenomegaly. Extremities:   Neurological: Grossly intact motor and sensory exam  Skin: Skin is warm and dry. No rash noted. She is not diaphoretic. Psychiatric: She has a normal mood and affect. Her behavior is normal. Judgment and thought content normal.     CT C/A/P 9/21/20:     A/P: S/P Robotic partial Liver resection & Peritoneal mass resection 7/22/19. Diagnosis Orders   1. Liver metastasis ([address]     2. Sarcoma of left lower extremity (HCC)       No clear symptoms of recurrence of sarcoma and no obvious evidence on imaging studies. Importance of regular follow-up and attention to any symptoms discussed. Imaging studies reviewed with the patient - other liver lesions were there before. They are seen better in some CT due to timing of contrast.  Discussed using compression stockings, ace wrap or lymphedema pump and Lymphedema therapy at last visit.  Referral placed. Follow up with Dr Tien Hathaway. Follow up with Dr Shonda Archuleta.   Follow up in 3 months    Jannie Johnson MD  Surgery Attending

## 2020-09-21 ENCOUNTER — HOSPITAL ENCOUNTER (OUTPATIENT)
Dept: CT IMAGING | Age: 40
Discharge: HOME | End: 2020-09-21
Payer: COMMERCIAL

## 2020-09-21 PROCEDURE — 6360000004 HC RX CONTRAST MEDICATION: Performed by: SURGERY

## 2020-09-21 PROCEDURE — 74177 CT ABD & PELVIS W/CONTRAST: CPT

## 2020-09-21 RX ADMIN — IOHEXOL 50 ML: 240 INJECTION, SOLUTION INTRATHECAL; INTRAVASCULAR; INTRAVENOUS; ORAL at 09:29

## 2020-09-21 RX ADMIN — IOPAMIDOL 80 ML: 755 INJECTION, SOLUTION INTRAVENOUS at 09:29

## 2020-09-22 ENCOUNTER — OFFICE VISIT (OUTPATIENT)
Dept: SURGERY | Age: 40
End: 2020-09-22
Payer: COMMERCIAL

## 2020-09-22 VITALS
DIASTOLIC BLOOD PRESSURE: 72 MMHG | TEMPERATURE: 98.4 F | SYSTOLIC BLOOD PRESSURE: 115 MMHG | HEIGHT: 69 IN | WEIGHT: 255 LBS | BODY MASS INDEX: 37.77 KG/M2 | OXYGEN SATURATION: 98 % | HEART RATE: 69 BPM

## 2020-09-22 PROCEDURE — 1036F TOBACCO NON-USER: CPT | Performed by: SURGERY

## 2020-09-22 PROCEDURE — G8427 DOCREV CUR MEDS BY ELIG CLIN: HCPCS | Performed by: SURGERY

## 2020-09-22 PROCEDURE — 99214 OFFICE O/P EST MOD 30 MIN: CPT | Performed by: SURGERY

## 2020-09-22 PROCEDURE — G8417 CALC BMI ABV UP PARAM F/U: HCPCS | Performed by: SURGERY

## 2020-09-22 RX ORDER — LIDOCAINE AND PRILOCAINE 25; 25 MG/G; MG/G
CREAM TOPICAL
Status: ON HOLD | COMMUNITY
Start: 2019-08-21 | End: 2021-03-22 | Stop reason: ALTCHOICE

## 2020-09-22 RX ORDER — BUDESONIDE 3 MG/1
CAPSULE, COATED PELLETS ORAL
Status: ON HOLD | COMMUNITY
Start: 2020-08-28 | End: 2021-03-22 | Stop reason: ALTCHOICE

## 2020-10-09 ENCOUNTER — HOSPITAL ENCOUNTER (OUTPATIENT)
Dept: WOMENS IMAGING | Age: 40
Discharge: HOME | End: 2020-10-09
Payer: COMMERCIAL

## 2020-10-09 PROCEDURE — 77063 BREAST TOMOSYNTHESIS BI: CPT

## 2020-11-24 NOTE — PROGRESS NOTES
[facility] / [facility] ([facility]) [address] [facility], [address]    Acknowledgment of Informed Consent for Surgical or Medical Procedure and Sedation  I agree to allow doctor(s) Nellie Ramirez and his/her associates or assistants, including residents and/or other qualified medical practitioner to perform the following medical treatment or procedure and to administer or direct the administration of sedation as necessary:  Procedure(s): PORT REMOVAL  My doctor has explained the following regarding the proposed procedure:   the explanation of the procedure   the benefits of the procedure   the potential problems that might occur during recuperation   the risks and side effects of the procedure which could include but are not limited to severe blood loss, infection, stroke or death   the benefits, risks and side effect of alternative procedures including the consequences of declining this procedure or any alternative procedures   the likelihood of achieving satisfactory results. I acknowledge no guarantee or assurance has been made to me regarding the results. I understand that during the course of this treatment/procedure, unforeseen conditions can occur which require an additional or different procedure. I agree to allow my physician or assistants to perform such extension of the original procedure as they may find necessary. I understand that sedation will often result in temporary impairment of memory and fine motor skills and that sedation can occasionally progress to a state of deep sedation or general anesthesia. I understand the risks of anesthesia for surgery include, but are not limited to, sore throat, hoarseness, injury to face, mouth, or teeth; nausea; headache; injury to blood vessels or nerves; death, brain damage, or paralysis.     I understand that if I have a Limitation of Treatment order in effect during my hospitalization, the order may or may not be in effect during this procedure. I give my doctor permission to give me blood or blood products. I understand that there are risks with receiving blood such as hepatitis, AIDS, fever, or allergic reaction. I acknowledge that the risks, benefits, and alternatives of this treatment have been explained to me and that no express or implied warranty has been given by the hospital, any blood bank, or any person or entity as to the blood or blood components transfused. At the discretion of my doctor, I agree to allow observers, equipment/product representatives and allow photographing, and/or televising of the procedure, provided my name or identity is maintained confidentially. I agree the hospital may dispose of or use for scientific or educational purposes any tissue, fluid, or body parts which may be removed.     ________________________________Date________Time______ am/pm  ([place] [facility])  Patient or Signature of Closest Relative or Legal Guardian    ________________________________Date________Time______am/pm      Page 1 of  1  Witness

## 2020-11-25 ENCOUNTER — TELEPHONE (OUTPATIENT)
Dept: SURGERY | Age: 40
End: 2020-11-25

## 2020-11-25 ENCOUNTER — OFFICE VISIT (OUTPATIENT)
Dept: PRIMARY CARE CLINIC | Age: 40
End: 2020-11-25
Payer: COMMERCIAL

## 2020-11-25 PROCEDURE — G8428 CUR MEDS NOT DOCUMENT: HCPCS | Performed by: NURSE PRACTITIONER

## 2020-11-25 PROCEDURE — 99211 OFF/OP EST MAY X REQ PHY/QHP: CPT | Performed by: NURSE PRACTITIONER

## 2020-11-25 PROCEDURE — G8417 CALC BMI ABV UP PARAM F/U: HCPCS | Performed by: NURSE PRACTITIONER

## 2020-11-25 NOTE — TELEPHONE ENCOUNTER
Patient states that she is unable to get her pre op physical due to her PCP not having availability this week. Patient would like to know if she is able to get her physical before surgery at the hospital.    Patient is due to have her port taken out on Monday 11/30/20.     Please contact the patient at [phone]

## 2020-11-25 NOTE — PROGRESS NOTES
PRE-OP INSTRUCTIONS FOR THE SURGICAL PATIENT YOU ARE UNABLE TO MAKE CONTACT FOR AN INTERVIEW:       All patients having surgery or anesthesia are required to be Covid tested. You will need to quarantined from the time you are tested until your surgery. 1. Follow instructions for your ARRIVAL TIME as DIRECTED BY YOUR SURGEON. 2. Enter the MAIN entrance located on [facility] and report to the desk. 3. Bring your insurance & prescription card and photo ID with you. You may also be asked to pay a co-pay, as you may want to bring a check or credit card with you. 4. Leave all other valuables at home. 5. Arrange for someone to drive you home and be with you for the first 24 hours after discharge. 6. Bring your medication list with you day of surgery with doses and frequency listed  7. You must contact your surgeon for Instructions regarding:              - ALL medication instructions, especially if taking blood thinners, aspirin, or diabetic medication.  - IF  there is a change in your physical condition such as a cold, fever, rash, cuts, sores or any other infection, especially near your surgical site. 8. A Pre-op History and Physical for surgery MUST be completed by your Physician or an Urgent Care within 30 days of your procedure date. Please bring a copy with you on the day of your procedure and along with any other testing performed. 9. DO NOT EAT ANYTHING eight hours prior to surgery. May have up to 8 ounces of water 4 hours prior to surgery. 10. No gum, candy, mints, or ice chips day of procedure. 11. Please refrain from drinking alcohol the day before or day of your procedure. 12. Please do not smoke the day of your procedure. 13. Dress in loose, comfortable clothing appropriate for redressing after your procedure. Do not wear jewelry (including body piercings), make-up, fingernail polish, lotion, powders or metal hairclips.    13. Contacts will need to be removed prior to surgery. You may want to bring your eye glasses to wear immediately before and after surgery. 14. Dentures will need to be removed before your procedure. 13. Bring cases for your glasses, contacts, dentures, or hearing aids to protect them while you are in surgery. 16. If you use a CPAP, please bring it with you on the day of your procedure. 17. Do not shave or wax for 72 hours prior to procedure near your operative site  18. FOR WOMAN OF CHILDBEARING AGE ONLY- please bring a urine sample with you on day of surgery or make sure we can collect on arrival.     If you have further questions, you may contact your surgeon's office or us at [phone]     Left instructions on patient's voicemail. Elmer Olsen. 11/25/2020 .7:29 AM

## 2020-11-26 LAB — SARS-COV-2: NOT DETECTED

## 2020-11-27 ENCOUNTER — ANESTHESIA EVENT (OUTPATIENT)
Dept: OPERATING ROOM | Age: 40
End: 2020-11-27
Payer: COMMERCIAL

## 2020-11-30 ENCOUNTER — ANESTHESIA (OUTPATIENT)
Dept: OPERATING ROOM | Age: 40
End: 2020-11-30
Payer: COMMERCIAL

## 2020-11-30 ENCOUNTER — HOSPITAL ENCOUNTER (OUTPATIENT)
Age: 40
Setting detail: OUTPATIENT SURGERY
Discharge: HOME | End: 2020-11-30
Attending: SURGERY | Admitting: SURGERY
Payer: COMMERCIAL

## 2020-11-30 VITALS
SYSTOLIC BLOOD PRESSURE: 89 MMHG | DIASTOLIC BLOOD PRESSURE: 50 MMHG | OXYGEN SATURATION: 97 % | RESPIRATION RATE: 11 BRPM | TEMPERATURE: 95 F

## 2020-11-30 VITALS
BODY MASS INDEX: 37.77 KG/M2 | SYSTOLIC BLOOD PRESSURE: 116 MMHG | HEART RATE: 66 BPM | DIASTOLIC BLOOD PRESSURE: 72 MMHG | RESPIRATION RATE: 14 BRPM | TEMPERATURE: 96.5 F | WEIGHT: 255 LBS | OXYGEN SATURATION: 98 % | HEIGHT: 69 IN

## 2020-11-30 PROCEDURE — 7100000010 HC PHASE II RECOVERY - FIRST 15 MIN: Performed by: SURGERY

## 2020-11-30 PROCEDURE — 6360000002 HC RX W HCPCS: Performed by: NURSE ANESTHETIST, CERTIFIED REGISTERED

## 2020-11-30 PROCEDURE — 2580000003 HC RX 258: Performed by: ANESTHESIOLOGY

## 2020-11-30 PROCEDURE — 36590 REMOVAL TUNNELED CV CATH: CPT | Performed by: SURGERY

## 2020-11-30 PROCEDURE — 3700000000 HC ANESTHESIA ATTENDED CARE: Performed by: SURGERY

## 2020-11-30 PROCEDURE — 2580000003 HC RX 258: Performed by: SURGERY

## 2020-11-30 PROCEDURE — 3600000002 HC SURGERY LEVEL 2 BASE: Performed by: SURGERY

## 2020-11-30 PROCEDURE — 7100000011 HC PHASE II RECOVERY - ADDTL 15 MIN: Performed by: SURGERY

## 2020-11-30 PROCEDURE — 6360000002 HC RX W HCPCS: Performed by: SURGERY

## 2020-11-30 PROCEDURE — 3600000012 HC SURGERY LEVEL 2 ADDTL 15MIN: Performed by: SURGERY

## 2020-11-30 PROCEDURE — 7100000001 HC PACU RECOVERY - ADDTL 15 MIN: Performed by: SURGERY

## 2020-11-30 PROCEDURE — 3700000001 HC ADD 15 MINUTES (ANESTHESIA): Performed by: SURGERY

## 2020-11-30 PROCEDURE — 7100000000 HC PACU RECOVERY - FIRST 15 MIN: Performed by: SURGERY

## 2020-11-30 PROCEDURE — 2709999900 HC NON-CHARGEABLE SUPPLY: Performed by: SURGERY

## 2020-11-30 PROCEDURE — 2500000003 HC RX 250 WO HCPCS: Performed by: SURGERY

## 2020-11-30 RX ORDER — SODIUM CHLORIDE, SODIUM LACTATE, POTASSIUM CHLORIDE, CALCIUM CHLORIDE 600; 310; 30; 20 MG/100ML; MG/100ML; MG/100ML; MG/100ML
INJECTION, SOLUTION INTRAVENOUS CONTINUOUS
Status: DISCONTINUED | OUTPATIENT
Start: 2020-11-30 | End: 2020-11-30 | Stop reason: HOSPADM

## 2020-11-30 RX ORDER — VIT C/B6/B5/MAGNESIUM/HERB 173 50-5-6-5MG
500 CAPSULE ORAL DAILY
Status: ON HOLD | COMMUNITY
End: 2021-03-22 | Stop reason: ALTCHOICE

## 2020-11-30 RX ORDER — MAGNESIUM HYDROXIDE 1200 MG/15ML
LIQUID ORAL CONTINUOUS PRN
Status: COMPLETED | OUTPATIENT
Start: 2020-11-30 | End: 2020-11-30

## 2020-11-30 RX ORDER — FENTANYL CITRATE 50 UG/ML
25 INJECTION, SOLUTION INTRAMUSCULAR; INTRAVENOUS EVERY 5 MIN PRN
Status: DISCONTINUED | OUTPATIENT
Start: 2020-11-30 | End: 2020-11-30 | Stop reason: HOSPADM

## 2020-11-30 RX ORDER — MIDAZOLAM HYDROCHLORIDE 1 MG/ML
INJECTION INTRAMUSCULAR; INTRAVENOUS PRN
Status: DISCONTINUED | OUTPATIENT
Start: 2020-11-30 | End: 2020-11-30 | Stop reason: SDUPTHER

## 2020-11-30 RX ORDER — FENTANYL CITRATE 50 UG/ML
INJECTION, SOLUTION INTRAMUSCULAR; INTRAVENOUS PRN
Status: DISCONTINUED | OUTPATIENT
Start: 2020-11-30 | End: 2020-11-30 | Stop reason: SDUPTHER

## 2020-11-30 RX ORDER — BUPIVACAINE HYDROCHLORIDE 5 MG/ML
INJECTION, SOLUTION EPIDURAL; INTRACAUDAL PRN
Status: DISCONTINUED | OUTPATIENT
Start: 2020-11-30 | End: 2020-11-30 | Stop reason: ALTCHOICE

## 2020-11-30 RX ORDER — PROPOFOL 10 MG/ML
INJECTION, EMULSION INTRAVENOUS PRN
Status: DISCONTINUED | OUTPATIENT
Start: 2020-11-30 | End: 2020-11-30 | Stop reason: SDUPTHER

## 2020-11-30 RX ORDER — DEXAMETHASONE SODIUM PHOSPHATE 4 MG/ML
4 INJECTION, SOLUTION INTRA-ARTICULAR; INTRALESIONAL; INTRAMUSCULAR; INTRAVENOUS; SOFT TISSUE
Status: DISCONTINUED | OUTPATIENT
Start: 2020-11-30 | End: 2020-11-30 | Stop reason: HOSPADM

## 2020-11-30 RX ORDER — PROPOFOL 10 MG/ML
INJECTION, EMULSION INTRAVENOUS CONTINUOUS PRN
Status: DISCONTINUED | OUTPATIENT
Start: 2020-11-30 | End: 2020-11-30 | Stop reason: SDUPTHER

## 2020-11-30 RX ADMIN — SODIUM CHLORIDE, SODIUM LACTATE, POTASSIUM CHLORIDE, AND CALCIUM CHLORIDE: 600; 310; 30; 20 INJECTION, SOLUTION INTRAVENOUS at 09:00

## 2020-11-30 RX ADMIN — CEFAZOLIN 3 G: 10 INJECTION, POWDER, FOR SOLUTION INTRAVENOUS at 09:06

## 2020-11-30 RX ADMIN — PROPOFOL 120 MCG/KG/MIN: 10 INJECTION, EMULSION INTRAVENOUS at 09:01

## 2020-11-30 RX ADMIN — SODIUM CHLORIDE, SODIUM LACTATE, POTASSIUM CHLORIDE, AND CALCIUM CHLORIDE: 600; 310; 30; 20 INJECTION, SOLUTION INTRAVENOUS at 08:15

## 2020-11-30 RX ADMIN — PROPOFOL 50 MG: 10 INJECTION, EMULSION INTRAVENOUS at 09:01

## 2020-11-30 RX ADMIN — FENTANYL CITRATE 25 MCG: 50 INJECTION INTRAMUSCULAR; INTRAVENOUS at 09:04

## 2020-11-30 RX ADMIN — FENTANYL CITRATE 25 MCG: 50 INJECTION INTRAMUSCULAR; INTRAVENOUS at 09:13

## 2020-11-30 RX ADMIN — MIDAZOLAM HYDROCHLORIDE 2 MG: 2 INJECTION, SOLUTION INTRAMUSCULAR; INTRAVENOUS at 08:56

## 2020-11-30 RX ADMIN — FENTANYL CITRATE 25 MCG: 50 INJECTION INTRAMUSCULAR; INTRAVENOUS at 09:07

## 2020-11-30 RX ADMIN — FENTANYL CITRATE 25 MCG: 50 INJECTION INTRAMUSCULAR; INTRAVENOUS at 09:01

## 2020-11-30 ASSESSMENT — PULMONARY FUNCTION TESTS
PIF_VALUE: 0
PIF_VALUE: 0
PIF_VALUE: 1
PIF_VALUE: 0
PIF_VALUE: 1
PIF_VALUE: 0
PIF_VALUE: 1
PIF_VALUE: 0

## 2020-11-30 ASSESSMENT — PAIN SCALES - GENERAL
PAINLEVEL_OUTOF10: 0
PAINLEVEL_OUTOF10: 0

## 2020-11-30 ASSESSMENT — PAIN - FUNCTIONAL ASSESSMENT: PAIN_FUNCTIONAL_ASSESSMENT: 0-10

## 2020-11-30 NOTE — ANESTHESIA POSTPROCEDURE EVALUATION
Department of Anesthesiology  Postprocedure Note    Patient: Amelia Alvarez  MRN: [id number]  YOB: 1980  Date of evaluation: 11/30/2020  Time:  10:55 AM     Procedure Summary     Date:  11/30/20 Room / Location:  [facility]    Anesthesia Start:  5056 Anesthesia Stop:      Procedure:  PORT REMOVAL (Right ) Diagnosis:       Sarcoma ([address]      (Sarcoma ([facility] [address] [C49.9])    Surgeon:  Farzad De Leon MD Responsible Provider:  Yimi Nunez DO    Anesthesia Type:  MAC ASA Status:  2          Anesthesia Type: MAC    Adalgisa Phase I: Adalgisa Score: 8    Adalgisa Phase II:      Last vitals: Reviewed and per EMR flowsheets.        Anesthesia Post Evaluation    Patient location during evaluation: bedside  Patient participation: complete - patient participated  Level of consciousness: awake  Pain score: 0  Airway patency: patent  Nausea & Vomiting: no nausea and no vomiting  Complications: no  Cardiovascular status: blood pressure returned to baseline  Respiratory status: acceptable  Hydration status: euvolemic

## 2020-11-30 NOTE — PROGRESS NOTES
Patient admitted to pacu post PORT REMOVAL - Right with Dr. Terrance Martino. Patient connected to bedside monitors; VSS. Per CRNA patient was stable intra-op. Patient resting comfortably with no complaints of pain.

## 2020-11-30 NOTE — BRIEF OP NOTE
Brief Postoperative Note      Patient: Az Prieto  YOB: 1980  MRN: [id number]    Date of Procedure: 11/30/2020    Pre-Op Diagnosis: Sarcoma ([address] [C49.9]    Post-Op Diagnosis: Same       Procedure(s):  PORT REMOVAL    Surgeon(s):  Viraj Aguilar MD    Assistant:  Surgical Assistant: Juaquin Ba Holter  Resident: Mara Boast, MD    Anesthesia: Monitor Anesthesia Care    Estimated Blood Loss (mL): Minimal    Complications: None    Specimens:   * No specimens in log *    Implants:  * No implants in log *      Drains:   [REMOVED] NG/OG/NJ/NE Tube Orogastric 16 fr Center mouth (Removed)       Findings: Right IJ port removed    Electronically signed by Mara Boast, MD on 11/30/2020 at 9:31 AM

## 2020-11-30 NOTE — ANESTHESIA PRE PROCEDURE
Department of Anesthesiology  Preprocedure Note       Name:  Wyatt Garza   Age:  41 y.o.  :  1980                                          MRN:  [id number]         Date:  2020      Surgeon: Mirna Aparicio):  Briseida Jimenez MD    Procedure: Procedure(s):  PORT REMOVAL    Medications prior to admission:   Prior to Admission medications    Medication Sig Start Date End Date Taking? Authorizing Provider   Turmeric (QC TUMERIC COMPLEX) 500 MG CAPS Take 500 mg by mouth daily   Yes Historical Provider, MD   budesonide (ENTOCORT EC) 3 MG extended release capsule  20  Yes Historical Provider, MD   lidocaine-prilocaine (EMLA) 2.5-2.5 % cream Apply a dot of cream to top of port and cover with plastic wrap 30-60 minutes before needle placed. 19  Yes Historical Provider, MD       Current medications:    Current Facility-Administered Medications   Medication Dose Route Frequency Provider Last Rate Last Dose    ceFAZolin (ANCEF) 3 g in dextrose 5 % 100 mL IVPB  3 g Intravenous Once Briseida Jimenez MD        lactated ringers infusion   Intravenous Continuous Jade Villarreal,  mL/hr at 20 9274         Allergies:     Allergies   Allergen Reactions    Nsaids      Pt has Crohn's    Ondansetron Hcl Other (See Comments)     States it makes her feel weird  States it makes her feel weird      Topamax [Topiramate] Hives, Itching and Rash       Problem List:    Patient Active Problem List   Diagnosis Code    Premature menopause E28.319    Anxiety disorder F41.9    Osteoarthritis, knee M17.10    Crohn's disease ([address] K50.90    IBS (irritable bowel syndrome) K58.9    Morbid obesity with BMI of 40.0-44.9, adult ([address] E66.01, Z68.41    Mixed hyperlipidemia E78.2    Vitamin B12 deficiency E53.8    Small bowel obstruction ([address] K56.609    Sarcoma of left lower extremity ([address] C49.22    Moderate malnutrition ([address] E44.0    Class 2 obesity due to excess calories with body mass index (BMI) of 39.0 to 39.9 in adult E66.09, Z68.39    Chronic depression F32.9    Weight loss counseling, encounter for Z71.3    Liver mass R16.0    Metastatic cancer to liver (HCC) C78.7    Neutropenic fever (HCC) D70.9, R50.81       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     Cro[facility]'s disease ([facility]     Depression     Early menopause     follows with endocrine/gyne    IBS (irritable bowel syndrome)     IBS (irritable bowel syndrome)     Mixed hyperlipidemia 6/22/2017    Obesity     Osteoarthritis, knee     PONV (postoperative nausea and vomiting)     Prolonged emergence from general anesthesia     panic attacks on awakening, requests anxiety med on awakening    Sarcoma of left lower extremity ([facility] [address] [facility] 11/24/2017    s/p XRT, followed by surgery       Past Surgical History:        Procedure Laterality Date    ANKLE SURGERY Left     APPENDECTOMY      BREAST REDUCTION SURGERY      CARPAL TUNNEL RELEASE Right 07/2020    KNEE ARTHROSCOPY Right 1/2015    KNEE ARTHROSCOPY Right 7/2015    LIVER RESECTION N/A 7/22/2019    ROBOTIC ASSISTED PARTIAL LIVER RESECTION AND ABDOMINAL MASS RESECTION performed by Lourdes Perry MD at [address] [place] [address] Left 11/2017    for sarcoma    OTHER SURGICAL HISTORY Left     biopsy of left thigh mass    OTHER SURGICAL HISTORY Left 02/26/2018    WIDE EXCISION LEFT THIGH SARCOMA          OTHER SURGICAL HISTORY Left 03/14/2018    INCISION AND DRAINAGE OF LEFT THIGH       OTHER SURGICAL HISTORY Left 03/22/2018    incision and drainage left posterior thigh    SALPINGECTOMY Left 2009    d/t scar tissue    TOTAL KNEE ARTHROPLASTY Right 9/2015       Social History:    Social History     Tobacco Use    Smoking status: Former Smoker     Packs/day: 0.50     Years: 15.00     Pack years: 7.50     Types: Cigarettes    Smokeless tobacco: Never Used   Substance Use Topics    Alcohol use: Yes     Comment: rare Counseling given: Not Answered      Vital Signs (Current):   Vitals:    11/27/20 2115 11/30/20 0756   BP:  117/82   Pulse:  71   Resp:  16   Temp:  97.9 °F (36.6 °C)   TempSrc:  Oral   SpO2:  96%   Weight: 255 lb 1.2 oz (115.7 kg) 255 lb (115.7 kg)   Height:  5' 9\" (1.753 m)                                              BP Readings from Last 3 Encounters:   11/30/20 117/82   09/22/20 115/72   08/07/20 124/82       NPO Status: Time of last liquid consumption: 2000                        Time of last solid consumption: 2000                        Date of last liquid consumption: 11/29/20                        Date of last solid food consumption: 11/29/20    BMI:   Wt Readings from Last 3 Encounters:   11/30/20 255 lb (115.7 kg)   09/22/20 255 lb (115.7 kg)   08/07/20 250 lb (113.4 kg)     Body mass index is 37.66 kg/m². CBC:   Lab Results   Component Value Date    WBC 2.6 10/12/2019    RBC 2.94 10/12/2019    HGB 8.8 10/12/2019    HCT 26.8 10/12/2019    MCV 91.2 10/12/2019    RDW 17.7 10/12/2019    PLT 45 10/12/2019       CMP:   Lab Results   Component Value Date     10/12/2019    K 4.1 10/12/2019     10/12/2019    CO2 18 10/12/2019    BUN 4 10/12/2019    CREATININE 0.5 10/12/2019    GFRAA >60 10/12/2019    AGRATIO 1.4 07/19/2019    LABGLOM >60 10/12/2019    LABGLOM 107 03/08/2013    GLUCOSE 100 10/12/2019    PROT 6.1 07/26/2019    PROT 7.1 03/08/2013    CALCIUM 8.5 10/12/2019    BILITOT 0.3 07/26/2019    ALKPHOS 69 07/26/2019    AST 92 07/26/2019    ALT 92 07/26/2019       POC Tests: No results for input(s): POCGLU, POCNA, POCK, POCCL, POCBUN, POCHEMO, POCHCT in the last 72 hours.     Coags:   Lab Results   Component Value Date    PROTIME 12.1 07/23/2019    INR 1.06 07/23/2019    APTT 26.9 07/19/2019       HCG (If Applicable):   Lab Results   Component Value Date    PREGTESTUR Negative 07/22/2019        ABGs: No results found for: PHART, PO2ART, BCR6UPU, BOQ9JDL, BEART, I1KIBUNR     Type &

## 2020-11-30 NOTE — PROGRESS NOTES
Ambulatory Surgery/Procedure Discharge Note    Vitals:    11/30/20 1056   BP: 116/72   Pulse: 66   Resp: 14   Temp: 96.5 °F (35.8 °C)   SpO2: 98%       In: 200 [P.O.:200]  Out: -     Restroom use offered before discharge. Yes, pt void per bathroom, assist x1. Pain assessment:  level of pain (1-10, 10 severe)  Pain Level: 0  Pt to SDS post port removal- right. Surgical glue clean, dry and intact. Pt denies numbness/tingling in right arm. Pt denies pain at this time. Pt denies nausea at this time. Pt tolerating PO fluids well. Discharge instructions given to pt's  and he states understanding of these instructions. Patient discharged to home/self care.  Patient discharged via wheel chair by transporter to waiting family/S.O.       11/30/2020 11:02 AM

## 2020-11-30 NOTE — PROGRESS NOTES
PACU Transfer to [facility]    Vitals:    11/30/20 1000   BP: 111/84   Pulse: 69   Resp: 10   Temp: 97 °F (36.1 °C)   SpO2: 100%         Intake/Output Summary (Last 24 hours) at 11/30/2020 1010  Last data filed at 11/30/2020 1000  Gross per 24 hour   Intake 200 ml   Output --   Net 200 ml       Pain assessment:  none  Pain Level: 0    Patient transferred to care of [facility] RN.    11/30/2020 10:10 AM

## 2020-11-30 NOTE — H&P
General Surgery   Resident History and Physical       Chief Complaint: Port removal    History of Present Illness:    Fide Serrato is a 41 y.o. 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 Since her last office visit on 9/22/20, she denies any changes in her health status. No weight loss, changes in appetite, abdominal pain, or jaundice. COVID-19 testing done 11/25 showed no virus detected; she has remained socially-distanced and quarantined since that time, denying any current symptoms of COVID-19.     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     [facility] disease ([facility] [address]     Depression     Early menopause     follows with endocrine/gyne    IBS (irritable bowel syndrome)     IBS (irritable bowel syndrome)     Mixed hyperlipidemia 6/22/2017    Obesity     Osteoarthritis, knee     PONV (postoperative nausea and vomiting)     Prolonged emergence from general anesthesia     panic attacks on awakening, requests anxiety med on awakening    Sarcoma of left lower extremity ([facility] [address] 11/24/2017    s/p XRT, followed by surgery       Past Surgical History:        Procedure Laterality Date    ANKLE SURGERY Left     APPENDECTOMY      BREAST REDUCTION SURGERY      CARPAL TUNNEL RELEASE Right 07/2020    KNEE ARTHROSCOPY Right 1/2015    KNEE ARTHROSCOPY Right 7/2015    LIVER RESECTION N/A 7/22/2019    ROBOTIC ASSISTED PARTIAL LIVER RESECTION AND ABDOMINAL MASS RESECTION performed by Renuka Alcazar MD at [address] [place] [address] Nw Left 11/2017    for sarcoma    OTHER SURGICAL HISTORY Left     biopsy of left thigh mass    OTHER SURGICAL HISTORY Left 02/26/2018    WIDE EXCISION LEFT THIGH SARCOMA          OTHER SURGICAL HISTORY Left 03/14/2018    INCISION AND DRAINAGE OF LEFT THIGH       OTHER SURGICAL HISTORY Left 03/22/2018    incision and drainage left posterior thigh    SALPINGECTOMY Left 2009    d/t scar tissue    TOTAL KNEE ARTHROPLASTY Right 9/2015       Allergies:    Nsaids; Ondansetron hcl; and Topamax [topiramate]    Medications:   Home Meds  No current facility-administered medications on file prior to encounter. Current Outpatient Medications on File Prior to Encounter   Medication Sig Dispense Refill    Turmeric (QC TUMERIC COMPLEX) 500 MG CAPS Take 500 mg by mouth daily      budesonide (ENTOCORT EC) 3 MG extended release capsule       lidocaine-prilocaine (EMLA) 2.5-2.5 % cream Apply a dot of cream to top of port and cover with plastic wrap 30-60 minutes before needle placed. Current Meds  ceFAZolin (ANCEF) 3 g in dextrose 5 % 100 mL IVPB, Once  lactated ringers infusion, Continuous        Family History:   Family History   Problem Relation Age of Onset    Hypertension Father     Elevated Lipids Father     Coronary Art Dis Maternal Grandfather 36    Heart Failure Paternal Grandfather 76    Hypertension Brother     Coronary Art Dis Maternal Uncle        Social History:   TOBACCO:   reports that she has quit smoking. Her smoking use included cigarettes. She has a 7.50 pack-year smoking history. She has never used smokeless tobacco.  ETOH:   reports current alcohol use. DRUGS:   reports no history of drug use. Review of Systems:      Constitutional: Negative. HENT: Negative. Eyes: Negative. Respiratory: Negative. Cardiovascular: Negative. Gastrointestinal: Negative. Genitourinary: Negative. Musculoskeletal: Negative. Skin: Negative. Endocrine: Negative. Allergic/Immunologic: Negative. Neurological: Negative. Hematological: Negative. Psychiatric/Behavioral: Negative.     Physical exam:    Vitals:    11/27/20 2115 11/30/20 0756   BP:  117/82   Pulse:  71   Resp:  16   Temp:  97.9 °F (36.6 °C)   TempSrc:  Oral   SpO2:  96%   Weight: 255 lb 1.2 oz (115.7 kg) 255 lb (115.7 kg)   Height:  5' 9\" (1.753 m)       Constitutional: Patient is oriented to person, place, and time. No distress. HENT: mucus membranes - moist. No scleral icterus. Neck: Supple and normal range of motion. No lymphadenopathy present. Pulmonary/Chest: Effort normal. No respiratory distress. Port in-place to right chest wall, well-healed overlying scar present. Abdominal: Soft. No distension and no mass. No tenderness. No Hepatosplenomegaly. Extremities: No cyanosis  Neurological: Grossly intact motor and sensory exam  Skin: Skin is warm and dry. No rash noted. She is not diaphoretic. Psychiatric: She has a normal mood and affect. Her behavior is normal. Judgment and thought content normal.      Assessment/Plan:  Kellie Tripp is a 41 y.o. 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    - To OR with Dr. Terrance Martino for elective port removal.  Consent obtained, placed in chart.  - NPO since midnight. - Anticipate discharge home from PACU.     Maribel Franco MD, PGY-1  11/30/20  7:59 AM  [phone]

## 2020-12-21 ENCOUNTER — HOSPITAL ENCOUNTER (OUTPATIENT)
Dept: GENERAL RADIOLOGY | Age: 40
Discharge: HOME | End: 2020-12-21
Payer: COMMERCIAL

## 2020-12-21 ENCOUNTER — HOSPITAL ENCOUNTER (OUTPATIENT)
Dept: MRI IMAGING | Age: 40
Discharge: HOME | End: 2020-12-21
Payer: COMMERCIAL

## 2020-12-21 PROCEDURE — 71046 X-RAY EXAM CHEST 2 VIEWS: CPT

## 2020-12-21 PROCEDURE — A9581 GADOXETATE DISODIUM INJ: HCPCS | Performed by: SURGERY

## 2020-12-21 PROCEDURE — 74183 MRI ABD W/O CNTR FLWD CNTR: CPT

## 2020-12-21 PROCEDURE — 6360000004 HC RX CONTRAST MEDICATION: Performed by: SURGERY

## 2020-12-21 RX ADMIN — GADOXETATE DISODIUM 10 ML: 181.43 INJECTION, SOLUTION INTRAVENOUS at 17:14

## 2020-12-24 ENCOUNTER — VIRTUAL VISIT (OUTPATIENT)
Dept: SURGERY | Age: 40
End: 2020-12-24
Payer: COMMERCIAL

## 2020-12-24 DIAGNOSIS — R91.1 LUNG NODULE: Primary | ICD-10-CM

## 2020-12-24 DIAGNOSIS — C49.22 SARCOMA OF LEFT LOWER EXTREMITY (HCC): ICD-10-CM

## 2020-12-24 DIAGNOSIS — C78.7 METASTATIC CANCER TO LIVER (HCC): ICD-10-CM

## 2020-12-24 PROCEDURE — 99213 OFFICE O/P EST LOW 20 MIN: CPT | Performed by: SURGERY

## 2020-12-24 PROCEDURE — G8427 DOCREV CUR MEDS BY ELIG CLIN: HCPCS | Performed by: SURGERY

## 2020-12-24 NOTE — PROGRESS NOTES
2020    TELEHEALTH EVALUATION -- Audio/Visual (During YGRQZ-88 public health emergency)    HPI:    Jb Bermeo (:  1980) has requested an audio/video evaluation for the following concern(s): Sarcoma left lower extremity, metastatic cancer to the liver. Joy Thao is here for follow up her Liver Cancer and Sarcoma of the left lower extremity and to review recent imaging studies. Denies headache, cough, jaundice, abdominal pain, loss of weight, loss of appetite currently. Patient endorses recent loose stools. No blood   No other complaints. Review of systems is otherwise negative    Past medical history, Past surgical history, Social history, Family history and allergies reviewed and updated. O/E:   Constitutional: Patient is oriented to person, place, and time. No distress. HENT: mucus membranes - moist. No scleral icterus. Neck: Supple and normal range of motion. No lymphadenopathy present. Pulmonary/Chest: Effort normal. No respiratory distress. Abdominal: Soft. No distension and no mass. No tenderness. No Hepatosplenomegaly. Extremities:   Neurological: Grossly intact motor and sensory exam  Skin: Skin is warm and dry. No rash noted. She is not diaphoretic. Psychiatric: She has a normal mood and affect. Her behavior is normal. Judgment and thought content normal.     XR Chest 20:     No acute cardiopulmonary disease     No focal nodules identified lung parenchyma. There is further concern, CT chest with IV contrast is recommended. MRI Abdomen 20 Impression:      There is a 1.2 cm oval region of diffusion restriction in segment II of the liver that corresponds with the enhancing structure on CT from 2020. There is no other imaging correlate. This region is difficult to evaluate due to its proximity to the   diaphragm and subsequent degradation by respiratory motion. Consider follow-up imaging in 6 months. A/P: S/P Robotic partial Liver resection & Peritoneal mass resection 7/22/19. Diagnosis Orders   1. Lung nodule     2. Metastatic cancer to liver [facility])     3. Sarcoma of left lower extremity [facility])       Imaging studies reviewed with the patient - difficulty in assessing liver masses based on imaging discussed. Reason of a liver mass appearing or not appearing based on timing of contrast and imaging modality explained. Likely typo error in CXR reading discussed. No clear symptoms of recurrence of sarcoma and no obvious evidence on imaging studies. Importance of regular follow-up and attention to any symptoms discussed. Follow up with Dr Earlene Madison. Follow up with Dr Lucian Cordova. Follow up in 3 months    Cali Arteaga is a 41 y.o. female being evaluated by a Virtual Visit (video visit) encounter to address concerns as mentioned above. A caregiver was present when appropriate. Due to this being a TeleHealth encounter (During XRE-23 public health emergency), evaluation of the some organ systems was limited. This Virtual Visit was conducted with patient's (and/or legal guardian's) consent. The patient (and/or legal guardian) has also been advised to contact this office for worsening conditions or problems, and seek emergency medical treatment and/or call 911 if deemed necessary. Samantha Mandel MD  Surgery Attending    An electronic signature was used to authenticate this note.

## 2021-01-07 ENCOUNTER — PATIENT MESSAGE (OUTPATIENT)
Dept: FAMILY MEDICINE CLINIC | Age: 41
End: 2021-01-07

## 2021-01-08 NOTE — TELEPHONE ENCOUNTER
From: Abeba Pop  To: Carol Sidhu MD  Sent: 1/7/2021 8:28 PM EST  Subject: Non-Urgent Medical Question    I was hoping to find out if your office has or will have the covid vaccines. Also, I wanted to know if you have information on qualifying for the next round that is coming out Jan 25th. I read that people with fragile X qualify for the next round. Since I have fragile x I wanted to confirm that I would be able to get the vaccine at the end of January. Thanks in advance for you help.   Len Myrick

## 2021-02-04 ENCOUNTER — HOSPITAL ENCOUNTER (EMERGENCY)
Age: 41
Discharge: HOME | End: 2021-02-04
Attending: EMERGENCY MEDICINE
Payer: COMMERCIAL

## 2021-02-04 ENCOUNTER — APPOINTMENT (OUTPATIENT)
Dept: GENERAL RADIOLOGY | Age: 41
End: 2021-02-04
Payer: COMMERCIAL

## 2021-02-04 ENCOUNTER — APPOINTMENT (OUTPATIENT)
Dept: CT IMAGING | Age: 41
End: 2021-02-04
Payer: COMMERCIAL

## 2021-02-04 VITALS
TEMPERATURE: 98.4 F | DIASTOLIC BLOOD PRESSURE: 64 MMHG | RESPIRATION RATE: 15 BRPM | OXYGEN SATURATION: 99 % | SYSTOLIC BLOOD PRESSURE: 131 MMHG | HEART RATE: 75 BPM

## 2021-02-04 DIAGNOSIS — R10.9 ABDOMINAL PAIN, UNSPECIFIED ABDOMINAL LOCATION: Primary | ICD-10-CM

## 2021-02-04 LAB
ALBUMIN SERPL-MCNC: 5 G/DL (ref 3.4–5)
ALP BLD-CCNC: 98 U/L (ref 40–129)
ALT SERPL-CCNC: 15 U/L (ref 10–40)
ANION GAP SERPL CALCULATED.3IONS-SCNC: 9 MMOL/L (ref 3–16)
AST SERPL-CCNC: 16 U/L (ref 15–37)
BASOPHILS ABSOLUTE: 0 K/UL (ref 0–0.2)
BASOPHILS RELATIVE PERCENT: 0.3 %
BILIRUB SERPL-MCNC: 0.4 MG/DL (ref 0–1)
BILIRUBIN DIRECT: <0.2 MG/DL (ref 0–0.3)
BILIRUBIN URINE: NEGATIVE
BILIRUBIN, INDIRECT: NORMAL MG/DL (ref 0–1)
BLOOD, URINE: NEGATIVE
BUN BLDV-MCNC: 10 MG/DL (ref 7–20)
CALCIUM SERPL-MCNC: 10 MG/DL (ref 8.3–10.6)
CHLORIDE BLD-SCNC: 101 MMOL/L (ref 99–110)
CLARITY: CLEAR
CO2: 29 MMOL/L (ref 21–32)
COLOR: YELLOW
CREAT SERPL-MCNC: 0.7 MG/DL (ref 0.6–1.1)
EKG ATRIAL RATE: 60 BPM
EKG DIAGNOSIS: NORMAL
EKG P AXIS: 0 DEGREES
EKG P-R INTERVAL: 142 MS
EKG Q-T INTERVAL: 424 MS
EKG QRS DURATION: 90 MS
EKG QTC CALCULATION (BAZETT): 424 MS
EKG R AXIS: 12 DEGREES
EKG T AXIS: 30 DEGREES
EKG VENTRICULAR RATE: 60 BPM
EOSINOPHILS ABSOLUTE: 0 K/UL (ref 0–0.6)
EOSINOPHILS RELATIVE PERCENT: 0.6 %
GFR AFRICAN AMERICAN: >60
GFR NON-AFRICAN AMERICAN: >60
GLUCOSE BLD-MCNC: 88 MG/DL (ref 70–99)
GLUCOSE URINE: NEGATIVE MG/DL
HCG(URINE) PREGNANCY TEST: NEGATIVE
HCT VFR BLD CALC: 42.3 % (ref 36–48)
HEMOGLOBIN: 13.8 G/DL (ref 12–16)
KETONES, URINE: NEGATIVE MG/DL
LACTIC ACID: 0.9 MMOL/L (ref 0.4–2)
LEUKOCYTE ESTERASE, URINE: NEGATIVE
LIPASE: 19 U/L (ref 13–60)
LYMPHOCYTES ABSOLUTE: 1.6 K/UL (ref 1–5.1)
LYMPHOCYTES RELATIVE PERCENT: 26.3 %
MCH RBC QN AUTO: 31.6 PG (ref 26–34)
MCHC RBC AUTO-ENTMCNC: 32.7 G/DL (ref 31–36)
MCV RBC AUTO: 96.7 FL (ref 80–100)
MICROSCOPIC EXAMINATION: NORMAL
MONOCYTES ABSOLUTE: 0.4 K/UL (ref 0–1.3)
MONOCYTES RELATIVE PERCENT: 6.9 %
NEUTROPHILS ABSOLUTE: 4 K/UL (ref 1.7–7.7)
NEUTROPHILS RELATIVE PERCENT: 65.9 %
NITRITE, URINE: NEGATIVE
PDW BLD-RTO: 14.5 % (ref 12.4–15.4)
PH UA: 6.5 (ref 5–8)
PLATELET # BLD: 187 K/UL (ref 135–450)
PMV BLD AUTO: 8.3 FL (ref 5–10.5)
POC OCCULT BLOOD STOOL: NEGATIVE
POTASSIUM REFLEX MAGNESIUM: 3.7 MMOL/L (ref 3.5–5.1)
PROTEIN UA: NEGATIVE MG/DL
RBC # BLD: 4.38 M/UL (ref 4–5.2)
SODIUM BLD-SCNC: 139 MMOL/L (ref 136–145)
SPECIFIC GRAVITY UA: <=1.005 (ref 1–1.03)
TOTAL PROTEIN: 7.6 G/DL (ref 6.4–8.2)
TROPONIN: <0.01 NG/ML
URINE TYPE: NORMAL
UROBILINOGEN, URINE: 0.2 E.U./DL
WBC # BLD: 6 K/UL (ref 4–11)

## 2021-02-04 PROCEDURE — 6360000004 HC RX CONTRAST MEDICATION: Performed by: EMERGENCY MEDICINE

## 2021-02-04 PROCEDURE — 96375 TX/PRO/DX INJ NEW DRUG ADDON: CPT

## 2021-02-04 PROCEDURE — 93005 ELECTROCARDIOGRAM TRACING: CPT | Performed by: PHYSICIAN ASSISTANT

## 2021-02-04 PROCEDURE — 83605 ASSAY OF LACTIC ACID: CPT

## 2021-02-04 PROCEDURE — 84484 ASSAY OF TROPONIN QUANT: CPT

## 2021-02-04 PROCEDURE — 36415 COLL VENOUS BLD VENIPUNCTURE: CPT

## 2021-02-04 PROCEDURE — 81003 URINALYSIS AUTO W/O SCOPE: CPT

## 2021-02-04 PROCEDURE — 2580000003 HC RX 258: Performed by: PHYSICIAN ASSISTANT

## 2021-02-04 PROCEDURE — 96376 TX/PRO/DX INJ SAME DRUG ADON: CPT

## 2021-02-04 PROCEDURE — 80048 BASIC METABOLIC PNL TOTAL CA: CPT

## 2021-02-04 PROCEDURE — 82272 OCCULT BLD FECES 1-3 TESTS: CPT

## 2021-02-04 PROCEDURE — 83690 ASSAY OF LIPASE: CPT

## 2021-02-04 PROCEDURE — 6360000002 HC RX W HCPCS: Performed by: PHYSICIAN ASSISTANT

## 2021-02-04 PROCEDURE — 71045 X-RAY EXAM CHEST 1 VIEW: CPT

## 2021-02-04 PROCEDURE — 96374 THER/PROPH/DIAG INJ IV PUSH: CPT

## 2021-02-04 PROCEDURE — 74177 CT ABD & PELVIS W/CONTRAST: CPT

## 2021-02-04 PROCEDURE — 80076 HEPATIC FUNCTION PANEL: CPT

## 2021-02-04 PROCEDURE — 99284 EMERGENCY DEPT VISIT MOD MDM: CPT

## 2021-02-04 PROCEDURE — 85025 COMPLETE CBC W/AUTO DIFF WBC: CPT

## 2021-02-04 PROCEDURE — 84703 CHORIONIC GONADOTROPIN ASSAY: CPT

## 2021-02-04 RX ORDER — PROMETHAZINE HYDROCHLORIDE 25 MG/ML
12.5 INJECTION, SOLUTION INTRAMUSCULAR; INTRAVENOUS ONCE
Status: COMPLETED | OUTPATIENT
Start: 2021-02-04 | End: 2021-02-04

## 2021-02-04 RX ORDER — OXYCODONE HYDROCHLORIDE AND ACETAMINOPHEN 5; 325 MG/1; MG/1
1 TABLET ORAL EVERY 4 HOURS PRN
Qty: 12 TABLET | Refills: 0 | Status: SHIPPED | OUTPATIENT
Start: 2021-02-04 | End: 2021-02-07

## 2021-02-04 RX ORDER — DOCUSATE SODIUM 100 MG/1
100 CAPSULE, LIQUID FILLED ORAL 2 TIMES DAILY
Qty: 30 CAPSULE | Refills: 0 | Status: ON HOLD | OUTPATIENT
Start: 2021-02-04 | End: 2021-03-22 | Stop reason: ALTCHOICE

## 2021-02-04 RX ORDER — SODIUM CHLORIDE, SODIUM LACTATE, POTASSIUM CHLORIDE, CALCIUM CHLORIDE 600; 310; 30; 20 MG/100ML; MG/100ML; MG/100ML; MG/100ML
1000 INJECTION, SOLUTION INTRAVENOUS ONCE
Status: COMPLETED | OUTPATIENT
Start: 2021-02-04 | End: 2021-02-04

## 2021-02-04 RX ORDER — POLYETHYLENE GLYCOL 3350 17 G/17G
17 POWDER, FOR SOLUTION ORAL 2 TIMES DAILY
Qty: 510 G | Refills: 0 | Status: SHIPPED | OUTPATIENT
Start: 2021-02-04 | End: 2021-03-06

## 2021-02-04 RX ADMIN — HYDROMORPHONE HYDROCHLORIDE 1 MG: 1 INJECTION, SOLUTION INTRAMUSCULAR; INTRAVENOUS; SUBCUTANEOUS at 14:00

## 2021-02-04 RX ADMIN — SODIUM CHLORIDE, POTASSIUM CHLORIDE, SODIUM LACTATE AND CALCIUM CHLORIDE 1000 ML: 600; 310; 30; 20 INJECTION, SOLUTION INTRAVENOUS at 11:29

## 2021-02-04 RX ADMIN — PROMETHAZINE HYDROCHLORIDE 12.5 MG: 25 INJECTION INTRAMUSCULAR; INTRAVENOUS at 11:28

## 2021-02-04 RX ADMIN — HYDROMORPHONE HYDROCHLORIDE 1 MG: 1 INJECTION, SOLUTION INTRAMUSCULAR; INTRAVENOUS; SUBCUTANEOUS at 11:28

## 2021-02-04 RX ADMIN — IOPAMIDOL 80 ML: 755 INJECTION, SOLUTION INTRAVENOUS at 11:57

## 2021-02-04 ASSESSMENT — ENCOUNTER SYMPTOMS
CHEST TIGHTNESS: 0
SORE THROAT: 0
VOMITING: 0
RHINORRHEA: 0
WHEEZING: 0
ABDOMINAL PAIN: 1
BACK PAIN: 0
RECTAL PAIN: 0
FACIAL SWELLING: 0
COUGH: 0
TROUBLE SWALLOWING: 0
DIARRHEA: 1
NAUSEA: 0
BLOOD IN STOOL: 1
SHORTNESS OF BREATH: 0

## 2021-02-04 ASSESSMENT — PAIN DESCRIPTION - DESCRIPTORS: DESCRIPTORS: ACHING

## 2021-02-04 ASSESSMENT — PAIN DESCRIPTION - ORIENTATION: ORIENTATION: MID

## 2021-02-04 ASSESSMENT — PAIN DESCRIPTION - FREQUENCY: FREQUENCY: CONTINUOUS

## 2021-02-04 ASSESSMENT — PAIN SCALES - GENERAL: PAINLEVEL_OUTOF10: 1

## 2021-02-04 NOTE — ED PROVIDER NOTES
[facility]          PHYSICIAN ASSISTANT NOTE       Date of evaluation: 2/4/2021    Chief Complaint     Abdominal Pain (hx crohn's disease and cancer, for the past three weeks c/o increasing back pain and LUQ pain, -n/v)      History of Present Illness     Isaias Hall is a 41 y.o. female who presents with complaints of left upper quadrant abdominal pain radiates into her back. Patient has a history of Crohn's disease and states this feels similar. She has had similar pain with her Crohn's disease in the past.  She states the pain typically radiates into her back. She denies any injury or trauma to the abdomen or back. Denies any fevers or chills. She has had some diarrhea over the last 2 to 3 weeks with constipation for 2 days. She has noticed some dark stool with occasional blood on the tissue paper when she wipes after bowel movement. She is not anticoagulated. She does have a history of sarcoma to her left leg that metastasized to the liver. Patient states she does not have active cancer and is not receiving any chemotherapy or radiation at this time. She does have a follow-up with her GI doctor next week. She has been taking some pain medication at home without relief of pain. Pain has been present for the last 2 to 3 weeks. She denies any chest pain or shortness of breath but states pain does start under the left breast.  Denies any exertional pain. Denies any cough or hemoptysis, loss of taste or smell. Review of Systems     Review of Systems   Constitutional: Negative for chills, diaphoresis, fatigue and fever. HENT: Negative for congestion, facial swelling, postnasal drip, rhinorrhea, sore throat and trouble swallowing. Eyes: Negative for visual disturbance. Respiratory: Negative for cough, chest tightness, shortness of breath and wheezing. Cardiovascular: Negative for chest pain, palpitations and leg swelling. Gastrointestinal: Positive for abdominal pain, blood in stool and diarrhea. Negative for nausea, rectal pain and vomiting. Genitourinary: Negative for dysuria, flank pain, hematuria and pelvic pain. Musculoskeletal: Negative for back pain, myalgias, neck pain and neck stiffness. Skin: Negative for rash. Neurological: Negative for dizziness, syncope, weakness, light-headedness, numbness and headaches. 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 She has a past surgical history that includes salpingectomy (Left, 2009); Appendectomy; Ankle surgery (Left); Breast reduction surgery; Knee arthroscopy (Right, 1/2015); Knee arthroscopy (Right, 7/2015); Total knee arthroplasty (Right, 9/2015); other surgical history (Left); Muscle biopsy (Left, 11/2017); other surgical history (Left, 02/26/2018); other surgical history (Left, 03/14/2018); other surgical history (Left, 03/22/2018); Liver Resection (N/A, 7/22/2019); Carpal tunnel release (Right, 07/2020); and Port Surgery (Right, 11/30/2020). Her family history includes Coronary Art Dis in her maternal uncle; Coronary Art Dis (age of onset: 36) in her maternal grandfather; Elevated Lipids in her father; Heart Failure (age of onset: 76) in her paternal grandfather; Hypertension in her brother and father. She reports that she has quit smoking. Her smoking use included cigarettes. She has a 7.50 pack-year smoking history. She has never used smokeless tobacco. She reports current alcohol use. She reports current drug use. Drug: Marijuana.     Medications     Previous Medications  HYDROmorphone (DILAUDID) injection 1 mg    lactated ringers infusion 1,000 mL    promethazine (PHENERGAN) injection 12.5 mg    iopamidol (ISOVUE-370) 76 % injection 80 mL    HYDROmorphone (DILAUDID) injection 1 mg    oxyCODONE-acetaminophen (PERCOCET) 5-325 MG per tablet     Sig: Take 1 tablet by mouth every 4 hours as needed for Pain for up to 3 days. Dispense:  12 tablet     Refill:  0       CONSULTS:  None    MEDICAL DECISION MAKING / ASSESSMENT / Julio Nery is a 41 y.o. female with history of Crohn's disease as well as sarcoma to the left leg presents to the emergency department with complaints of abdominal pain rating into her back consistent with her Crohn's. She does have some tenderness to the left upper quadrant on exam.  Her CBC shows normal white count of 6.0 with hemoglobin 13.8. BMP is within normal limits with glucose of 88 and creatinine 0.7. LFTs and lipase are normal.  Troponin less than 0.01. Lactic acid 0.9. Chest x-ray showed no acute disease. Patient given IV fluids as well as Dilaudid and Phenergan for pain and nausea. Urinalysis was negative and pregnancy test negative. CT of the abdomen and pelvis with IV contrast shows no acute intra abdominal abnormality. Patient has follow-up with Dr. Peter Ng of GI on Monday. Patient has had no vomiting here. She has had 2 doses of Dilaudid for pain. She was prescribed Percocet for the next few days until follow-up with GI. She can return if fevers, vomiting, abdominal pain or worsening symptoms. Patient stable for discharge. This patient was also evaluated by the attending physician. All care plans were discussed and agreed upon. Clinical Impression     1.  Abdominal pain, unspecified abdominal location        Disposition     PATIENT REFERRED TO:  Leighton Gaviria MD  [facility]  [address]    Schedule an appointment as soon as possible for a visit Sharee Webb MD  [facility] [id number]  [phone]      see as scheduled next week      DISCHARGE MEDICATIONS:  New Prescriptions    OXYCODONE-ACETAMINOPHEN (PERCOCET) 5-325 MG PER TABLET    Take 1 tablet by mouth every 4 hours as needed for Pain for up to 3 days.        DISPOSITION   discharged     [place], [place]  02/04/21 [address] [place], [place]  02/04/21 7057

## 2021-02-04 NOTE — ED PROVIDER NOTES
ED Attending Attestation Note     Date of evaluation: 2/4/2021    This patient was seen by the advance practice provider. I have seen and examined the patient, agree with the workup, evaluation, management and diagnosis. The care plan has been discussed. I have reviewed the ECG and concur with the KULDEEP's interpretation. My assessment reveals a 41year old female with a past medical history of crohn's disease, sarcoma of left lower extremity, and liver nodule who presents with left upper quadrant abdominal pain associated with diarrhea initially and no constipation. On my evaluation she has a regular rate and rhythm with clear lungs. She is uncomfortable appearing with reproducible tenderness in her left upper quadrant and underneath her left breast.  She reports her symptoms are consistent with her crohn's disease. She has no shortness of breath or tachycardia and I think this is unlikely to be PE. Will treat her pain and nausea and obtain laboratory studies and CT scan of her abdomen and pelvis. She has follow-up with GI on Monday. Armani Smith MD  02/04/21 4195      I met with Mrs. Jose L Herndon and explained her results to her. She has had a reassuring examination with no anemia or elevated WBC count. Occult blood is negative. UA shows no signs of infection. She has no electrolyte or hepatic abnormalities. Her lactate is normal.  She has a nonischemic EKG and a normal troponin. I have low suspicion for ACS or PE. Cross sectional imaging demonstrated no acute intra-abdominopelvic abnormality. I did explain to her that she should follow-up with her GI physician and she was treated for constipation with mirilax and colace as well as a prescription for percocet. Strict return precautions were provided.      Armani Smith MD  02/04/21 2337

## 2021-02-04 NOTE — ED NOTES
Pt complained of more pain, provided pt with second dose of dilaudid per order.       Pau Islas RN  02/04/21 8197

## 2021-02-04 NOTE — ED NOTES
Pt D/C ambulatory. Went over D/C instructions and medications with pt, pt verbalized understanding and all questions were answered. Pt refused repeat blood pressure. Pt very agitated upon discharge. This RN updated . PT states \"the 3 day percocet is only two days because she will take it at night\". Spoke with MD CASAREZ [facility].             Christine Rock RN  02/04/21 2137

## 2021-02-05 ENCOUNTER — TELEPHONE (OUTPATIENT)
Dept: FAMILY MEDICINE CLINIC | Age: 41
End: 2021-02-05

## 2021-02-05 NOTE — TELEPHONE ENCOUNTER
----- Message from [employer] sent at 2/5/2021 10:10 AM EST -----  Subject: Appointment Request    Reason for Call: Urgent (Patient Request) ED Follow Up Visit    QUESTIONS  Type of Appointment? Established Patient  Reason for appointment request? Available appointments did not meet   patient need  Additional Information for Provider? Pt was saw at [facility] 2/4 for extreme   abdominal pain and she said they couldn't tell her what was wrong. Offered   appointment for 2/8 but she would like to be seen today and only wants to   be seen by . ---------------------------------------------------------------------------  --------------  [place]  What is the best way for the office to contact you? OK to leave message on   voicemail  Preferred Call Back Phone Number? [phone]  ---------------------------------------------------------------------------  --------------  SCRIPT ANSWERS  Relationship to Patient? Self  Appointment reason? Well Care/Follow Ups  Select a Well Care/Follow Ups appointment reason? Adult ED Follow Up   [Emergency Room   Emergency Department]  (Patient requests to see provider urgently. )? Yes  Have you been diagnosed with   tested for   or told that you are suspected of having COVID-19 (Coronavirus)? No  Have you had a fever or taken medication to treat a fever within the past   3 days? No  Have you had a cough   shortness of breath or flu-like symptoms within the past 3 days? No  Do you currently have flu-like symptoms including fever or chills   cough   shortness of breath   or difficulty breathing   or new loss of taste or smell? No  (Service Expert  click yes below to proceed with [employer] As Usual   Scheduling)?  Yes

## 2021-02-05 NOTE — TELEPHONE ENCOUNTER
Please see message. Pt was offered an appt for Monday and declined. There are no appts available for today. Please advise. Thanks.

## 2021-02-05 NOTE — TELEPHONE ENCOUNTER
Please advise   Pt states that she was seen at [facility] on 2/4/2021 she states that she needs pain medication ASAP  I offered the earliest apt (2/8/2021) she states that she needs to be seen today   Is there anything that can be done?   Thank you

## 2021-02-09 ENCOUNTER — OFFICE VISIT (OUTPATIENT)
Dept: FAMILY MEDICINE CLINIC | Age: 41
End: 2021-02-09
Payer: COMMERCIAL

## 2021-02-09 DIAGNOSIS — R10.84 GENERALIZED ABDOMINAL PAIN: ICD-10-CM

## 2021-02-09 DIAGNOSIS — M54.9 MID BACK PAIN: ICD-10-CM

## 2021-02-09 DIAGNOSIS — K50.812 CROHN'S DISEASE OF BOTH SMALL AND LARGE INTESTINE WITH INTESTINAL OBSTRUCTION (HCC): ICD-10-CM

## 2021-02-09 DIAGNOSIS — C49.22 SARCOMA OF LEFT LOWER EXTREMITY (HCC): ICD-10-CM

## 2021-02-09 DIAGNOSIS — S22.060A CLOSED WEDGE COMPRESSION FRACTURE OF T7 VERTEBRA, INITIAL ENCOUNTER (HCC): Primary | ICD-10-CM

## 2021-02-09 PROCEDURE — 99214 OFFICE O/P EST MOD 30 MIN: CPT | Performed by: FAMILY MEDICINE

## 2021-02-09 RX ORDER — HYDROCODONE BITARTRATE AND ACETAMINOPHEN 5; 325 MG/1; MG/1
1 TABLET ORAL EVERY 6 HOURS PRN
Qty: 30 TABLET | Refills: 0 | Status: ON HOLD | OUTPATIENT
Start: 2021-02-09 | End: 2021-02-19 | Stop reason: HOSPADM

## 2021-02-09 NOTE — PROGRESS NOTES
Here for f/u and recheck of chronic abd pain, IBD, sarcoma    Pt states that things are doing ok. Pt was in ER a few days ago due to some acute flare of abd pain. Pt states her flare of crohns sx started with change in diet, for several months was on keto. Pt states sx improved with lifestyle. Pt had less GI sx and more joint-based sx which then progressed to more back pain-type sx. Sx were mild and intermittent, but with the beginning of this year, did adjust her diet to the Noom diet, and feels that it could have triggered symptoms. Pain is in L upper flank area and les in abdomen, but did move to LUQ of abdomen. Pain is constant and seems to be across back and across abdomen and now is bilateral. At times, will get random times where sx are improved, but that is not common and now is constant. Pain can be severe, pt went to ER for evaluation and did have CT of abdomen, CXR, bloodwork and urine. Everything including bloodwork for liver, pancreas was normal.      Pt working with dr. Hernandez Santos for IBD, and they did want to discuss biologics but pt did not want to pursue at that time, due to concern for side effects. Pt did try medicinal MJ which did not help. Pt did have opportunity to se Dr. Aneesh Faye yesterday for 2nd opinion. Pt will be getting a capsule endoscopy and will have EGD. Pt does have chronic back pain issues in midback and has tried some range of motion exercises. Prior imaging shows + wedge deformity of T 7, no acute findings      Except as noted above in the history of present illness, the review of systems is  negative for headache, vision changes, chest pain, shortness of breath, abdominal pain, urinary sx, bowel changes. Past medical, surgical, and social history reviewed and updated  Medications and allergies reviewed and updated         O: There were no vitals taken for this visit. GEN: No acute distress, cooperative, well nourished, alert. HEENT: PEERLA, EOMI , normocephalic/atraumatic, nares and oropharynx clear. Mucous membranes normal, Tympanic membranes clear bilaterally. Neck: soft, supple, no thyromegaly, mass, no Lymphadenopathy  CV: Regular rate and rhythm, no murmur, rubs, gallops. No edema. Resp: Clear to auscultation bilaterally good air entry bilaterally  No crackles, wheeze. Breathing comfortably. Abd: soft, mild epigastric tender to palpation, normoactive bowels sounds. No hepatosplenomegaly  No RUQ tender to palpation. No mass, lesions. Neg homans  Musc: full range of motion bilateral upper extremities, bilateral lower extremities      Current Outpatient Medications   Medication Sig Dispense Refill    HYDROcodone-acetaminophen (NORCO) 5-325 MG per tablet Take 1 tablet by mouth every 6 hours as needed for Pain for up to 30 days. 30 tablet 0    docusate sodium (COLACE) 100 MG capsule Take 1 capsule by mouth 2 times daily 30 capsule 0    polyethylene glycol (GLYCOLAX) 17 GM/SCOOP powder Take 17 g by mouth 2 times daily 510 g 0    Turmeric (QC TUMERIC COMPLEX) 500 MG CAPS Take 500 mg by mouth daily      budesonide (ENTOCORT EC) 3 MG extended release capsule       lidocaine-prilocaine (EMLA) 2.5-2.5 % cream Apply a dot of cream to top of port and cover with plastic wrap 30-60 minutes before needle placed. No current facility-administered medications for this visit. ASSESSMENT / PLAN:    1. Closed wedge compression fracture of T7 vertebra, initial encounter ([facility])  Persistent pain sx, reviewed prior imaging with T7 vert wedge deformity  Discussed tx options. Check MRI thoracic spine d/t peristent sx, lack of benefit with range of motion exercises  Management pending results. Short course norco as below  See [facility]  - MRI THORACIC SPINE WO CONTRAST;  Future - HYDROcodone-acetaminophen (NORCO) 5-325 MG per tablet; Take 1 tablet by mouth every 6 hours as needed for Pain for up to 30 days. Dispense: 30 tablet; Refill: 0    2. Mid back pain  As above  - MRI THORACIC SPINE WO CONTRAST; Future  - HYDROcodone-acetaminophen (NORCO) 5-325 MG per tablet; Take 1 tablet by mouth every 6 hours as needed for Pain for up to 30 days. Dispense: 30 tablet; Refill: 0    3. Crohn's disease of both small and large intestine with intestinal obstruction ([address]  Cont f/u with GI  Not on biologics at this time, has appt for capsule endoscopy and EGD  Management pending results. 4. Generalized abdominal pain  As above    5. Sarcoma of left lower extremity ([address]  Asymptomatic  Reviewed recent imaging  F/u with heme/onc           Follow-up appointment:   Pending MRI results/prn    Discussed use, benefit, and side effects of all prescribed medications. Barriers to medication compliance addressed. All patient questions answered. Pt voiced understanding. When applicable, patient's outside records were reviewed through [facility]. The patient has signed appropriate paperworks/consents.

## 2021-02-15 ENCOUNTER — APPOINTMENT (OUTPATIENT)
Dept: GENERAL RADIOLOGY | Age: 41
DRG: 457 | End: 2021-02-15
Attending: INTERNAL MEDICINE
Payer: COMMERCIAL

## 2021-02-15 ENCOUNTER — HOSPITAL ENCOUNTER (OUTPATIENT)
Dept: MRI IMAGING | Age: 41
Discharge: HOME | DRG: 457 | End: 2021-02-15
Payer: COMMERCIAL

## 2021-02-15 ENCOUNTER — HOSPITAL ENCOUNTER (INPATIENT)
Age: 41
LOS: 6 days | Discharge: HOME | DRG: 457 | End: 2021-02-21
Attending: INTERNAL MEDICINE | Admitting: INTERNAL MEDICINE
Payer: COMMERCIAL

## 2021-02-15 ENCOUNTER — TELEPHONE (OUTPATIENT)
Dept: FAMILY MEDICINE CLINIC | Age: 41
End: 2021-02-15

## 2021-02-15 DIAGNOSIS — S22.060A CLOSED WEDGE COMPRESSION FRACTURE OF T7 VERTEBRA, INITIAL ENCOUNTER (HCC): ICD-10-CM

## 2021-02-15 DIAGNOSIS — M54.9 MID BACK PAIN: ICD-10-CM

## 2021-02-15 DIAGNOSIS — Z98.1 STATUS POST THORACIC SPINAL FUSION: Primary | ICD-10-CM

## 2021-02-15 PROBLEM — C49.9 SARCOMA (HCC): Status: ACTIVE | Noted: 2021-02-15

## 2021-02-15 LAB
ANION GAP SERPL CALCULATED.3IONS-SCNC: 10 MMOL/L (ref 3–16)
BUN BLDV-MCNC: 10 MG/DL (ref 7–20)
CALCIUM SERPL-MCNC: 9.8 MG/DL (ref 8.3–10.6)
CHLORIDE BLD-SCNC: 100 MMOL/L (ref 99–110)
CO2: 26 MMOL/L (ref 21–32)
CREAT SERPL-MCNC: 0.7 MG/DL (ref 0.6–1.1)
GFR AFRICAN AMERICAN: >60
GFR NON-AFRICAN AMERICAN: >60
GLUCOSE BLD-MCNC: 100 MG/DL (ref 70–99)
HCT VFR BLD CALC: 40 % (ref 36–48)
HEMOGLOBIN: 13 G/DL (ref 12–16)
MCH RBC QN AUTO: 31.5 PG (ref 26–34)
MCHC RBC AUTO-ENTMCNC: 32.6 G/DL (ref 31–36)
MCV RBC AUTO: 96.5 FL (ref 80–100)
PDW BLD-RTO: 14.2 % (ref 12.4–15.4)
PLATELET # BLD: 223 K/UL (ref 135–450)
PMV BLD AUTO: 7.9 FL (ref 5–10.5)
POTASSIUM REFLEX MAGNESIUM: 3.6 MMOL/L (ref 3.5–5.1)
RBC # BLD: 4.15 M/UL (ref 4–5.2)
SODIUM BLD-SCNC: 136 MMOL/L (ref 136–145)
WBC # BLD: 6.3 K/UL (ref 4–11)

## 2021-02-15 PROCEDURE — 2580000003 HC RX 258: Performed by: INTERNAL MEDICINE

## 2021-02-15 PROCEDURE — 6360000002 HC RX W HCPCS: Performed by: INTERNAL MEDICINE

## 2021-02-15 PROCEDURE — 36415 COLL VENOUS BLD VENIPUNCTURE: CPT

## 2021-02-15 PROCEDURE — 72146 MRI CHEST SPINE W/O DYE: CPT

## 2021-02-15 PROCEDURE — 85027 COMPLETE CBC AUTOMATED: CPT

## 2021-02-15 PROCEDURE — 80048 BASIC METABOLIC PNL TOTAL CA: CPT

## 2021-02-15 PROCEDURE — 6370000000 HC RX 637 (ALT 250 FOR IP): Performed by: INTERNAL MEDICINE

## 2021-02-15 PROCEDURE — 1200000000 HC SEMI PRIVATE

## 2021-02-15 PROCEDURE — 71045 X-RAY EXAM CHEST 1 VIEW: CPT

## 2021-02-15 RX ORDER — ACETAMINOPHEN 650 MG/1
650 SUPPOSITORY RECTAL EVERY 6 HOURS PRN
Status: DISCONTINUED | OUTPATIENT
Start: 2021-02-15 | End: 2021-02-16

## 2021-02-15 RX ORDER — BUDESONIDE 3 MG/1
3 CAPSULE, COATED PELLETS ORAL DAILY
Status: DISCONTINUED | OUTPATIENT
Start: 2021-02-16 | End: 2021-02-21 | Stop reason: HOSPADM

## 2021-02-15 RX ORDER — SIMETHICONE 80 MG
80 TABLET,CHEWABLE ORAL EVERY 6 HOURS PRN
Status: DISCONTINUED | OUTPATIENT
Start: 2021-02-15 | End: 2021-02-21 | Stop reason: HOSPADM

## 2021-02-15 RX ORDER — MAGNESIUM SULFATE IN WATER 40 MG/ML
2000 INJECTION, SOLUTION INTRAVENOUS PRN
Status: DISCONTINUED | OUTPATIENT
Start: 2021-02-15 | End: 2021-02-21 | Stop reason: HOSPADM

## 2021-02-15 RX ORDER — HYDROCODONE BITARTRATE AND ACETAMINOPHEN 5; 325 MG/1; MG/1
1 TABLET ORAL EVERY 6 HOURS PRN
Status: DISCONTINUED | OUTPATIENT
Start: 2021-02-15 | End: 2021-02-16

## 2021-02-15 RX ORDER — POTASSIUM CHLORIDE 7.45 MG/ML
10 INJECTION INTRAVENOUS PRN
Status: DISCONTINUED | OUTPATIENT
Start: 2021-02-15 | End: 2021-02-21 | Stop reason: HOSPADM

## 2021-02-15 RX ORDER — DOCUSATE SODIUM 100 MG/1
100 CAPSULE, LIQUID FILLED ORAL 2 TIMES DAILY
Status: DISCONTINUED | OUTPATIENT
Start: 2021-02-15 | End: 2021-02-17

## 2021-02-15 RX ORDER — MORPHINE SULFATE 2 MG/ML
1 INJECTION, SOLUTION INTRAMUSCULAR; INTRAVENOUS EVERY 4 HOURS PRN
Status: DISCONTINUED | OUTPATIENT
Start: 2021-02-15 | End: 2021-02-16

## 2021-02-15 RX ORDER — PROMETHAZINE HYDROCHLORIDE 25 MG/ML
6.25 INJECTION, SOLUTION INTRAMUSCULAR; INTRAVENOUS EVERY 6 HOURS PRN
Status: DISCONTINUED | OUTPATIENT
Start: 2021-02-15 | End: 2021-02-21 | Stop reason: HOSPADM

## 2021-02-15 RX ORDER — SODIUM CHLORIDE 0.9 % (FLUSH) 0.9 %
10 SYRINGE (ML) INJECTION PRN
Status: DISCONTINUED | OUTPATIENT
Start: 2021-02-15 | End: 2021-02-18

## 2021-02-15 RX ORDER — ALPRAZOLAM 0.25 MG/1
0.25 TABLET ORAL NIGHTLY PRN
Status: DISCONTINUED | OUTPATIENT
Start: 2021-02-15 | End: 2021-02-21 | Stop reason: HOSPADM

## 2021-02-15 RX ORDER — ACETAMINOPHEN 325 MG/1
650 TABLET ORAL EVERY 6 HOURS PRN
Status: DISCONTINUED | OUTPATIENT
Start: 2021-02-15 | End: 2021-02-16

## 2021-02-15 RX ORDER — SODIUM CHLORIDE 0.9 % (FLUSH) 0.9 %
10 SYRINGE (ML) INJECTION EVERY 12 HOURS SCHEDULED
Status: DISCONTINUED | OUTPATIENT
Start: 2021-02-15 | End: 2021-02-18

## 2021-02-15 RX ORDER — POLYETHYLENE GLYCOL 3350 17 G/17G
17 POWDER, FOR SOLUTION ORAL 2 TIMES DAILY
Status: DISCONTINUED | OUTPATIENT
Start: 2021-02-15 | End: 2021-02-21 | Stop reason: HOSPADM

## 2021-02-15 RX ADMIN — HYDROMORPHONE HYDROCHLORIDE 0.5 MG: 1 INJECTION, SOLUTION INTRAMUSCULAR; INTRAVENOUS; SUBCUTANEOUS at 17:31

## 2021-02-15 RX ADMIN — PROMETHAZINE HYDROCHLORIDE 6.25 MG: 25 INJECTION INTRAMUSCULAR; INTRAVENOUS at 20:18

## 2021-02-15 RX ADMIN — ALPRAZOLAM 0.25 MG: 0.25 TABLET ORAL at 20:18

## 2021-02-15 RX ADMIN — HYDROMORPHONE HYDROCHLORIDE 0.5 MG: 1 INJECTION, SOLUTION INTRAMUSCULAR; INTRAVENOUS; SUBCUTANEOUS at 22:21

## 2021-02-15 RX ADMIN — HYDROCODONE BITARTRATE AND ACETAMINOPHEN 1 TABLET: 5; 325 TABLET ORAL at 23:24

## 2021-02-15 RX ADMIN — Medication 10 ML: at 20:05

## 2021-02-15 RX ADMIN — MORPHINE SULFATE 1 MG: 2 INJECTION, SOLUTION INTRAMUSCULAR; INTRAVENOUS at 20:01

## 2021-02-15 RX ADMIN — SIMETHICONE 80 MG: 80 TABLET, CHEWABLE ORAL at 23:25

## 2021-02-15 RX ADMIN — ENOXAPARIN SODIUM 40 MG: 40 INJECTION SUBCUTANEOUS at 20:04

## 2021-02-15 ASSESSMENT — PAIN DESCRIPTION - FREQUENCY
FREQUENCY: CONTINUOUS

## 2021-02-15 ASSESSMENT — PAIN SCALES - GENERAL
PAINLEVEL_OUTOF10: 7
PAINLEVEL_OUTOF10: 10
PAINLEVEL_OUTOF10: 3
PAINLEVEL_OUTOF10: 10
PAINLEVEL_OUTOF10: 7

## 2021-02-15 ASSESSMENT — PAIN DESCRIPTION - ORIENTATION
ORIENTATION: RIGHT;LEFT;MID
ORIENTATION: MID;LOWER
ORIENTATION: LOWER;RIGHT;LEFT

## 2021-02-15 ASSESSMENT — PAIN DESCRIPTION - DESCRIPTORS
DESCRIPTORS: CONSTANT
DESCRIPTORS: CONSTANT
DESCRIPTORS: CONSTANT;CRAMPING;SHARP

## 2021-02-15 ASSESSMENT — PAIN DESCRIPTION - LOCATION
LOCATION: BACK
LOCATION: BACK;ABDOMEN
LOCATION: BACK;ABDOMEN

## 2021-02-15 ASSESSMENT — PAIN DESCRIPTION - PAIN TYPE
TYPE: CHRONIC PAIN
TYPE: CHRONIC PAIN

## 2021-02-15 NOTE — TELEPHONE ENCOUNTER
Dr. Valeria Morrell from [facility] calling today in regards to patient. Dr. Valeria Morrell states the scan shows a tumor at T7, T12. The  Tumor at T7 surrounds and compresses the spinal cord. The report will be sent over.

## 2021-02-15 NOTE — PROGRESS NOTES
pt has arrived to the floor. pt is crying in pain and on the verge of hyperventilating. Perfect serve message sent to Dr. Viviane Das. Awaiting response.  Electronically signed by Denisa Vicente RN on 2/15/2021 at 5:09 PM

## 2021-02-16 ENCOUNTER — APPOINTMENT (OUTPATIENT)
Dept: VASCULAR LAB | Age: 41
DRG: 457 | End: 2021-02-16
Attending: INTERNAL MEDICINE
Payer: COMMERCIAL

## 2021-02-16 ENCOUNTER — ANESTHESIA EVENT (OUTPATIENT)
Dept: OPERATING ROOM | Age: 41
DRG: 457 | End: 2021-02-16
Payer: COMMERCIAL

## 2021-02-16 LAB
ANION GAP SERPL CALCULATED.3IONS-SCNC: 9 MMOL/L (ref 3–16)
APTT: 35.3 SEC (ref 24.2–36.2)
BUN BLDV-MCNC: 10 MG/DL (ref 7–20)
CALCIUM SERPL-MCNC: 9.4 MG/DL (ref 8.3–10.6)
CHLORIDE BLD-SCNC: 104 MMOL/L (ref 99–110)
CO2: 27 MMOL/L (ref 21–32)
CREAT SERPL-MCNC: 0.6 MG/DL (ref 0.6–1.1)
GFR AFRICAN AMERICAN: >60
GFR NON-AFRICAN AMERICAN: >60
GLUCOSE BLD-MCNC: 83 MG/DL (ref 70–99)
HCG(URINE) PREGNANCY TEST: NEGATIVE
HCT VFR BLD CALC: 35.8 % (ref 36–48)
HEMOGLOBIN: 11.8 G/DL (ref 12–16)
INR BLD: 1 (ref 0.86–1.14)
MCH RBC QN AUTO: 31.6 PG (ref 26–34)
MCHC RBC AUTO-ENTMCNC: 33.1 G/DL (ref 31–36)
MCV RBC AUTO: 95.6 FL (ref 80–100)
PDW BLD-RTO: 14 % (ref 12.4–15.4)
PLATELET # BLD: 188 K/UL (ref 135–450)
PMV BLD AUTO: 8 FL (ref 5–10.5)
POTASSIUM REFLEX MAGNESIUM: 3.9 MMOL/L (ref 3.5–5.1)
PROTHROMBIN TIME: 11.6 SEC (ref 10–13.2)
RBC # BLD: 3.74 M/UL (ref 4–5.2)
SARS-COV-2, NAAT: NOT DETECTED
SODIUM BLD-SCNC: 140 MMOL/L (ref 136–145)
WBC # BLD: 4.8 K/UL (ref 4–11)

## 2021-02-16 PROCEDURE — 6360000002 HC RX W HCPCS: Performed by: INTERNAL MEDICINE

## 2021-02-16 PROCEDURE — 84703 CHORIONIC GONADOTROPIN ASSAY: CPT

## 2021-02-16 PROCEDURE — 36415 COLL VENOUS BLD VENIPUNCTURE: CPT

## 2021-02-16 PROCEDURE — 1200000000 HC SEMI PRIVATE

## 2021-02-16 PROCEDURE — 93970 EXTREMITY STUDY: CPT

## 2021-02-16 PROCEDURE — 6360000002 HC RX W HCPCS: Performed by: STUDENT IN AN ORGANIZED HEALTH CARE EDUCATION/TRAINING PROGRAM

## 2021-02-16 PROCEDURE — 87635 SARS-COV-2 COVID-19 AMP PRB: CPT

## 2021-02-16 PROCEDURE — 85610 PROTHROMBIN TIME: CPT

## 2021-02-16 PROCEDURE — 85730 THROMBOPLASTIN TIME PARTIAL: CPT

## 2021-02-16 PROCEDURE — 6370000000 HC RX 637 (ALT 250 FOR IP): Performed by: INTERNAL MEDICINE

## 2021-02-16 PROCEDURE — 2580000003 HC RX 258: Performed by: INTERNAL MEDICINE

## 2021-02-16 PROCEDURE — 80048 BASIC METABOLIC PNL TOTAL CA: CPT

## 2021-02-16 PROCEDURE — 6370000000 HC RX 637 (ALT 250 FOR IP): Performed by: STUDENT IN AN ORGANIZED HEALTH CARE EDUCATION/TRAINING PROGRAM

## 2021-02-16 PROCEDURE — 85027 COMPLETE CBC AUTOMATED: CPT

## 2021-02-16 PROCEDURE — 6370000000 HC RX 637 (ALT 250 FOR IP): Performed by: NURSE PRACTITIONER

## 2021-02-16 PROCEDURE — 6360000002 HC RX W HCPCS: Performed by: NURSE PRACTITIONER

## 2021-02-16 PROCEDURE — 2580000003 HC RX 258: Performed by: NURSE PRACTITIONER

## 2021-02-16 RX ORDER — CEFAZOLIN SODIUM 2 G/50ML
2000 SOLUTION INTRAVENOUS
Status: DISCONTINUED | OUTPATIENT
Start: 2021-02-17 | End: 2021-02-17 | Stop reason: HOSPADM

## 2021-02-16 RX ORDER — DIAZEPAM 5 MG/1
5 TABLET ORAL EVERY 6 HOURS PRN
Status: DISCONTINUED | OUTPATIENT
Start: 2021-02-16 | End: 2021-02-17

## 2021-02-16 RX ORDER — HYDROCODONE BITARTRATE AND ACETAMINOPHEN 5; 325 MG/1; MG/1
1 TABLET ORAL EVERY 4 HOURS PRN
Status: DISCONTINUED | OUTPATIENT
Start: 2021-02-16 | End: 2021-02-16

## 2021-02-16 RX ORDER — ACETAMINOPHEN 500 MG
1000 TABLET ORAL EVERY 6 HOURS SCHEDULED
Status: DISCONTINUED | OUTPATIENT
Start: 2021-02-16 | End: 2021-02-21 | Stop reason: HOSPADM

## 2021-02-16 RX ORDER — LIDOCAINE 4 G/G
1 PATCH TOPICAL DAILY
Status: DISCONTINUED | OUTPATIENT
Start: 2021-02-16 | End: 2021-02-18

## 2021-02-16 RX ORDER — OXYCODONE HYDROCHLORIDE 5 MG/1
10 TABLET ORAL EVERY 4 HOURS PRN
Status: DISCONTINUED | OUTPATIENT
Start: 2021-02-16 | End: 2021-02-17

## 2021-02-16 RX ORDER — METHOCARBAMOL 750 MG/1
750 TABLET, FILM COATED ORAL EVERY 6 HOURS SCHEDULED
Status: DISCONTINUED | OUTPATIENT
Start: 2021-02-17 | End: 2021-02-17

## 2021-02-16 RX ORDER — NALOXONE HYDROCHLORIDE 0.4 MG/ML
0.4 INJECTION, SOLUTION INTRAMUSCULAR; INTRAVENOUS; SUBCUTANEOUS PRN
Status: DISCONTINUED | OUTPATIENT
Start: 2021-02-16 | End: 2021-02-21 | Stop reason: HOSPADM

## 2021-02-16 RX ORDER — SODIUM CHLORIDE 9 MG/ML
INJECTION, SOLUTION INTRAVENOUS CONTINUOUS
Status: DISCONTINUED | OUTPATIENT
Start: 2021-02-17 | End: 2021-02-18

## 2021-02-16 RX ORDER — DEXAMETHASONE SODIUM PHOSPHATE 4 MG/ML
6 INJECTION, SOLUTION INTRA-ARTICULAR; INTRALESIONAL; INTRAMUSCULAR; INTRAVENOUS; SOFT TISSUE EVERY 8 HOURS
Status: DISCONTINUED | OUTPATIENT
Start: 2021-02-16 | End: 2021-02-19

## 2021-02-16 RX ORDER — OXYCODONE HYDROCHLORIDE 5 MG/1
5 TABLET ORAL EVERY 4 HOURS PRN
Status: DISCONTINUED | OUTPATIENT
Start: 2021-02-16 | End: 2021-02-17

## 2021-02-16 RX ADMIN — ACETAMINOPHEN 1000 MG: 500 TABLET ORAL at 17:25

## 2021-02-16 RX ADMIN — METHOCARBAMOL 1000 MG: 100 INJECTION, SOLUTION INTRAMUSCULAR; INTRAVENOUS at 13:10

## 2021-02-16 RX ADMIN — HYDROMORPHONE HYDROCHLORIDE 0.5 MG: 1 INJECTION, SOLUTION INTRAMUSCULAR; INTRAVENOUS; SUBCUTANEOUS at 04:03

## 2021-02-16 RX ADMIN — POLYETHYLENE GLYCOL 3350 17 G: 17 POWDER, FOR SOLUTION ORAL at 08:14

## 2021-02-16 RX ADMIN — OXYCODONE 10 MG: 5 TABLET ORAL at 23:36

## 2021-02-16 RX ADMIN — SODIUM CHLORIDE: 9 INJECTION, SOLUTION INTRAVENOUS at 23:37

## 2021-02-16 RX ADMIN — HYDROMORPHONE HYDROCHLORIDE 1 MG: 1 INJECTION, SOLUTION INTRAMUSCULAR; INTRAVENOUS; SUBCUTANEOUS at 11:23

## 2021-02-16 RX ADMIN — OXYCODONE 10 MG: 5 TABLET ORAL at 13:13

## 2021-02-16 RX ADMIN — ENOXAPARIN SODIUM 40 MG: 40 INJECTION SUBCUTANEOUS at 08:14

## 2021-02-16 RX ADMIN — OXYCODONE 10 MG: 5 TABLET ORAL at 17:25

## 2021-02-16 RX ADMIN — HYDROMORPHONE HYDROCHLORIDE 1 MG: 1 INJECTION, SOLUTION INTRAMUSCULAR; INTRAVENOUS; SUBCUTANEOUS at 08:11

## 2021-02-16 RX ADMIN — MORPHINE SULFATE 1 MG: 2 INJECTION, SOLUTION INTRAMUSCULAR; INTRAVENOUS at 06:57

## 2021-02-16 RX ADMIN — HYDROCODONE BITARTRATE AND ACETAMINOPHEN 1 TABLET: 5; 325 TABLET ORAL at 09:40

## 2021-02-16 RX ADMIN — MORPHINE SULFATE 1 MG: 2 INJECTION, SOLUTION INTRAMUSCULAR; INTRAVENOUS at 02:52

## 2021-02-16 RX ADMIN — METHOCARBAMOL 1000 MG: 100 INJECTION, SOLUTION INTRAMUSCULAR; INTRAVENOUS at 20:22

## 2021-02-16 RX ADMIN — Medication 10 ML: at 08:14

## 2021-02-16 RX ADMIN — HYDROCODONE BITARTRATE AND ACETAMINOPHEN 1 TABLET: 5; 325 TABLET ORAL at 05:28

## 2021-02-16 RX ADMIN — DOCUSATE SODIUM 100 MG: 100 CAPSULE, LIQUID FILLED ORAL at 20:22

## 2021-02-16 RX ADMIN — Medication 10 ML: at 19:28

## 2021-02-16 RX ADMIN — HYDROMORPHONE HYDROCHLORIDE 1 MG: 1 INJECTION, SOLUTION INTRAMUSCULAR; INTRAVENOUS; SUBCUTANEOUS at 19:28

## 2021-02-16 RX ADMIN — ACETAMINOPHEN 1000 MG: 500 TABLET ORAL at 23:36

## 2021-02-16 RX ADMIN — ACETAMINOPHEN 1000 MG: 500 TABLET ORAL at 13:11

## 2021-02-16 RX ADMIN — ALPRAZOLAM 0.25 MG: 0.25 TABLET ORAL at 23:48

## 2021-02-16 RX ADMIN — HYDROMORPHONE HYDROCHLORIDE 1 MG: 1 INJECTION, SOLUTION INTRAMUSCULAR; INTRAVENOUS; SUBCUTANEOUS at 22:40

## 2021-02-16 RX ADMIN — DOCUSATE SODIUM 100 MG: 100 CAPSULE, LIQUID FILLED ORAL at 08:14

## 2021-02-16 RX ADMIN — POLYETHYLENE GLYCOL 3350 17 G: 17 POWDER, FOR SOLUTION ORAL at 20:22

## 2021-02-16 RX ADMIN — DEXAMETHASONE SODIUM PHOSPHATE 6 MG: 4 INJECTION, SOLUTION INTRA-ARTICULAR; INTRALESIONAL; INTRAMUSCULAR; INTRAVENOUS; SOFT TISSUE at 08:25

## 2021-02-16 RX ADMIN — SIMETHICONE 80 MG: 80 TABLET, CHEWABLE ORAL at 07:00

## 2021-02-16 RX ADMIN — HYDROMORPHONE HYDROCHLORIDE 1 MG: 1 INJECTION, SOLUTION INTRAMUSCULAR; INTRAVENOUS; SUBCUTANEOUS at 15:24

## 2021-02-16 RX ADMIN — BUDESONIDE 3 MG: 3 CAPSULE, GELATIN COATED ORAL at 08:25

## 2021-02-16 RX ADMIN — DEXAMETHASONE SODIUM PHOSPHATE 6 MG: 4 INJECTION, SOLUTION INTRA-ARTICULAR; INTRALESIONAL; INTRAMUSCULAR; INTRAVENOUS; SOFT TISSUE at 17:24

## 2021-02-16 ASSESSMENT — PAIN DESCRIPTION - PAIN TYPE
TYPE: CHRONIC PAIN
TYPE: ACUTE PAIN
TYPE: CHRONIC PAIN

## 2021-02-16 ASSESSMENT — PAIN SCALES - GENERAL
PAINLEVEL_OUTOF10: 8
PAINLEVEL_OUTOF10: 7
PAINLEVEL_OUTOF10: 9
PAINLEVEL_OUTOF10: 0
PAINLEVEL_OUTOF10: 8
PAINLEVEL_OUTOF10: 7
PAINLEVEL_OUTOF10: 5
PAINLEVEL_OUTOF10: 7
PAINLEVEL_OUTOF10: 8

## 2021-02-16 ASSESSMENT — PAIN DESCRIPTION - ONSET
ONSET: ON-GOING

## 2021-02-16 ASSESSMENT — ENCOUNTER SYMPTOMS
NAUSEA: 0
SHORTNESS OF BREATH: 0
COUGH: 0
ABDOMINAL PAIN: 1
VOMITING: 0
DIARRHEA: 0

## 2021-02-16 ASSESSMENT — PAIN DESCRIPTION - LOCATION
LOCATION: BACK
LOCATION: ABDOMEN;BACK
LOCATION: ABDOMEN;BACK

## 2021-02-16 ASSESSMENT — PAIN DESCRIPTION - DESCRIPTORS
DESCRIPTORS: ACHING;CONSTANT
DESCRIPTORS: CONSTANT
DESCRIPTORS: ACHING
DESCRIPTORS: CONSTANT
DESCRIPTORS: STABBING;TENDER
DESCRIPTORS: CONSTANT;CRAMPING;SHARP
DESCRIPTORS: ACHING
DESCRIPTORS: ACHING;CONSTANT
DESCRIPTORS: ACHING;CONSTANT
DESCRIPTORS: ACHING;CONSTANT;CRAMPING;DISCOMFORT
DESCRIPTORS: ACHING;CONSTANT

## 2021-02-16 ASSESSMENT — PAIN - FUNCTIONAL ASSESSMENT
PAIN_FUNCTIONAL_ASSESSMENT: PREVENTS OR INTERFERES SOME ACTIVE ACTIVITIES AND ADLS
PAIN_FUNCTIONAL_ASSESSMENT: ACTIVITIES ARE NOT PREVENTED
PAIN_FUNCTIONAL_ASSESSMENT: PREVENTS OR INTERFERES SOME ACTIVE ACTIVITIES AND ADLS

## 2021-02-16 ASSESSMENT — PAIN DESCRIPTION - ORIENTATION
ORIENTATION: LOWER
ORIENTATION: MID
ORIENTATION: LOWER
ORIENTATION: MID
ORIENTATION: LOWER
ORIENTATION: LOWER

## 2021-02-16 ASSESSMENT — PAIN DESCRIPTION - FREQUENCY
FREQUENCY: CONTINUOUS

## 2021-02-16 ASSESSMENT — PAIN DESCRIPTION - PROGRESSION
CLINICAL_PROGRESSION: NOT CHANGED

## 2021-02-16 NOTE — PROGRESS NOTES
Physical Therapy/Occupational Therapy  Discharge note    Referral received, chart reviewed. Per RN, pt is up ad stacey. Limited by abdominal / back pain. Will sign off from  acute PT and OT at this time. Please re-refer if pt condition warrants.        Brandon Viera, PT  Vinicius See OTR/L  [id number]

## 2021-02-16 NOTE — PROGRESS NOTES
Neurosurgery NP at bedside, discussing care with patient. Family on phone during conversation. Possible surgery tomorrow, stated from team. Ongoing pain management.  Will cont to monitor

## 2021-02-16 NOTE — H&P
Hospital Medicine History & Physical      PCP: Mirtha Adames MD    Date of Admission: 2/15/2021    Chief Complaint: Unbearable pain, cancer metastasis    History Of Present Illness:  Patient is a 52-year-old female with past medical history of Crohn's disease, sarcoma of left lower extremity, closed which helped compression fracture T7 vertebrae who presents to the hospital from PCP office for uncontrolled pain in lower back. According to the patient she was in remission for her sarcoma but recently she had radiologic imaging with MRI of her abdomen and was found to be positive for metastasis in her abdomen. Patient mentions her pain is excruciating 10/10, is unbearable, her pain is uncontrolled on home [place].  Patient is specifically asking for Xanax and Dilaudid during time of my evaluation. Patient denies difficulty urination, difficulty having bowel movements, also denies chest pain shortness of breath.       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     Crohn's disease ([address]     Depression     Early menopause     follows with endocrine/gyne    IBS (irritable bowel syndrome)     IBS (irritable bowel syndrome)     Mixed hyperlipidemia 6/22/2017    Obesity     Osteoarthritis, knee     PONV (postoperative nausea and vomiting)     Prolonged emergence from general anesthesia     panic attacks on awakening, requests anxiety med on awakening    Sarcoma of left lower extremity ([address] 11/24/2017    s/p XRT, followed by surgery       Past Surgical History:          Procedure Laterality Date    ANKLE SURGERY Left     APPENDECTOMY      BREAST REDUCTION SURGERY      CARPAL TUNNEL RELEASE Right 07/2020    KNEE ARTHROSCOPY Right 1/2015    KNEE ARTHROSCOPY Right 7/2015    LIVER RESECTION N/A 7/22/2019    ROBOTIC ASSISTED PARTIAL LIVER RESECTION AND ABDOMINAL MASS RESECTION performed by Kendell Mc MD at [address] [place] [address] Left 11/2017    for sarcoma    OTHER SURGICAL HISTORY Left     biopsy of left thigh mass    OTHER SURGICAL HISTORY Left 02/26/2018    WIDE EXCISION LEFT THIGH SARCOMA          OTHER SURGICAL HISTORY Left 03/14/2018    INCISION AND DRAINAGE OF LEFT THIGH       OTHER SURGICAL HISTORY Left 03/22/2018    incision and drainage left posterior thigh    PORT SURGERY Right 11/30/2020    PORT REMOVAL performed by Mariana Lema MD at [address] SALPINGECTOMY Left 2009    d/t scar tissue    TOTAL KNEE ARTHROPLASTY Right 9/2015       Medications Prior to Admission:      Prior to Admission medications    Medication Sig Start Date End Date Taking? Authorizing Provider   HYDROcodone-acetaminophen (NORCO) 5-325 MG per tablet Take 1 tablet by mouth every 6 hours as needed for Pain for up to 30 days. 2/9/21 3/11/21 Yes Ama Wilkes MD   docusate sodium (COLACE) 100 MG capsule Take 1 capsule by mouth 2 times daily 2/4/21  Yes Alissa Bullock MD   polyethylene glycol [facility]) 17 GM/SCOOP powder Take 17 g by mouth 2 times daily 2/4/21 3/6/21 Yes Alissa Bullock MD   Turmeric (QC TUMERIC COMPLEX) 500 MG CAPS Take 500 mg by mouth daily   Yes Historical Provider, MD   budesonide (ENTOCORT EC) 3 MG extended release capsule  8/28/20  Yes Historical Provider, MD   lidocaine-prilocaine (EMLA) 2.5-2.5 % cream Apply a dot of cream to top of port and cover with plastic wrap 30-60 minutes before needle placed. 8/21/19   Historical Provider, MD       Allergies:  Nsaids, Ondansetron hcl, and Topamax [topiramate]    Social History:      TOBACCO:   reports that she has quit smoking. Her smoking use included cigarettes. She has a 7.50 pack-year smoking history. She has never used smokeless tobacco.  ETOH:   reports current alcohol use.       Family History:       Reviewed in detail and non contributory          Problem Relation Age of Onset    Hypertension Father     Elevated Lipids Father     Coronary Art Dis Maternal Grandfather 36    Heart Failure Paternal Grandfather 76    Hypertension Brother     Coronary Art Dis Maternal Uncle        REVIEW OF SYSTEMS:   Pertinent positives as noted in the HPI. All other systems reviewed and negative. PHYSICAL EXAM PERFORMED:    BP (!) 130/95   Pulse 89   Temp 98 °F (36.7 °C) (Oral)   Resp 18   Ht 5' 9\" (1.753 m)   Wt 263 lb 3.7 oz (119.4 kg)   SpO2 100%   BMI 38.87 kg/m²     General appearance:  No apparent distress, cooperative. HEENT:  Normal cephalic, atraumatic without obvious deformity. Conjunctivae/corneas clear. Neck: Supple, with full range of motion. No cervical lymphadenopathy  Respiratory:  Normal respiratory effort. Clear to auscultation, bilaterally without Rales/Wheezes/Rhonchi. Cardiovascular:  Regular rate and rhythm with normal S1/S2 without murmurs, rubs or gallops. Abdomen: Soft, non-tender, non-distended, normal bowel sounds. Musculoskeletal:  No edema noted bilaterally. No tenderness on palpation   Skin: no rash visible  Neurologic: Decreased sensation to light touch on left lower extremity  Psychiatric:  Alert and oriented, normal mood  Peripheral Pulses: +2 palpable, equal bilaterally       Labs:     Recent Labs     02/15/21  1858   WBC 6.3   HGB 13.0   HCT 40.0        Recent Labs     02/15/21  1858      K 3.6      CO2 26   BUN 10   CREATININE 0.7   CALCIUM 9.8     No results for input(s): AST, ALT, BILIDIR, BILITOT, ALKPHOS in the last 72 hours. No results for input(s): INR in the last 72 hours. No results for input(s): Francies Gathers in the last 72 hours. Urinalysis:      Lab Results   Component Value Date    NITRU Negative 02/04/2021    BLOODU Negative 02/04/2021    SPECGRAV <=1.005 02/04/2021    GLUCOSEU Negative 02/04/2021       Radiology:       XR CHEST 1 VIEW   Final Result      No acute pulmonary disease.                  Active Hospital Problems    Diagnosis Date Noted    Sarcoma [facility]) [C49.9] 02/15/2021       Patient is a 68-year-old female with past medical history of Crohn's disease, sarcoma of left lower extremity, closed which helped compression fracture T7 vertebrae who presents to the hospital from PCP office for uncontrolled pain in lower back. According to the patient she was in remission for her sarcoma but recently she had radiologic imaging with MRI of her abdomen and was found to be positive for metastasis in her abdomen. Patient mentions her pain is excruciating 10/10, is unbearable, her pain is uncontrolled on home [place].  Patient is specifically asking for Xanax and Dilaudid during time of my evaluation. 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 Every effort was made to ensure accuracy; however, inadvertent computerized transcription errors may be present. Thank you Laurita Méndez MD for the opportunity to be involved in this patient's care. If you have any questions or concerns please feel free to contact me at [phone].     Bennett Dunbar MD

## 2021-02-16 NOTE — PROGRESS NOTES
Heating pads given to as additional pain relievers along with prn norco, morphine and dilaudid. Pt denied n/v.  Will continue to monitor

## 2021-02-16 NOTE — PROGRESS NOTES
Pt beginning of the shift cried and demanded pain relievers, xanax and phenergan, saying she had been waiting for hours. This RN messaged dr Jazzy Cui for orders and was able to get them for pt. Pt symptoms relieved and pt was able to get a short nap. Mother in law called for updates, pt consented, she wanted a zoom call or such with doctors in the morning, message left for physicican. Pt woke up after a nap, crying for more pain reliever, dilaudid and then norco given, as well as simethicon for gas pain. Pt is satisfied w pain management, currently at rest in bed.  Will continue to monitor

## 2021-02-16 NOTE — CARE COORDINATION
CM following, pt from home with family ind pta.   Onc and Neuro surg consult, Neuro surg panning OR 2/17, will need PT OT evals post-op for DC needs,   Electronically signed by Shannan Aj RN on 2/16/2021 at 1:25 PM  [phone]

## 2021-02-16 NOTE — CONSULTS
Department of Radiation Oncology  Attending Consult Note      Reason for Consult:  Back pain  Requesting Physician:  Ren Del Valle MD      CHIEF COMPLAINT:  Severe back pain, worsening over the past 4 weeks. History Obtained From:  patient, electronic medical record    HISTORY OF PRESENT ILLNESS:      Ms. 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 She underwent neoadjuvant radiation therapy to the left thigh receiving 5000 cGy in 25 fractions. She then underwent surgical resection by Dr. Rekha Richey in February 2018. She subsequent developed a liver metastasis and underwent partial liver resection by Dr. Katie Pleitez in July 2019 followed by systemic chemotherapy at the [facility] sarcoma clinic. She reports that in roughly March 2020 is been developing some off and on mid back pain. She reports when the pain started it was radiating around to the left chest wall and under her left breast.  As it progressed over the past several months it is been radiating around both sides. A CT scan of the chest abdomen pelvis this time did show a mild compression fracture at T7 and this had slightly worsened on a similar CT scan from June 2020. More recently, the pain has become more severe, particularly over the last 4 weeks, and she presented to the emergency room for evaluation. An MRI of the thoracic spine was obtained which shows bony metastasis in T7 extending into the pedicles with epidural tumor extension. Additional lesions were thought to be present at T12 and L3. She reports having brief episodes of lower extremity weakness. She denies any difficulty emptying her bladder or controlling her bowels. Biggest concern is the severe pain that wraps around her chest wall correlating with the T7 area. Radiation oncology is consulted to evaluate the role radiation therapy and what appears to be a significant cord compression at T7.     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     [facility]'s disease ([facility] [address]     Depression     Early menopause     follows with endocrine/gyne    IBS (irritable bowel syndrome)     IBS (irritable bowel syndrome)     Mixed hyperlipidemia 6/22/2017    Obesity     Osteoarthritis, knee     PONV (postoperative nausea and vomiting)     Prolonged emergence from general anesthesia     panic attacks on awakening, requests anxiety med on awakening    Sarcoma of left lower extremity ([facility] [address] 11/24/2017    s/p XRT, followed by surgery     Past Surgical History:        Procedure Laterality Date    ANKLE SURGERY Left     APPENDECTOMY      BREAST REDUCTION SURGERY      CARPAL TUNNEL RELEASE Right 07/2020    KNEE ARTHROSCOPY Right 1/2015    KNEE ARTHROSCOPY Right 7/2015    LIVER RESECTION N/A 7/22/2019    ROBOTIC ASSISTED PARTIAL LIVER RESECTION AND ABDOMINAL MASS RESECTION performed by Jefferson Thao MD at [address] [place] [address] Left 11/2017    for sarcoma    OTHER SURGICAL HISTORY Left     biopsy of left thigh mass    OTHER SURGICAL HISTORY Left 02/26/2018    WIDE EXCISION LEFT THIGH SARCOMA          OTHER SURGICAL HISTORY Left 03/14/2018    INCISION AND DRAINAGE OF LEFT THIGH       OTHER SURGICAL HISTORY Left 03/22/2018    incision and drainage left posterior thigh    PORT SURGERY Right 11/30/2020    PORT REMOVAL performed by Jefferson Thao MD at [address] SALPINGECTOMY Left 2009    d/t scar tissue    TOTAL KNEE ARTHROPLASTY Right 9/2015       Current Medications:    Current Facility-Administered Medications: HYDROmorphone (DILAUDID) injection 1 mg, 1 mg, Intravenous, Q3H PRN  naloxone (NARCAN) injection 0.4 mg, 0.4 mg, Intravenous, PRN  [START ON 2/17/2021] naloxegol (MOVANTIK) tablet 12.5 mg, 12.5 mg, Oral, QAM  Dexamethasone Sodium Phosphate injection 6 mg, 6 mg, Intravenous, Q8H  methocarbamol (ROBAXIN) 1,000 mg in dextrose 5 % 100 mL IVPB, 1,000 mg, Intravenous, Q8H **FOLLOWED BY** [START ON 2/17/2021] methocarbamol (ROBAXIN) tablet 750 mg, 750 mg, Oral, 4 times per day  acetaminophen (TYLENOL) tablet 1,000 mg, 1,000 mg, Oral, 4 times per day  oxyCODONE (ROXICODONE) immediate release tablet 5 mg, 5 mg, Oral, Q4H PRN **OR** oxyCODONE (ROXICODONE) immediate release tablet 10 mg, 10 mg, Oral, Q4H PRN  lidocaine 4 % external patch 1 patch, 1 patch, Transdermal, Daily  diazePAM (VALIUM) tablet 5 mg, 5 mg, Oral, Q6H PRN  [START ON 2/17/2021] ceFAZolin (ANCEF) 2000 mg in dextrose 3 % 50 mL IVPB (duplex), 2,000 mg, Intravenous, On Call to OR  [START ON 2/17/2021] 0.9 % sodium chloride infusion, , Intravenous, Continuous  sodium chloride flush 0.9 % injection 10 mL, 10 mL, Intravenous, 2 times per day  sodium chloride flush 0.9 % injection 10 mL, 10 mL, Intravenous, PRN  potassium chloride 10 mEq/100 mL IVPB (Peripheral Line), 10 mEq, Intravenous, PRN  magnesium sulfate 2000 mg in 50 mL IVPB premix, 2,000 mg, Intravenous, PRN  magnesium hydroxide (MILK OF MAGNESIA) 400 MG/5ML suspension 30 mL, 30 mL, Oral, Daily PRN  budesonide (ENTOCORT EC) extended release capsule 3 mg, 3 mg, Oral, Daily  docusate sodium (COLACE) capsule 100 mg, 100 mg, Oral, BID  polyethylene glycol (GLYCOLAX) packet 17 g, 17 g, Oral, BID  promethazine (PHENERGAN) injection 6.25 mg, 6.25 mg, Intramuscular, Q6H PRN  ALPRAZolam (XANAX) tablet 0.25 mg, 0.25 mg, Oral, Nightly PRN  simethicone (MYLICON) chewable tablet 80 mg, 80 mg, Oral, Q6H PRN    Allergies:  Review of patient's allergies indicates no known allergies.     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[place] [address] strain: Not on file    Food insecurity     Worry: Not on file     Inability: Not on file    Transportation needs     Medical: Not on file     Non-medical: Not on file   Tobacco Use    Smoking status: Former Smoker     Packs/day: 0.50     Years: 15.00     Pack years: 7.50     Types: Cigarettes    Smokeless tobacco: Never Used   Substance and Sexual Activity    Alcohol use: Yes     Comment: rare    Drug use: Yes     Types: Marijuana     Comment: medical marijuana    Sexual activity: Yes     Partners: Male     Comment:     Lifestyle    Physical activity     Days per week: Not on file     Minutes per session: Not on file    Stress: Not on file   Relationships    Social connections     Talks on phone: Not on file     Gets together: Not on file     Attends Taoism service: Not on file     Active member of club or organization: Not on file     Attends meetings of clubs or organizations: Not on file     Relationship status: Not on file    Intimate partner violence     Fear of current or ex partner: Not on file     Emotionally abused: Not on file     Physically abused: Not on file     Forced sexual activity: Not on file   Other Topics Concern    Not on file   Social History Narrative    Not on file       Family History:       Problem Relation Age of Onset    Hypertension Father     Elevated Lipids Father     Coronary Art Dis Maternal Grandfather 36    Heart Failure Paternal Grandfather 76    Hypertension Brother     Coronary Art Dis Maternal Uncle      REVIEW OF SYSTEMS:    As outlined in HPI and chart review, otherwise review of general, eyes, nose, throat, pulmonary, cardiac, GI, , musculoskeletal, neurological, and integumentary systems is negative. PHYSICAL EXAM:      Vitals:    Vitals:    02/16/21 1119   BP: 122/83   Pulse: 89   Resp: 16   Temp: 98.1 °F (36.7 °C)   SpO2: 96%       Laying in bed in no apparent distress. Moves all 4 extremities out difficulty. Does brace herself shift gingerly when trying to reposition herself.   Alert and oriented x3, answers questions appropriately, appears to have appropriate insight into her disease. Sensation is grossly intact in bilateral lower extremities. DATA:      Mri Thoracic Spine Wo Contrast    Result Date: 2/15/2021  MRI thoracic spine without contrast HISTORY: Compression fracture T7 vertebral body. Back pain. COMPARISON: CT chest September 21, 2020. T1 sagittal, T2 sagittal, sagittal STIR and axial T2-weighted images There is abnormal marrow signal within the T7 vertebral body and bilateral pedicles occupying a large volume of the T7 vertebral body with low signal on T1-weighted images and T2 hyperintensity. There is epidural tumor extension noted surrounding the thecal sac at this level extending to the level of T8 with obliteration of the surrounding CSF. There is mild compression on the left lateral aspect of the cord. Left paravertebral soft tissue mass is identified at the level of the T7 vertebral body measuring 7 mm in thickness extending posterior to the descending thoracic aorta measuring 11 x 11 mm consistent with neoplasm. Mild wedging of the vertebral body is noted anteriorly consistent with mild pathologic compression fracture. There is abnormal marrow signal within large portion of the T12 vertebral body extending into the left pedicle consistent with neoplasm with T1 hypointensity and T2 signal hyperintensity. No definite epidural or paravertebral component is noted at this level. No significant compression fracture identified. The remainder the vertebral body heights are maintained with normal marrow signal. Localizer image demonstrates abnormal signal within the L3 vertebral body which likely relates to bony metastasis. Bony metastasis involving T7 vertebral body and pedicles with epidural tumor extension surrounding and compressing the cord at this level with tumor extending to the left paravertebral region at this level.  Mild wedging compression fracture at T7 Bony metastasis involving the T12 vertebral body and left pedicle as described. Suspected bony metastasis involving the L3 vertebral body noted on the localizer image. The findings were called as a critical result to the referring clinical service by Dr. Bettie Kumar at the time of dictation 12:53 PM     Ct Abdomen Pelvis W Iv Contrast Additional Contrast? None    Result Date: 2/4/2021  DATE: 2/4/2021 EXAM: CT ABDOMEN PELVIS W IV CONTRAST INDICATION: luq ab pain, Pain COMPARISON: December 2020, September 2020 TECHNIQUE: Axial CT imaging obtained from lung bases through pelvis. Axial images and multiplanar reformatted images are provided for review. Individualized dose optimization technique was used in order to meet ALARA standards for radiation dose reduction. In addition to vendor specific dose reduction algorithms, the dose reduction techniques vary based on the specific scanner utilized but frequently include automated exposure control, adjustment of the mA and/or kV according to patient size, and use of iterative reconstruction technique. IV Contrast: 80 mL Isovue-370 Oral Contrast: No FINDINGS: LUNG BASES: Clear. LIVER: Posttreatment and therapeutic changes of the liver with irregular branching calcification in the dome and anteriorly. Vague enhancement posterior left lobe is unchanged, image 25. GALLBLADDER AND BILIARY TREE: No calcified gallstones. No gallbladder distention. No intra- or extrahepatic biliary dilatation. PANCREAS: Normal. SPLEEN: Normal. ADRENAL GLANDS: Normal. KIDNEYS AND URETERS: Symmetrical enhancement. No hydronephrosis. 1 mm calcification left lower pole. No asymmetrical perinephric stranding. URINARY BLADDER: Normal. REPRODUCTIVE ORGANS: No mass. BOWEL: No bowel dilatation. No wall thickening. Mild to moderate colonic diverticulosis. No bowel wall thickening or surrounding inflammatory changes. Appendix not identified. LYMPH NODES: No abnormally enlarged nodes. PERITONEUM/RETROPERITONEUM: No ascites or free air. VESSELS: Aorta is normal caliber and proximal branch vessels are patent. ABDOMINAL WALL: Normal. BONES: No destructive process. 1. No acute intra-abdominopelvic abnormality. 2. Post treatment/therapeutic changes of the liver. Stable vague enhancement within the left lobe 3. Mild diverticulosis. Xr Chest Portable    Result Date: 2/4/2021  History: Abdominal pain. AP chest COMPARISON: 12/21/2020. FINDINGS: Normal heart size. No effusion or consolidation. No acute osseous abnormality. Mediastinal contours are unremarkable. 1. No acute disease. Xr Chest 1 View    Result Date: 2/15/2021  EXAM: PORTABLE AP CHEST X-RAY, 1850 hours INDICATION: Chest pain COMPARISON: 2/4/2021 FINDINGS: There is no focal consolidation, pleural effusion, or pneumothorax. The cardiomediastinal silhouette is normal. The visible bony thorax is intact. No acute pulmonary disease. IMPRESSION/RECOMMENDATIONS:      Ms. 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 She has significant cord compression at T7. Her pain is modestly controlled with her current narcotic regimen. This will need to be transitioned to a home schedule with a combination of long and short acting pain medications. Given the degree of canal stenosis on imaging, the off-and-on symptoms she has had, and the histology I would favor a laminectomy in the T7 area to open up the canal and reduce her long-term risk of developing cord compression. She should continue on dexamethasone at her current dosage of 6 mg 3 times/day until tapered by Neurosurgery following her procedure. I discussed that this would need to be followed with adjuvant radiation therapy to decrease the risk of local progression in this area. We would also radiate the T12 and L3 areas.   We discussed

## 2021-02-16 NOTE — PROGRESS NOTES
Hospitalist Progress Note      PCP: Emery Carey MD    Chief Complaint. Patient is a 39-year-old female with past medical history of Crohn's disease, sarcoma of left lower extremity, closed which helped compression fracture T7 vertebrae who presents to the hospital from PCP office for uncontrolled pain in lower back. According to the patient she was in remission for her sarcoma but recently she had radiologic imaging with MRI of her abdomen and was found to be positive for metastasis in her abdomen. Patient mentions her pain is excruciating 10/10, is unbearable, her pain is uncontrolled on home [place].  Patient is specifically asking for Xanax and Dilaudid during time of my evaluation. Date of Admission: 2/15/2021      Subjective:   denies chest pain, nausea, vomiting, shortness of breath, fever or chills.  mention feels overall better    Medications:  Reviewed    Infusion Medications    [START ON 2/17/2021] sodium chloride       Scheduled Medications    [START ON 2/17/2021] naloxegol  12.5 mg Oral QAM    dexamethasone  6 mg Intravenous Q8H    methocarbamol IVPB  1,000 mg Intravenous Q8H    Followed by   Darrion Tyler ON 2/17/2021] methocarbamol  750 mg Oral 4 times per day    acetaminophen  1,000 mg Oral 4 times per day    lidocaine  1 patch Transdermal Daily    [START ON 2/17/2021] ceFAZolin (ANCEF) IVPB  2,000 mg Intravenous On Call to OR    sodium chloride flush  10 mL Intravenous 2 times per day    budesonide  3 mg Oral Daily    docusate sodium  100 mg Oral BID    polyethylene glycol  17 g Oral BID     PRN Meds: HYDROmorphone, naloxone, oxyCODONE **OR** oxyCODONE, diazePAM, sodium chloride flush, potassium chloride, magnesium sulfate, magnesium hydroxide, promethazine, ALPRAZolam, simethicone      Intake/Output Summary (Last 24 hours) at 2/16/2021 1832  Last data filed at 2/16/2021 1728  Gross per 24 hour   Intake 970 ml   Output    Net 970 ml       Physical Exam Performed:    /80 Pulse 86   Temp 98.5 °F (36.9 °C) (Oral)   Resp 16   Ht 5' 9\" (1.753 m)   Wt 263 lb 3.7 oz (119.4 kg)   SpO2 94%   BMI 38.87 kg/m²     General appearance: No apparent distress,   HEENT:  Conjunctivae/corneas clear. Neck: Supple, with full range of motion. Respiratory:  Normal respiratory effort. Clear to auscultation, bilaterally without Rales/Wheezes/Rhonchi. Cardiovascular: Regular rate and rhythm with normal S1/S2 without murmurs or rubs  Abdomen: Soft, non-tender, non-distended, normal bowel sounds. Musculoskeletal: No cyanosis or edema bilaterally  Neurologic:  without any focal sensory/motor deficits. grossly non-focal.  Psychiatric: Alert and oriented, Normal mood  Peripheral Pulses: +2 palpable, equal bilaterally       Labs:   Recent Labs     02/15/21  1858 02/16/21  0500   WBC 6.3 4.8   HGB 13.0 11.8*   HCT 40.0 35.8*    188     Recent Labs     02/15/21  1858 02/16/21  0500    140   K 3.6 3.9    104   CO2 26 27   BUN 10 10   CREATININE 0.7 0.6   CALCIUM 9.8 9.4     No results for input(s): AST, ALT, BILIDIR, BILITOT, ALKPHOS in the last 72 hours. Recent Labs     02/16/21  1343   INR 1.00     No results for input(s): Maddie Simmer in the last 72 hours. Urinalysis:      Lab Results   Component Value Date    NITRU Negative 02/04/2021    BLOODU Negative 02/04/2021    SPECGRAV <=1.005 02/04/2021    GLUCOSEU Negative 02/04/2021       Radiology:  VL Extremity Venous Bilateral         XR CHEST 1 VIEW   Final Result      No acute pulmonary disease. Assessment/Plan:    Active Hospital Problems    Diagnosis    Sarcoma [facility]) [C49.9]     Patient is a 77-year-old female with past medical history of Crohn's disease, sarcoma of left lower extremity, closed which helped compression fracture T7 vertebrae who presents to the hospital from PCP office for uncontrolled pain in lower back.   According to the patient she was in remission for her sarcoma but recently she had radiologic imaging with MRI of her abdomen and was found to be positive for metastasis in her abdomen.   Patient mentions her pain is excruciating 10/10, is unbearable, her pain is uncontrolled on home [place].  Patient is specifically asking for Xanax and Dilaudid during time of my evaluation.     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, spine  T7 compression fracture  Patient is noted to have a spinal cord compression  Crohn's disease        Plan  Chest x-ray performed-negative for acute pathology, neurosurgery consulted-plan for laminectomy tomorrow, started on Decadron, radiation oncology also consulted for radiation therapy  Consult PT OT  Pain control  DVT prophylaxis with Lovenox  Resume home medications including budesonide  Diet: DIET GENERAL;  Diet NPO Time Specified Exceptions are: Sips of Water with Meds  Code Status: Full Code    PT/OT Eval Status: ordered    Dispo -plan for OR tomorrow by neurosurgery    Leah Urrutia MD

## 2021-02-16 NOTE — PROGRESS NOTES
Patient was seen by Suzie Larson NP via telehealth. Sheng served MD and residents about unmanaged pain. Patient asking for morphine and more freq doses. Explained the pain management to her, verbalized understanding but stating she is still in extreme lower back and abdominal pain. Given pillow support, rest and emotional support. Will cont to monitor. Neurosurgery consulted yesterday but have not seen MD or residents for this patient. HUC will re-consult today.  Alerted MD of family updates and given number to team. Will cont to monitor

## 2021-02-16 NOTE — CONSULTS
[facility]  [id number]   1980   2/16/2021    Requesting physician: Juan De La Cruz MD    Reason for consultation:t7 mass    History of present illness: Patient is a 41 y.o. female w/ PMH of sarcoma which was in remission but recently she had radiologic imaging with MRI of her abdomen and was found to be positive for metastasis in her abdomen. She presented on 2/16/2021 with hx of a month of mid back pain that circles around her abdomen with Left side worse. LE weakness when she stands with L>R. Her pain has progressively gotten worse and is unbearable. She is very tearful and appears very uncomfortable. Yesterday, she described a lightening bolt of pain that shot down her back and legs and she felt tingling in toes. The tingling was transient. The constant pain in mid back that circles her abdomen persists. ROS:   GENERAL:  Denies fever or recent illness.  Denies weight changes   EYES:  Denies vision change or diplopia  EARS:  Denies hearing loss  CARDIAC:  Denies chest pain  RESPIRATORY:  Denies shortness of breath  SKIN:  Denies rash or lesions   HEM:  Denies excessive bruising  PSYCH:  Denies anxiety or depression  NEURO:  Denies headache, numbness + tingling bilat toes + lateralizing weakness LE L>R  :  Denies urinary difficulty  GI: Denies nausea, vomiting, diarrhea or constipation  MUSCULOSKELETAL:  No arthralgias    Allergies   Allergen Reactions    Nsaids      Pt has Crohn's    Ondansetron Hcl Other (See Comments)     States it makes her feel weird  States it makes her feel weird      Topamax [Topiramate] Hives, Itching and Rash       Past Medical History:   Diagnosis Date    Anxiety     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  30 tablet 0    docusate sodium (COLACE) 100 MG capsule Take 1 capsule by mouth 2 times daily 30 capsule 0    polyethylene glycol (GLYCOLAX) 17 GM/SCOOP powder Take 17 g by mouth 2 times daily 510 g 0    Turmeric (QC TUMERIC COMPLEX) 500 MG CAPS Take 500 mg by mouth daily      budesonide (ENTOCORT EC) 3 MG extended release capsule           Current Facility-Administered Medications   Medication Dose Route Frequency Provider Last Rate Last Admin    HYDROmorphone (DILAUDID) injection 1 mg  1 mg Intravenous Q3H PRN Kerri Melara MD   1 mg at 02/16/21 1123    naloxone (NARCAN) injection 0.4 mg  0.4 mg Intravenous PRN MD Simona Wade [START ON 2/17/2021] naloxegol (MOVANTIK) tablet 12.5 mg  12.5 mg Oral QAM Kerri Melara MD        Dexamethasone Sodium Phosphate injection 6 mg  6 mg Intravenous Q8H Kerri Melara MD   6 mg at 02/16/21 0825    methocarbamol (ROBAXIN) 1,000 mg in dextrose 5 % 100 mL IVPB  1,000 mg Intravenous Q8H Buck Pancake, APRN - CNP        Followed by   Sebastian Starkey ON 2/17/2021] methocarbamol (ROBAXIN) tablet 750 mg  750 mg Oral 4 times per day Buck Pancake, APRN - CNP        acetaminophen (TYLENOL) tablet 1,000 mg  1,000 mg Oral 4 times per day Buck Pancake, APRN - CNP        oxyCODONE (ROXICODONE) immediate release tablet 5 mg  5 mg Oral Q4H PRN Buck Pancake, APRN - CNP        Or    oxyCODONE (ROXICODONE) immediate release tablet 10 mg  10 mg Oral Q4H PRN Buck Pancake, APRN - CNP        lidocaine 4 % external patch 1 patch  1 patch Transdermal Daily Buck Pancake, APRN - CNP        diazePAM (VALIUM) tablet 5 mg  5 mg Oral Q6H PRN Buck Pancake, APRN - CNP        sodium chloride flush 0.9 % injection 10 mL  10 mL Intravenous 2 times per day Bouchra Connolly MD   10 mL at 02/16/21 0814    sodium chloride flush 0.9 % injection 10 mL  10 mL Intravenous PRN Bouchra Connolly MD        potassium chloride 10 mEq/100 mL IVPB (Peripheral Line)  10 mEq Intravenous PRN Bouchra Connolly MD        magnesium sulfate 2000 mg in 50 mL IVPB premix  2,000 mg Intravenous PRN Bouchra Connolly MD        magnesium hydroxide (MILK OF MAGNESIA) 400 MG/5ML suspension 30 mL  30 mL Oral Daily PRN Bouchra Connolly MD        budesonide (ENTOCORT EC) extended release capsule 3 mg  3 mg Oral Daily Bouchra Connolly MD   3 mg at 02/16/21 0825    docusate sodium (COLACE) capsule 100 mg  100 mg Oral BID Bouchra Connolly MD   100 mg at 02/16/21 0814    polyethylene glycol (GLYCOLAX) packet 17 g  17 g Oral BID Bouchra Connolly MD   17 g at 02/16/21 0814    promethazine (PHENERGAN) injection 6.25 mg  6.25 mg Intramuscular Q6H PRN Bouchra Connolly MD   6.25 mg at 02/15/21 2018    ALPRAZolam (XANAX) tablet 0.25 mg  0.25 mg Oral Nightly PRN Bouchra Connolly MD   0.25 mg at 02/15/21 2018    simethicone (MYLICON) chewable tablet 80 mg  80 mg Oral Q6H PRN Loulou Blackwell MD   80 mg at 02/16/21 0700        Objective:  /83   Pulse 89   Temp 98.1 °F (36.7 °C) (Oral)   Resp 16   Ht 5' 9\" (1.753 m)   Wt 263 lb 3.7 oz (119.4 kg)   SpO2 96%   BMI 38.87 kg/m²     Physical Exam:   Patient seen and examined  General: Well developed. Alert and cooperative and appears very uncomfortable     HENT: atraumatic, neck supple  Eyes: Optic discs: Not tested  Pulmonary: unlabored respiratory effort  Cardiovascular:  Warm well perfused.  No peripheral edema  Gastrointestinal: abdomen soft, NT, ND    Neurological:  Mental Status: Awake, alert, oriented x 4, speech clear and appropriate  Attention: Intact  Language: No aphasia or dysarthria noted  Sensation: Intact to all extremities to light touch  Coordination: Intact    DTRs:    Right  Left    Gilman's - -   biceps  2 2   brachioradialis  2 2    Patella   1 1   ankle clonus  - -   toes (babinski)  d d     Cranial Nerves:  II: Visual acuity not tested, denies new visual changes / diplopia  III, IV, VI: PERRL, 3 mm bilaterally, EOMI, no nystagmus noted  V: Facial sensation intact bilaterally to touch  VII: Face symmetric  VIII: Hearing intact bilaterally to spoken voice  IX: Palate movement equal bilaterally  XI: Shoulder shrug equal bilaterally  XII: Tongue midline    Musculoskeletal:   Gait: Not tested   Assist devices: None   Tone: normal  Motor strength:    Right  Left    Right  Left    Deltoid  5 5  Hip Flex  4 4-   Biceps  5 5  Knee Extensors  4 4-   Triceps  5 5  Knee Flexors  4 4-   Wrist Ext  5 5  Ankle Dorsiflex. 4 4-   Wrist Flex  5 5  Ankle Plantarflex. 4 4-   Handgrip  5 5  Ext Clayton Longus  4 4-   Thumb Ext  5 5         Radiological Findings:  MRI lumbar spine  Bony metastasis involving T7 vertebral body and pedicles with epidural tumor extension surrounding and compressing the cord at this level with tumor extending to the left paravertebral region at this level. Mild wedging compression fracture at T7       Bony metastasis involving the T12 vertebral body and left pedicle as described.        Suspected bony metastasis involving the L3 vertebral body noted on the localizer image. CT abdomen  There is a 1.2 cm oval region of diffusion restriction in segment II of the liver that corresponds with the enhancing structure on CT from 9/21/2020. There is no other imaging correlate. This region is difficult to evaluate due to its proximity to the    diaphragm and subsequent degradation by respiratory motion. Consider follow-up imaging in 6 months. Labs:  Recent Labs     02/16/21  0500   WBC 4.8   HGB 11.8*   HCT 35.8*          Recent Labs     02/16/21  0500      K 3.9      CO2 27   BUN 10   CREATININE 0.6   GLUCOSE 83   CALCIUM 9.4       No results for input(s): PROTIME, INR, APTT in the last 72 hours.     Patient Active Problem List    Diagnosis Date Noted    Sarcoma ([facility] 02/15/2021    Neutropenic fever ([facility] 10/10/2019    Metastatic cancer to liver ([facility] 09/19/2019    Liver mass 07/22/2019    Weight loss counseling, encounter for     Moderate malnutrition ([facility] 03/23/2018    Class 2 obesity due to excess calories with body mass index (BMI) of 39.0 to 39.9 in adult     Chronic depression     Sarcoma of left lower extremity ([facility] 11/24/2017    Small bowel obstruction ([facility] 10/11/2017    Vitamin B12 deficiency 07/10/2017    Morbid obesity with BMI of 40.0-44.9, adult ([facility] 06/22/2017    Mixed hyperlipidemia 06/22/2017    IBS (irritable bowel syndrome)     Crohn's disease ([facility]     Osteoarthritis, knee     Premature menopause 02/04/2011    Anxiety disorder 02/04/2011       Assessment:  Patient is a 41 y.o. female w/hx of sarcoma who presented with severe mid back pain. MRI shows a T7 mass with epidural extension and compression of the cord. Discussed surgical plan with pt and  over the phone. Both verbalize understanding. All questions were answered. Plan:  1. Pt will have T7 laminectomy with T5-T9 pedicle fixation and removal of mass tomorrow afternoon  2. Neurologic exams frequency: Floor: Q4H  3. For change in exam MUST contact neurosurgery team along with primary team  4. Muscle spasms: scheduled Robaxin and prn Valium  5. Pain control: roxicodone  6. Advance diet / activity per primary team  7. Oncology to see(Cata spoke to Block)  8. Rad oncology to see Discussed with Dr. Harsh Ruelas  9. NPO after MN  10. Thank you for consult. Will follow inpatient. Please call with any questions or decline in neurological status    DISPO: Remain inpatient from neurosurgery standpoint. Patient was seen and examined with Dr. Loney Schaumann who agrees with above assessment and plan. Electronically signed by:  SHELBY Mccauley, 2/16/2021 11:54 AM  [phone]

## 2021-02-16 NOTE — PLAN OF CARE
Problem: Pain:  Goal: Pain level will decrease  Description: Pain level will decrease  Outcome: Ongoing  Note: Unmanageable pain. Given many prn pain meds, pain has not improved. Patient was satisfied with lidocaine patch but ongoing 8/10 lower back and abdominal pain     Problem: Falls - Risk of:  Goal: Will remain free from falls  Description: Will remain free from falls  Outcome: Ongoing  Note: Ambulates independently. Calls out appropriately, freq rounding throughout shift. Call light within reach.  Door closed for rest

## 2021-02-16 NOTE — PROGRESS NOTES
VSS. A&Ox4. Patient complaining of lower back pain 8/10. Given 1mg of dilaudid for pain. Repositioned with pillow support. Educated patient on pain management, verbalized understanding. IV flushed and capped. Tolerating PO fluid intake. Ambulates independently.  Door closed for rest. Will cont to monitor

## 2021-02-17 ENCOUNTER — ANESTHESIA (OUTPATIENT)
Dept: OPERATING ROOM | Age: 41
DRG: 457 | End: 2021-02-17
Payer: COMMERCIAL

## 2021-02-17 VITALS — OXYGEN SATURATION: 88 % | TEMPERATURE: 97.2 F | SYSTOLIC BLOOD PRESSURE: 108 MMHG | DIASTOLIC BLOOD PRESSURE: 69 MMHG

## 2021-02-17 LAB
ANION GAP SERPL CALCULATED.3IONS-SCNC: 11 MMOL/L (ref 3–16)
BUN BLDV-MCNC: 12 MG/DL (ref 7–20)
CALCIUM SERPL-MCNC: 10.2 MG/DL (ref 8.3–10.6)
CHLORIDE BLD-SCNC: 99 MMOL/L (ref 99–110)
CO2: 25 MMOL/L (ref 21–32)
CREAT SERPL-MCNC: 0.6 MG/DL (ref 0.6–1.1)
GFR AFRICAN AMERICAN: >60
GFR NON-AFRICAN AMERICAN: >60
GLUCOSE BLD-MCNC: 129 MG/DL (ref 70–99)
HCT VFR BLD CALC: 41 % (ref 36–48)
HEMOGLOBIN: 13.5 G/DL (ref 12–16)
MCH RBC QN AUTO: 31.6 PG (ref 26–34)
MCHC RBC AUTO-ENTMCNC: 32.9 G/DL (ref 31–36)
MCV RBC AUTO: 96 FL (ref 80–100)
PDW BLD-RTO: 14.1 % (ref 12.4–15.4)
PLATELET # BLD: 242 K/UL (ref 135–450)
PMV BLD AUTO: 7.8 FL (ref 5–10.5)
POTASSIUM REFLEX MAGNESIUM: 4.2 MMOL/L (ref 3.5–5.1)
RBC # BLD: 4.27 M/UL (ref 4–5.2)
SODIUM BLD-SCNC: 135 MMOL/L (ref 136–145)
WBC # BLD: 7.8 K/UL (ref 4–11)

## 2021-02-17 PROCEDURE — 7100000000 HC PACU RECOVERY - FIRST 15 MIN: Performed by: NEUROLOGICAL SURGERY

## 2021-02-17 PROCEDURE — 2580000003 HC RX 258: Performed by: INTERNAL MEDICINE

## 2021-02-17 PROCEDURE — 3700000001 HC ADD 15 MINUTES (ANESTHESIA): Performed by: NEUROLOGICAL SURGERY

## 2021-02-17 PROCEDURE — 2580000003 HC RX 258: Performed by: NURSE PRACTITIONER

## 2021-02-17 PROCEDURE — 85027 COMPLETE CBC AUTOMATED: CPT

## 2021-02-17 PROCEDURE — 2580000003 HC RX 258: Performed by: NEUROLOGICAL SURGERY

## 2021-02-17 PROCEDURE — 6360000002 HC RX W HCPCS: Performed by: ANESTHESIOLOGY

## 2021-02-17 PROCEDURE — 6360000002 HC RX W HCPCS: Performed by: NURSE PRACTITIONER

## 2021-02-17 PROCEDURE — 00NX0ZZ RELEASE THORACIC SPINAL CORD, OPEN APPROACH: ICD-10-PCS | Performed by: NEUROLOGICAL SURGERY

## 2021-02-17 PROCEDURE — 36415 COLL VENOUS BLD VENIPUNCTURE: CPT

## 2021-02-17 PROCEDURE — 7100000001 HC PACU RECOVERY - ADDTL 15 MIN: Performed by: NEUROLOGICAL SURGERY

## 2021-02-17 PROCEDURE — 6370000000 HC RX 637 (ALT 250 FOR IP): Performed by: INTERNAL MEDICINE

## 2021-02-17 PROCEDURE — 2500000003 HC RX 250 WO HCPCS: Performed by: NEUROLOGICAL SURGERY

## 2021-02-17 PROCEDURE — 1200000000 HC SEMI PRIVATE

## 2021-02-17 PROCEDURE — 6360000002 HC RX W HCPCS: Performed by: STUDENT IN AN ORGANIZED HEALTH CARE EDUCATION/TRAINING PROGRAM

## 2021-02-17 PROCEDURE — 6360000002 HC RX W HCPCS: Performed by: NEUROLOGICAL SURGERY

## 2021-02-17 PROCEDURE — 2500000003 HC RX 250 WO HCPCS: Performed by: NURSE ANESTHETIST, CERTIFIED REGISTERED

## 2021-02-17 PROCEDURE — 6370000000 HC RX 637 (ALT 250 FOR IP): Performed by: NURSE PRACTITIONER

## 2021-02-17 PROCEDURE — 3600000004 HC SURGERY LEVEL 4 BASE: Performed by: NEUROLOGICAL SURGERY

## 2021-02-17 PROCEDURE — 0RG7071 FUSION OF 2 TO 7 THORACIC VERTEBRAL JOINTS WITH AUTOLOGOUS TISSUE SUBSTITUTE, POSTERIOR APPROACH, POSTERIOR COLUMN, OPEN APPROACH: ICD-10-PCS | Performed by: NEUROLOGICAL SURGERY

## 2021-02-17 PROCEDURE — 88341 IMHCHEM/IMCYTCHM EA ADD ANTB: CPT

## 2021-02-17 PROCEDURE — 6370000000 HC RX 637 (ALT 250 FOR IP): Performed by: STUDENT IN AN ORGANIZED HEALTH CARE EDUCATION/TRAINING PROGRAM

## 2021-02-17 PROCEDURE — C9290 INJ, BUPIVACAINE LIPOSOME: HCPCS | Performed by: NEUROLOGICAL SURGERY

## 2021-02-17 PROCEDURE — 3600000014 HC SURGERY LEVEL 4 ADDTL 15MIN: Performed by: NEUROLOGICAL SURGERY

## 2021-02-17 PROCEDURE — 88342 IMHCHEM/IMCYTCHM 1ST ANTB: CPT

## 2021-02-17 PROCEDURE — 2709999900 HC NON-CHARGEABLE SUPPLY: Performed by: NEUROLOGICAL SURGERY

## 2021-02-17 PROCEDURE — 88307 TISSUE EXAM BY PATHOLOGIST: CPT

## 2021-02-17 PROCEDURE — 2720000010 HC SURG SUPPLY STERILE: Performed by: NEUROLOGICAL SURGERY

## 2021-02-17 PROCEDURE — 6360000002 HC RX W HCPCS: Performed by: NURSE ANESTHETIST, CERTIFIED REGISTERED

## 2021-02-17 PROCEDURE — 2580000003 HC RX 258: Performed by: NURSE ANESTHETIST, CERTIFIED REGISTERED

## 2021-02-17 PROCEDURE — 3700000000 HC ANESTHESIA ATTENDED CARE: Performed by: NEUROLOGICAL SURGERY

## 2021-02-17 PROCEDURE — C9359 IMPLNT,BON VOID FILLER-PUTTY: HCPCS | Performed by: NEUROLOGICAL SURGERY

## 2021-02-17 PROCEDURE — C1713 ANCHOR/SCREW BN/BN,TIS/BN: HCPCS | Performed by: NEUROLOGICAL SURGERY

## 2021-02-17 PROCEDURE — 01N80ZZ RELEASE THORACIC NERVE, OPEN APPROACH: ICD-10-PCS | Performed by: NEUROLOGICAL SURGERY

## 2021-02-17 PROCEDURE — 80048 BASIC METABOLIC PNL TOTAL CA: CPT

## 2021-02-17 PROCEDURE — 0PB40ZX EXCISION OF THORACIC VERTEBRA, OPEN APPROACH, DIAGNOSTIC: ICD-10-PCS | Performed by: NEUROLOGICAL SURGERY

## 2021-02-17 DEVICE — IMPLANTABLE DEVICE: Type: IMPLANTABLE DEVICE | Site: SPINE THORACIC | Status: FUNCTIONAL

## 2021-02-17 DEVICE — SET SCR SPNL L25MM DIA5.5MM TI FOR 5.5MM ROD EXPEDIUM: Type: IMPLANTABLE DEVICE | Site: SPINE THORACIC | Status: FUNCTIONAL

## 2021-02-17 DEVICE — BONE GRFT SUB L 12.5CC BIOACTIVE GLS MTRX: Type: IMPLANTABLE DEVICE | Site: SPINE THORACIC | Status: FUNCTIONAL

## 2021-02-17 RX ORDER — LIDOCAINE HYDROCHLORIDE ANHYDROUS AND DEXTROSE MONOHYDRATE .4; 5 G/100ML; G/100ML
INJECTION, SOLUTION INTRAVENOUS CONTINUOUS PRN
Status: DISCONTINUED | OUTPATIENT
Start: 2021-02-17 | End: 2021-02-17 | Stop reason: SDUPTHER

## 2021-02-17 RX ORDER — FENTANYL CITRATE 50 UG/ML
INJECTION, SOLUTION INTRAMUSCULAR; INTRAVENOUS PRN
Status: DISCONTINUED | OUTPATIENT
Start: 2021-02-17 | End: 2021-02-17 | Stop reason: SDUPTHER

## 2021-02-17 RX ORDER — METHOCARBAMOL 750 MG/1
750 TABLET, FILM COATED ORAL EVERY 6 HOURS SCHEDULED
Status: DISCONTINUED | OUTPATIENT
Start: 2021-02-19 | End: 2021-02-18

## 2021-02-17 RX ORDER — CEFAZOLIN SODIUM 1 G/3ML
INJECTION, POWDER, FOR SOLUTION INTRAMUSCULAR; INTRAVENOUS PRN
Status: DISCONTINUED | OUTPATIENT
Start: 2021-02-17 | End: 2021-02-17 | Stop reason: SDUPTHER

## 2021-02-17 RX ORDER — HYDROMORPHONE HCL 110MG/55ML
PATIENT CONTROLLED ANALGESIA SYRINGE INTRAVENOUS PRN
Status: DISCONTINUED | OUTPATIENT
Start: 2021-02-17 | End: 2021-02-17 | Stop reason: SDUPTHER

## 2021-02-17 RX ORDER — MIDAZOLAM HYDROCHLORIDE 1 MG/ML
INJECTION INTRAMUSCULAR; INTRAVENOUS PRN
Status: DISCONTINUED | OUTPATIENT
Start: 2021-02-17 | End: 2021-02-17 | Stop reason: SDUPTHER

## 2021-02-17 RX ORDER — NALOXONE HYDROCHLORIDE 0.4 MG/ML
0.4 INJECTION, SOLUTION INTRAMUSCULAR; INTRAVENOUS; SUBCUTANEOUS PRN
Status: DISCONTINUED | OUTPATIENT
Start: 2021-02-17 | End: 2021-02-18

## 2021-02-17 RX ORDER — REMIFENTANIL HYDROCHLORIDE 1 MG/ML
INJECTION, POWDER, LYOPHILIZED, FOR SOLUTION INTRAVENOUS CONTINUOUS PRN
Status: DISCONTINUED | OUTPATIENT
Start: 2021-02-17 | End: 2021-02-17 | Stop reason: SDUPTHER

## 2021-02-17 RX ORDER — DIAZEPAM 5 MG/1
5 TABLET ORAL EVERY 6 HOURS PRN
Status: DISCONTINUED | OUTPATIENT
Start: 2021-02-17 | End: 2021-02-21 | Stop reason: HOSPADM

## 2021-02-17 RX ORDER — DIPHENHYDRAMINE HYDROCHLORIDE 50 MG/ML
12.5 INJECTION INTRAMUSCULAR; INTRAVENOUS
Status: DISCONTINUED | OUTPATIENT
Start: 2021-02-17 | End: 2021-02-17 | Stop reason: HOSPADM

## 2021-02-17 RX ORDER — PROMETHAZINE HYDROCHLORIDE 25 MG/ML
6.25 INJECTION, SOLUTION INTRAMUSCULAR; INTRAVENOUS
Status: DISCONTINUED | OUTPATIENT
Start: 2021-02-17 | End: 2021-02-17 | Stop reason: HOSPADM

## 2021-02-17 RX ORDER — PROPOFOL 10 MG/ML
INJECTION, EMULSION INTRAVENOUS PRN
Status: DISCONTINUED | OUTPATIENT
Start: 2021-02-17 | End: 2021-02-17 | Stop reason: SDUPTHER

## 2021-02-17 RX ORDER — DIAZEPAM 5 MG/1
5 TABLET ORAL EVERY 6 HOURS PRN
Status: DISCONTINUED | OUTPATIENT
Start: 2021-02-17 | End: 2021-02-17

## 2021-02-17 RX ORDER — LIDOCAINE HYDROCHLORIDE 20 MG/ML
INJECTION, SOLUTION INFILTRATION; PERINEURAL PRN
Status: DISCONTINUED | OUTPATIENT
Start: 2021-02-17 | End: 2021-02-17 | Stop reason: SDUPTHER

## 2021-02-17 RX ORDER — HYDRALAZINE HYDROCHLORIDE 20 MG/ML
5 INJECTION INTRAMUSCULAR; INTRAVENOUS EVERY 10 MIN PRN
Status: DISCONTINUED | OUTPATIENT
Start: 2021-02-17 | End: 2021-02-17 | Stop reason: HOSPADM

## 2021-02-17 RX ORDER — OXYCODONE HYDROCHLORIDE 15 MG/1
15 TABLET ORAL EVERY 4 HOURS PRN
Status: DISCONTINUED | OUTPATIENT
Start: 2021-02-17 | End: 2021-02-21 | Stop reason: HOSPADM

## 2021-02-17 RX ORDER — VANCOMYCIN HYDROCHLORIDE 1 G/20ML
INJECTION, POWDER, LYOPHILIZED, FOR SOLUTION INTRAVENOUS PRN
Status: DISCONTINUED | OUTPATIENT
Start: 2021-02-17 | End: 2021-02-17 | Stop reason: HOSPADM

## 2021-02-17 RX ORDER — LABETALOL HYDROCHLORIDE 5 MG/ML
5 INJECTION, SOLUTION INTRAVENOUS EVERY 10 MIN PRN
Status: DISCONTINUED | OUTPATIENT
Start: 2021-02-17 | End: 2021-02-17 | Stop reason: HOSPADM

## 2021-02-17 RX ORDER — ONDANSETRON 2 MG/ML
INJECTION INTRAMUSCULAR; INTRAVENOUS PRN
Status: DISCONTINUED | OUTPATIENT
Start: 2021-02-17 | End: 2021-02-17

## 2021-02-17 RX ORDER — ROCURONIUM BROMIDE 10 MG/ML
INJECTION, SOLUTION INTRAVENOUS PRN
Status: DISCONTINUED | OUTPATIENT
Start: 2021-02-17 | End: 2021-02-17 | Stop reason: SDUPTHER

## 2021-02-17 RX ORDER — PROPOFOL 10 MG/ML
INJECTION, EMULSION INTRAVENOUS CONTINUOUS PRN
Status: DISCONTINUED | OUTPATIENT
Start: 2021-02-17 | End: 2021-02-17 | Stop reason: SDUPTHER

## 2021-02-17 RX ORDER — METOCLOPRAMIDE HYDROCHLORIDE 5 MG/ML
10 INJECTION INTRAMUSCULAR; INTRAVENOUS
Status: DISCONTINUED | OUTPATIENT
Start: 2021-02-17 | End: 2021-02-17 | Stop reason: HOSPADM

## 2021-02-17 RX ORDER — OXYCODONE HYDROCHLORIDE 5 MG/1
10 TABLET ORAL EVERY 4 HOURS PRN
Status: DISCONTINUED | OUTPATIENT
Start: 2021-02-17 | End: 2021-02-21 | Stop reason: HOSPADM

## 2021-02-17 RX ORDER — DEXAMETHASONE SODIUM PHOSPHATE 4 MG/ML
INJECTION, SOLUTION INTRA-ARTICULAR; INTRALESIONAL; INTRAMUSCULAR; INTRAVENOUS; SOFT TISSUE PRN
Status: DISCONTINUED | OUTPATIENT
Start: 2021-02-17 | End: 2021-02-17 | Stop reason: SDUPTHER

## 2021-02-17 RX ORDER — SUCCINYLCHOLINE CHLORIDE 20 MG/ML
INJECTION INTRAMUSCULAR; INTRAVENOUS PRN
Status: DISCONTINUED | OUTPATIENT
Start: 2021-02-17 | End: 2021-02-17 | Stop reason: SDUPTHER

## 2021-02-17 RX ORDER — LORAZEPAM 2 MG/ML
1 INJECTION INTRAMUSCULAR EVERY 10 MIN PRN
Status: COMPLETED | OUTPATIENT
Start: 2021-02-17 | End: 2021-02-17

## 2021-02-17 RX ORDER — MEPERIDINE HYDROCHLORIDE 25 MG/ML
12.5 INJECTION INTRAMUSCULAR; INTRAVENOUS; SUBCUTANEOUS EVERY 5 MIN PRN
Status: DISCONTINUED | OUTPATIENT
Start: 2021-02-17 | End: 2021-02-17 | Stop reason: HOSPADM

## 2021-02-17 RX ORDER — SODIUM CHLORIDE, SODIUM LACTATE, POTASSIUM CHLORIDE, CALCIUM CHLORIDE 600; 310; 30; 20 MG/100ML; MG/100ML; MG/100ML; MG/100ML
INJECTION, SOLUTION INTRAVENOUS CONTINUOUS PRN
Status: DISCONTINUED | OUTPATIENT
Start: 2021-02-17 | End: 2021-02-17 | Stop reason: SDUPTHER

## 2021-02-17 RX ORDER — KETOROLAC TROMETHAMINE 30 MG/ML
30 INJECTION, SOLUTION INTRAMUSCULAR; INTRAVENOUS ONCE
Status: COMPLETED | OUTPATIENT
Start: 2021-02-17 | End: 2021-02-17

## 2021-02-17 RX ORDER — GLYCOPYRROLATE 1 MG/5 ML
SYRINGE (ML) INTRAVENOUS PRN
Status: DISCONTINUED | OUTPATIENT
Start: 2021-02-17 | End: 2021-02-17 | Stop reason: SDUPTHER

## 2021-02-17 RX ORDER — OXYCODONE HYDROCHLORIDE 5 MG/1
10 TABLET ORAL PRN
Status: DISCONTINUED | OUTPATIENT
Start: 2021-02-17 | End: 2021-02-17 | Stop reason: HOSPADM

## 2021-02-17 RX ORDER — SENNA AND DOCUSATE SODIUM 50; 8.6 MG/1; MG/1
2 TABLET, FILM COATED ORAL 2 TIMES DAILY
Status: DISCONTINUED | OUTPATIENT
Start: 2021-02-17 | End: 2021-02-21 | Stop reason: HOSPADM

## 2021-02-17 RX ORDER — OXYCODONE HYDROCHLORIDE 5 MG/1
5 TABLET ORAL PRN
Status: DISCONTINUED | OUTPATIENT
Start: 2021-02-17 | End: 2021-02-17 | Stop reason: HOSPADM

## 2021-02-17 RX ORDER — MORPHINE SULFATE 4 MG/ML
1 INJECTION, SOLUTION INTRAMUSCULAR; INTRAVENOUS EVERY 5 MIN PRN
Status: DISCONTINUED | OUTPATIENT
Start: 2021-02-17 | End: 2021-02-17 | Stop reason: HOSPADM

## 2021-02-17 RX ADMIN — SODIUM CHLORIDE: 9 INJECTION, SOLUTION INTRAVENOUS at 21:33

## 2021-02-17 RX ADMIN — Medication 0.2 MG: at 16:50

## 2021-02-17 RX ADMIN — FAMOTIDINE 20 MG: 10 INJECTION, SOLUTION INTRAVENOUS at 16:50

## 2021-02-17 RX ADMIN — HYDROMORPHONE HYDROCHLORIDE 0.5 MG: 1 INJECTION, SOLUTION INTRAMUSCULAR; INTRAVENOUS; SUBCUTANEOUS at 21:00

## 2021-02-17 RX ADMIN — DEXAMETHASONE SODIUM PHOSPHATE 4 MG: 4 INJECTION, SOLUTION INTRAMUSCULAR; INTRAVENOUS at 16:18

## 2021-02-17 RX ADMIN — HYDROMORPHONE HYDROCHLORIDE 1 MG: 2 INJECTION, SOLUTION INTRAMUSCULAR; INTRAVENOUS; SUBCUTANEOUS at 20:32

## 2021-02-17 RX ADMIN — HYDROMORPHONE HYDROCHLORIDE 1 MG: 1 INJECTION, SOLUTION INTRAMUSCULAR; INTRAVENOUS; SUBCUTANEOUS at 05:29

## 2021-02-17 RX ADMIN — HYDROMORPHONE HYDROCHLORIDE 1 MG: 1 INJECTION, SOLUTION INTRAMUSCULAR; INTRAVENOUS; SUBCUTANEOUS at 09:11

## 2021-02-17 RX ADMIN — PHENYLEPHRINE HYDROCHLORIDE 80 MCG: 10 INJECTION, SOLUTION INTRAMUSCULAR; INTRAVENOUS; SUBCUTANEOUS at 16:38

## 2021-02-17 RX ADMIN — PROPOFOL 100 MCG/KG/MIN: 10 INJECTION, EMULSION INTRAVENOUS at 16:12

## 2021-02-17 RX ADMIN — SODIUM CHLORIDE: 9 INJECTION, SOLUTION INTRAVENOUS at 18:40

## 2021-02-17 RX ADMIN — SUCCINYLCHOLINE CHLORIDE 120 MG: 20 INJECTION, SOLUTION INTRAMUSCULAR; INTRAVENOUS; PARENTERAL at 15:59

## 2021-02-17 RX ADMIN — LIDOCAINE HYDROCHLORIDE ANHYDROUS AND DEXTROSE MONOHYDRATE 2 MG/MIN: .4; 5 INJECTION, SOLUTION INTRAVENOUS at 16:12

## 2021-02-17 RX ADMIN — POLYETHYLENE GLYCOL 3350 17 G: 17 POWDER, FOR SOLUTION ORAL at 23:11

## 2021-02-17 RX ADMIN — HYDROMORPHONE HYDROCHLORIDE 0.5 MG: 2 INJECTION, SOLUTION INTRAMUSCULAR; INTRAVENOUS; SUBCUTANEOUS at 18:31

## 2021-02-17 RX ADMIN — OXYCODONE HYDROCHLORIDE 15 MG: 15 TABLET ORAL at 23:12

## 2021-02-17 RX ADMIN — DEXAMETHASONE SODIUM PHOSPHATE 6 MG: 4 INJECTION, SOLUTION INTRA-ARTICULAR; INTRALESIONAL; INTRAMUSCULAR; INTRAVENOUS; SOFT TISSUE at 00:28

## 2021-02-17 RX ADMIN — REMIFENTANIL HYDROCHLORIDE 0.12 MCG/KG/MIN: 1 INJECTION, POWDER, LYOPHILIZED, FOR SOLUTION INTRAVENOUS at 16:12

## 2021-02-17 RX ADMIN — MIDAZOLAM HYDROCHLORIDE 2 MG: 2 INJECTION, SOLUTION INTRAMUSCULAR; INTRAVENOUS at 15:53

## 2021-02-17 RX ADMIN — SODIUM CHLORIDE, SODIUM LACTATE, POTASSIUM CHLORIDE, AND CALCIUM CHLORIDE: .6; .31; .03; .02 INJECTION, SOLUTION INTRAVENOUS at 16:05

## 2021-02-17 RX ADMIN — Medication 10 ML: at 09:11

## 2021-02-17 RX ADMIN — METHOCARBAMOL 1000 MG: 100 INJECTION, SOLUTION INTRAMUSCULAR; INTRAVENOUS at 20:44

## 2021-02-17 RX ADMIN — CEFAZOLIN SODIUM 2000 MG: 1 POWDER, FOR SOLUTION INTRAMUSCULAR; INTRAVENOUS at 16:34

## 2021-02-17 RX ADMIN — OXYCODONE 10 MG: 5 TABLET ORAL at 07:47

## 2021-02-17 RX ADMIN — PROPOFOL 170 MG: 10 INJECTION, EMULSION INTRAVENOUS at 15:58

## 2021-02-17 RX ADMIN — Medication: at 21:35

## 2021-02-17 RX ADMIN — KETOROLAC TROMETHAMINE 30 MG: 30 INJECTION, SOLUTION INTRAMUSCULAR at 21:31

## 2021-02-17 RX ADMIN — ROCURONIUM BROMIDE 5 MG: 10 INJECTION INTRAVENOUS at 15:58

## 2021-02-17 RX ADMIN — FENTANYL CITRATE 100 MCG: 50 INJECTION, SOLUTION INTRAMUSCULAR; INTRAVENOUS at 15:58

## 2021-02-17 RX ADMIN — DEXAMETHASONE SODIUM PHOSPHATE 6 MG: 4 INJECTION, SOLUTION INTRA-ARTICULAR; INTRALESIONAL; INTRAMUSCULAR; INTRAVENOUS; SOFT TISSUE at 09:11

## 2021-02-17 RX ADMIN — OXYCODONE HYDROCHLORIDE 15 MG: 15 TABLET ORAL at 12:04

## 2021-02-17 RX ADMIN — PHENYLEPHRINE HYDROCHLORIDE 80 MCG: 10 INJECTION, SOLUTION INTRAMUSCULAR; INTRAVENOUS; SUBCUTANEOUS at 16:41

## 2021-02-17 RX ADMIN — ACETAMINOPHEN 1000 MG: 500 TABLET ORAL at 12:04

## 2021-02-17 RX ADMIN — ROCURONIUM BROMIDE 20 MG: 10 INJECTION INTRAVENOUS at 16:35

## 2021-02-17 RX ADMIN — DIAZEPAM 5 MG: 5 TABLET ORAL at 00:28

## 2021-02-17 RX ADMIN — HYDROMORPHONE HYDROCHLORIDE 0.5 MG: 1 INJECTION, SOLUTION INTRAMUSCULAR; INTRAVENOUS; SUBCUTANEOUS at 21:16

## 2021-02-17 RX ADMIN — METHOCARBAMOL 1000 MG: 100 INJECTION, SOLUTION INTRAMUSCULAR; INTRAVENOUS at 14:11

## 2021-02-17 RX ADMIN — DOCUSATE SODIUM 50 MG AND SENNOSIDES 8.6 MG 2 TABLET: 8.6; 5 TABLET, FILM COATED ORAL at 23:11

## 2021-02-17 RX ADMIN — DOCUSATE SODIUM 100 MG: 100 CAPSULE, LIQUID FILLED ORAL at 09:17

## 2021-02-17 RX ADMIN — LIDOCAINE HYDROCHLORIDE 100 MG: 20 INJECTION, SOLUTION INFILTRATION; PERINEURAL at 15:58

## 2021-02-17 RX ADMIN — METHOCARBAMOL 1000 MG: 100 INJECTION, SOLUTION INTRAMUSCULAR; INTRAVENOUS at 19:07

## 2021-02-17 RX ADMIN — DIAZEPAM 5 MG: 5 TABLET ORAL at 10:39

## 2021-02-17 RX ADMIN — BUDESONIDE 3 MG: 3 CAPSULE, GELATIN COATED ORAL at 09:11

## 2021-02-17 RX ADMIN — LORAZEPAM 1 MG: 2 INJECTION INTRAMUSCULAR; INTRAVENOUS at 21:13

## 2021-02-17 RX ADMIN — METHOCARBAMOL 1000 MG: 100 INJECTION, SOLUTION INTRAMUSCULAR; INTRAVENOUS at 04:06

## 2021-02-17 RX ADMIN — LORAZEPAM 1 MG: 2 INJECTION INTRAMUSCULAR; INTRAVENOUS at 21:30

## 2021-02-17 RX ADMIN — SODIUM CHLORIDE: 9 INJECTION, SOLUTION INTRAVENOUS at 13:35

## 2021-02-17 RX ADMIN — HYDROMORPHONE HYDROCHLORIDE 1 MG: 1 INJECTION, SOLUTION INTRAMUSCULAR; INTRAVENOUS; SUBCUTANEOUS at 02:29

## 2021-02-17 RX ADMIN — HYDROMORPHONE HYDROCHLORIDE 1 MG: 1 INJECTION, SOLUTION INTRAMUSCULAR; INTRAVENOUS; SUBCUTANEOUS at 13:35

## 2021-02-17 RX ADMIN — NALOXEGOL OXALATE 12.5 MG: 12.5 TABLET, FILM COATED ORAL at 10:39

## 2021-02-17 ASSESSMENT — PULMONARY FUNCTION TESTS
PIF_VALUE: 23
PIF_VALUE: 22
PIF_VALUE: 23
PIF_VALUE: 24
PIF_VALUE: 22
PIF_VALUE: 27
PIF_VALUE: 5
PIF_VALUE: 22
PIF_VALUE: 22
PIF_VALUE: 23
PIF_VALUE: 7
PIF_VALUE: 20
PIF_VALUE: 22
PIF_VALUE: 24
PIF_VALUE: 23
PIF_VALUE: 19
PIF_VALUE: 22
PIF_VALUE: 1
PIF_VALUE: 26
PIF_VALUE: 13
PIF_VALUE: 28
PIF_VALUE: 23
PIF_VALUE: 22
PIF_VALUE: 24
PIF_VALUE: 22
PIF_VALUE: 22
PIF_VALUE: 23
PIF_VALUE: 27
PIF_VALUE: 24
PIF_VALUE: 22
PIF_VALUE: 23
PIF_VALUE: 22
PIF_VALUE: 22
PIF_VALUE: 19
PIF_VALUE: 0
PIF_VALUE: 23
PIF_VALUE: 22
PIF_VALUE: 23
PIF_VALUE: 22
PIF_VALUE: 31
PIF_VALUE: 22
PIF_VALUE: 23
PIF_VALUE: 22
PIF_VALUE: 26
PIF_VALUE: 23
PIF_VALUE: 23
PIF_VALUE: 22
PIF_VALUE: 23
PIF_VALUE: 22
PIF_VALUE: 22
PIF_VALUE: 19
PIF_VALUE: 22
PIF_VALUE: 23
PIF_VALUE: 22
PIF_VALUE: 23
PIF_VALUE: 19
PIF_VALUE: 23
PIF_VALUE: 1
PIF_VALUE: 22
PIF_VALUE: 23
PIF_VALUE: 23
PIF_VALUE: 22
PIF_VALUE: 20
PIF_VALUE: 22
PIF_VALUE: 26
PIF_VALUE: 22
PIF_VALUE: 23
PIF_VALUE: 23
PIF_VALUE: 22
PIF_VALUE: 23
PIF_VALUE: 13
PIF_VALUE: 19
PIF_VALUE: 3
PIF_VALUE: 23
PIF_VALUE: 24
PIF_VALUE: 22
PIF_VALUE: 23
PIF_VALUE: 22
PIF_VALUE: 15
PIF_VALUE: 22
PIF_VALUE: 23
PIF_VALUE: 19
PIF_VALUE: 9
PIF_VALUE: 15
PIF_VALUE: 19
PIF_VALUE: 1
PIF_VALUE: 22
PIF_VALUE: 23
PIF_VALUE: 23
PIF_VALUE: 1
PIF_VALUE: 23
PIF_VALUE: 23
PIF_VALUE: 25
PIF_VALUE: 22
PIF_VALUE: 21
PIF_VALUE: 21
PIF_VALUE: 23
PIF_VALUE: 22
PIF_VALUE: 23
PIF_VALUE: 22
PIF_VALUE: 22
PIF_VALUE: 23
PIF_VALUE: 22
PIF_VALUE: 22
PIF_VALUE: 20
PIF_VALUE: 23
PIF_VALUE: 22
PIF_VALUE: 22
PIF_VALUE: 23
PIF_VALUE: 24
PIF_VALUE: 22
PIF_VALUE: 23
PIF_VALUE: 1
PIF_VALUE: 22
PIF_VALUE: 2
PIF_VALUE: 22
PIF_VALUE: 21
PIF_VALUE: 22
PIF_VALUE: 25
PIF_VALUE: 23
PIF_VALUE: 5
PIF_VALUE: 22

## 2021-02-17 ASSESSMENT — PAIN DESCRIPTION - DESCRIPTORS
DESCRIPTORS: ACHING;CONSTANT
DESCRIPTORS: ACHING
DESCRIPTORS: ACHING
DESCRIPTORS: RADIATING;SHARP;SHOOTING
DESCRIPTORS: ACHING
DESCRIPTORS: ACHING;CONSTANT
DESCRIPTORS: ACHING;CONSTANT

## 2021-02-17 ASSESSMENT — PAIN DESCRIPTION - FREQUENCY
FREQUENCY: CONTINUOUS

## 2021-02-17 ASSESSMENT — PAIN SCALES - GENERAL
PAINLEVEL_OUTOF10: 6
PAINLEVEL_OUTOF10: 8
PAINLEVEL_OUTOF10: 10
PAINLEVEL_OUTOF10: 8
PAINLEVEL_OUTOF10: 5
PAINLEVEL_OUTOF10: 10
PAINLEVEL_OUTOF10: 7
PAINLEVEL_OUTOF10: 10
PAINLEVEL_OUTOF10: 7
PAINLEVEL_OUTOF10: 4
PAINLEVEL_OUTOF10: 6
PAINLEVEL_OUTOF10: 8
PAINLEVEL_OUTOF10: 10
PAINLEVEL_OUTOF10: 8
PAINLEVEL_OUTOF10: 6

## 2021-02-17 ASSESSMENT — PAIN DESCRIPTION - ORIENTATION
ORIENTATION: LOWER
ORIENTATION: LEFT
ORIENTATION: UPPER;MID
ORIENTATION: LOWER
ORIENTATION: LEFT
ORIENTATION: LEFT
ORIENTATION: LOWER
ORIENTATION: LOWER
ORIENTATION: UPPER;MID

## 2021-02-17 ASSESSMENT — PAIN DESCRIPTION - PAIN TYPE
TYPE: ACUTE PAIN;CHRONIC PAIN
TYPE: ACUTE PAIN
TYPE: ACUTE PAIN;CHRONIC PAIN
TYPE: ACUTE PAIN
TYPE: SURGICAL PAIN
TYPE: SURGICAL PAIN
TYPE: ACUTE PAIN;CHRONIC PAIN
TYPE: SURGICAL PAIN

## 2021-02-17 ASSESSMENT — PAIN DESCRIPTION - ONSET
ONSET: ON-GOING

## 2021-02-17 ASSESSMENT — PAIN DESCRIPTION - LOCATION
LOCATION: BACK
LOCATION: ABDOMEN;BACK
LOCATION: BACK
LOCATION: BACK
LOCATION: ABDOMEN;BACK
LOCATION: BACK

## 2021-02-17 ASSESSMENT — PAIN DESCRIPTION - PROGRESSION
CLINICAL_PROGRESSION: GRADUALLY WORSENING
CLINICAL_PROGRESSION: GRADUALLY WORSENING
CLINICAL_PROGRESSION: RAPIDLY WORSENING
CLINICAL_PROGRESSION: GRADUALLY IMPROVING
CLINICAL_PROGRESSION: NOT CHANGED
CLINICAL_PROGRESSION: NOT CHANGED
CLINICAL_PROGRESSION: RAPIDLY WORSENING

## 2021-02-17 NOTE — PROGRESS NOTES
[facility]  Progress Note      SUBJECTIVE:   Pt not available as she is going to OR getting resection of spinal cord lesion. Reportedly pain remains a significant issue. Oncologic Hx:    Demetrio Tamayo is a 41 y.o. female w/ PMHX of Crohns Dx, Hx of sarcoma who presented to [facility] w/ back pain. The pt painless right thigh mass, [id number] mass in 12/17 - bx showed high grade myxoid liposarcoma. CT Chest/ab/pelvis - normal (12/17). Bone scan- no skeletal uptake. Neoadjuvant radiation 12/17-2/18. Resection of sarcoma - 2/18, revision 3/18. In 1/19 - mass noted in dome of liver. 6/19, showing interval growth + Peritoneal nodules, 7/19 - resection of liver mass and peritoneal nodules. Both returned metastatic Myxoid liposarcoma. 8/19 - started on Doxorubicin + Ifosfamide under the care of Dr Anjum Peters at [facility]. Last Chemo - Dec/2019. Port removed 11/2020     Pt reported that her current back pain - started Jan, had intermittent pain in April which went away, persistent since Jan, progressively worsening. Started having LE weakness 5 days ago, numbness started 1 day ago. Also has numbness in abdominal region w/ ab pain in upper quadrants.  Recently had MRI Ab which showed hemangioma w/o evidence of mets.      In ER: Thoracic MRI showing T7 (epidural mass w/ cord compression), T12, L3    ROS: Not obtained    OBJECTIVE      Medications    Current Facility-Administered Medications: methocarbamol (ROBAXIN) 1,000 mg in dextrose 5 % 100 mL IVPB, 1,000 mg, Intravenous, Q8H **FOLLOWED BY** [START ON 2/19/2021] methocarbamol (ROBAXIN) tablet 750 mg, 750 mg, Oral, 4 times per day  sennosides-docusate sodium (SENOKOT-S) 8.6-50 MG tablet 2 tablet, 2 tablet, Oral, BID  oxyCODONE (ROXICODONE) immediate release tablet 10 mg, 10 mg, Oral, Q4H PRN **OR** oxyCODONE (OXY-IR) immediate release tablet 15 mg, 15 mg, Oral, Q4H PRN  HYDROmorphone (DILAUDID) injection 0.25 mg, 0.25 mg, Intravenous, Q5 Min PRN HYDROmorphone (DILAUDID) injection 0.5 mg, 0.5 mg, Intravenous, Q5 Min PRN  morphine injection 1 mg, 1 mg, Intravenous, Q5 Min PRN  HYDROmorphone (DILAUDID) injection 0.5 mg, 0.5 mg, Intravenous, Q5 Min PRN  oxyCODONE (ROXICODONE) immediate release tablet 5 mg, 5 mg, Oral, PRN **OR** oxyCODONE (ROXICODONE) immediate release tablet 10 mg, 10 mg, Oral, PRN  diphenhydrAMINE (BENADRYL) injection 12.5 mg, 12.5 mg, Intravenous, Once PRN  metoclopramide (REGLAN) injection 10 mg, 10 mg, Intravenous, Once PRN  promethazine (PHENERGAN) injection 6.25 mg, 6.25 mg, Intravenous, Once PRN  labetalol (NORMODYNE;TRANDATE) injection 5 mg, 5 mg, Intravenous, Q10 Min PRN  hydrALAZINE (APRESOLINE) injection 5 mg, 5 mg, Intravenous, Q10 Min PRN  meperidine (DEMEROL) injection 12.5 mg, 12.5 mg, Intravenous, Q5 Min PRN  HYDROmorphone (DILAUDID) injection 1 mg, 1 mg, Intravenous, Q3H PRN  naloxone (NARCAN) injection 0.4 mg, 0.4 mg, Intravenous, PRN  naloxegol (MOVANTIK) tablet 12.5 mg, 12.5 mg, Oral, QAM  Dexamethasone Sodium Phosphate injection 6 mg, 6 mg, Intravenous, Q8H  acetaminophen (TYLENOL) tablet 1,000 mg, 1,000 mg, Oral, 4 times per day  lidocaine 4 % external patch 1 patch, 1 patch, Transdermal, Daily  diazePAM (VALIUM) tablet 5 mg, 5 mg, Oral, Q6H PRN  ceFAZolin (ANCEF) 2000 mg in dextrose 3 % 50 mL IVPB (duplex), 2,000 mg, Intravenous, On Call to OR  0.9 % sodium chloride infusion, , Intravenous, Continuous  sodium chloride flush 0.9 % injection 10 mL, 10 mL, Intravenous, 2 times per day  sodium chloride flush 0.9 % injection 10 mL, 10 mL, Intravenous, PRN  potassium chloride 10 mEq/100 mL IVPB (Peripheral Line), 10 mEq, Intravenous, PRN  magnesium sulfate 2000 mg in 50 mL IVPB premix, 2,000 mg, Intravenous, PRN  magnesium hydroxide (MILK OF MAGNESIA) 400 MG/5ML suspension 30 mL, 30 mL, Oral, Daily PRN  budesonide (ENTOCORT EC) extended release capsule 3 mg, 3 mg, Oral, Daily  polyethylene glycol (GLYCOLAX) packet 17 g, 17 g, cancer to liver ([facility] [address] [facility]    Neutropenic fever ([facility] [address] [facility]    Sarcoma [facility])       ASSESSMENT AND PLAN    Today we will go to surgery for relief of the lesion on her spinal cord. We expect pathology to be consistent with her prior sarcoma. Her progression free interval following chemotherapy is only 3 months and her prognosis therefore is not very good in the long term. The patient will likely require postoperative radiation therapy followed by restaging followed by further treatment. We will recommend NexGen sequencing on the tumor specimen if it has not already been done at the [facility]. We will need to get her records. Continue aggressive pain management. Dr. Jayson Santiago will round on the patient again tomorrow.     Desmond Blackwell MD, MPH  [phone] office

## 2021-02-17 NOTE — CONSULTS
Clinical Pharmacy Progress Note    Admit date: 2/15/2021     Subjective/Objective:  42 yo F with PMH of Crohn's disease, depression, IBS, sarcoma of LLE, who presented 2/15 with uncontrolled pain in lower back. Pt was in remission for sarcoma but recently has MRI of abdomen which showed metastasis. Planning for OR today for T7 laminectomy with T5-T9 pedicle fixation and removal of mass. Pharmacy has been consulted to make pain medication recommendations by TRACY Mireles. Pain scores have been 6-8/10.    2/17: Pt transferred to  this AM in anticipation of OR. Planning for T7 laminectomy today. Currently NPO. Current pain regimen:   Medication  Home med? Amount used yesterday    Acetaminophen 1g PO q6h Yes (on Hydrocodone/APAP) 3 doses    Alprazolam 0.25 mg PO QHS PRN anxiety  No  1 dose   Diazepam 5 mg PO q6h PRN spasms No  2 doses    Yes  5 mg (1 dose)   Hydromorphone 1mg IV q3h PRN No  7 mg IV (7 doses)   Lidocaine 4% patch daily  No  Yes   Methocarbamol 1g IV q8h x 6 doses  No     Oxycodone 5-10 mg PO q4h PRN pain No  40 mg (4 doses)     Morphine Equivalent Daily Dose (MEDD):   Date MEDD (mg)   2/17 205 mg         ASSESSMENT/PLAN:  1)  Pain Management:   · Pt requiring frequent doses of IV hydromorphone and PO oxycodone. Currently NPO in anticipation of OR today. · Agree with current dose of hydromorphone. · Post-op, pt when able to tolerate PO medications, could consider increasing oxycodone to 10-15 mg PO q4h PRN. · Agree with scheduled acetaminophen to encourage multi-modal analgesia. Also agree with Lidocaine patch and diazepam.   · Will monitor patient post-op and provide recommendations as appropriate. Please call with any questions.   Jesus Robles PharmD, BCPS  Wireless: [id number]  or [phone]  2/17/2021 11:01 AM

## 2021-02-17 NOTE — PROGRESS NOTES
Pt alert and oriented. VSS. Pt reporting pain that is being controlled with ordered pain medication, see MAR. Pt voiding well. Pt tolerating diet. Pt made NPO at midnight and IVF started in prep for OR. Pt has call light within reach bed in lowest position with wheels locked, 2/4 side rails up, and pt is up ad stacey with steady gait. Will continue to monitor.

## 2021-02-17 NOTE — CARE COORDINATION
CM following, pt plan OR today for Virgilio Milner with neuro-surg today. Will need OT PT evals post op for DC recs, however pt plans to return home with family post-op. She will follow closely with Onc for treatment.   Electronically signed by An Almanzar RN on 2/17/2021 at 10:51 AM  [phone]

## 2021-02-17 NOTE — PROGRESS NOTES
Hospitalist Progress Note      PCP: Eric Palomo MD    Date of Admission: 2/15/2021    Chief Complaint:     No chief complaint on file. back pain    Subjective:  Patient seen and examined at the bedside. Still having back pain, but improved w/ current regimen and lidocaine patch, will continue  Plan for OR for laminectomy today.     PFHS: reviewed as documented 2/15/2021, no changes    Medications:  Reviewed    Infusion Medications    sodium chloride 100 mL/hr at 02/16/21 2337     Scheduled Medications    methocarbamol IVPB  1,000 mg Intravenous Q8H    Followed by   Shantell Whitt ON 2/19/2021] methocarbamol  750 mg Oral 4 times per day    sennosides-docusate sodium  2 tablet Oral BID    naloxegol  12.5 mg Oral QAM    dexamethasone  6 mg Intravenous Q8H    acetaminophen  1,000 mg Oral 4 times per day    lidocaine  1 patch Transdermal Daily    ceFAZolin (ANCEF) IVPB  2,000 mg Intravenous On Call to OR    sodium chloride flush  10 mL Intravenous 2 times per day    budesonide  3 mg Oral Daily    polyethylene glycol  17 g Oral BID     PRN Meds: oxyCODONE **OR** oxyCODONE, HYDROmorphone, naloxone, diazePAM, sodium chloride flush, potassium chloride, magnesium sulfate, magnesium hydroxide, promethazine, ALPRAZolam, simethicone      Intake/Output Summary (Last 24 hours) at 2/17/2021 1205  Last data filed at 2/17/2021 0204  Gross per 24 hour   Intake 860 ml   Output 500 ml   Net 360 ml       Physical Exam    /74   Pulse 93   Temp 97.9 °F (36.6 °C) (Oral)   Resp 16   Ht 5' 9\" (1.753 m)   Wt 263 lb 3.7 oz (119.4 kg)   SpO2 93%   BMI 38.87 kg/m²     General appearance:  No acute distress, appears stated age  Eyes: Pupils equal, round, reactive to light, conjunctiva/corneas clear  Ears/Nose/Mouth/Throat: No external lesions or scars, hearing intact to voice  Neck: Trachea midline, no masses noted, no thyromegaly  Respiratory:  Non-labored breathing, clear to auscultation bilaterally Cardiovascular: Regular rate and rhythm, no murmurs, gallops, or rubs  Abdomen: soft, non-tender, non-distended  Musculoskeletal: Tenderness to palpation over the spinous processes in the low thoracic and upper lumbar region, neurovascularly intact  Skin: normal color, no wounds noted  Psychiatric: A&Ox4, good insight and judgment    Labs:   Recent Labs     02/15/21  1858 02/16/21  0500 02/17/21  0516   WBC 6.3 4.8 7.8   HGB 13.0 11.8* 13.5   HCT 40.0 35.8* 41.0    188 242     Recent Labs     02/15/21  1858 02/16/21  0500 02/17/21  0516    140 135*   K 3.6 3.9 4.2    104 99   CO2 26 27 25   BUN 10 10 12   CREATININE 0.7 0.6 0.6   CALCIUM 9.8 9.4 10.2     No results for input(s): AST, ALT, BILIDIR, BILITOT, ALKPHOS in the last 72 hours. Recent Labs     02/16/21  1343   INR 1.00     No results for input(s): Sparks Ripper in the last 72 hours. Urinalysis:      Lab Results   Component Value Date    NITRU Negative 02/04/2021    BLOODU Negative 02/04/2021    SPECGRAV <=1.005 02/04/2021    GLUCOSEU Negative 02/04/2021       Radiology:  VL Extremity Venous Bilateral         XR CHEST 1 VIEW   Final Result      No acute pulmonary disease.              Assessment/Plan:    Active Hospital Problems    Diagnosis Date Noted    Sarcoma [facility]) [C49.9] 02/15/2021       Plan:    # Sarcoma, metastatic to liver, spine  # T7 compression fracture w/ spinal cord compression  -neurosurgery consulted  -analgesia as needed  -consulted radiation therapy  -PT/OT  -plan for laminectomy 2/17     # Crohn's disease    DVT Prophylaxis: SCDs  Diet: Diet NPO Time Specified Exceptions are: Sips of Water with Meds  Code Status: Full Code    PT/OT Eval Status: Ongoing    Dispo: Alberto Gutierrez pending clinical improvement    Fito Jefferson MD

## 2021-02-17 NOTE — PLAN OF CARE
Problem: Pain:  Goal: Pain level will decrease  Description: Pain level will decrease  Outcome: Ongoing   PRN medication given for pain. Will continue to monitor. Problem: Falls - Risk of:  Goal: Will remain free from falls  Description: Will remain free from falls  Outcome: Ongoing   Fall risk precautions in place. Bed is locked and in lowest position. 2/4 side rails up. Call light within reach. Fall risk Bracelet in place. Frequent checks on patient. Free from falls at this time. Will continue to monitor.

## 2021-02-17 NOTE — PROGRESS NOTES
VSS. A&Ox4. Patient complaining of ongoing back and abdominal pain 8/10. Given prn pain meds w/ small sips. Pt NPO for upcoming surgery today. IV infusing. Up independently to BR. Pre-op completed with OR. Patient being transported to [address] [place] [address]. Called to give report, RN unavailable, stated she would call back. Will cont to monitor    Patient showered with chlorhexidine scrub.

## 2021-02-17 NOTE — PROGRESS NOTES
NEUROSURGERY PROGRESS NOTE    Patient Name: Darwin Geronimo YOB: 1980   Sex: Female Age: 41 yrs     Medical Record Number: [id number] Acct Number: [de-identified]   Room Number: 1568/3083-65 Hospital Day: Hospital Day: 3     Subjective: Pt still c/o of severe pain around abdomen and down legs  Objective:    VITAL SIGNS   /78   Pulse 82   Temp 98.2 °F (36.8 °C) (Oral)   Resp 16   Ht 5' 9\" (1.753 m)   Wt 263 lb 3.7 oz (119.4 kg)   SpO2 94%   BMI 38.87 kg/m²    Height Height: 5' 9\" (175.3 cm)   Weight Weight: 263 lb 3.7 oz (119.4 kg)        Allergies Allergies   Allergen Reactions    Nsaids      Pt has Crohn's    Ondansetron Hcl Other (See Comments)     States it makes her feel weird  States it makes her feel weird      Topamax [Topiramate] Hives, Itching and Rash      NPO Status Diet NPO Time Specified Exceptions are: Sips of Water with Meds   Isolation No active isolations     LABS   Basic Metabolic Profile Recent Labs     02/15/21  1858 02/16/21  0500 02/17/21  0516    140 135*    104 99   CO2 26 27 25   BUN 10 10 12   CREATININE 0.7 0.6 0.6   GLUCOSE 100* 83 129*      Complete Blood Count Recent Labs     02/15/21  1858 02/16/21  0500 02/17/21  0516   WBC 6.3 4.8 7.8   RBC 4.15 3.74* 4.27      Coagulation Studies Recent Labs     02/16/21  1343   INR 1.00        MEDICATIONS   Inpatient Medications   naloxegol, 12.5 mg, Oral, QAM    dexamethasone, 6 mg, Intravenous, Q8H    [COMPLETED] methocarbamol IVPB, 1,000 mg, Intravenous, Q8H **FOLLOWED BY** methocarbamol, 750 mg, Oral, 4 times per day    acetaminophen, 1,000 mg, Oral, 4 times per day    lidocaine, 1 patch, Transdermal, Daily    ceFAZolin (ANCEF) IVPB, 2,000 mg, Intravenous, On Call to OR    sodium chloride flush, 10 mL, Intravenous, 2 times per day    budesonide, 3 mg, Oral, Daily    docusate sodium, 100 mg, Oral, BID    polyethylene glycol, 17 g, Oral, BID   Infusions    sodium chloride 100 mL/hr at 02/16/21 2337      Antibiotics   Recent Abx Admin      No antibiotic orders with administrations found. Neurologic Exam:  Mental status: awake and alert and oriented x4        Musculoskeletal:   Gait: Not tested   Tone: normal  Sensory: intact to all extremities  Motor strength:    Right  Left      Right  Left    Deltoid  5 5   Hip Flex  4 4-   Biceps  5 5   Knee Extensors  4 4-   Triceps  5 5   Knee Flexors  4 4-   Wrist Ext  5 5   Ankle Dorsiflex. 4 4-   Wrist Flex  5 5   Ankle Plantarflex. 4 4-   Handgrip  5 5   Ext Clayton Longus  4 4-   Thumb Ext  5 5                  Respiratory:  Unlabored respiratory pattern    Abdomen:   Soft, ND       Cardiovascular:  Warm, well perfused    Assessment   Patient is a 41 y.o. female w/hx of sarcoma who presented with severe mid back pain. MRI shows a T7 mass with epidural extension and compression of the cord    Plan:  Or today to remove the mass  NPO  Pharmacy consult for pain management  Robaxin, valium  Roxicodone, tylenol, lidocaine patch      Patient was seen with Dr. Tee Gil who agrees with above assessment and plan. Electronically signed by:  Johnney Snellen, 2/17/2021 10:39 AM   Neurosurgery Nurse Practitioner  [phone]

## 2021-02-17 NOTE — PLAN OF CARE
Problem: Pain:  Goal: Pain level will decrease  Description: Pain level will decrease  2/16/2021 2207 by Cinthya Hanna RN  Outcome: Ongoing   Pt has reported pain that is being controlled with ordered pain medication, see MAR.

## 2021-02-17 NOTE — ANESTHESIA PRE PROCEDURE
Department of Anesthesiology  Preprocedure Note       Name:  Abeba Pop   Age:  41 y.o.  :  1980                                          MRN:  [id number]         Date:  2021      Surgeon: Radha Juddr):  Gonzalo Jaimes. Refugio Spatz, MD    Procedure: Procedure(s):  T7 LAMINECTOMY WITH T5-9 PEDICALE FIXATION AND REMOVAL OF MASS    Medications prior to admission:   Prior to Admission medications    Medication Sig Start Date End Date Taking? Authorizing Provider   HYDROcodone-acetaminophen (NORCO) 5-325 MG per tablet Take 1 tablet by mouth every 6 hours as needed for Pain for up to 30 days. 2/9/21 3/11/21 Yes Carol Sidhu MD   docusate sodium (COLACE) 100 MG capsule Take 1 capsule by mouth 2 times daily 21  Yes Colt Valdez MD   polyethylene glycol [facility]) 17 GM/SCOOP powder Take 17 g by mouth 2 times daily 2/4/21 3/6/21 Yes Colt Valdez MD   Turmeric (QC TUMERIC COMPLEX) 500 MG CAPS Take 500 mg by mouth daily   Yes Historical Provider, MD   budesonide (ENTOCORT EC) 3 MG extended release capsule  20  Yes Historical Provider, MD   lidocaine-prilocaine (EMLA) 2.5-2.5 % cream Apply a dot of cream to top of port and cover with plastic wrap 30-60 minutes before needle placed.  19   Historical Provider, MD       Current medications:    Current Facility-Administered Medications   Medication Dose Route Frequency Provider Last Rate Last Admin    methocarbamol (ROBAXIN) 1,000 mg in dextrose 5 % 100 mL IVPB  1,000 mg Intravenous Q8H TRACY Irvin  mL/hr at 21 1411 1,000 mg at 21 1411    Followed by   Mack Schneider ON 2021] methocarbamol (ROBAXIN) tablet 750 mg  750 mg Oral 4 times per day TRACY Irvin CNP        sennosides-docusate sodium (SENOKOT-S) 8.6-50 MG tablet 2 tablet  2 tablet Oral BID TRACY Irvin CNP        oxyCODONE (ROXICODONE) immediate release tablet 10 mg  10 mg Oral Q4H PRN TRACY Irvin CNP        Or   Moira Cano oxyCODONE (OXY-IR) immediate release tablet 15 mg  15 mg Oral Q4H PRN Nguyen Double, APRN - CNP   15 mg at 02/17/21 1204    HYDROmorphone (DILAUDID) injection 0.25 mg  0.25 mg Intravenous Q5 Min PRN Rhetta Scale, MD        HYDROmorphone (DILAUDID) injection 0.5 mg  0.5 mg Intravenous Q5 Min PRN Rhetta Scale, MD        morphine injection 1 mg  1 mg Intravenous Q5 Min PRN Rhetta Scale, MD        HYDROmorphone (DILAUDID) injection 0.5 mg  0.5 mg Intravenous Q5 Min PRN Rhetta Scale, MD        oxyCODONE (ROXICODONE) immediate release tablet 5 mg  5 mg Oral PRN Rhetta Scale, MD        Or    oxyCODONE (ROXICODONE) immediate release tablet 10 mg  10 mg Oral PRN Rhetta Scale, MD        diphenhydrAMINE (BENADRYL) injection 12.5 mg  12.5 mg Intravenous Once PRN Rhetta Scale, MD        metoclopramide (REGLAN) injection 10 mg  10 mg Intravenous Once PRN Rhetta Scale, MD        promethazine (PHENERGAN) injection 6.25 mg  6.25 mg Intravenous Once PRN Rhetta Scale, MD        labetalol (NORMODYNE;TRANDATE) injection 5 mg  5 mg Intravenous Q10 Min PRN Rhetta Scale, MD        hydrALAZINE (APRESOLINE) injection 5 mg  5 mg Intravenous Q10 Min PRN Rhetta Scale, MD        meperidine (DEMEROL) injection 12.5 mg  12.5 mg Intravenous Q5 Min PRN Rhetta Scale, MD        HYDROmorphone (DILAUDID) injection 1 mg  1 mg Intravenous Q3H PRN Marah Hatfield MD   1 mg at 02/17/21 1335    naloxone (NARCAN) injection 0.4 mg  0.4 mg Intravenous PRN Marah Hatfield MD        naloxegol (MOVANTIK) tablet 12.5 mg  12.5 mg Oral QAM Marah Hatfield MD   12.5 mg at 02/17/21 1039    Dexamethasone Sodium Phosphate injection 6 mg  6 mg Intravenous Q8H Marah Hatfield MD   6 mg at 02/17/21 0911    acetaminophen (TYLENOL) tablet 1,000 mg  1,000 mg Oral 4 times per day Nguyen Double, APRN - CNP   1,000 mg at 02/17/21 1204    lidocaine 4 % external patch 1 patch  1 patch Transdermal Daily Nguyen Double, APRN - CNP   1 patch at 02/17/21 0911    diazePAM (VALIUM) tablet 5 mg  5 mg Oral Q6H PRN Beth Pott, APRN - CNP   5 mg at 02/17/21 1039    ceFAZolin (ANCEF) 2000 mg in dextrose 3 % 50 mL IVPB (duplex)  2,000 mg Intravenous On Call to [address] [place] [address], APRN - CNP        0.9 % sodium chloride infusion   Intravenous Continuous Beth Pott, APRN -  mL/hr at 02/17/21 1335 New Bag at 02/17/21 1335    sodium chloride flush 0.9 % injection 10 mL  10 mL Intravenous 2 times per day Fatmata Reese MD   10 mL at 02/17/21 0911    sodium chloride flush 0.9 % injection 10 mL  10 mL Intravenous PRN Fatmata Reese MD        potassium chloride 10 mEq/100 mL IVPB (Peripheral Line)  10 mEq Intravenous PRN Fatmata Reese MD        magnesium sulfate 2000 mg in 50 mL IVPB premix  2,000 mg Intravenous PRN Fatmata Reese MD        magnesium hydroxide (MILK OF MAGNESIA) 400 MG/5ML suspension 30 mL  30 mL Oral Daily PRN Fatmata Reese MD        budesonide (ENTOCORT EC) extended release capsule 3 mg  3 mg Oral Daily Fatmata Reese MD   3 mg at 02/17/21 0911    polyethylene glycol (GLYCOLAX) packet 17 g  17 g Oral BID Fatmata Reese MD   17 g at 02/16/21 2022    promethazine (PHENERGAN) injection 6.25 mg  6.25 mg Intramuscular Q6H PRN Fatmata Reese MD   6.25 mg at 02/15/21 2018    ALPRAZolam (XANAX) tablet 0.25 mg  0.25 mg Oral Nightly PRN Fatmata Reese MD   0.25 mg at 02/16/21 2348    simethicone (MYLICON) chewable tablet 80 mg  80 mg Oral Q6H PRN Martinez Chew MD   80 mg at 02/16/21 0700       Allergies:     Allergies   Allergen Reactions    Nsaids      Pt has Crohn's    Ondansetron Hcl Other (See Comments)     States it makes her feel weird  States it makes her feel weird      Topamax [Topiramate] Hives, Itching and Rash       Problem List:    Patient Active Problem List   Diagnosis Code    Premature menopause E28.319    Anxiety disorder F41.9    Osteoarthritis, knee M17.10    [facility] ([facility] K50.90    IBS (irritable bowel syndrome) K58.9    Morbid obesity with BMI of 40.0-44.9, adult (HCC) E66.01, Z68.41    Mixed hyperlipidemia E78.2    Vitamin B12 deficiency E53.8    Small bowel obstruction (HCC) K56.609    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    Moderate malnutrition (HCC) E44.0    Class 2 obesity due to excess calories with body mass index (BMI) of 39.0 to 39.9 in adult E66.09, Z68.39    Chronic depression F32.9    Weight loss counseling, encounter for Z71.3    Liver mass R16.0    Metastatic cancer to liver (HCC) C78.7    Neutropenic fever (HCC) D70.9, R50.81    Sarcoma ([address] C49.9       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     Crohn's disease ([address]     Depression     Early menopause     follows with endocrine/gyne    IBS (irritable bowel syndrome)     IBS (irritable bowel syndrome)     Mixed hyperlipidemia 6/22/2017    Obesity     Osteoarthritis, knee     PONV (postoperative nausea and vomiting)     Prolonged emergence from general anesthesia     panic attacks on awakening, requests anxiety med on awakening    Sarcoma of left lower extremity ([facility] 11/24/2017    s/p XRT, followed by surgery       Past Surgical History:        Procedure Laterality Date    ANKLE SURGERY Left     APPENDECTOMY      BREAST REDUCTION SURGERY      CARPAL TUNNEL RELEASE Right 07/2020    KNEE ARTHROSCOPY Right 1/2015    KNEE ARTHROSCOPY Right 7/2015    LIVER RESECTION N/A 7/22/2019    ROBOTIC ASSISTED PARTIAL LIVER RESECTION AND ABDOMINAL MASS RESECTION performed by Salome Hurt MD at [address] [place] [address] Left 11/2017    for sarcoma    OTHER SURGICAL HISTORY Left     biopsy of left thigh mass    OTHER SURGICAL HISTORY Left 02/26/2018    WIDE EXCISION LEFT THIGH SARCOMA          OTHER SURGICAL HISTORY Left 03/14/2018    INCISION AND DRAINAGE OF LEFT THIGH       OTHER SURGICAL HISTORY Left 03/22/2018    incision and drainage left posterior thigh    PORT SURGERY Right 11/30/2020    PORT REMOVAL performed by Gretel Gonzalez MD at [address] SALPINGECTOMY Left 2009    d/t scar tissue    TOTAL KNEE ARTHROPLASTY Right 9/2015       Social History:    Social History     Tobacco Use    Smoking status: Former Smoker     Packs/day: 0.50     Years: 15.00     Pack years: 7.50     Types: Cigarettes    Smokeless tobacco: Never Used   Substance Use Topics    Alcohol use: Yes     Comment: rare                                Counseling given: Not Answered      Vital Signs (Current):   Vitals:    02/17/21 0226 02/17/21 0744 02/17/21 1034 02/17/21 1120   BP: 110/69 (!) 97/59 117/78 129/74   Pulse: 60 64 82 93   Resp: 16 16 16 16   Temp: 98.2 °F (36.8 °C) 98.6 °F (37 °C) 98.2 °F (36.8 °C) 97.9 °F (36.6 °C)   TempSrc: Oral Oral Oral Oral   SpO2: 95% 96% 94% 93%   Weight:       Height:                                                  BP Readings from Last 3 Encounters:   02/17/21 129/74   02/04/21 131/64   11/30/20 (!) 89/50       NPO Status:                                                                                 BMI:   Wt Readings from Last 3 Encounters:   02/15/21 263 lb 3.7 oz (119.4 kg)   11/30/20 255 lb (115.7 kg)   09/22/20 255 lb (115.7 kg)     Body mass index is 38.87 kg/m².     CBC:   Lab Results   Component Value Date    WBC 7.8 02/17/2021    RBC 4.27 02/17/2021    HGB 13.5 02/17/2021    HCT 41.0 02/17/2021    MCV 96.0 02/17/2021    RDW 14.1 02/17/2021     02/17/2021       CMP:   Lab Results   Component Value Date     02/17/2021    K 4.2 02/17/2021    CL 99 02/17/2021    CO2 25 02/17/2021    BUN 12 02/17/2021    CREATININE 0.6 02/17/2021    GFRAA >60 02/17/2021    AGRATIO 1.4 07/19/2019    LABGLOM >60 02/17/2021    LABGLOM 107 03/08/2013    GLUCOSE 129 02/17/2021    PROT 7.6 02/04/2021    PROT 7.1 03/08/2013    CALCIUM 10.2 02/17/2021    BILITOT 0.4 02/04/2021    ALKPHOS 98 02/04/2021    AST 16 02/04/2021 ALT 15 02/04/2021       POC Tests: No results for input(s): POCGLU, POCNA, POCK, POCCL, POCBUN, POCHEMO, POCHCT in the last 72 hours. Coags:   Lab Results   Component Value Date    PROTIME 11.6 02/16/2021    INR 1.00 02/16/2021    APTT 35.3 02/16/2021       HCG (If Applicable):   Lab Results   Component Value Date    PREGTESTUR Negative 02/16/2021        ABGs: No results found for: PHART, PO2ART, WZM4BEV, KMH1NCI, BEART, B3YWETHJ     Type & Screen (If Applicable):  No results found for: LABABO, LABRH    Drug/Infectious Status (If Applicable):  No results found for: HIV, HEPCAB    COVID-19 Screening (If Applicable):   Lab Results   Component Value Date    COVID19 Not Detected 02/16/2021    COVID19 Not Detected 11/25/2020         Anesthesia Evaluation  Patient summary reviewed and Nursing notes reviewed   history of anesthetic complications: PONV. Airway: Mallampati: II  TM distance: >3 FB   Neck ROM: full  Mouth opening: > = 3 FB Dental:          Pulmonary:Negative Pulmonary ROS                              Cardiovascular:                      Neuro/Psych:   (+) depression/anxiety             GI/Hepatic/Renal:   (+) liver disease:,           Endo/Other:    (+) malignancy/cancer. Abdominal:           Vascular:                                        Anesthesia Plan      general     ASA 1    (75-year-old female presents for T7 LAMINECTOMY WITH T5-9 PEDICALE FIXATION AND REMOVAL OF MASS. Plan general anesthesia with ASA standard monitors. Questions answered. Patient agreeable with anesthetic plan.  )  Induction: intravenous. Anesthetic plan and risks discussed with patient. Plan discussed with CRNA.     Attending anesthesiologist reviewed and agrees with Pre Eval content        Court Mayorga MD   2/17/2021

## 2021-02-18 LAB
ANION GAP SERPL CALCULATED.3IONS-SCNC: 10 MMOL/L (ref 3–16)
BUN BLDV-MCNC: 14 MG/DL (ref 7–20)
CALCIUM SERPL-MCNC: 9.2 MG/DL (ref 8.3–10.6)
CHLORIDE BLD-SCNC: 102 MMOL/L (ref 99–110)
CO2: 24 MMOL/L (ref 21–32)
CREAT SERPL-MCNC: 0.7 MG/DL (ref 0.6–1.1)
GFR AFRICAN AMERICAN: >60
GFR NON-AFRICAN AMERICAN: >60
GLUCOSE BLD-MCNC: 114 MG/DL (ref 70–99)
HCT VFR BLD CALC: 33.8 % (ref 36–48)
HEMOGLOBIN: 10.9 G/DL (ref 12–16)
MCH RBC QN AUTO: 31.4 PG (ref 26–34)
MCHC RBC AUTO-ENTMCNC: 32.2 G/DL (ref 31–36)
MCV RBC AUTO: 97.4 FL (ref 80–100)
PDW BLD-RTO: 14.5 % (ref 12.4–15.4)
PLATELET # BLD: 201 K/UL (ref 135–450)
PMV BLD AUTO: 7.8 FL (ref 5–10.5)
POTASSIUM REFLEX MAGNESIUM: 4.4 MMOL/L (ref 3.5–5.1)
RBC # BLD: 3.46 M/UL (ref 4–5.2)
SODIUM BLD-SCNC: 136 MMOL/L (ref 136–145)
WBC # BLD: 11.9 K/UL (ref 4–11)

## 2021-02-18 PROCEDURE — 6360000002 HC RX W HCPCS: Performed by: STUDENT IN AN ORGANIZED HEALTH CARE EDUCATION/TRAINING PROGRAM

## 2021-02-18 PROCEDURE — 97530 THERAPEUTIC ACTIVITIES: CPT

## 2021-02-18 PROCEDURE — 6370000000 HC RX 637 (ALT 250 FOR IP): Performed by: NURSE PRACTITIONER

## 2021-02-18 PROCEDURE — 97166 OT EVAL MOD COMPLEX 45 MIN: CPT

## 2021-02-18 PROCEDURE — 6360000002 HC RX W HCPCS: Performed by: PHYSICIAN ASSISTANT

## 2021-02-18 PROCEDURE — 6360000002 HC RX W HCPCS: Performed by: NURSE PRACTITIONER

## 2021-02-18 PROCEDURE — 97116 GAIT TRAINING THERAPY: CPT

## 2021-02-18 PROCEDURE — 6360000002 HC RX W HCPCS: Performed by: INTERNAL MEDICINE

## 2021-02-18 PROCEDURE — 94761 N-INVAS EAR/PLS OXIMETRY MLT: CPT

## 2021-02-18 PROCEDURE — 1200000000 HC SEMI PRIVATE

## 2021-02-18 PROCEDURE — 97535 SELF CARE MNGMENT TRAINING: CPT

## 2021-02-18 PROCEDURE — 97162 PT EVAL MOD COMPLEX 30 MIN: CPT

## 2021-02-18 PROCEDURE — 6370000000 HC RX 637 (ALT 250 FOR IP): Performed by: INTERNAL MEDICINE

## 2021-02-18 PROCEDURE — 85027 COMPLETE CBC AUTOMATED: CPT

## 2021-02-18 PROCEDURE — 6370000000 HC RX 637 (ALT 250 FOR IP): Performed by: STUDENT IN AN ORGANIZED HEALTH CARE EDUCATION/TRAINING PROGRAM

## 2021-02-18 PROCEDURE — 2580000003 HC RX 258: Performed by: PHYSICIAN ASSISTANT

## 2021-02-18 PROCEDURE — 6370000000 HC RX 637 (ALT 250 FOR IP): Performed by: NEUROLOGICAL SURGERY

## 2021-02-18 PROCEDURE — 80048 BASIC METABOLIC PNL TOTAL CA: CPT

## 2021-02-18 PROCEDURE — 2580000003 HC RX 258: Performed by: NURSE PRACTITIONER

## 2021-02-18 RX ORDER — CEFAZOLIN SODIUM 2 G/50ML
2000 SOLUTION INTRAVENOUS EVERY 8 HOURS
Status: COMPLETED | OUTPATIENT
Start: 2021-02-18 | End: 2021-02-19

## 2021-02-18 RX ORDER — TIZANIDINE 4 MG/1
4 TABLET ORAL EVERY 6 HOURS
Status: DISCONTINUED | OUTPATIENT
Start: 2021-02-18 | End: 2021-02-21 | Stop reason: HOSPADM

## 2021-02-18 RX ORDER — ONDANSETRON 2 MG/ML
4 INJECTION INTRAMUSCULAR; INTRAVENOUS EVERY 6 HOURS PRN
Status: DISCONTINUED | OUTPATIENT
Start: 2021-02-18 | End: 2021-02-21 | Stop reason: HOSPADM

## 2021-02-18 RX ORDER — OXYCODONE HYDROCHLORIDE 5 MG/1
5 TABLET ORAL EVERY 4 HOURS PRN
Status: DISCONTINUED | OUTPATIENT
Start: 2021-02-18 | End: 2021-02-18 | Stop reason: SDUPTHER

## 2021-02-18 RX ORDER — PROMETHAZINE HYDROCHLORIDE 12.5 MG/1
12.5 TABLET ORAL EVERY 6 HOURS PRN
Status: DISCONTINUED | OUTPATIENT
Start: 2021-02-18 | End: 2021-02-21 | Stop reason: HOSPADM

## 2021-02-18 RX ORDER — SODIUM CHLORIDE 0.9 % (FLUSH) 0.9 %
10 SYRINGE (ML) INJECTION EVERY 12 HOURS SCHEDULED
Status: DISCONTINUED | OUTPATIENT
Start: 2021-02-18 | End: 2021-02-21 | Stop reason: HOSPADM

## 2021-02-18 RX ORDER — BISACODYL 10 MG
10 SUPPOSITORY, RECTAL RECTAL DAILY PRN
Status: DISCONTINUED | OUTPATIENT
Start: 2021-02-18 | End: 2021-02-21 | Stop reason: HOSPADM

## 2021-02-18 RX ORDER — SODIUM CHLORIDE 0.9 % (FLUSH) 0.9 %
10 SYRINGE (ML) INJECTION PRN
Status: DISCONTINUED | OUTPATIENT
Start: 2021-02-18 | End: 2021-02-21 | Stop reason: HOSPADM

## 2021-02-18 RX ORDER — LIDOCAINE 4 G/G
2 PATCH TOPICAL DAILY
Status: DISCONTINUED | OUTPATIENT
Start: 2021-02-19 | End: 2021-02-21 | Stop reason: HOSPADM

## 2021-02-18 RX ORDER — SODIUM CHLORIDE 9 MG/ML
INJECTION, SOLUTION INTRAVENOUS CONTINUOUS
Status: DISCONTINUED | OUTPATIENT
Start: 2021-02-18 | End: 2021-02-18

## 2021-02-18 RX ADMIN — OXYCODONE 10 MG: 5 TABLET ORAL at 03:49

## 2021-02-18 RX ADMIN — ENOXAPARIN SODIUM 40 MG: 40 INJECTION SUBCUTANEOUS at 15:42

## 2021-02-18 RX ADMIN — Medication 10 ML: at 20:07

## 2021-02-18 RX ADMIN — ACETAMINOPHEN 1000 MG: 500 TABLET ORAL at 07:32

## 2021-02-18 RX ADMIN — TIZANIDINE 4 MG: 4 TABLET ORAL at 16:41

## 2021-02-18 RX ADMIN — HYDROMORPHONE HYDROCHLORIDE 1 MG: 1 INJECTION, SOLUTION INTRAMUSCULAR; INTRAVENOUS; SUBCUTANEOUS at 17:42

## 2021-02-18 RX ADMIN — METHOCARBAMOL 1000 MG: 100 INJECTION, SOLUTION INTRAMUSCULAR; INTRAVENOUS at 11:23

## 2021-02-18 RX ADMIN — DIAZEPAM 5 MG: 5 TABLET ORAL at 15:33

## 2021-02-18 RX ADMIN — TIZANIDINE 4 MG: 4 TABLET ORAL at 21:25

## 2021-02-18 RX ADMIN — DEXAMETHASONE SODIUM PHOSPHATE 6 MG: 4 INJECTION, SOLUTION INTRA-ARTICULAR; INTRALESIONAL; INTRAMUSCULAR; INTRAVENOUS; SOFT TISSUE at 15:41

## 2021-02-18 RX ADMIN — BUDESONIDE 3 MG: 3 CAPSULE, GELATIN COATED ORAL at 08:16

## 2021-02-18 RX ADMIN — NALOXEGOL OXALATE 12.5 MG: 12.5 TABLET, FILM COATED ORAL at 08:06

## 2021-02-18 RX ADMIN — POLYETHYLENE GLYCOL 3350 17 G: 17 POWDER, FOR SOLUTION ORAL at 20:01

## 2021-02-18 RX ADMIN — OXYCODONE HYDROCHLORIDE 15 MG: 15 TABLET ORAL at 08:16

## 2021-02-18 RX ADMIN — ACETAMINOPHEN 1000 MG: 500 TABLET ORAL at 00:17

## 2021-02-18 RX ADMIN — CEFAZOLIN SODIUM 2000 MG: 2 SOLUTION INTRAVENOUS at 15:47

## 2021-02-18 RX ADMIN — HYDROMORPHONE HYDROCHLORIDE 1 MG: 1 INJECTION, SOLUTION INTRAMUSCULAR; INTRAVENOUS; SUBCUTANEOUS at 15:41

## 2021-02-18 RX ADMIN — HYDROMORPHONE HYDROCHLORIDE 1 MG: 1 INJECTION, SOLUTION INTRAMUSCULAR; INTRAVENOUS; SUBCUTANEOUS at 01:35

## 2021-02-18 RX ADMIN — OXYCODONE HYDROCHLORIDE 15 MG: 15 TABLET ORAL at 11:29

## 2021-02-18 RX ADMIN — ACETAMINOPHEN 1000 MG: 500 TABLET ORAL at 17:41

## 2021-02-18 RX ADMIN — HYDROMORPHONE HYDROCHLORIDE 1 MG: 1 INJECTION, SOLUTION INTRAMUSCULAR; INTRAVENOUS; SUBCUTANEOUS at 13:22

## 2021-02-18 RX ADMIN — METHOCARBAMOL 1000 MG: 100 INJECTION, SOLUTION INTRAMUSCULAR; INTRAVENOUS at 03:49

## 2021-02-18 RX ADMIN — DOCUSATE SODIUM 50 MG AND SENNOSIDES 8.6 MG 2 TABLET: 8.6; 5 TABLET, FILM COATED ORAL at 20:01

## 2021-02-18 RX ADMIN — DIAZEPAM 5 MG: 5 TABLET ORAL at 09:12

## 2021-02-18 RX ADMIN — POLYETHYLENE GLYCOL 3350 17 G: 17 POWDER, FOR SOLUTION ORAL at 08:06

## 2021-02-18 RX ADMIN — OXYCODONE HYDROCHLORIDE 15 MG: 15 TABLET ORAL at 21:25

## 2021-02-18 RX ADMIN — ACETAMINOPHEN 1000 MG: 500 TABLET ORAL at 11:29

## 2021-02-18 RX ADMIN — DEXAMETHASONE SODIUM PHOSPHATE 6 MG: 4 INJECTION, SOLUTION INTRA-ARTICULAR; INTRALESIONAL; INTRAMUSCULAR; INTRAVENOUS; SOFT TISSUE at 01:35

## 2021-02-18 RX ADMIN — HYDROMORPHONE HYDROCHLORIDE 1 MG: 1 INJECTION, SOLUTION INTRAMUSCULAR; INTRAVENOUS; SUBCUTANEOUS at 09:12

## 2021-02-18 RX ADMIN — DEXAMETHASONE SODIUM PHOSPHATE 6 MG: 4 INJECTION, SOLUTION INTRA-ARTICULAR; INTRALESIONAL; INTRAMUSCULAR; INTRAVENOUS; SOFT TISSUE at 09:50

## 2021-02-18 RX ADMIN — HYDROMORPHONE HYDROCHLORIDE 1 MG: 1 INJECTION, SOLUTION INTRAMUSCULAR; INTRAVENOUS; SUBCUTANEOUS at 20:01

## 2021-02-18 RX ADMIN — SIMETHICONE 80 MG: 80 TABLET, CHEWABLE ORAL at 00:40

## 2021-02-18 RX ADMIN — DIAZEPAM 5 MG: 5 TABLET ORAL at 22:29

## 2021-02-18 RX ADMIN — OXYCODONE HYDROCHLORIDE 15 MG: 15 TABLET ORAL at 16:41

## 2021-02-18 RX ADMIN — DOCUSATE SODIUM 50 MG AND SENNOSIDES 8.6 MG 2 TABLET: 8.6; 5 TABLET, FILM COATED ORAL at 08:06

## 2021-02-18 RX ADMIN — CEFAZOLIN SODIUM 2000 MG: 2 SOLUTION INTRAVENOUS at 08:16

## 2021-02-18 ASSESSMENT — PAIN DESCRIPTION - PAIN TYPE
TYPE: SURGICAL PAIN

## 2021-02-18 ASSESSMENT — PAIN DESCRIPTION - ORIENTATION
ORIENTATION: MID;UPPER
ORIENTATION: UPPER
ORIENTATION: UPPER
ORIENTATION: MID;UPPER
ORIENTATION: UPPER;MID
ORIENTATION: MID;UPPER
ORIENTATION: MID;UPPER

## 2021-02-18 ASSESSMENT — PAIN DESCRIPTION - ONSET
ONSET: ON-GOING
ONSET: GRADUAL
ONSET: ON-GOING
ONSET: GRADUAL
ONSET: GRADUAL
ONSET: ON-GOING
ONSET: ON-GOING

## 2021-02-18 ASSESSMENT — PAIN SCALES - GENERAL
PAINLEVEL_OUTOF10: 6
PAINLEVEL_OUTOF10: 6
PAINLEVEL_OUTOF10: 7
PAINLEVEL_OUTOF10: 2
PAINLEVEL_OUTOF10: 0
PAINLEVEL_OUTOF10: 6
PAINLEVEL_OUTOF10: 7
PAINLEVEL_OUTOF10: 5
PAINLEVEL_OUTOF10: 7
PAINLEVEL_OUTOF10: 9
PAINLEVEL_OUTOF10: 7

## 2021-02-18 ASSESSMENT — PAIN DESCRIPTION - LOCATION
LOCATION: BACK

## 2021-02-18 ASSESSMENT — PAIN DESCRIPTION - FREQUENCY
FREQUENCY: CONTINUOUS

## 2021-02-18 ASSESSMENT — PAIN DESCRIPTION - PROGRESSION
CLINICAL_PROGRESSION: GRADUALLY WORSENING
CLINICAL_PROGRESSION: NOT CHANGED
CLINICAL_PROGRESSION: GRADUALLY WORSENING
CLINICAL_PROGRESSION: GRADUALLY IMPROVING

## 2021-02-18 ASSESSMENT — PAIN DESCRIPTION - DESCRIPTORS
DESCRIPTORS: ACHING
DESCRIPTORS: ACHING
DESCRIPTORS: SORE

## 2021-02-18 ASSESSMENT — PAIN DESCRIPTION - DIRECTION
RADIATING_TOWARDS: LEG
RADIATING_TOWARDS: LEG

## 2021-02-18 ASSESSMENT — PAIN - FUNCTIONAL ASSESSMENT
PAIN_FUNCTIONAL_ASSESSMENT: PREVENTS OR INTERFERES SOME ACTIVE ACTIVITIES AND ADLS
PAIN_FUNCTIONAL_ASSESSMENT: ACTIVITIES ARE NOT PREVENTED
PAIN_FUNCTIONAL_ASSESSMENT: PREVENTS OR INTERFERES SOME ACTIVE ACTIVITIES AND ADLS
PAIN_FUNCTIONAL_ASSESSMENT: ACTIVITIES ARE NOT PREVENTED
PAIN_FUNCTIONAL_ASSESSMENT: PREVENTS OR INTERFERES SOME ACTIVE ACTIVITIES AND ADLS
PAIN_FUNCTIONAL_ASSESSMENT: PREVENTS OR INTERFERES SOME ACTIVE ACTIVITIES AND ADLS

## 2021-02-18 NOTE — PROGRESS NOTES
Hospitalist Progress Note      PCP: Alana Pastor MD    Date of Admission: 2/15/2021    Chief Complaint:     No chief complaint on file. back pain    Subjective:  Patient seen and examined at the bedside. Just worked w/ therapy and did well but is quite sore.   Just took PO analgesics  I suspect she will still need IV analgesics today, but will wean as tolerated  Up Ad-stacey, therapy has signed off    PFHS: reviewed as documented 2/15/2021, no changes    Medications:  Reviewed    Infusion Medications    sodium chloride       Scheduled Medications    sodium chloride flush  10 mL Intravenous 2 times per day    ceFAZolin (ANCEF) IVPB  2,000 mg Intravenous Q8H    enoxaparin  40 mg Subcutaneous Daily    methocarbamol IVPB  1,000 mg Intravenous Q8H    Followed by   Teresa Micky ON 2/19/2021] methocarbamol  750 mg Oral 4 times per day    sennosides-docusate sodium  2 tablet Oral BID    naloxegol  12.5 mg Oral QAM    dexamethasone  6 mg Intravenous Q8H    acetaminophen  1,000 mg Oral 4 times per day    lidocaine  1 patch Transdermal Daily    sodium chloride flush  10 mL Intravenous 2 times per day    budesonide  3 mg Oral Daily    polyethylene glycol  17 g Oral BID     PRN Meds: sodium chloride flush, promethazine **OR** ondansetron, HYDROmorphone **OR** HYDROmorphone, bisacodyl, oxyCODONE **OR** oxyCODONE, diazePAM, naloxone, sodium chloride flush, potassium chloride, magnesium sulfate, magnesium hydroxide, promethazine, ALPRAZolam, simethicone      Intake/Output Summary (Last 24 hours) at 2/18/2021 0952  Last data filed at 2/18/2021 0851  Gross per 24 hour   Intake 3854 ml   Output 2475 ml   Net 1379 ml       Physical Exam    /84   Pulse 71   Temp 97.6 °F (36.4 °C) (Oral)   Resp 16   Ht 5' 9\" (1.753 m)   Wt 263 lb 3.7 oz (119.4 kg)   SpO2 96%   BMI 38.87 kg/m²     General appearance:  No acute distress, appears stated age  Eyes: Pupils equal, round, reactive to light, conjunctiva/corneas clear  Ears/Nose/Mouth/Throat: No external lesions or scars, hearing intact to voice  Neck: Trachea midline, no masses noted, no thyromegaly  Respiratory:  Non-labored breathing, clear to auscultation bilaterally  Cardiovascular: Regular rate and rhythm, no murmurs, gallops, or rubs  Abdomen: soft, non-tender, non-distended  Musculoskeletal: Warm, well perfused, no cyanosis or edema  Skin: normal color, no wounds noted  Psychiatric: A&Ox4, good insight and judgment    Labs:   Recent Labs     02/15/21  1858 02/16/21  0500 02/17/21  0516   WBC 6.3 4.8 7.8   HGB 13.0 11.8* 13.5   HCT 40.0 35.8* 41.0    188 242     Recent Labs     02/15/21  1858 02/16/21  0500 02/17/21  0516    140 135*   K 3.6 3.9 4.2    104 99   CO2 26 27 25   BUN 10 10 12   CREATININE 0.7 0.6 0.6   CALCIUM 9.8 9.4 10.2     No results for input(s): AST, ALT, BILIDIR, BILITOT, ALKPHOS in the last 72 hours. Recent Labs     02/16/21  1343   INR 1.00     No results for input(s): Almeida Scrivener in the last 72 hours. Urinalysis:      Lab Results   Component Value Date    NITRU Negative 02/04/2021    BLOODU Negative 02/04/2021    SPECGRAV <=1.005 02/04/2021    GLUCOSEU Negative 02/04/2021       Radiology:  VL Extremity Venous Bilateral   Final Result      XR CHEST 1 VIEW   Final Result      No acute pulmonary disease.              Assessment/Plan:    Active Hospital Problems    Diagnosis Date Noted    [facility]) [C49.9] 02/15/2021       Plan:    # Sarcoma, metastatic to liver, spine  # T7 compression fracture w/ spinal cord compression  -neurosurgery consulted  -consulted radiation therapy  -laminectomy 2/17  -PT/OT signed off, pt up ad-stacey  -analgesia as needed     # Crohn's disease    DVT Prophylaxis: Lovenox  Diet: DIET GENERAL;  Code Status: Full Code    PT/OT Eval Status: Complete    Dispo: Malva Rail pending clinical improvement, pain control    Kelly Cage MD

## 2021-02-18 NOTE — ANESTHESIA POSTPROCEDURE EVALUATION
Department of Anesthesiology  Postprocedure Note    Patient: Kar Landeros  MRN: [id number]  YOB: 1980  Date of evaluation: 2/17/2021  Time:  9:20 PM     Procedure Summary     Date: 02/17/21 Room / Location: [facility]    Anesthesia Start: 6974 Anesthesia Stop: 2049    Procedure: T7 LAMINECTOMY WITH T5-9 PEDICALE FIXATION AND REMOVAL OF EPIDURAL TUMOR AT T7 (N/A ) Diagnosis: (T7 MASS)    Surgeons: Hedy Simms. Ute Perez MD Responsible Provider: Moriah Lopez MD    Anesthesia Type: general ASA Status: 3          Anesthesia Type: general    Adalgisa Phase I: Adalgisa Score: 8    Adalgisa Phase II:      Last vitals: Reviewed and per EMR flowsheets.        Anesthesia Post Evaluation    Patient location during evaluation: PACU  Patient participation: complete - patient participated  Level of consciousness: awake and alert  Pain score: 10  Airway patency: patent  Nausea & Vomiting: no nausea and no vomiting  Complications: no  Cardiovascular status: hemodynamically stable  Respiratory status: acceptable  Hydration status: euvolemic

## 2021-02-18 NOTE — PROGRESS NOTES
Notified APRIL Marquez NP that patient is requesting morphine instead of dilaudid. He said dilaudid did help as much and causing itching. Was not after Toradol. He denies needing Benadryl said the itching isn't that bad.

## 2021-02-18 NOTE — PROGRESS NOTES
Physical Therapy    Facility/Department: [facility]  Initial Assessment and treatment    NAME: Isaias Hall  : 1980  MRN: [id number]    Date of Service: 2021    Discharge Recommendations:Clara Moore scored a 17/24 on the AM-PAC short mobility form. Current research shows that an AM-PAC score of 18 or greater is typically associated with a discharge to the patient's home setting. Based on the patient's AM-PAC score and their current functional mobility deficits, it is recommended that the patient have 2-3 sessions per week of Physical Therapy at d/c to increase the patient's independence. At this time, this patient demonstrates the endurance and safety to discharge home with home services and a follow up treatment frequency of 2-3x/wk. Please see assessment section for further patient specific details. If patient discharges prior to next session this note will serve as a discharge summary. Please see below for the latest assessment towards goals. PT Equipment Recommendations  Equipment Needed: Yes  Mobility Devices: Unknown Hamming: Rolling    Assessment   Body structures, Functions, Activity limitations: Decreased functional mobility ; Decreased balance;Decreased strength  Assessment: The pt is a 42 y/o female who presents after T7 laminectomy with T5-9 pedicale fixation and removal of mass. She is typically independent with all mobility without AD and able to perform all ADLs and iADLs without difficulty. The pt is currently having more difficulty with bed mobility, transfers, and ambulation. She requires use of walker with mobility for safety. She has impaired balance, increased pain and LE weakness. She would benefit from further PT upon discharge home to improve her independence with mobility. She will have 24 hr assist and supervision at home.   Treatment Diagnosis: impaired mobility secondary to tumor  Prognosis: Good  Decision Making: Medium Complexity  PT Education: Goals;PT Role;Plan of Care;General Safety;Gait Training;Transfer Training;Functional Mobility Training  Patient Education: pt verbalized understanding  REQUIRES PT FOLLOW UP: Yes  Activity Tolerance  Activity Tolerance: Patient limited by fatigue;Patient limited by pain  Activity Tolerance: also limited by LE weakness       Patient Diagnosis(es): There were no encounter diagnoses. 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 has a past surgical history that includes salpingectomy (Left, 2009); Appendectomy; Ankle surgery (Left); Breast reduction surgery; Knee arthroscopy (Right, 01/2015); Knee arthroscopy (Right, 07/2015); Total knee arthroplasty (Right, 09/2015); other surgical history (Left); Muscle biopsy (Left, 11/2017); other surgical history (Left, 02/26/2018); other surgical history (Left, 03/14/2018); other surgical history (Left, 03/22/2018); Liver Resection (N/A, 07/22/2019); Carpal tunnel release (Right, 07/2020); Port Surgery (Right, 11/30/2020); and laminectomy (N/A, 2/17/2021).     Restrictions  Position Activity Restriction  Spinal Precautions: No Bending, No Lifting, No Twisting  Other position/activity restrictions: ambulate, activity as tolerated  Vision/Hearing  Vision: Impaired  Vision Exceptions: Wears glasses for distance  Hearing: Within functional limits     Subjective  General  Chart Reviewed: Yes  Patient assessed for rehabilitation services?: Yes  Additional Pertinent Hx: Patient is a 25-year-old female with past medical history of Crohn's disease, sarcoma of left lower extremity, closed which helped compression fracture T7 vertebrae who presents to the hospital from PCP office for uncontrolled pain in lower back. She was in remission from sarcoma but had MRI and found to have metastasis in her abdomen. Pt had T7 LAMINECTOMY WITH T5-9 PEDICALE FIXATION AND REMOVAL OF EPIDURAL TUMOR AT T7 (N/A ) on 2/17. Family / Caregiver Present: No  Referring Practitioner: INEZ Eubanks  Diagnosis: sarcoma  Follows Commands: Within Functional Limits  General Comment  Comments: The pt presents supine in bed and willing to work with therapy. Subjective  Subjective: \"I am doing well, I think I have more strength in my legs but I am having some pain. \"  Pain Screening  Patient Currently in Pain: Yes(5/10 back, RN aware and medicated pt beginning of session)  Vital Signs  Patient Currently in Pain: Yes(5/10 back pain at rest and 7/10 with movement)       Orientation  Orientation  Overall Orientation Status: Within Normal Limits  Social/Functional History  Social/Functional History  Lives With: Spouse(10 and 12 y/o children at home and spouse home 24/7)  Type of Home: House  Home Layout: Two level, Bed/Bath upstairs(does not need to use basement for anything)  Home Access: Stairs to enter without rails  Entrance Stairs - Number of Steps: 3  Bathroom Shower/Tub: Walk-in shower  Bathroom Toilet: Standard  Bathroom Equipment: Grab bars in shower, Hand-held shower(built in bench)  Home Equipment: (no DME)  ADL Assistance: [facility] [address]: Independent(severe pain over the last month so she has needed help with iADLs)  Homemaking Responsibilities: Yes  Ambulation Assistance: Independent  Transfer Assistance: Independent  Active : Yes  Occupation: Full time employment  Type of occupation: will be short term disability;   Leisure & Hobbies: garden, outside hiking and biking, reading,meditating  Additional Comments: did have an accidental fall; rolled her ankle going down steps; due to her spinal tumor she noted weakness bilaterally with slightly more on her L  Cognition   Cognition  Overall Cognitive Status: [facility]    Objective             Strength RLE  Strength RLE: Exception  Comment: hip flex 4-/5, knee flex/ext 4/5/4/5, DF 5/5  Strength LLE  Strength LLE: Exception  Comment: hip flex 4-/5, knee flex/ext 4/5/4/5, DF 5/5     Sensation  Overall Sensation Status: [facility]  Bed mobility  Rolling to Right: Stand by assistance  Supine to Sit: Stand by assistance(from almost completely flat bed)  Sit to Supine: Stand by assistance  Scooting: Stand by assistance(to EOB)  Comment: cueing for maintaining back precautions  Transfers  Sit to Stand: Contact guard assistance(from bed, from toilet)  Stand to sit: Contact guard assistance(to chair, toilet)  Comment: cueing for hand placement with transfers  Ambulation  Ambulation?: Yes  More Ambulation?: Yes  Ambulation 1  Surface: level tile  Device: Hand-Held Assist  Assistance: Minimal assistance  Quality of Gait: decreased lily, decreased step height, decreased step length  Distance: 10'  Comments: pt slightly unsteady; one person CGA and another CGA with HHA  Ambulation 2  Surface - 2: level tile  Device 2: Rolling Walker  Assistance 2: Contact guard assistance  Quality of Gait 2: still decreased lily, step height, and step length but improved from not using AD  Distance: 250'  Comments: pt steady with ambulation though with increased WB through hands on walker  Stairs/Curb  Stairs?: No(pt refused as she felt too weak and had too much pain to attempt)     Balance  Sitting - Static: Fair;+  Sitting - Dynamic: Fair  Standing - Static: Fair  Standing - Dynamic: Fair;-  Comments: Sitting balance supervision sitting EOB       Treatment:  Functional mobility training and pt education    Plan   Plan  Times per week: 5-7  Times per day: Daily  Current Treatment Recommendations: Strengthening, Transfer Training, Endurance Training, Neuromuscular Re-education, Cognitive Reorientation, Patient/Caregiver Education & Training, Balance Training, Gait Training, Home Exercise Program, Functional Mobility Training, Safety Education & Training  Safety Devices  Type of devices: Call light within reach, Chair alarm in place, Left in chair, Nurse notified    AM-PAC Score  AM-PAC Inpatient Mobility Raw Score : 17 (02/18/21 1034)  AM-PAC Inpatient T-Scale Score : 42.13 (02/18/21 1034)  Mobility Inpatient CMS 0-100% Score: 50.57 (02/18/21 1034)  Mobility Inpatient CMS G-Code Modifier : CK (02/18/21 1034)          Goals  Short term goals  Time Frame for Short term goals: By discharge  Short term goal 1: The pt will perform bed mobility with mod I  Short term goal 2: The pt will transfer with supervision  Short term goal 3: The pt will ambulate x 150' with supervision and RW  Short term goal 4: The pt will be able to take 10 stairs with 1 rail and CGA  Patient Goals   Patient goals :  To get stronger and have less pain       Therapy Time   Individual Concurrent Group Co-treatment   Time In 0858         Time Out 0947         Minutes 49         Timed Code Treatment Minutes: 34 Minutes    Timed Code Treatment Minutes:  34 Minutes    Total Treatment Minutes:  49 minutes      Luis Ellison PT

## 2021-02-18 NOTE — PROGRESS NOTES
Talked with [facility] in pharmacy and notified her that patient states that dilaudid takes pain down to 5 but doesn't last long only for about an hour. She was going to talk with neurosurgery.

## 2021-02-18 NOTE — PROGRESS NOTES
NEUROSURGERY POST-OP PROGRESS NOTE    Patient Name: Kar Landeros YOB: 1980   Sex: Female Age: 41 yrs     Medical Record Number: [id number] Carlos Number: [de-identified]   Room Number: 8694/9575-50 Hospital Day: Hospital Day: 4     Interval History:  Post-operative Day# 1 s/p Procedure(s) (LRB):  T7 LAMINECTOMY WITH T5-9 PEDICALE FIXATION AND REMOVAL OF EPIDURAL TUMOR AT T7 (N/A)    Subjective: Pt states her back pain is different. It is from surgical incision now and is right there at incision. The pain that went around abdomen is gone.     Objective:    VITAL SIGNS   /86   Pulse 77   Temp 97.7 °F (36.5 °C) (Oral)   Resp 16   Ht 5' 9\" (1.753 m)   Wt 263 lb 3.7 oz (119.4 kg)   SpO2 100%   BMI 38.87 kg/m²    Height Height: 5' 9\" (175.3 cm)   Weight Weight: 263 lb 3.7 oz (119.4 kg)        Allergies Allergies   Allergen Reactions    Nsaids      Pt has Crohn's    Ondansetron Hcl Other (See Comments)     States it makes her feel weird  States it makes her feel weird      Topamax [Topiramate] Hives, Itching and Rash      NPO Status DIET GENERAL;   Isolation No active isolations     LABS   Basic Metabolic Profile Recent Labs     02/15/21  1858 02/16/21  0500 02/17/21  0516    140 135*    104 99   CO2 26 27 25   BUN 10 10 12   CREATININE 0.7 0.6 0.6   GLUCOSE 100* 83 129*      Complete Blood Count Recent Labs     02/15/21  1858 02/16/21  0500 02/17/21  0516   WBC 6.3 4.8 7.8   RBC 4.15 3.74* 4.27      Coagulation Studies Recent Labs     02/16/21  1343   INR 1.00        MEDICATIONS   Inpatient Medications     sodium chloride flush, 10 mL, Intravenous, 2 times per day    ceFAZolin (ANCEF) IVPB, 2,000 mg, Intravenous, Q8H    enoxaparin, 40 mg, Subcutaneous, Daily    [START ON 2/19/2021] naloxegol, 25 mg, Oral, QAM    methocarbamol IVPB, 1,000 mg, Intravenous, Q8H **FOLLOWED BY** [START ON 2/19/2021] methocarbamol, 750 mg, Oral, 4 times per day    sennosides-docusate sodium, 2 tablet, Oral, BID    dexamethasone, 6 mg, Intravenous, Q8H    acetaminophen, 1,000 mg, Oral, 4 times per day    lidocaine, 1 patch, Transdermal, Daily    sodium chloride flush, 10 mL, Intravenous, 2 times per day    budesonide, 3 mg, Oral, Daily    polyethylene glycol, 17 g, Oral, BID   Infusions      Antibiotics   Recent Abx Admin                   ceFAZolin (ANCEF) 2000 mg in dextrose 3 % 50 mL IVPB (duplex) (mg) 2,000 mg New Bag 02/18/21 0816    vancomycin (VANCOCIN) injection (g) 1 g Given 02/17/21 1934    ceFAZolin (ANCEF) 1,000 mg in sodium chloride 0.9 % 1,000 mL (mL) 1,000 mL Given 02/17/21 1708    ceFAZolin (ANCEF) injection (mg) 2,000 mg Given 02/17/21 1634                 Neurologic Exam:  Mental status: awake and alert and oriented x4    Musculoskeletal:   Gait: Not tested   Tone: normal  Sensory: intact to all extremities  Motor strength:    Right  Left    Right  Left    Deltoid  5 5  Hip Flex  5 5   Biceps  5 5  Knee Extensors  5 5   Triceps  5 5  Knee Flexors  5 5   Wrist Ext  5 5  Ankle Dorsiflex. 5 5   Wrist Flex  5 5  Ankle Plantarflex. 5 5   Handgrip  5 5  Ext Clayton Longus  5 5   Thumb Ext  5 5         Incision: intact, clean and dry  Drain:150/855=264    Respiratory:  Unlabored respiratory pattern    Abdomen:   Soft, ND       Cardiovascular:  Warm, well perfused    Assessment   Patient is a 40 yo F s/p Procedure(s) (LRB):  T7 LAMINECTOMY WITH T5-9 PEDICALE FIXATION AND REMOVAL OF EPIDURAL TUMOR AT T7 (N/A) per Dr. Aviva Hahn:  1. Neurologic exam frequency:q4  2. Mobility:PT/OT eval  3. Steroids:continue   4. Drain:continue  5. Tabares:remove  6. DVT Prophylaxis: SCDs and lovenox  7. Bowel Regimen: senna and glycolax  8. Pain control:working with pharmacy  9. Incisional Care: open to air  10. Dispo Planning:in pt    Patient was seen with Dr. Ely Goldberg who agrees with above assessment and plan. Electronically signed by:  Dash Rangel, 2/18/2021 12:13 PM   Neurosurgery Nurse Practitioner  [phone]

## 2021-02-18 NOTE — PLAN OF CARE
Problem: Pain:  Goal: Pain level will decrease  Outcome: Ongoing   Pt currently has a PCA pump for pain control. Pt did receive Oxycodone x 2 and x 1 Dilaudid 1 mg. Problem: Falls - Risk of:  Goal: Will remain free from falls  Outcome: Ongoing   Pt remains free from injury during this shift. Pt is up x 1 person assist, gait belt. Encourage pt to call for all assistance. Call light is in reach, bed alarm is activated for safety, bed locked and in lowest position. Will continue to monitor.

## 2021-02-18 NOTE — PROGRESS NOTES
PACU Transfer to Floor Note    Current Allergies: Nsaids, Ondansetron hcl, and Topamax [topiramate]    Pt meets criteria as per Adalgisa Score and ASPAN Standards to transfer to next phase of care. No results for input(s): POCGLU in the last 72 hours. Vitals:    02/17/21 2145   BP: 107/71   Pulse: 60   Resp: 9   Temp: 97.7 °F (36.5 °C)   SpO2: 100%       SpO2: 100 %           Intake/Output Summary (Last 24 hours) at 2/17/2021 2153  Last data filed at 2/17/2021 2049  Gross per 24 hour   Intake 3460 ml   Output 1930 ml   Net 1530 ml       Pain assessment:  not receiving treatment    Pain Level: 7    Patient was assessed for alterations to skin integrity. There were not alterations observed. Is patient incontinent: no has cain to gravity    Handoff report given at bedside.  Call to floor Eloise RN will call back  Family updated and directed to pt room  in room      2/17/2021 9:53 PM

## 2021-02-18 NOTE — PROGRESS NOTES
Current Allergies: Nsaids, Ondansetron hcl, and Topamax [topiramate]    No results for input(s): POCGLU in the last 72 hours. Admitted to PACU bed 13 from OR. Arrived on a bed . Attached to PACU monitoring system. Alarms and parameters set. Report received from anesthesia personnel Wade Damico. OR staff did not report skin issues that were observed while in OR  No problems reported intraoperatively. Pt arrived with oxygen per nasal cannula with oxygen at 4 liters. Athrombic wraps in place.

## 2021-02-18 NOTE — CARE COORDINATION
Patient here from home with . Plans for  to transport when ready. Pathology pending. PCA pump stopped. Patient unsure if she will need any services at discharge. CM sent message to Manuel/Lolita to notify of need for RW. CM will continue to follow for any changes to discharge plans.   Mukul Bower RN  RN Case Manager  [phone]

## 2021-02-18 NOTE — PROGRESS NOTES
Oncology Progress Note    PCP: Lan Choi MD    Date of Admission: 2/15/2021    Reason for Consult: Metastatic cancer, likely sarcoma recurrence    Subjective: Pt is feeling better today. Reported that her LE weakness, numbness/tingling has improved. Denied CP, ab pain, n/v. Has constipation, last BM was 3 days ago. Medications:  Reviewed    Infusion Medications    sodium chloride       Scheduled Medications    sodium chloride flush  10 mL Intravenous 2 times per day    ceFAZolin (ANCEF) IVPB  2,000 mg Intravenous Q8H    enoxaparin  40 mg Subcutaneous Daily    methocarbamol IVPB  1,000 mg Intravenous Q8H    Followed by   Didi Mazariegos ON 2/19/2021] methocarbamol  750 mg Oral 4 times per day    sennosides-docusate sodium  2 tablet Oral BID    naloxegol  12.5 mg Oral QAM    dexamethasone  6 mg Intravenous Q8H    acetaminophen  1,000 mg Oral 4 times per day    lidocaine  1 patch Transdermal Daily    sodium chloride flush  10 mL Intravenous 2 times per day    budesonide  3 mg Oral Daily    polyethylene glycol  17 g Oral BID     PRN Meds: sodium chloride flush, promethazine **OR** ondansetron, HYDROmorphone **OR** HYDROmorphone, bisacodyl, oxyCODONE **OR** oxyCODONE, diazePAM, naloxone, sodium chloride flush, potassium chloride, magnesium sulfate, magnesium hydroxide, promethazine, ALPRAZolam, simethicone      Intake/Output Summary (Last 24 hours) at 2/18/2021 1133  Last data filed at 2/18/2021 0851  Gross per 24 hour   Intake 3854.5 ml   Output 2475 ml   Net 1379.5 ml       Physical Exam Performed:    /86   Pulse 77   Temp 97.7 °F (36.5 °C) (Oral)   Resp 16   Ht 5' 9\" (1.753 m)   Wt 263 lb 3.7 oz (119.4 kg)   SpO2 100%   BMI 38.87 kg/m²     General appearance: No apparent distress, appears stated age and cooperative. HEENT: Pupils equal, round, and reactive to light. Respiratory:  Normal respiratory effort. Clear to auscultation, bilaterally without Rales/Wheezes/Rhonchi. Cardiovascular: Regular rate and rhythm with normal S1/S2 without murmurs, rubs or gallops. Abdomen: Soft, non-tender, non-distended with normal bowel sounds. Back: Drain in place, draining red blood. Incision is C/D/I. No redness/swelling. Musculoskeletal: No clubbing, cyanosis or edema bilaterally. Neurologic:  LE Strenght 5/5, sensation intact. Neurovascularly intact without any focal sensory/motor deficits. Cranial nerves: II-XII intact, grossly non-focal.  Psychiatric: Alert and oriented, thought content appropriate, normal insight  Peripheral Pulses: +2 palpable, equal bilaterally     Labs:   Recent Labs     02/15/21  1858 02/16/21  0500 02/17/21  0516   WBC 6.3 4.8 7.8   HGB 13.0 11.8* 13.5   HCT 40.0 35.8* 41.0    188 242     Recent Labs     02/15/21  1858 02/16/21  0500 02/17/21  0516    140 135*   K 3.6 3.9 4.2    104 99   CO2 26 27 25   BUN 10 10 12   CREATININE 0.7 0.6 0.6   CALCIUM 9.8 9.4 10.2     No results for input(s): AST, ALT, BILIDIR, BILITOT, ALKPHOS in the last 72 hours. Recent Labs     02/16/21  1343   INR 1.00     No results for input(s): Freire Will in the last 72 hours. Urinalysis:      Lab Results   Component Value Date    NITRU Negative 02/04/2021    BLOODU Negative 02/04/2021    SPECGRAV <=1.005 02/04/2021    GLUCOSEU Negative 02/04/2021       Radiology:  VL Extremity Venous Bilateral   Final Result      XR CHEST 1 VIEW   Final Result      No acute pulmonary disease. Assessment/Plan:    LE Weakness: 2/2 Metastatic tumor at T7 w/ cord compression  - s/p T7 laminectomy w/ T5-T9 pedicle fixation and removal of mass, awaiting pathology (expecting recurrence of sarcoma).  Once we have pathology, we will request molecular profiling.  - Pain better controlled on current regimen  - Consult to Philomena Bowers as she will require postop radiation to the T7 tumor bed as well as T12 & L3.  - Receiving Decadron 6mg q8rh, taper as per NSGY recs    Hx of myxoid

## 2021-02-18 NOTE — PROGRESS NOTES
general anesthesia, and Sarcoma of left lower extremity ([facility] [address] has a past surgical history that includes salpingectomy (Left, 2009); Appendectomy; Ankle surgery (Left); Breast reduction surgery; Knee arthroscopy (Right, 01/2015); Knee arthroscopy (Right, 07/2015); Total knee arthroplasty (Right, 09/2015); other surgical history (Left); Muscle biopsy (Left, 11/2017); other surgical history (Left, 02/26/2018); other surgical history (Left, 03/14/2018); other surgical history (Left, 03/22/2018); Liver Resection (N/A, 07/22/2019); Carpal tunnel release (Right, 07/2020); Port Surgery (Right, 11/30/2020); and laminectomy (N/A, 2/17/2021). Treatment Diagnosis: Impaired ADLs and mobility s/p spinal sx      Restrictions  Position Activity Restriction  Spinal Precautions: No Bending, No Lifting, No Twisting  Other position/activity restrictions: ambulate, activity as tolerated    Subjective   General  Chart Reviewed: Yes  Patient assessed for rehabilitation services?: Yes  Additional Pertinent Hx: Patient is a 41 y.o. female w/hx of sarcoma who presented with severe mid back pain. MRI shows a T7 mass with epidural extension and compression of the cord. T7 LAMINECTOMY WITH T5-9 PEDICALE FIXATION AND REMOVAL OF MASS 2/17   PMHx: Sarcoma L LE s/p thigh excision  Referring Practitioner: INEZ Elizabeth  Diagnosis: sarcoma  Subjective  Subjective: Pt in bed, reports she's been up to bathroom once, eager to try walking.  \"My left leg is feeling stronger already\"  Pain Assessment: Yes (5/10 , RN medicated beginning of session)    Social/Functional History  Social/Functional History  Lives With: Spouse(10 and 14 y/o children at home and spouse home 24/7)  Type of Home: House  Home Layout: Two level, Bed/Bath upstairs(does not need to use basement for anything)  Home Access: Stairs to enter without rails  Entrance Stairs - Number of Steps: 3  Bathroom Shower/Tub: Walk-in shower  Bathroom Toilet: Standard  Bathroom Equipment: Grab bars in shower, Hand-held shower(built in bench)  Home Equipment: (no DME)  ADL Assistance: [address]: Independent(severe pain over the last month so she has needed help with iADLs)  Homemaking Responsibilities: Yes  Ambulation Assistance: Independent  Transfer Assistance: Independent  Active : Yes  Occupation: Full time employment  Type of occupation: will be short term disability;   Leisure & Hobbies: garden, outside hiking and biking, reading,meditating  Additional Comments: did have an accidental fall; rolled her ankle going down steps; due to her spinal tumor she noted weakness bilaterally with slightly more on her L       Objective   Vision: Within Functional Limits  Vision Exceptions: Wears glasses for distance  Hearing: Within functional limits      Orientation  Overall Orientation Status: Within Normal Limits        Functional Mobility  Functional - Mobility Device: Rolling Walker  Activity: To/from bathroom; Other(hallway 300')  Assist Level: Contact guard assistance  Functional Mobility Comments: trialed mobility to bathroom without AD- slow and effortful 2/2 pain. Introduced walker for mobility in room/hallway to assist pain management- pt reported decreased pain with use of walker    Toilet Transfers  Toilet - Technique: Ambulating  Equipment Used: Standard toilet(R grab bar)  Toilet Transfer: Contact guard assistance  Toilet Transfers Comments: VC to maintain back precautions- prevent twisting when reaching for grab bar    ADL  LE Dressing: Modified independent (don socks (seated eob, figure 4 w/ UE assist to lift L LE 2/2 weakness and decreased knee ROM))  Toileting: Contact guard assistance    Education: Pt educated on DME recommendations and modified ADLs to maintain spinal precautions and for increased safety/independence with ADLs.  Educated on safe shower transfer technique with initial family assist         Bed mobility  Rolling to Right: Stand by assistance  Supine to Sit: Stand by assistance(from almost completely flat bed)  Sit to Supine: Stand by assistance  Scooting: Stand by assistance(to EOB)  Comment: cuing for maintaining back precautions     Transfers  Sit to stand: Contact guard assistance(bed)  Stand to sit: Contact guard assistance(chair)        Cognition  Overall Cognitive Status: WFL        Sensation  Overall Sensation Status: WFL        LUE AROM (degrees)  LUE AROM : WFL  RUE AROM (degrees)  RUE AROM : WFL              Treatment consisted of:  ADLs, transfer training, education on activity promotion and fall precautions.            Plan   Plan  Times per week: 5-7  Times per day: Daily      AM-PAC Score        AM-PAC Inpatient Daily Activity Raw Score: 20 (02/18/21 1235)  AM-PAC Inpatient ADL T-Scale Score : 42.03 (02/18/21 1235)  ADL Inpatient CMS 0-100% Score: 38.32 (02/18/21 1235)  ADL Inpatient CMS G-Code Modifier : Lanie Felder (02/18/21 [facility])    Goals  Short term goals  Time Frame for Short term goals: discharge  Short term goal 1: doff/don pants/underwear with supervision- not met  Short term goal 2: tolerate standing 10 minutes for ADLs/mobility for ADLs- not met  Short term goal 3: demo adherence to spinal precautions during ADLs without VC- not met  Short term goal 4: supervision toilet transfer- not met  Patient Goals   Patient goals : d/c home, less back pain       Therapy Time   Individual Concurrent Group Co-treatment   Time In 0858         Time Out 0947         Minutes 49         Timed Code Treatment Minutes: 34 Minutes +15 min maxim Augustin, OT

## 2021-02-18 NOTE — PROGRESS NOTES
Pt A&Ox4. Tearful and agitated at beginning of shift due to lower back pain and hemovac getting in the way and getting pulled. Ice compress applied to patient lower back and was not tolerated. PRN pain meds administered and followed up w/ pt an hour later and med effective. Pain level went from 8/10 to a 6/10. Assessed and VS documented in flowsheet. Eyes PERRLA. Lung sounds clear in all lobes. S1 and S2 present and regular rhythm. Symmetrical chest expansion and unlabored breathing. Skin was cool, dry and intact. Peripheral pulses in all 4 extremities. Normoactive bowel sounds in all 4 quads. Abdomen soft and non-distended. Non tender. Bilateral upper extremities equal and strong in strength. RLE strong in strength. LLE weak in strength. Bed in lowest position and call light w/in reach. All needs met and pt in bed w/  near bedside.

## 2021-02-19 PROBLEM — Z98.1 STATUS POST THORACIC SPINAL FUSION: Status: ACTIVE | Noted: 2021-02-19

## 2021-02-19 LAB
ANION GAP SERPL CALCULATED.3IONS-SCNC: 7 MMOL/L (ref 3–16)
BUN BLDV-MCNC: 11 MG/DL (ref 7–20)
CALCIUM SERPL-MCNC: 8.8 MG/DL (ref 8.3–10.6)
CHLORIDE BLD-SCNC: 106 MMOL/L (ref 99–110)
CO2: 26 MMOL/L (ref 21–32)
CREAT SERPL-MCNC: 0.6 MG/DL (ref 0.6–1.1)
GFR AFRICAN AMERICAN: >60
GFR NON-AFRICAN AMERICAN: >60
GLUCOSE BLD-MCNC: 119 MG/DL (ref 70–99)
HCT VFR BLD CALC: 28.6 % (ref 36–48)
HEMOGLOBIN: 9.6 G/DL (ref 12–16)
MCH RBC QN AUTO: 32.1 PG (ref 26–34)
MCHC RBC AUTO-ENTMCNC: 33.6 G/DL (ref 31–36)
MCV RBC AUTO: 95.3 FL (ref 80–100)
PDW BLD-RTO: 14 % (ref 12.4–15.4)
PLATELET # BLD: 159 K/UL (ref 135–450)
PMV BLD AUTO: 8.4 FL (ref 5–10.5)
POTASSIUM REFLEX MAGNESIUM: 4.3 MMOL/L (ref 3.5–5.1)
RBC # BLD: 3 M/UL (ref 4–5.2)
SODIUM BLD-SCNC: 139 MMOL/L (ref 136–145)
WBC # BLD: 7.5 K/UL (ref 4–11)

## 2021-02-19 PROCEDURE — 80048 BASIC METABOLIC PNL TOTAL CA: CPT

## 2021-02-19 PROCEDURE — 6370000000 HC RX 637 (ALT 250 FOR IP): Performed by: NURSE PRACTITIONER

## 2021-02-19 PROCEDURE — 97116 GAIT TRAINING THERAPY: CPT

## 2021-02-19 PROCEDURE — 99221 1ST HOSP IP/OBS SF/LOW 40: CPT | Performed by: NURSE PRACTITIONER

## 2021-02-19 PROCEDURE — 6370000000 HC RX 637 (ALT 250 FOR IP): Performed by: NEUROLOGICAL SURGERY

## 2021-02-19 PROCEDURE — 6370000000 HC RX 637 (ALT 250 FOR IP): Performed by: INTERNAL MEDICINE

## 2021-02-19 PROCEDURE — 6360000002 HC RX W HCPCS: Performed by: INTERNAL MEDICINE

## 2021-02-19 PROCEDURE — 2580000003 HC RX 258: Performed by: INTERNAL MEDICINE

## 2021-02-19 PROCEDURE — 97530 THERAPEUTIC ACTIVITIES: CPT

## 2021-02-19 PROCEDURE — 6370000000 HC RX 637 (ALT 250 FOR IP): Performed by: STUDENT IN AN ORGANIZED HEALTH CARE EDUCATION/TRAINING PROGRAM

## 2021-02-19 PROCEDURE — 6360000002 HC RX W HCPCS: Performed by: STUDENT IN AN ORGANIZED HEALTH CARE EDUCATION/TRAINING PROGRAM

## 2021-02-19 PROCEDURE — 85027 COMPLETE CBC AUTOMATED: CPT

## 2021-02-19 PROCEDURE — 97535 SELF CARE MNGMENT TRAINING: CPT

## 2021-02-19 PROCEDURE — 6360000002 HC RX W HCPCS: Performed by: NURSE PRACTITIONER

## 2021-02-19 PROCEDURE — 36415 COLL VENOUS BLD VENIPUNCTURE: CPT

## 2021-02-19 PROCEDURE — 6360000002 HC RX W HCPCS: Performed by: PHYSICIAN ASSISTANT

## 2021-02-19 PROCEDURE — 1200000000 HC SEMI PRIVATE

## 2021-02-19 PROCEDURE — 2580000003 HC RX 258: Performed by: PHYSICIAN ASSISTANT

## 2021-02-19 RX ORDER — LIDOCAINE 4 G/G
1 PATCH TOPICAL DAILY
Qty: 10 PATCH | Refills: 0 | Status: SHIPPED | OUTPATIENT
Start: 2021-02-20 | End: 2021-03-02

## 2021-02-19 RX ORDER — 0.9 % SODIUM CHLORIDE 0.9 %
500 INTRAVENOUS SOLUTION INTRAVENOUS ONCE
Status: COMPLETED | OUTPATIENT
Start: 2021-02-19 | End: 2021-02-19

## 2021-02-19 RX ORDER — TIZANIDINE 4 MG/1
4 TABLET ORAL EVERY 6 HOURS
Qty: 40 TABLET | Refills: 0 | Status: SHIPPED | OUTPATIENT
Start: 2021-02-19 | End: 2021-03-01

## 2021-02-19 RX ORDER — DEXAMETHASONE 4 MG/1
4 TABLET ORAL EVERY 12 HOURS SCHEDULED
Status: DISCONTINUED | OUTPATIENT
Start: 2021-02-22 | End: 2021-02-21 | Stop reason: HOSPADM

## 2021-02-19 RX ORDER — DIAZEPAM 5 MG/1
5 TABLET ORAL EVERY 6 HOURS PRN
Qty: 40 TABLET | Refills: 0 | Status: SHIPPED | OUTPATIENT
Start: 2021-02-19 | End: 2021-03-01

## 2021-02-19 RX ORDER — LIDOCAINE 4 G/G
1 PATCH TOPICAL DAILY
Status: DISCONTINUED | OUTPATIENT
Start: 2021-02-19 | End: 2021-02-21 | Stop reason: HOSPADM

## 2021-02-19 RX ORDER — DEXAMETHASONE 4 MG/1
4 TABLET ORAL EVERY 6 HOURS SCHEDULED
Status: COMPLETED | OUTPATIENT
Start: 2021-02-19 | End: 2021-02-20

## 2021-02-19 RX ORDER — OXYCODONE HYDROCHLORIDE 10 MG/1
10-15 TABLET ORAL EVERY 6 HOURS PRN
Qty: 80 TABLET | Refills: 0 | Status: SHIPPED | OUTPATIENT
Start: 2021-02-19 | End: 2021-03-05

## 2021-02-19 RX ORDER — SENNA AND DOCUSATE SODIUM 50; 8.6 MG/1; MG/1
2 TABLET, FILM COATED ORAL 2 TIMES DAILY
Status: ON HOLD | COMMUNITY
Start: 2021-02-19 | End: 2021-03-22 | Stop reason: ALTCHOICE

## 2021-02-19 RX ORDER — DEXAMETHASONE 4 MG/1
4 TABLET ORAL EVERY 8 HOURS SCHEDULED
Status: DISCONTINUED | OUTPATIENT
Start: 2021-02-20 | End: 2021-02-21 | Stop reason: HOSPADM

## 2021-02-19 RX ORDER — DEXAMETHASONE 4 MG/1
4 TABLET ORAL EVERY 8 HOURS SCHEDULED
Qty: 18 TABLET | Refills: 0 | Status: SHIPPED | OUTPATIENT
Start: 2021-02-20 | End: 2021-02-22

## 2021-02-19 RX ORDER — DEXAMETHASONE 4 MG/1
4 TABLET ORAL DAILY
Status: DISCONTINUED | OUTPATIENT
Start: 2021-02-26 | End: 2021-02-21 | Stop reason: HOSPADM

## 2021-02-19 RX ADMIN — Medication 10 ML: at 08:55

## 2021-02-19 RX ADMIN — OXYCODONE HYDROCHLORIDE 15 MG: 15 TABLET ORAL at 01:45

## 2021-02-19 RX ADMIN — DEXAMETHASONE SODIUM PHOSPHATE 6 MG: 4 INJECTION, SOLUTION INTRA-ARTICULAR; INTRALESIONAL; INTRAMUSCULAR; INTRAVENOUS; SOFT TISSUE at 08:51

## 2021-02-19 RX ADMIN — DEXAMETHASONE 4 MG: 4 TABLET ORAL at 15:26

## 2021-02-19 RX ADMIN — DIAZEPAM 5 MG: 5 TABLET ORAL at 12:51

## 2021-02-19 RX ADMIN — NALOXEGOL OXALATE 25 MG: 25 TABLET, FILM COATED ORAL at 10:21

## 2021-02-19 RX ADMIN — OXYCODONE HYDROCHLORIDE 15 MG: 15 TABLET ORAL at 17:42

## 2021-02-19 RX ADMIN — DEXAMETHASONE SODIUM PHOSPHATE 6 MG: 4 INJECTION, SOLUTION INTRA-ARTICULAR; INTRALESIONAL; INTRAMUSCULAR; INTRAVENOUS; SOFT TISSUE at 00:10

## 2021-02-19 RX ADMIN — SODIUM CHLORIDE 500 ML: 900 INJECTION, SOLUTION INTRAVENOUS at 00:12

## 2021-02-19 RX ADMIN — POLYETHYLENE GLYCOL 3350 17 G: 17 POWDER, FOR SOLUTION ORAL at 08:51

## 2021-02-19 RX ADMIN — HYDROMORPHONE HYDROCHLORIDE 1 MG: 1 INJECTION, SOLUTION INTRAMUSCULAR; INTRAVENOUS; SUBCUTANEOUS at 00:09

## 2021-02-19 RX ADMIN — TIZANIDINE 4 MG: 4 TABLET ORAL at 15:26

## 2021-02-19 RX ADMIN — DOCUSATE SODIUM 50 MG AND SENNOSIDES 8.6 MG 2 TABLET: 8.6; 5 TABLET, FILM COATED ORAL at 08:51

## 2021-02-19 RX ADMIN — DEXAMETHASONE 4 MG: 4 TABLET ORAL at 17:42

## 2021-02-19 RX ADMIN — BUDESONIDE 3 MG: 3 CAPSULE, GELATIN COATED ORAL at 08:51

## 2021-02-19 RX ADMIN — POLYETHYLENE GLYCOL 3350 17 G: 17 POWDER, FOR SOLUTION ORAL at 20:33

## 2021-02-19 RX ADMIN — HYDROMORPHONE HYDROCHLORIDE 0.5 MG: 1 INJECTION, SOLUTION INTRAMUSCULAR; INTRAVENOUS; SUBCUTANEOUS at 20:33

## 2021-02-19 RX ADMIN — HYDROMORPHONE HYDROCHLORIDE 1 MG: 1 INJECTION, SOLUTION INTRAMUSCULAR; INTRAVENOUS; SUBCUTANEOUS at 09:05

## 2021-02-19 RX ADMIN — ENOXAPARIN SODIUM 40 MG: 40 INJECTION SUBCUTANEOUS at 08:51

## 2021-02-19 RX ADMIN — OXYCODONE HYDROCHLORIDE 15 MG: 15 TABLET ORAL at 05:41

## 2021-02-19 RX ADMIN — DIAZEPAM 5 MG: 5 TABLET ORAL at 05:41

## 2021-02-19 RX ADMIN — TIZANIDINE 4 MG: 4 TABLET ORAL at 20:33

## 2021-02-19 RX ADMIN — TIZANIDINE 4 MG: 4 TABLET ORAL at 08:52

## 2021-02-19 RX ADMIN — TIZANIDINE 4 MG: 4 TABLET ORAL at 03:02

## 2021-02-19 RX ADMIN — ONDANSETRON 4 MG: 2 INJECTION INTRAMUSCULAR; INTRAVENOUS at 22:22

## 2021-02-19 RX ADMIN — HYDROMORPHONE HYDROCHLORIDE 1 MG: 1 INJECTION, SOLUTION INTRAMUSCULAR; INTRAVENOUS; SUBCUTANEOUS at 03:02

## 2021-02-19 RX ADMIN — CEFAZOLIN SODIUM 2000 MG: 2 SOLUTION INTRAVENOUS at 01:46

## 2021-02-19 RX ADMIN — HYDROMORPHONE HYDROCHLORIDE 1 MG: 1 INJECTION, SOLUTION INTRAMUSCULAR; INTRAVENOUS; SUBCUTANEOUS at 11:47

## 2021-02-19 RX ADMIN — Medication 10 ML: at 20:33

## 2021-02-19 RX ADMIN — ACETAMINOPHEN 1000 MG: 500 TABLET ORAL at 05:41

## 2021-02-19 RX ADMIN — DOCUSATE SODIUM 50 MG AND SENNOSIDES 8.6 MG 2 TABLET: 8.6; 5 TABLET, FILM COATED ORAL at 20:33

## 2021-02-19 RX ADMIN — ACETAMINOPHEN 1000 MG: 500 TABLET ORAL at 11:23

## 2021-02-19 RX ADMIN — OXYCODONE HYDROCHLORIDE 15 MG: 15 TABLET ORAL at 12:52

## 2021-02-19 RX ADMIN — ACETAMINOPHEN 1000 MG: 500 TABLET ORAL at 17:43

## 2021-02-19 ASSESSMENT — PAIN DESCRIPTION - LOCATION
LOCATION: BACK

## 2021-02-19 ASSESSMENT — PAIN DESCRIPTION - ONSET
ONSET: ON-GOING

## 2021-02-19 ASSESSMENT — PAIN - FUNCTIONAL ASSESSMENT
PAIN_FUNCTIONAL_ASSESSMENT: ACTIVITIES ARE NOT PREVENTED
PAIN_FUNCTIONAL_ASSESSMENT: PREVENTS OR INTERFERES SOME ACTIVE ACTIVITIES AND ADLS
PAIN_FUNCTIONAL_ASSESSMENT: ACTIVITIES ARE NOT PREVENTED

## 2021-02-19 ASSESSMENT — PAIN DESCRIPTION - ORIENTATION
ORIENTATION: UPPER

## 2021-02-19 ASSESSMENT — PAIN DESCRIPTION - PAIN TYPE
TYPE: SURGICAL PAIN

## 2021-02-19 ASSESSMENT — PAIN DESCRIPTION - DIRECTION
RADIATING_TOWARDS: LEGS

## 2021-02-19 ASSESSMENT — PAIN SCALES - GENERAL
PAINLEVEL_OUTOF10: 0
PAINLEVEL_OUTOF10: 5
PAINLEVEL_OUTOF10: 6
PAINLEVEL_OUTOF10: 0

## 2021-02-19 ASSESSMENT — PAIN DESCRIPTION - PROGRESSION
CLINICAL_PROGRESSION: NOT CHANGED
CLINICAL_PROGRESSION: GRADUALLY WORSENING
CLINICAL_PROGRESSION: GRADUALLY WORSENING

## 2021-02-19 ASSESSMENT — PAIN DESCRIPTION - FREQUENCY
FREQUENCY: CONTINUOUS

## 2021-02-19 ASSESSMENT — PAIN DESCRIPTION - DESCRIPTORS
DESCRIPTORS: ACHING
DESCRIPTORS: ACHING

## 2021-02-19 NOTE — PROGRESS NOTES
NEUROSURGERY POST-OP PROGRESS NOTE    Patient Name: Suha Gfof YOB: 1980   Sex: Female Age: 41 yrs     Medical Record Number: [id number] Acct Number: [de-identified]   Room Number: 8442/6768-31 Hospital Day: Hospital Day: 5     Interval History:  Post-operative Day# 2 s/p Procedure(s) (LRB):  T7 LAMINECTOMY WITH T5-9 PEDICALE FIXATION AND REMOVAL OF EPIDURAL TUMOR AT T7 (N/A)    Subjective: Pt states she thinks her pain is better but still required IV pain meds during the night. She thinks her legs are stronger.     Objective:    VITAL SIGNS   /83   Pulse 71   Temp 97.6 °F (36.4 °C) (Oral)   Resp 16   Ht 5' 9\" (1.753 m)   Wt 263 lb 3.7 oz (119.4 kg)   SpO2 98%   BMI 38.87 kg/m²    Height Height: 5' 9\" (175.3 cm)   Weight Weight: 263 lb 3.7 oz (119.4 kg)        Allergies Allergies   Allergen Reactions    Nsaids      Pt has Crohn's    Ondansetron Hcl Other (See Comments)     States it makes her feel weird  States it makes her feel weird      Topamax [Topiramate] Hives, Itching and Rash      NPO Status DIET GENERAL;   Isolation No active isolations     LABS   Basic Metabolic Profile Recent Labs     02/17/21  0516 02/18/21  1350 02/19/21  0546   * 136 139   CL 99 102 106   CO2 25 24 26   BUN 12 14 11   CREATININE 0.6 0.7 0.6   GLUCOSE 129* 114* 119*      Complete Blood Count Recent Labs     02/17/21  0516 02/18/21  1350 02/19/21  0546   WBC 7.8 11.9* 7.5   RBC 4.27 3.46* 3.00*      Coagulation Studies Recent Labs     02/16/21  1343   INR 1.00        MEDICATIONS   Inpatient Medications     lidocaine, 1 patch, Transdermal, Daily    sodium chloride flush, 10 mL, Intravenous, 2 times per day    enoxaparin, 40 mg, Subcutaneous, Daily    naloxegol, 25 mg, Oral, QAM    lidocaine, 2 patch, Transdermal, Daily    tiZANidine, 4 mg, Oral, Q6H    sennosides-docusate sodium, 2 tablet, Oral, BID    dexamethasone, 6 mg, Intravenous, Q8H    acetaminophen, 1,000 mg, Oral, 4 times per day   budesonide, 3 mg, Oral, Daily    polyethylene glycol, 17 g, Oral, BID   Infusions      Antibiotics   Recent Abx Admin                   ceFAZolin (ANCEF) 2000 mg in dextrose 3 % 50 mL IVPB (duplex) (mg) 2,000 mg New Bag 02/19/21 0146     2,000 mg New Bag 02/18/21 1547                 Neurologic Exam:  Mental status: awake and alert and oriented x4      Musculoskeletal:   Gait: Not tested   Tone: normal  Sensory: intact to all extremities  Motor strength:    Right  Left    Right  Left    Deltoid  5 5  Hip Flex  5 5   Biceps  5 5  Knee Extensors  5 5   Triceps  5 5  Knee Flexors  5 5   Wrist Ext  5 5  Ankle Dorsiflex. 5 5   Wrist Flex  5 5  Ankle Plantarflex. 5 5   Handgrip  5 5  Ext Clayton Longus  5 5   Thumb Ext  5 5         Incision: intact, clean and dry  Drain:190    Respiratory:  Unlabored respiratory pattern    Abdomen:   Soft, ND     Cardiovascular:  Warm, well perfused     Assessment   Patient is a 40 yo F s/p Procedure(s) (LRB):  T7 LAMINECTOMY WITH T5-9 PEDICALE FIXATION AND REMOVAL OF EPIDURAL TUMOR AT T7 (N/A) per Dr. Keila Perry. This patient requires greater than the 30 MED and longer than a 7 day supply for adequate pain control for severe acute postoperative pain after major neurological surgery. Plan:  1. Neurologic exam frequency:q4  2. Mobility:PT/OT   3. Steroids:continue   4. Drain:continue  5. Tabares:remove  6. DVT Prophylaxis: SCDs and lovenox  7. Bowel Regimen: senna and glycolax  8. Pain control:working with pharmacy  9. Incisional Care: open to air  10. Dispo Planning:in pt    Patient was seen with Dr. Keila Perry who agrees with above assessment and plan. Electronically signed by:  Prema Brooks, 2/19/2021 12:15 PM   Neurosurgery Nurse Practitioner  [phone]

## 2021-02-19 NOTE — PROGRESS NOTES
Internal Medicine PGY- 3 Resident Progress Note    PCP: Leighton Gaviria MD    Date of Admission: 2/15/2021    Reason for Consult: Metastatic cancer, likely sarcoma recurrence    Subjective: Pt is feeling fine today. Reported that her LE weakness, numbness/tingling has resolved. Denied CP, ab pain, n/v.  Continues to have constipation, last BM was 4 days ago. Medications:  Reviewed    Infusion Medications   Scheduled Medications    lidocaine  1 patch Transdermal Daily    sodium chloride flush  10 mL Intravenous 2 times per day    enoxaparin  40 mg Subcutaneous Daily    naloxegol  25 mg Oral QAM    lidocaine  2 patch Transdermal Daily    tiZANidine  4 mg Oral Q6H    sennosides-docusate sodium  2 tablet Oral BID    dexamethasone  6 mg Intravenous Q8H    acetaminophen  1,000 mg Oral 4 times per day    budesonide  3 mg Oral Daily    polyethylene glycol  17 g Oral BID     PRN Meds: sodium chloride flush, promethazine **OR** ondansetron, bisacodyl, HYDROmorphone **OR** HYDROmorphone, oxyCODONE **OR** oxyCODONE, diazePAM, naloxone, potassium chloride, magnesium sulfate, magnesium hydroxide, promethazine, ALPRAZolam, simethicone      Intake/Output Summary (Last 24 hours) at 2/19/2021 0851  Last data filed at 2/19/2021 8367  Gross per 24 hour   Intake 920 ml   Output 190 ml   Net 730 ml       Physical Exam Performed:    BP (!) 96/58   Pulse 54   Temp 98.1 °F (36.7 °C) (Oral)   Resp 16   Ht 5' 9\" (1.753 m)   Wt 263 lb 3.7 oz (119.4 kg)   SpO2 95%   BMI 38.87 kg/m²     General appearance: No apparent distress, appears stated age and cooperative. HEENT: Pupils equal, round, and reactive to light. Respiratory:  Normal respiratory effort. Clear to auscultation, bilaterally without Rales/Wheezes/Rhonchi. Cardiovascular: Regular rate and rhythm with normal S1/S2 without murmurs, rubs or gallops. Abdomen: Soft, non-tender, non-distended with normal bowel sounds. Back: Drain in place, draining red blood. Incision is C/D/I. No redness/swelling. Musculoskeletal: No clubbing, cyanosis or edema bilaterally. Neurologic:  LE Strenght 5/5, sensation intact. Neurovascularly intact without any focal sensory/motor deficits. Cranial nerves: II-XII intact, grossly non-focal.  Psychiatric: Alert and oriented, thought content appropriate, normal insight  Peripheral Pulses: +2 palpable, equal bilaterally     Labs:   Recent Labs     02/17/21  0516 02/18/21  1350 02/19/21  0546   WBC 7.8 11.9* 7.5   HGB 13.5 10.9* 9.6*   HCT 41.0 33.8* 28.6*    201 159     Recent Labs     02/17/21  0516 02/18/21  1350 02/19/21  0546   * 136 139   K 4.2 4.4 4.3   CL 99 102 106   CO2 25 24 26   BUN 12 14 11   CREATININE 0.6 0.7 0.6   CALCIUM 10.2 9.2 8.8     No results for input(s): AST, ALT, BILIDIR, BILITOT, ALKPHOS in the last 72 hours. Recent Labs     02/16/21  1343   INR 1.00     No results for input(s): Kiki Lacks in the last 72 hours. Urinalysis:      Lab Results   Component Value Date    NITRU Negative 02/04/2021    BLOODU Negative 02/04/2021    SPECGRAV <=1.005 02/04/2021    GLUCOSEU Negative 02/04/2021       Radiology:  VL Extremity Venous Bilateral   Final Result      XR CHEST 1 VIEW   Final Result      No acute pulmonary disease. MRI - Thoracic Spine 2/15/2021         Bony metastasis involving T7 vertebral body and pedicles with epidural tumor extension surrounding and compressing the cord at this level with tumor extending to the left paravertebral region at this level. Mild wedging compression fracture at T7       Bony metastasis involving the T12 vertebral body and left pedicle as described.        Suspected bony metastasis involving the L3 vertebral body noted on the localizer image.           Assessment/Plan:    LE Weakness: 2/2 Metastatic tumor at T7 w/ cord compression  - s/p T7 laminectomy w/ T5-T9 pedicle fixation and removal of mass, awaiting pathology (expecting recurrence of sarcoma). Once we have pathology, we will request molecular profiling.  - Pain better controlled on current regimen  - Consult to Dominga Jean-Baptiste [id number] she will require postop radiation to the T7 tumor bed as well as T12 & L3.  - Receiving Decadron 6mg q8rh, taper as per NSGY recs     Hx of myxoid liposarcoma  - Spine mets     Crohn's Dx  - On Entocort at home     Constipation  - added dulcolax suppository  - increased movantik  - Cont glycolax, senokot     DVT Prophylaxis: Lovenox 40    Discussed the patient with MD Lionel Manzo MD   Internal Medicine Resident PGY-3  Contact via [employer]

## 2021-02-19 NOTE — PROGRESS NOTES
Occupational Therapy  Daily Treatment Note  Patient Name: Peggy Crisostomo  MRN: [id number]    Assessment: pt is very pleasant and cooperative with therapy. She is ambulating spvn to SBA with the walker or CGA without it. She is able to perform all of her ADLs with no more than SBA/CGA as well. Activity tolerance is WFL for household distance mobility and self care. Will continue OT to improve functional balance and endurance. Recommend initial 24 hr spvn at home. Discharge Recommendations: Peggy Crisostomo scored a 21/24 on the AM-[facility] ADL Inpatient form. At this time, no further OT is recommended upon discharge. Recommend patient returns to prior setting with 24 hr spvn. Equipment Needs:  Shower chair - for fall prevention and energy conservation while bathing    Chart Reviewed: Yes     Other position/activity restrictions: ambulate, activity as tolerated   Additional Pertinent Hx: Patient is a 78-year-old female with past medical history of Crohn's disease, sarcoma of left lower extremity, closed which helped compression fracture T7 vertebrae who presents to the hospital from PCP office for uncontrolled pain in lower back. She was in remission from sarcoma but had MRI and found to have metastasis in her abdomen. Pt had T7 LAMINECTOMY WITH T5-9 PEDICALE FIXATION AND REMOVAL OF EPIDURAL TUMOR AT T7 (N/A ) on 2/17. Diagnosis: sarcoma  Treatment Diagnosis: Impaired ADLs and mobility s/p spinal sx    Subjective: Pt in bathroom with RN spvn on arrival. Very pleasant and cooperative. Pain: yes, surgical pain, mostly when transferring sit<>stand, controlled and tolerable at rest    Objective:    Cognition/Orientation: WFL    Functional Mobility   Sit to Stand: SBA (from toilet, arm chairs)  Stand to Sit: SBA (to arm chairs)  Chair Transfer: SBA with walker and without walker  Other: Functional mobility in room SBA with rolling walker. Pt requested to trial mobility in hallway without walker.  Initially utilized rail only lightly then felt more comfortable with light hand held assist. With onset of fatigue, noted worsening balance and acknowledged need for walker. CGA, hand held assist throughout. Time in stance: 2 min + 1 min + 5 min     ADLs   Grooming: Independent  LB dressing: Mod I (to don socks seated in chair; Spvn to SBA for clothing management in stance)    Activity Tolerance: Fair/Good    Patient Education: fall prevention, ADLs, d/c planning - verb understanding    Safety Devices in Place: left in chair, alarm on, needs in reach, RN aware      Goals:  Short term goals  Time Frame for Short term goals: discharge  Short term goal 1: doff/don pants/underwear with supervision- not met  Short term goal 2: tolerate standing 10 minutes for ADLs/mobility for ADLs- not met  Short term goal 3: demo adherence to spinal precautions during ADLs without VC- Met 2/19  Short term goal 4: supervision toilet transfer- not met         Plan:      Times per week: 5-7   Times per day: Daily    If patient is discharged prior to next treatment, this note will serve as the discharge summary.     Therapy Time   Individual Concurrent Group Co-treatment   Time In 1118         Time Out 1141         Minutes 23             Timed Code Treatment Minutes: 23  Total Treatment Time: 23       Mei Zamorano, OT

## 2021-02-19 NOTE — PLAN OF CARE
Problem: Pain:  Goal: Pain level will decrease  Description: Pain level will decrease  Outcome: Ongoing   Patient complains of pain, patient medicated per mar, will continue to monitor. Problem: Falls - Risk of:  Goal: Will remain free from falls  Description: Will remain free from falls  Outcome: Ongoing   Patient is at risk for falls. Patient is in bed with bed alarm on, fall risk ID, Nonskid socks, Bed in lowest position. Call light and bedside table are within reach. Patient calls out approprietly. Will continue to monitor.

## 2021-02-19 NOTE — PROGRESS NOTES
Hospitalist Progress Note      PCP: Mirtha Adames MD    Date of Admission: 2/15/2021    Chief Complaint:     No chief complaint on file. back pain    Subjective:  Patient seen and examined at the bedside. Pain improving, still requiring IV  She is tearful at this time about her prognosis  I offered palliative care consult and she was appreciative.     PFHS: reviewed as documented 2/15/2021, no changes    Medications:  Reviewed    Infusion Medications   Scheduled Medications    lidocaine  1 patch Transdermal Daily    sodium chloride flush  10 mL Intravenous 2 times per day    enoxaparin  40 mg Subcutaneous Daily    naloxegol  25 mg Oral QAM    lidocaine  2 patch Transdermal Daily    tiZANidine  4 mg Oral Q6H    sennosides-docusate sodium  2 tablet Oral BID    dexamethasone  6 mg Intravenous Q8H    acetaminophen  1,000 mg Oral 4 times per day    budesonide  3 mg Oral Daily    polyethylene glycol  17 g Oral BID     PRN Meds: sodium chloride flush, promethazine **OR** ondansetron, bisacodyl, HYDROmorphone **OR** HYDROmorphone, oxyCODONE **OR** oxyCODONE, diazePAM, naloxone, potassium chloride, magnesium sulfate, magnesium hydroxide, promethazine, ALPRAZolam, simethicone      Intake/Output Summary (Last 24 hours) at 2/19/2021 1142  Last data filed at 2/19/2021 1954  Gross per 24 hour   Intake 920 ml   Output 190 ml   Net 730 ml       Physical Exam    /83   Pulse 71   Temp 97.6 °F (36.4 °C) (Oral)   Resp 16   Ht 5' 9\" (1.753 m)   Wt 263 lb 3.7 oz (119.4 kg)   SpO2 98%   BMI 38.87 kg/m²     General appearance:  No acute distress, appears stated age  Eyes: Pupils equal, round, reactive to light, conjunctiva/corneas clear  Ears/Nose/Mouth/Throat: No external lesions or scars, hearing intact to voice  Neck: Trachea midline, no masses noted, no thyromegaly  Respiratory:  Non-labored breathing, clear to auscultation bilaterally  Cardiovascular: Regular rate and rhythm, no murmurs, gallops, or rubs  Abdomen: soft, non-tender, non-distended  Musculoskeletal: Warm, well perfused, no cyanosis or edema  Skin: normal color, no wounds noted  Psychiatric: A&Ox4, good insight and judgment    Labs:   Recent Labs     02/17/21  0516 02/18/21  1350 02/19/21  0546   WBC 7.8 11.9* 7.5   HGB 13.5 10.9* 9.6*   HCT 41.0 33.8* 28.6*    201 159     Recent Labs     02/17/21  0516 02/18/21  1350 02/19/21  0546   * 136 139   K 4.2 4.4 4.3   CL 99 102 106   CO2 25 24 26   BUN 12 14 11   CREATININE 0.6 0.7 0.6   CALCIUM 10.2 9.2 8.8     No results for input(s): AST, ALT, BILIDIR, BILITOT, ALKPHOS in the last 72 hours. Recent Labs     02/16/21  1343   INR 1.00     No results for input(s): Tenna Pinta in the last 72 hours. Urinalysis:      Lab Results   Component Value Date    NITRU Negative 02/04/2021    BLOODU Negative 02/04/2021    SPECGRAV <=1.005 02/04/2021    GLUCOSEU Negative 02/04/2021       Radiology:  VL Extremity Venous Bilateral   Final Result      XR CHEST 1 VIEW   Final Result      No acute pulmonary disease.              Assessment/Plan:    Active Hospital Problems    Diagnosis Date Noted    Status post thoracic spinal fusion [Z98.1] 02/19/2021    Sarcoma ([facility] [address] [C49.9] 02/15/2021       Plan:    # Sarcoma, metastatic to liver, spine  # T7 compression fracture w/ spinal cord compression  -neurosurgery consulted  -consulted radiation therapy  -laminectomy 2/17  -PT/OT signed off, pt up ad-stacey  -analgesia as needed     # Crohn's disease    DVT Prophylaxis: Lovenox  Diet: DIET GENERAL;  Code Status: Full Code    PT/OT Eval Status: Ongoing    Dispo: [facility] pending clinical improvement    Lisa Adames MD

## 2021-02-19 NOTE — CARE COORDINATION
Patient here from home . Plans for discharge to home with  transporting. CM will continue to follow for any changes to discharge plans. Elías Reid RN  RN Case Manager  [phone]    Updated  CM spoke to Beto Peterson NP. Patient requesting referral for palliative. Referral sent [facility] referral.  CM will continue to follow for discharge planning.   Elías Reid RN  RN Case Manager  [phone]

## 2021-02-19 NOTE — PROGRESS NOTES
Patients blood pressure running low 90s, contacted MD. Paulina Bingham patient 500cc bolus. Pressure improved.  Will continue to monitor

## 2021-02-19 NOTE — CONSULTS
[facility] [place]  Palliative Medicine Consultation Note      Date Of Admission:2/15/2021  Date of consult: 02/19/21  Seen by [facility] in the past:  No    Recommendations:        1238: Went to see pt at the bedside, introduced palliative care. She was in deep conversation with a family member at the bedside, politely requested this NP return later on.     [phone]: Met with the pt and her mother at the bedside, introduced palliative care. She reports her pain is well controlled on current regimen. Discussed her understanding of her medical condition. She is interested in resources to further assist with goals of care, symptom management and counseling. Suggested outpatient palliative care referral, which she is interested in. D/w CM JOB Rolle, who will make referral.    1. Goals of Care/Advanced Care planning/Code status: Full Code, continue with current management. Pt is hopeful that she will be able to spend more quality time with her family. 2. Pain 2/2 T7 compression fracture w/ spinal cord compression: Pt presented with severe pain in lower back. She reports it is now well controlled on current regimen, oxycodone 10-15mg PO q4h PRN, 4mg zanaflex q6h prn  3. SOB: pt denies  4. Disposition: Home w/ outpatient palliative care referral when medically ready for discharge    Reason for Consult:         [x]  Goals of Care  [x]  Code Status Discussion/Advanced Care Planning   [x]  Psychosocial/Family Support  []  Symptom Management  []  Other (Specify)    Requesting Physician: Dr. Flavio Weeks:  Unbearable pain, cancer metastasis    History Obtained From:  patient, electronic medical record    History of Present Illness:         Kar Landeros is a 41 y.o. female with PMH of Crohn's disease, sarcoma of LLE, compression fracture of the T7 vertebrae who presented with uncontrolled pain in her lower back from her PCP office.  She was in remission for her sarcoma, however recent MRI of her abdomen showed metastasis in her abdomen. Her pain was uncontrolled on home norco. NSGY was consulted, pt underwent a laminectomy 2/17. 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 [address]     Depression     Early menopause     follows with endocrine/gyne    IBS (irritable bowel syndrome)     IBS (irritable bowel syndrome)     Mixed hyperlipidemia 06/22/2017    Obesity     Osteoarthritis, knee     PONV (postoperative nausea and vomiting)     Prolonged emergence from general anesthesia     panic attacks on awakening, requests anxiety med on awakening    Sarcoma of left lower extremity ([facility] [address] 11/24/2017    s/p XRT, followed by surgery       Past Surgical History:        Procedure Laterality Date    ANKLE SURGERY Left     APPENDECTOMY      BREAST REDUCTION SURGERY      CARPAL TUNNEL RELEASE Right 07/2020    KNEE ARTHROSCOPY Right 01/2015    KNEE ARTHROSCOPY Right 07/2015    LAMINECTOMY N/A 2/17/2021    T7 LAMINECTOMY WITH T5-9 PEDICALE FIXATION AND REMOVAL OF EPIDURAL TUMOR AT T7 performed by [place] Karine.  Victoria Adams MD at [address] [place] [address] N/A 07/22/2019    ROBOTIC ASSISTED PARTIAL LIVER RESECTION AND ABDOMINAL MASS RESECTION performed by Erica Coronado MD at [address] [place] [address] Left 11/2017    for sarcoma    OTHER SURGICAL HISTORY Left     biopsy of left thigh mass    OTHER SURGICAL HISTORY Left 02/26/2018    WIDE EXCISION LEFT THIGH SARCOMA          OTHER SURGICAL HISTORY Left 03/14/2018    INCISION AND DRAINAGE OF LEFT THIGH       OTHER SURGICAL HISTORY Left 03/22/2018    incision and drainage left posterior thigh    PORT SURGERY Right 11/30/2020    PORT REMOVAL performed by Erica Coronado MD at [address] SALPINGECTOMY Left 2009    d/t scar tissue    TOTAL KNEE ARTHROPLASTY Right 09/2015       Current Medications:    Medications Prior to Admission: HYDROcodone-acetaminophen ([place]) care for the patient. Thank you to Dr. Delfina Cruz for this consultation. We will continue to follow Ms. Hicks's care as needed.     Brittaney Lentz [facility]  [phone]

## 2021-02-19 NOTE — PROGRESS NOTES
Physical Therapy  Daily Treatment Note    Discharge Recommendations: Kar Landeros scored a 18/24 on the AM-PAC short mobility form. Current research shows that an AM-PAC score of 18 or greater is typically associated with a discharge to the patient's home setting. Based on the patient's AM-PAC score and their current functional mobility deficits, it is recommended that the patient have 2-3 sessions per week of Physical Therapy at d/c to increase the patient's independence. At this time, this patient demonstrates the endurance and safety to discharge home with home PT and a follow up treatment frequency of 2-3x/wk. Please see assessment section for further patient specific detailsEquipment Needs:     Assessment:  Pt with improved gait tolerance. Steady with walker. Mobility slow and guarded. Pt report decreased discomfort when using walker for ambulation. Did well on stairs with railing. Plan is for home at D/C with assist of family. Pt was already issued a wheeled walker. May benefit from continued home PT to work towards baseline mobility. HOME HEALTH CARE: LEVEL 1 STANDARD  - Initial home health evaluation to occur within 24-48 hours, in patient home   - Therapy to evaluate with goal of regaining prior level of functioning   - Therapy to evaluate if patient has [address] needs for personal care    Chart Reviewed: Yes     Other position/activity restrictions: ambulate, activity as tolerated   Additional Pertinent Hx: Patient is a 25-year-old female with past medical history of Crohn's disease, sarcoma of left lower extremity, closed which helped compression fracture T7 vertebrae who presents to the hospital from PCP office for uncontrolled pain in lower back. She was in remission from sarcoma but had MRI and found to have metastasis in her abdomen. Pt had T7 LAMINECTOMY WITH T5-9 PEDICALE FIXATION AND REMOVAL OF EPIDURAL TUMOR AT T7 (N/A ) on 2/17.       Diagnosis: sarcoma   Treatment Diagnosis: impaired mobility secondary to tumor    Subjective: Pt in bed initially. Agreeable to working with PT on 2nd attempt. Would like to try the stairs. States she has 2 steps into house with door frame on one side and freezer on the other. Also has flight to bedroom with one railing. States she walked earlier without using the walker and had increased pain afterwards. \"I think I need to to use one for awhile. \"    Pain: Not rated. Reports she still is still having back pain, but better today. Objective:    Bed mobility  Supine to sit: SBA, HOB up with use of rail  Sit to Supine: SBA, HOB up with use of rail    Transfers  Sit to stand: SBA from bed; SBA from commode with grab bar  Stand to sit: SBA onto bed; SBA onto commode with grab bar    Ambulation  Assistance Level: SBA  Assistive device: Wheeled walker  Distance: ~ 500 ft in napier; 10 ft x 2, 5 ft in room (to/from bathroom/sink)  Quality of gait: Moderate reliance on walker for support; step through pattern; decreased pace; guarded    Stairs  Up/down 2 steps x 2 (4 total) with B rails (hand placement simulating door frame/low freezer at home) SBA  Up/down 2 steps x 3 (6 total) with B hands on one railing SBA--pt facing railing and ascending/descending sideways to avoid twisting. Patient Education  Stair negotiation with railing. Demonstrated safely. Calling for assist with needs. Expressed understanding. Safety Devices  Pt left with needs in reach. In bed with bed alarm on. Family present.      AM-PAC score  AM-PAC Inpatient Mobility Raw Score : 18  AM-PAC Inpatient T-Scale Score : 43.63  Mobility Inpatient CMS 0-100% Score: 46.58  Mobility Inpatient CMS G-Code Modifier : CK    Goals: (as determined and assessed by primary PT)- goals ongoing today  Time Frame for Short term goals: By discharge  Short term goal 1: The pt will perform bed mobility with mod I   Short term goal 2: The pt will transfer with supervision   Short term goal 3: The pt will ambulate x 150' with supervision and RW  Short term goal 4: The pt will be able to take 10 stairs with 1 rail and CGA     Plan:  Times per week: 5-7; Times per day: Daily  Current Treatment Recommendations: Strengthening, Transfer Training, Endurance Training, Neuromuscular Re-education, Cognitive Reorientation, Patient/Caregiver Education & Training, Balance Training, Gait Training, Home Exercise Program, Functional Mobility Training, Safety Education & Training    Therapy Time    Individual  Concurrent  Group  Co-treatment    Time In  1433            Time Out  1501            Minutes  28              Timed Code Treatment Minutes: 28  Total Treatment Minutes: 28    Will continue per plan of care. If patient is discharged prior to next treatment, this note will serve as the discharge summary.     Ronda Brooks, [place] [id number]  Ruby Marinelli, [facility]

## 2021-02-19 NOTE — PROGRESS NOTES
Clinical Pharmacy Progress Note    Admit date: 2/15/2021     Subjective/Objective:  42 yo F with PMH of Crohn's disease, depression, IBS, sarcoma of LLE, who presented 2/15 with uncontrolled pain in lower back. Pt was in remission for sarcoma but recently has MRI of abdomen which showed metastasis. Planning for OR today for T7 laminectomy with T5-T9 pedicle fixation and removal of mass. Pharmacy has been consulted to make pain medication recommendations by TRACY Day. 2/17: Pt transferred to  this AM in anticipation of OR. Planning for T7 laminectomy today. Currently NPO.  2/18: Pt was on hydromorphone PCA overnight which was stopped this AM. Pt reports pain has improved since yesterday. Pain scores 7-8, and per RN, pt has been OOB and active with therapies this AM.   2/19: Per medicine team, LE weakness/numbness and tingling have improved. Constipation - last BM was 4 days ago. Naloxegol dose increased this AM. Tizanidine added 2/19. Pain scores 7/10 this AM. BP 96/58 this AM.    Current pain regimen:   Medication  Home med? Amount used yesterday    Acetaminophen 1g PO q6h Yes (on Hydrocodone/APAP) 3 doses    Alprazolam 0.25 mg PO QHS PRN anxiety  No  None    Diazepam 5 mg PO q6h PRN spasms No  4 doses    Hydromorphone 0.5-1 mg IV q2h PRN pain No  7 mg IV total   Lidocaine 4% 2 patches daily  No  1 dose     No     Oxycodone 10-15 mg PO q4h PRN pain No  75 mg total    Tizanidine 4 mg PO q6h No 4 doses      Morphine Equivalent Daily Dose (MEDD):   Date MEDD (mg)   2/17 205 mg   2/18 252 mg     ASSESSMENT/PLAN:  1)  Pain Management:   · Pain is improving after multiple changes yesterday. · Would continue current regimen for now - if patient still continues to require IV hydromorphone, may need to consider adding long acting agent? Hydromorphone is currently ordered q2h PRN -- could consider adjusting to q3h PRN to encourage use of PO agents. Please call with any questions.   Ayah Harrison, PharmD, [address]  Wireless: [phone]  or [phone]  2/19/2021 8:56 AM

## 2021-02-19 NOTE — PROGRESS NOTES
Patient is AAOx4. VSS. Patient is up x1 with a walker and gaitbelt. Patient complains of pain, patient medicated per mar. Patient is voiding adequately. Patient is resting in bed at this time with all fall precautions in place.  Will continue to monitor

## 2021-02-19 NOTE — PROGRESS NOTES
Pt is A&O, VSS, except BP which runs soft. Pt is up x1 SBA. Tolerating diet well. Voiding adequately per bathroom. Denies n/v. C/o pain, PRN medications given as available. All fall precautions in place. Will continue to monitor.

## 2021-02-20 LAB
ANION GAP SERPL CALCULATED.3IONS-SCNC: 8 MMOL/L (ref 3–16)
BUN BLDV-MCNC: 11 MG/DL (ref 7–20)
CALCIUM SERPL-MCNC: 9.3 MG/DL (ref 8.3–10.6)
CHLORIDE BLD-SCNC: 105 MMOL/L (ref 99–110)
CO2: 28 MMOL/L (ref 21–32)
CREAT SERPL-MCNC: 0.5 MG/DL (ref 0.6–1.1)
GFR AFRICAN AMERICAN: >60
GFR NON-AFRICAN AMERICAN: >60
GLUCOSE BLD-MCNC: 103 MG/DL (ref 70–99)
HCT VFR BLD CALC: 32.2 % (ref 36–48)
HEMOGLOBIN: 10.7 G/DL (ref 12–16)
MCH RBC QN AUTO: 32.2 PG (ref 26–34)
MCHC RBC AUTO-ENTMCNC: 33.3 G/DL (ref 31–36)
MCV RBC AUTO: 96.8 FL (ref 80–100)
PDW BLD-RTO: 14.3 % (ref 12.4–15.4)
PLATELET # BLD: 184 K/UL (ref 135–450)
PMV BLD AUTO: 8 FL (ref 5–10.5)
POTASSIUM REFLEX MAGNESIUM: 4.2 MMOL/L (ref 3.5–5.1)
RBC # BLD: 3.32 M/UL (ref 4–5.2)
SODIUM BLD-SCNC: 141 MMOL/L (ref 136–145)
WBC # BLD: 7.9 K/UL (ref 4–11)

## 2021-02-20 PROCEDURE — 6360000002 HC RX W HCPCS: Performed by: PHYSICIAN ASSISTANT

## 2021-02-20 PROCEDURE — 36415 COLL VENOUS BLD VENIPUNCTURE: CPT

## 2021-02-20 PROCEDURE — 6370000000 HC RX 637 (ALT 250 FOR IP): Performed by: PHYSICIAN ASSISTANT

## 2021-02-20 PROCEDURE — 6370000000 HC RX 637 (ALT 250 FOR IP): Performed by: NURSE PRACTITIONER

## 2021-02-20 PROCEDURE — 6370000000 HC RX 637 (ALT 250 FOR IP): Performed by: INTERNAL MEDICINE

## 2021-02-20 PROCEDURE — 97110 THERAPEUTIC EXERCISES: CPT

## 2021-02-20 PROCEDURE — 97116 GAIT TRAINING THERAPY: CPT

## 2021-02-20 PROCEDURE — 6360000002 HC RX W HCPCS: Performed by: NURSE PRACTITIONER

## 2021-02-20 PROCEDURE — 1200000000 HC SEMI PRIVATE

## 2021-02-20 PROCEDURE — 6370000000 HC RX 637 (ALT 250 FOR IP): Performed by: STUDENT IN AN ORGANIZED HEALTH CARE EDUCATION/TRAINING PROGRAM

## 2021-02-20 PROCEDURE — 2580000003 HC RX 258: Performed by: PHYSICIAN ASSISTANT

## 2021-02-20 PROCEDURE — 80048 BASIC METABOLIC PNL TOTAL CA: CPT

## 2021-02-20 PROCEDURE — 85027 COMPLETE CBC AUTOMATED: CPT

## 2021-02-20 PROCEDURE — 6370000000 HC RX 637 (ALT 250 FOR IP): Performed by: NEUROLOGICAL SURGERY

## 2021-02-20 RX ADMIN — ONDANSETRON 4 MG: 2 INJECTION INTRAMUSCULAR; INTRAVENOUS at 12:59

## 2021-02-20 RX ADMIN — Medication 10 ML: at 07:33

## 2021-02-20 RX ADMIN — SIMETHICONE 80 MG: 80 TABLET, CHEWABLE ORAL at 14:33

## 2021-02-20 RX ADMIN — ONDANSETRON 4 MG: 2 INJECTION INTRAMUSCULAR; INTRAVENOUS at 05:36

## 2021-02-20 RX ADMIN — BUDESONIDE 3 MG: 3 CAPSULE, GELATIN COATED ORAL at 07:54

## 2021-02-20 RX ADMIN — DIAZEPAM 5 MG: 5 TABLET ORAL at 00:40

## 2021-02-20 RX ADMIN — TIZANIDINE 4 MG: 4 TABLET ORAL at 16:01

## 2021-02-20 RX ADMIN — ACETAMINOPHEN 1000 MG: 500 TABLET ORAL at 12:56

## 2021-02-20 RX ADMIN — OXYCODONE HYDROCHLORIDE 15 MG: 15 TABLET ORAL at 22:07

## 2021-02-20 RX ADMIN — ACETAMINOPHEN 1000 MG: 500 TABLET ORAL at 16:01

## 2021-02-20 RX ADMIN — DEXAMETHASONE 4 MG: 4 TABLET ORAL at 12:56

## 2021-02-20 RX ADMIN — ACETAMINOPHEN 1000 MG: 500 TABLET ORAL at 22:07

## 2021-02-20 RX ADMIN — DEXAMETHASONE 4 MG: 4 TABLET ORAL at 05:20

## 2021-02-20 RX ADMIN — ACETAMINOPHEN 1000 MG: 500 TABLET ORAL at 00:40

## 2021-02-20 RX ADMIN — OXYCODONE HYDROCHLORIDE 15 MG: 15 TABLET ORAL at 12:56

## 2021-02-20 RX ADMIN — POLYETHYLENE GLYCOL 3350 17 G: 17 POWDER, FOR SOLUTION ORAL at 07:35

## 2021-02-20 RX ADMIN — OXYCODONE HYDROCHLORIDE 15 MG: 15 TABLET ORAL at 09:20

## 2021-02-20 RX ADMIN — DIAZEPAM 5 MG: 5 TABLET ORAL at 14:27

## 2021-02-20 RX ADMIN — Medication 10 ML: at 20:09

## 2021-02-20 RX ADMIN — DOCUSATE SODIUM 50 MG AND SENNOSIDES 8.6 MG 2 TABLET: 8.6; 5 TABLET, FILM COATED ORAL at 07:33

## 2021-02-20 RX ADMIN — TIZANIDINE 4 MG: 4 TABLET ORAL at 20:07

## 2021-02-20 RX ADMIN — ENOXAPARIN SODIUM 40 MG: 40 INJECTION SUBCUTANEOUS at 07:33

## 2021-02-20 RX ADMIN — DEXAMETHASONE 4 MG: 4 TABLET ORAL at 00:40

## 2021-02-20 RX ADMIN — PROMETHAZINE HYDROCHLORIDE 12.5 MG: 12.5 TABLET ORAL at 17:10

## 2021-02-20 RX ADMIN — OXYCODONE HYDROCHLORIDE 15 MG: 15 TABLET ORAL at 05:00

## 2021-02-20 RX ADMIN — NALOXEGOL OXALATE 25 MG: 25 TABLET, FILM COATED ORAL at 07:33

## 2021-02-20 RX ADMIN — DIAZEPAM 5 MG: 5 TABLET ORAL at 07:34

## 2021-02-20 RX ADMIN — TIZANIDINE 4 MG: 4 TABLET ORAL at 09:20

## 2021-02-20 RX ADMIN — DEXAMETHASONE 4 MG: 4 TABLET ORAL at 20:06

## 2021-02-20 RX ADMIN — OXYCODONE HYDROCHLORIDE 15 MG: 15 TABLET ORAL at 17:08

## 2021-02-20 RX ADMIN — OXYCODONE HYDROCHLORIDE 15 MG: 15 TABLET ORAL at 00:40

## 2021-02-20 RX ADMIN — TIZANIDINE 4 MG: 4 TABLET ORAL at 05:20

## 2021-02-20 RX ADMIN — DOCUSATE SODIUM 50 MG AND SENNOSIDES 8.6 MG 2 TABLET: 8.6; 5 TABLET, FILM COATED ORAL at 20:06

## 2021-02-20 ASSESSMENT — PAIN DESCRIPTION - ORIENTATION
ORIENTATION: LOWER
ORIENTATION: UPPER
ORIENTATION: UPPER

## 2021-02-20 ASSESSMENT — PAIN SCALES - GENERAL
PAINLEVEL_OUTOF10: 7
PAINLEVEL_OUTOF10: 0
PAINLEVEL_OUTOF10: 5
PAINLEVEL_OUTOF10: 8
PAINLEVEL_OUTOF10: 4
PAINLEVEL_OUTOF10: 7
PAINLEVEL_OUTOF10: 7

## 2021-02-20 ASSESSMENT — PAIN DESCRIPTION - ONSET
ONSET: GRADUAL
ONSET: GRADUAL

## 2021-02-20 ASSESSMENT — PAIN DESCRIPTION - FREQUENCY
FREQUENCY: CONTINUOUS

## 2021-02-20 ASSESSMENT — PAIN DESCRIPTION - LOCATION: LOCATION: BACK

## 2021-02-20 ASSESSMENT — PAIN - FUNCTIONAL ASSESSMENT: PAIN_FUNCTIONAL_ASSESSMENT: ACTIVITIES ARE NOT PREVENTED

## 2021-02-20 ASSESSMENT — PAIN DESCRIPTION - PROGRESSION
CLINICAL_PROGRESSION: NOT CHANGED
CLINICAL_PROGRESSION: GRADUALLY WORSENING

## 2021-02-20 ASSESSMENT — PAIN DESCRIPTION - PAIN TYPE
TYPE: SURGICAL PAIN
TYPE: SURGICAL PAIN

## 2021-02-20 ASSESSMENT — PAIN DESCRIPTION - DESCRIPTORS
DESCRIPTORS: ACHING
DESCRIPTORS: ACHING

## 2021-02-20 NOTE — PROGRESS NOTES
with any questions.   Deidra Antoine, PharmD, BCPS  Wireless: [phone]  Main pharmacy: [id number]  2/20/2021 10:25 AM

## 2021-02-20 NOTE — PROGRESS NOTES
Progress Note    PCP: Carolina Nina MD    Date of Admission: 2/15/2021    Reason for Consult: Metastatic cancer, likely sarcoma recurrence    Subjective: Pt is feeling fine today. Reported that her LE weakness, numbness/tingling has resolved. Denied CP, ab pain, n/v.  Bowels moving. She is up and out of bed. Medications:  Reviewed    Infusion Medications   Scheduled Medications    lidocaine  1 patch Transdermal Daily    dexamethasone  4 mg Oral 3 times per day    Followed by   Cesar Seal ON 2/22/2021] dexamethasone  4 mg Oral 2 times per day    Followed by   Cesar Seal ON 2/26/2021] dexamethasone  4 mg Oral Daily    sodium chloride flush  10 mL Intravenous 2 times per day    enoxaparin  40 mg Subcutaneous Daily    naloxegol  25 mg Oral QAM    lidocaine  2 patch Transdermal Daily    tiZANidine  4 mg Oral Q6H    sennosides-docusate sodium  2 tablet Oral BID    acetaminophen  1,000 mg Oral 4 times per day    budesonide  3 mg Oral Daily    polyethylene glycol  17 g Oral BID     PRN Meds: HYDROmorphone **OR** HYDROmorphone, sodium chloride flush, promethazine **OR** ondansetron, bisacodyl, oxyCODONE **OR** oxyCODONE, diazePAM, naloxone, potassium chloride, magnesium sulfate, magnesium hydroxide, promethazine, ALPRAZolam, simethicone      Intake/Output Summary (Last 24 hours) at 2/20/2021 1006  Last data filed at 2/20/2021 4088  Gross per 24 hour   Intake 240 ml   Output 100 ml   Net 140 ml       Physical Exam Performed:    /77   Pulse 55   Temp 98 °F (36.7 °C) (Oral)   Resp 16   Ht 5' 9\" (1.753 m)   Wt 263 lb 3.7 oz (119.4 kg)   SpO2 98%   BMI 38.87 kg/m²     General appearance: No apparent distress, appears stated age and cooperative. HEENT: Pupils equal, round, and reactive to light. Respiratory:  Normal respiratory effort. Clear to auscultation, bilaterally without Rales/Wheezes/Rhonchi. Cardiovascular: Regular rate and rhythm with normal S1/S2 without murmurs, rubs or gallops. Abdomen: Soft, non-tender, non-distended with normal bowel sounds. Back: Drain in place, draining red blood. Incision is C/D/I. No redness/swelling. Musculoskeletal: No clubbing, cyanosis or edema bilaterally. Neurologic:  LE Strenght 5/5, sensation intact. Neurovascularly intact without any focal sensory/motor deficits. Cranial nerves: II-XII intact, grossly non-focal.  Psychiatric: Alert and oriented, thought content appropriate, normal insight  Peripheral Pulses: +2 palpable, equal bilaterally     Labs:   Recent Labs     02/18/21  1350 02/19/21  0546 02/20/21  0540   WBC 11.9* 7.5 7.9   HGB 10.9* 9.6* 10.7*   HCT 33.8* 28.6* 32.2*    159 184     Recent Labs     02/18/21  1350 02/19/21  0546 02/20/21  0540    139 141   K 4.4 4.3 4.2    106 105   CO2 24 26 28   BUN 14 11 11   CREATININE 0.7 0.6 0.5*   CALCIUM 9.2 8.8 9.3     No results for input(s): AST, ALT, BILIDIR, BILITOT, ALKPHOS in the last 72 hours. No results for input(s): INR in the last 72 hours. No results for input(s): Francies Gathers in the last 72 hours. Urinalysis:      Lab Results   Component Value Date    NITRU Negative 02/04/2021    BLOODU Negative 02/04/2021    SPECGRAV <=1.005 02/04/2021    GLUCOSEU Negative 02/04/2021       Radiology:  VL Extremity Venous Bilateral   Final Result      XR CHEST 1 VIEW   Final Result      No acute pulmonary disease. MRI - Thoracic Spine 2/15/2021         Bony metastasis involving T7 vertebral body and pedicles with epidural tumor extension surrounding and compressing the cord at this level with tumor extending to the left paravertebral region at this level.  Mild wedging compression fracture at T7       Bony metastasis involving the T12 vertebral body and left pedicle as described.        Suspected bony metastasis involving the L3 vertebral body noted on the localizer image.           Assessment/Plan:    LE Weakness: 2/2 Metastatic tumor at T7 w/ cord compression  - s/p T7 laminectomy w/ T5-T9 pedicle fixation and removal of mass, awaiting pathology (expecting recurrence of sarcoma). Pathology is available. 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 Pathology will be sent for Tiffany molecular profiling. This typically takes approximately 3 weeks for results to return. - Pain controlled on current regimen  - Consult to [employer] she will require postop radiation to the T7 tumor bed as well as T12 & L3.  This will likely begin in a couple of weeks as an outpatient  - Receiving Decadron 6mg q8rh, taper as per NSGY recs     Hx of myxoid liposarcoma  - Spine mets     Crohn's Dx  - On Entocort at home     Constipation  - added dulcolax suppository  - increased movantik  - Cont glycolax, senokot     DVT Prophylaxis: Lovenox 40    Shakir Soares MD, PhD

## 2021-02-20 NOTE — PROGRESS NOTES
NEUROSURGERY POST-OP PROGRESS NOTE    Patient Name: Kellee Gallardo YOB: 1980   Sex: Female Age: 41 yrs     Medical Record Number: [id number] Acct Number: [de-identified]   Room Number: 8856/9082-23 Hospital Day: Hospital Day: 6     Interval History:  Post-operative Day# 3 s/p Procedure(s) (LRB):  T7 LAMINECTOMY WITH T5-9 PEDICALE FIXATION AND REMOVAL OF EPIDURAL TUMOR AT T7 (N/A)    Subjective: Pt states she thinks her pain is better but still required IV pain meds during the night. She thinks her legs are stronger. Objective:    VITAL SIGNS   /77   Pulse 57   Temp 98.1 °F (36.7 °C) (Oral)   Resp 18   Ht 5' 9\" (1.753 m)   Wt 263 lb 3.7 oz (119.4 kg)   SpO2 98%   BMI 38.87 kg/m²    Height Height: 5' 9\" (175.3 cm)   Weight Weight: 263 lb 3.7 oz (119.4 kg)        Allergies Allergies   Allergen Reactions    Nsaids      Pt has Crohn's    Ondansetron Hcl Other (See Comments)     States it makes her feel weird  States it makes her feel weird      Topamax [Topiramate] Hives, Itching and Rash      NPO Status DIET GENERAL;   Isolation No active isolations     LABS   Basic Metabolic Profile Recent Labs     02/18/21  1350 02/19/21  0546 02/20/21  0540    139 141    106 105   CO2 24 26 28   BUN 14 11 11   CREATININE 0.7 0.6 0.5*   GLUCOSE 114* 119* 103*      Complete Blood Count Recent Labs     02/18/21  1350 02/19/21  0546 02/20/21  0540   WBC 11.9* 7.5 7.9   RBC 3.46* 3.00* 3.32*      Coagulation Studies No results for input(s): PTT, INR in the last 72 hours.     Invalid input(s): PLATELETS, PROA, PT, PTTA     MEDICATIONS   Inpatient Medications     lidocaine, 1 patch, Transdermal, Daily    [COMPLETED] dexamethasone, 4 mg, Oral, 4 times per day **FOLLOWED BY** dexamethasone, 4 mg, Oral, 3 times per day **FOLLOWED BY** [START ON 2/22/2021] dexamethasone, 4 mg, Oral, 2 times per day **FOLLOWED BY** [START ON 2/26/2021] dexamethasone, 4 mg, Oral, Daily    sodium chloride flush, 10 mL, [address] [facility], [address] [facility], [place], [address] [facility] (c), [phone] (o)

## 2021-02-20 NOTE — PROGRESS NOTES
Hospitalist Progress Note      PCP: Kortney Long MD    Date of Admission: 2/15/2021    Chief Complaint:     No chief complaint on file. Back pain    Subjective:  Patient seen and examined at the bedside. Pain is improving, only needed IV once overnight.   Has been working w/ therapy, up showered  Drain output decreasing  Met w/ palliative care yesterday   Anticipate dc tomorrow if ok w/ neurosurgery    PFHS: reviewed as documented 2/15/2021, no changes    Medications:  Reviewed    Infusion Medications   Scheduled Medications    lidocaine  1 patch Transdermal Daily    dexamethasone  4 mg Oral 3 times per day    Followed by   Rich Dillon ON 2/22/2021] dexamethasone  4 mg Oral 2 times per day    Followed by   Rich Dillon ON 2/26/2021] dexamethasone  4 mg Oral Daily    sodium chloride flush  10 mL Intravenous 2 times per day    enoxaparin  40 mg Subcutaneous Daily    naloxegol  25 mg Oral QAM    lidocaine  2 patch Transdermal Daily    tiZANidine  4 mg Oral Q6H    sennosides-docusate sodium  2 tablet Oral BID    acetaminophen  1,000 mg Oral 4 times per day    budesonide  3 mg Oral Daily    polyethylene glycol  17 g Oral BID     PRN Meds: HYDROmorphone **OR** HYDROmorphone, sodium chloride flush, promethazine **OR** ondansetron, bisacodyl, oxyCODONE **OR** oxyCODONE, diazePAM, naloxone, potassium chloride, magnesium sulfate, magnesium hydroxide, promethazine, ALPRAZolam, simethicone      Intake/Output Summary (Last 24 hours) at 2/20/2021 1008  Last data filed at 2/20/2021 3315  Gross per 24 hour   Intake 240 ml   Output 100 ml   Net 140 ml       Physical Exam    /77   Pulse 55   Temp 98 °F (36.7 °C) (Oral)   Resp 16   Ht 5' 9\" (1.753 m)   Wt 263 lb 3.7 oz (119.4 kg)   SpO2 98%   BMI 38.87 kg/m²     General appearance:  No acute distress, appears stated age  Eyes: Pupils equal, round, reactive to light, conjunctiva/corneas clear  Ears/Nose/Mouth/Throat: No external lesions or scars, hearing intact to voice  Neck: Trachea midline, no masses noted, no thyromegaly  Respiratory:  Non-labored breathing, clear to auscultation bilaterally  Cardiovascular: Regular rate and rhythm, no murmurs, gallops, or rubs  Abdomen: soft, non-tender, non-distended  Musculoskeletal: Warm, well perfused, no cyanosis or edema  Skin: normal color, no wounds noted  Psychiatric: A&Ox4, good insight and judgment    Labs:   Recent Labs     02/18/21  1350 02/19/21  0546 02/20/21  0540   WBC 11.9* 7.5 7.9   HGB 10.9* 9.6* 10.7*   HCT 33.8* 28.6* 32.2*    159 184     Recent Labs     02/18/21  1350 02/19/21  0546 02/20/21  0540    139 141   K 4.4 4.3 4.2    106 105   CO2 24 26 28   BUN 14 11 11   CREATININE 0.7 0.6 0.5*   CALCIUM 9.2 8.8 9.3     No results for input(s): AST, ALT, BILIDIR, BILITOT, ALKPHOS in the last 72 hours. No results for input(s): INR in the last 72 hours. No results for input(s): Kathyanne Fennel in the last 72 hours. Urinalysis:      Lab Results   Component Value Date    NITRU Negative 02/04/2021    BLOODU Negative 02/04/2021    SPECGRAV <=1.005 02/04/2021    GLUCOSEU Negative 02/04/2021       Radiology:  VL Extremity Venous Bilateral   Final Result      XR CHEST 1 VIEW   Final Result      No acute pulmonary disease.              Assessment/Plan:    Active Hospital Problems    Diagnosis Date Noted    Status post thoracic spinal fusion [Z98.1] 02/19/2021    Sarcoma ([address] [C49.9] 02/15/2021       Plan:    Gifty Neri, metastatic to liver, spine  # T7 compression fracture w/ spinal cord compression  -neurosurgery consulted, drain output decreasing, anticipate removal 2/21  -consulted radiation therapy  -laminectomy 2/17  -PT/OT signed off, pt up ad-stacey  -analgesia as needed     # Crohn's disease    DVT Prophylaxis: Lovenox  Diet: DIET GENERAL;  Code Status: Full Code    PT/OT Eval Status: Complete    Dispo: Inpt, dispo pending clinical improvement, possibly tomorrow if ok w/ neurosurgery Prakash Hastings MD

## 2021-02-20 NOTE — PROGRESS NOTES
Physical Therapy  Facility/Department: [facility] 5T ORTHO/NEURO  Daily Treatment Note  NAME: Cali Arteaga  : 1980  MRN: [id number]    Date of Service: 2021    Discharge Recommendations:  Cali Arteaga scored a 18/24 on the AM-PAC short mobility form. Current research shows that an AM-PAC score of 18 or greater is typically associated with a discharge to the patient's home setting. Based on the patient's AM-PAC score and their current functional mobility deficits, it is recommended that the patient have 2-3 sessions per week of Physical Therapy at d/c to increase the patient's independence. At this time, this patient demonstrates the endurance and safety to discharge home with (home vs OP services) and a follow up treatment frequency of 2-3x/wk. Please see assessment section for further patient specific details. If patient discharges prior to next session this note will serve as a discharge summary. Please see below for the latest assessment towards goals. PT Equipment Recommendations  Equipment Needed: Yes  Mobility Devices: Stephanie Barnett: Rolling    Assessment   Body structures, Functions, Activity limitations: Decreased functional mobility ; Decreased balance;Decreased strength  Assessment: Lourdes Nunez remains motivated to participate in PT & tolerated wtih no adverse events. Pt continues to progress her mobility & currently ambulating community distances with & without RW wtih close supervision assist. She endorses back pain with mobility & at rest however no inc'd pain with mobility. She was provided with seated BLE HEP this date & v.u & demo'd understanding. Pt plans to return home with support from family & will benefit from 0 [facility] 16  services.   Treatment Diagnosis: impaired mobility secondary to tumor  Prognosis: Good  PT Education: Goals;PT Role;Plan of Care;General Safety;Gait Training;Transfer Training;Functional Mobility Training  Patient Education: pt verbalized understanding  Barriers to Learning: n/a  REQUIRES PT FOLLOW UP: Yes  Activity Tolerance  Activity Tolerance: Patient limited by fatigue;Patient limited by pain     Patient Diagnosis(es): The encounter diagnosis was Status post thoracic spinal fusion. has a past medical history of Anxiety, 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, Crohn's disease ([facility] [address] Depression, Early menopause, IBS (irritable bowel syndrome), IBS (irritable bowel syndrome), Mixed hyperlipidemia, Obesity, Osteoarthritis, knee, PONV (postoperative nausea and vomiting), Prolonged emergence from general anesthesia, and Sarcoma of left lower extremity ([facility] [address] has a past surgical history that includes salpingectomy (Left, 2009); Appendectomy; Ankle surgery (Left); Breast reduction surgery; Knee arthroscopy (Right, 01/2015); Knee arthroscopy (Right, 07/2015); Total knee arthroplasty (Right, 09/2015); other surgical history (Left); Muscle biopsy (Left, 11/2017); other surgical history (Left, 02/26/2018); other surgical history (Left, 03/14/2018); other surgical history (Left, 03/22/2018); Liver Resection (N/A, 07/22/2019); Carpal tunnel release (Right, 07/2020); Port Surgery (Right, 11/30/2020); and laminectomy (N/A, 2/17/2021). Restrictions  Position Activity Restriction  Spinal Precautions: No Bending, No Lifting, No Twisting  Other position/activity restrictions: ambulate, activity as tolerated  Subjective   General  Chart Reviewed: Yes  Additional Pertinent Hx: Patient is a 73-year-old female with past medical history of Crohn's disease, sarcoma of left lower extremity, closed which helped compression fracture T7 vertebrae who presents to the hospital from PCP office for uncontrolled pain in lower back. She was in remission from sarcoma but had MRI and found to have metastasis in her abdomen. Pt had T7 LAMINECTOMY WITH T5-9 PEDICALE FIXATION AND REMOVAL OF EPIDURAL TUMOR AT T7 (N/A ) on 2/17.   Family / Caregiver Present: No  Referring Practitioner: INEZ Steward Score : 43.63 (02/20/21 0923)  Mobility Inpatient CMS 0-100% Score: 46.58 (02/20/21 4720)  Mobility Inpatient CMS G-Code Modifier : CK (02/20/21 1132)          Goals  Short term goals  Time Frame for Short term goals: By discharge  Short term goal 1: The pt will perform bed mobility with mod I  Short term goal 2: The pt will transfer with supervision  Short term goal 3: The pt will ambulate x 150' with supervision and RW  Short term goal 4: The pt will be able to take 10 stairs with 1 rail and CGA  Patient Goals   Patient goals :  To get stronger and have less pain    Plan    Plan  Times per week: 5-7  Times per day: Daily  Current Treatment Recommendations: Strengthening, Transfer Training, Endurance Training, Neuromuscular Re-education, Cognitive Reorientation, Patient/Caregiver Education & Training, Balance Training, Gait Training, Home Exercise Program, Functional Mobility Training, Safety Education & Training  Safety Devices  Type of devices: Call light within reach, Bed alarm in place, Left in bed  Restraints  Initially in place: No     Therapy Time   Individual Concurrent Group Co-treatment   Time In 0827         Time Out 0851         Minutes 24         Timed Code Treatment Minutes: [address], [address], PT

## 2021-02-20 NOTE — PROGRESS NOTES
Patient is AAOX4. VSS. Patient is up x1 with a walker at times. Patient is voiding adequately. Patient complains of pain, patient medicated per mar. Patient became tearful this shift and asked that she be let to sleep for a while with no interruptions. Patient is in bed with all fall precautions in place. Will continue to monitor.

## 2021-02-21 ENCOUNTER — APPOINTMENT (OUTPATIENT)
Dept: CT IMAGING | Age: 41
DRG: 457 | End: 2021-02-21
Attending: INTERNAL MEDICINE
Payer: COMMERCIAL

## 2021-02-21 VITALS
BODY MASS INDEX: 38.99 KG/M2 | HEART RATE: 58 BPM | RESPIRATION RATE: 18 BRPM | SYSTOLIC BLOOD PRESSURE: 112 MMHG | HEIGHT: 69 IN | DIASTOLIC BLOOD PRESSURE: 71 MMHG | OXYGEN SATURATION: 100 % | WEIGHT: 263.23 LBS | TEMPERATURE: 98 F

## 2021-02-21 LAB
ANION GAP SERPL CALCULATED.3IONS-SCNC: 8 MMOL/L (ref 3–16)
BUN BLDV-MCNC: 13 MG/DL (ref 7–20)
CALCIUM SERPL-MCNC: 9.4 MG/DL (ref 8.3–10.6)
CHLORIDE BLD-SCNC: 99 MMOL/L (ref 99–110)
CO2: 28 MMOL/L (ref 21–32)
CREAT SERPL-MCNC: 0.7 MG/DL (ref 0.6–1.1)
GFR AFRICAN AMERICAN: >60
GFR NON-AFRICAN AMERICAN: >60
GLUCOSE BLD-MCNC: 101 MG/DL (ref 70–99)
HCT VFR BLD CALC: 34.1 % (ref 36–48)
HEMOGLOBIN: 11.1 G/DL (ref 12–16)
MCH RBC QN AUTO: 31.7 PG (ref 26–34)
MCHC RBC AUTO-ENTMCNC: 32.6 G/DL (ref 31–36)
MCV RBC AUTO: 97.2 FL (ref 80–100)
PDW BLD-RTO: 14.3 % (ref 12.4–15.4)
PLATELET # BLD: 188 K/UL (ref 135–450)
PMV BLD AUTO: 8.1 FL (ref 5–10.5)
POTASSIUM REFLEX MAGNESIUM: 4.4 MMOL/L (ref 3.5–5.1)
RBC # BLD: 3.5 M/UL (ref 4–5.2)
SODIUM BLD-SCNC: 135 MMOL/L (ref 136–145)
WBC # BLD: 7.8 K/UL (ref 4–11)

## 2021-02-21 PROCEDURE — 6370000000 HC RX 637 (ALT 250 FOR IP): Performed by: NEUROLOGICAL SURGERY

## 2021-02-21 PROCEDURE — 6360000002 HC RX W HCPCS: Performed by: PHYSICIAN ASSISTANT

## 2021-02-21 PROCEDURE — 6370000000 HC RX 637 (ALT 250 FOR IP): Performed by: PHYSICIAN ASSISTANT

## 2021-02-21 PROCEDURE — 2580000003 HC RX 258: Performed by: PHYSICIAN ASSISTANT

## 2021-02-21 PROCEDURE — 71260 CT THORAX DX C+: CPT

## 2021-02-21 PROCEDURE — 85027 COMPLETE CBC AUTOMATED: CPT

## 2021-02-21 PROCEDURE — 80048 BASIC METABOLIC PNL TOTAL CA: CPT

## 2021-02-21 PROCEDURE — 6370000000 HC RX 637 (ALT 250 FOR IP): Performed by: NURSE PRACTITIONER

## 2021-02-21 PROCEDURE — 6370000000 HC RX 637 (ALT 250 FOR IP): Performed by: STUDENT IN AN ORGANIZED HEALTH CARE EDUCATION/TRAINING PROGRAM

## 2021-02-21 PROCEDURE — 36415 COLL VENOUS BLD VENIPUNCTURE: CPT

## 2021-02-21 PROCEDURE — 6360000002 HC RX W HCPCS: Performed by: NURSE PRACTITIONER

## 2021-02-21 PROCEDURE — 6370000000 HC RX 637 (ALT 250 FOR IP): Performed by: INTERNAL MEDICINE

## 2021-02-21 PROCEDURE — 6360000004 HC RX CONTRAST MEDICATION: Performed by: INTERNAL MEDICINE

## 2021-02-21 RX ORDER — PROMETHAZINE HYDROCHLORIDE 12.5 MG/1
12.5 TABLET ORAL EVERY 6 HOURS PRN
Qty: 30 TABLET | Refills: 0 | Status: SHIPPED | OUTPATIENT
Start: 2021-02-21 | End: 2021-02-28

## 2021-02-21 RX ADMIN — Medication 10 ML: at 08:26

## 2021-02-21 RX ADMIN — NALOXEGOL OXALATE 25 MG: 25 TABLET, FILM COATED ORAL at 08:26

## 2021-02-21 RX ADMIN — POLYETHYLENE GLYCOL 3350 17 G: 17 POWDER, FOR SOLUTION ORAL at 08:24

## 2021-02-21 RX ADMIN — TIZANIDINE 4 MG: 4 TABLET ORAL at 08:26

## 2021-02-21 RX ADMIN — DEXAMETHASONE 4 MG: 4 TABLET ORAL at 12:56

## 2021-02-21 RX ADMIN — OXYCODONE HYDROCHLORIDE 15 MG: 15 TABLET ORAL at 08:25

## 2021-02-21 RX ADMIN — TIZANIDINE 4 MG: 4 TABLET ORAL at 15:24

## 2021-02-21 RX ADMIN — DIAZEPAM 5 MG: 5 TABLET ORAL at 00:06

## 2021-02-21 RX ADMIN — DOCUSATE SODIUM 50 MG AND SENNOSIDES 8.6 MG 2 TABLET: 8.6; 5 TABLET, FILM COATED ORAL at 08:26

## 2021-02-21 RX ADMIN — OXYCODONE HYDROCHLORIDE 15 MG: 15 TABLET ORAL at 16:52

## 2021-02-21 RX ADMIN — DIAZEPAM 5 MG: 5 TABLET ORAL at 06:09

## 2021-02-21 RX ADMIN — TIZANIDINE 4 MG: 4 TABLET ORAL at 02:50

## 2021-02-21 RX ADMIN — ACETAMINOPHEN 1000 MG: 500 TABLET ORAL at 16:52

## 2021-02-21 RX ADMIN — BUDESONIDE 3 MG: 3 CAPSULE, GELATIN COATED ORAL at 08:25

## 2021-02-21 RX ADMIN — OXYCODONE HYDROCHLORIDE 15 MG: 15 TABLET ORAL at 02:50

## 2021-02-21 RX ADMIN — ENOXAPARIN SODIUM 40 MG: 40 INJECTION SUBCUTANEOUS at 08:26

## 2021-02-21 RX ADMIN — PROMETHAZINE HYDROCHLORIDE 12.5 MG: 12.5 TABLET ORAL at 08:25

## 2021-02-21 RX ADMIN — IOPAMIDOL 80 ML: 755 INJECTION, SOLUTION INTRAVENOUS at 09:49

## 2021-02-21 RX ADMIN — DEXAMETHASONE 4 MG: 4 TABLET ORAL at 06:09

## 2021-02-21 RX ADMIN — ACETAMINOPHEN 1000 MG: 500 TABLET ORAL at 06:09

## 2021-02-21 RX ADMIN — OXYCODONE HYDROCHLORIDE 15 MG: 15 TABLET ORAL at 12:56

## 2021-02-21 ASSESSMENT — PAIN SCALES - GENERAL
PAINLEVEL_OUTOF10: 7
PAINLEVEL_OUTOF10: 5
PAINLEVEL_OUTOF10: 7

## 2021-02-21 ASSESSMENT — PAIN DESCRIPTION - DESCRIPTORS
DESCRIPTORS: ACHING
DESCRIPTORS: ACHING

## 2021-02-21 ASSESSMENT — PAIN DESCRIPTION - PROGRESSION
CLINICAL_PROGRESSION: NOT CHANGED
CLINICAL_PROGRESSION: GRADUALLY WORSENING

## 2021-02-21 ASSESSMENT — PAIN DESCRIPTION - FREQUENCY: FREQUENCY: CONTINUOUS

## 2021-02-21 ASSESSMENT — PAIN DESCRIPTION - ONSET
ONSET: ON-GOING
ONSET: ON-GOING

## 2021-02-21 ASSESSMENT — PAIN DESCRIPTION - LOCATION: LOCATION: BACK

## 2021-02-21 ASSESSMENT — PAIN DESCRIPTION - ORIENTATION
ORIENTATION: LOWER
ORIENTATION: LOWER

## 2021-02-21 ASSESSMENT — PAIN DESCRIPTION - PAIN TYPE: TYPE: SURGICAL PAIN

## 2021-02-21 NOTE — PROGRESS NOTES
Clinical Pharmacy Progress Note    Admit date: 2/15/2021     Subjective/Objective:  42 yo F with PMH of Crohn's disease, depression, IBS, sarcoma of LLE, who presented 2/15 with uncontrolled pain in lower back. Pt was in remission for sarcoma but recently has MRI of abdomen which showed metastasis. Patient is s/p T7 laminectomy with T5-T9 pedicle fixation and removal of mass. Pharmacy has been consulted to make pain medication recommendations by TRACY Bean. 2/17: Pt transferred to  this AM in anticipation of OR. Planning for T7 laminectomy today. Currently NPO.  2/18: Pt was on hydromorphone PCA overnight which was stopped this AM. Pt reports pain has improved since yesterday. Pain scores 7-8, and per RN, pt has been OOB and active with therapies this AM.   2/19: Per medicine team, LE weakness/numbness and tingling have improved. Constipation - last BM was 4 days ago. Naloxegol dose increased this AM. Tizanidine added 2/19. Pain scores 7/10 this AM. BP 96/58 this AM.  2/20: Pain continues to improve. Scores 5-7/10 in past 24h. Patient required just 1 dose of IV hydromorphone overnight.  2/21: Pain scores 5-7/10, requiring no doses of IV hydromorphone. Plan to discharge today. Current pain regimen:   Medication  Home med? Amount used yesterday    Acetaminophen 1g PO q6h Yes (on Hydrocodone/APAP) 3 doses    Alprazolam 0.25 mg PO QHS PRN anxiety  No  None    Diazepam 5 mg PO q6h PRN spasms No  3 doses    Hydromorphone 0.5-1 mg IV q2h PRN pain No  None   Lidocaine 4% 2 patches daily  No  1 dose    Oxycodone 10-15 mg PO q4h PRN pain No  60 mg total    Tizanidine 4 mg PO q6h No 4 doses      Morphine Equivalent Daily Dose (MEDD):   Date MEDD (mg)   2/17 205 mg   2/18 252 mg   2/19 108 mg   2/20 90 mg     ASSESSMENT/PLAN:  1)  Pain Management:   · Pain continues to improve. Patient is requiring significantly less narcotics. Recommend continuing current regimen. Pharmacy will sign off consult. Please call with any questions.   Titi NixonD, BCPS  Wireless: [id number]  Main pharmacy: [id number]  2/21/2021 11:37 AM

## 2021-02-21 NOTE — PLAN OF CARE
Problem: Pain:  Goal: Pain level will decrease  Description: Pain level will decrease  Outcome: Ongoing   Pt c/o aching pain in mid back, medicated per mar with prn oxycodone. Pt verbalized relief and is now resting in bed. Will continue to assess pain level. Problem: Mobility - Impaired:  Goal: Mobility will improve to maximum level  Description: Mobility will improve to maximum level  Outcome: Ongoing   Pt ambulated to bathroom with walker and standby assist. Pt showed a steady gait and tolerated it well. Pt used proper technique when entering and exiting the bed. Will continue to monitor.

## 2021-02-21 NOTE — PROGRESS NOTES
Removed hemovac and sutures holding it in per order. Applied gauze and tegaderm over site. Waiting on Dr. Agnes Rodney to see patient then will be discharged.

## 2021-02-21 NOTE — DISCHARGE SUMMARY
Hospital Medicine Discharge Summary    Patient ID: Lexus Oden      Patient's PCP: Grayson Philippe MD    Admit Date: 2/15/2021     Discharge Date: 2/21/2021    Admitting Physician: Alexander Fonseca MD     Discharge Physician: Hannah Andino MD     Discharge Diagnoses: Active Hospital Problems    Diagnosis Date Noted    Status post thoracic spinal fusion [Z98.1] 02/19/2021    [facility]) [C49.9] 02/15/2021     See plan in progress note from this date for full hospital problem list.    The patient was seen and examined on day of discharge and this discharge summary is in conjunction with any daily progress note from day of discharge. Hospital Course:    Please see admission H&P as well as progress note from this date. Physical Exam Performed:     /83   Pulse 50   Temp 97.9 °F (36.6 °C) (Oral)   Resp 16   Ht 5' 9\" (1.753 m)   Wt 263 lb 3.7 oz (119.4 kg)   SpO2 99%   BMI 38.87 kg/m²     Please see progress note from this date    Labs: For convenience and continuity at follow-up the following most recent labs are provided:      CBC:    Lab Results   Component Value Date    WBC 7.8 02/21/2021    HGB 11.1 02/21/2021    HCT 34.1 02/21/2021     02/21/2021       Renal:    Lab Results   Component Value Date     02/21/2021    K 4.4 02/21/2021    CL 99 02/21/2021    CO2 28 02/21/2021    BUN 13 02/21/2021    CREATININE 0.7 02/21/2021    CALCIUM 9.4 02/21/2021    PHOS 3.9 07/27/2019         Significant Diagnostic Studies    Radiology:   VL Extremity Venous Bilateral   Final Result      XR CHEST 1 VIEW   Final Result      No acute pulmonary disease.          CT CHEST W CONTRAST    (Results Pending)          Consults:     IP CONSULT TO ONCOLOGY  IP CONSULT TO NEUROSURGERY  IP CONSULT TO RADIATION ONCOLOGY  IP CONSULT TO PHARMACY  IP CONSULT TO PALLIATIVE CARE    Disposition:  Home    Condition at Discharge: Stable    Discharge Instructions/Follow-up:  Follow up with PCP in 1 week for lidocaine-prilocaine (EMLA) 2.5-2.5 % cream Apply a dot of cream to top of port and cover with plastic wrap 30-60 minutes before needle placed. Time Spent on discharge is 35 minutes in the examination, evaluation, counseling and review of medications and discharge plan. Signed:    Alexandrea Sandhu MD   2/21/2021      Thank you Ama Wilkes MD for the opportunity to be involved in this patient's care. If you have any questions or concerns please feel free to contact me at [phone].

## 2021-02-21 NOTE — CARE COORDINATION
Case Management Assessment            Discharge Note                    Date / Time of Note: 2/21/2021 9:40 AM                  Discharge Note Completed by: Boby Marino    Patient Name: Clifford Schmitt   YOB: 1980  Diagnosis: Sarcoma [facility]) [C49.9]   Date / Time: 2/15/2021  4:58 PM    Current PCP: Veronica Milian MD  Clinic patient: No    Hospitalization in the last 30 days: No    Advance Directives:  Code Status: Full Code  [place] DNR form completed and on chart: No    Financial:  Payor: [id number] [employer] / Plan: [id number] / Product Type: *No Product type* /      Pharmacy:    [address] [facility] [phone]  [facility] [address]  [facility] [address] [address]  Phone: [phone] Fax: [phone]      Assistance purchasing medications?: Potential Assistance Purchasing Medications: No  Assistance provided by Case Management: None at this time    Does patient want to participate in local refill/ meds to beds program?: No    Meds To Beds General Rules:  1. Can ONLY be done Monday- Friday between 8:30am-5pm  2. Prescription(s) must be in pharmacy by 3pm to be filled same day  3. Copy of patient's insurance/ prescription drug card and patient face sheet must be sent along with the prescription(s)  4. Cost of Rx cannot be added to hospital bill. If financial assistance is needed, please contact unit  or ;  or  CANNOT provide pharmacy voucher for patients co-pays  5.  Patients can then  the prescription on their way out of the hospital at discharge, or pharmacy can deliver to the bedside if staff is available. (payment due at time of pick-up or delivery - cash, check, or card accepted)     Able to afford home medications/ co-pay costs: Yes    ADLS:  Current PT AM-PAC Score: 18 /24  Current OT AM-PAC Score: 21 /24      DISCHARGE Disposition: Home- No Services Needed    LOC at discharge: Not Applicable  LUIS ANTONIO Completed: Not Indicated    Notification completed in HENS/PAS?:  Not Applicable    IMM Completed:   Not Indicated    Transportation:  Transportation PLAN for discharge: family   Mode of Transport: [address]  ordered at discharge: Not [address]: Not Applicable  Orders faxed: No    Durable Medical Equipment:  DME Provider: Evelina Sun obtained during hospitalization: walker    Home Oxygen and Respiratory Equipment:  Oxygen needed at discharge?: Not [address]: Not Applicable  Portable tank available for discharge?: Not Indicated    Dialysis:  Dialysis patient: No    Dialysis Center:  Not Applicable      Additional CM Notes: Pt form home with  will Dc home with Mellisa Camryn from [facility] follow up with Onc out pt. The Plan for Transition of Care is related to the following treatment goals of [facility]) [C49.9]    The Patient and/or patient representative Jorge Horton and her family were provided with a choice of provider and agrees with the discharge plan Yes    Freedom of choice list was provided with basic dialogue that supports the patient's individualized plan of care/goals and shares the quality data associated with the providers.  Not Indicated    Care Transitions patient: No    Magali Montiel RN  [facility] [employer]  Case Management Department  Ph: [phone]  Fax: [phone]

## 2021-02-21 NOTE — PROGRESS NOTES
Hospitalist Progress Note      PCP: Lisandra Garcia MD    Date of Admission: 2/15/2021    Chief Complaint:     No chief complaint on file. Back pain    Subjective:  Patient seen and examined at the bedside. Pain improving, now controlled on PO  Plan for dc home today if cleared by neurosurgery  She has noticed a small lump above her right breast, will get imaging before dc given her history.     PFHS: reviewed as documented 2/15/2021, no changes    Medications:  Reviewed    Infusion Medications   Scheduled Medications    lidocaine  1 patch Transdermal Daily    dexamethasone  4 mg Oral 3 times per day    Followed by   Dossie  ON 2/22/2021] dexamethasone  4 mg Oral 2 times per day    Followed by   Dossie  ON 2/26/2021] dexamethasone  4 mg Oral Daily    sodium chloride flush  10 mL Intravenous 2 times per day    enoxaparin  40 mg Subcutaneous Daily    naloxegol  25 mg Oral QAM    lidocaine  2 patch Transdermal Daily    tiZANidine  4 mg Oral Q6H    sennosides-docusate sodium  2 tablet Oral BID    acetaminophen  1,000 mg Oral 4 times per day    budesonide  3 mg Oral Daily    polyethylene glycol  17 g Oral BID     PRN Meds: HYDROmorphone **OR** HYDROmorphone, sodium chloride flush, promethazine **OR** ondansetron, bisacodyl, oxyCODONE **OR** oxyCODONE, diazePAM, naloxone, potassium chloride, magnesium sulfate, magnesium hydroxide, promethazine, ALPRAZolam, simethicone      Intake/Output Summary (Last 24 hours) at 2/21/2021 0819  Last data filed at 2/21/2021 6744  Gross per 24 hour   Intake 920 ml   Output 95 ml   Net 825 ml       Physical Exam    /83   Pulse 50   Temp 97.9 °F (36.6 °C) (Oral)   Resp 16   Ht 5' 9\" (1.753 m)   Wt 263 lb 3.7 oz (119.4 kg)   SpO2 99%   BMI 38.87 kg/m²     General appearance:  No acute distress, appears stated age  Eyes: Pupils equal, round, reactive to light, conjunctiva/corneas clear  Ears/Nose/Mouth/Throat: No external lesions or scars, hearing intact to voice  Neck: Trachea midline, no masses noted, no thyromegaly  Respiratory:  Non-labored breathing, clear to auscultation bilaterally  Cardiovascular: Regular rate and rhythm, no murmurs, gallops, or rubs  Abdomen: soft, non-tender, non-distended  Musculoskeletal: Warm, well perfused, no cyanosis or edema, R anterior chest wall, just above the right breast there is a palpable soft lesion with poorly demarcated edges, ~1cm in diameter, that is mobile, slightly tender, no changes to the overlying skin  Skin: normal color, no wounds noted  Psychiatric: A&Ox4, good insight and judgment    Labs:   Recent Labs     02/19/21  0546 02/20/21  0540 02/21/21  0530   WBC 7.5 7.9 7.8   HGB 9.6* 10.7* 11.1*   HCT 28.6* 32.2* 34.1*    184 188     Recent Labs     02/19/21  0546 02/20/21  0540 02/21/21  0530    141 135*   K 4.3 4.2 4.4    105 99   CO2 26 28 28   BUN 11 11 13   CREATININE 0.6 0.5* 0.7   CALCIUM 8.8 9.3 9.4     No results for input(s): AST, ALT, BILIDIR, BILITOT, ALKPHOS in the last 72 hours. No results for input(s): INR in the last 72 hours. No results for input(s): Sofiya Ripper in the last 72 hours. Urinalysis:      Lab Results   Component Value Date    NITRU Negative 02/04/2021    BLOODU Negative 02/04/2021    SPECGRAV <=1.005 02/04/2021    GLUCOSEU Negative 02/04/2021       Radiology:  VL Extremity Venous Bilateral   Final Result      XR CHEST 1 VIEW   Final Result      No acute pulmonary disease.              Assessment/Plan:    Active Hospital Problems    Diagnosis Date Noted    Status post thoracic spinal fusion [Z98.1] 02/19/2021    [facility]coma [facility]) [C49.9] 02/15/2021       Plan:    # Right chest wall palpable lesion  -Will get CT chest and proceed with outpatient followup    # Sarcoma, metastatic to liver, spine  # T7 compression fracture w/ spinal cord compression  -neurosurgery consulted, drain output decreasing, anticipate removal 2/21  -consulted radiation therapy  -laminectomy

## 2021-02-23 ENCOUNTER — TELEPHONE (OUTPATIENT)
Dept: FAMILY MEDICINE CLINIC | Age: 41
End: 2021-02-23

## 2021-02-23 NOTE — TELEPHONE ENCOUNTER
Alondra 45 Transitions Initial Follow Up Call    Outreach made within 2 business days of discharge: Yes    Patient: Rosa Felton Patient : 1980   MRN: [id number]  Reason for Admission: There are no discharge diagnoses documented for the most recent discharge. Discharge Date: 21       Spoke with: Left voicemail for patient to call office with questions, concerns and to schedule HFU.     Discharge department/facility: [facility]    Scheduled appointment with PCP within 7-14 days    Follow Up  Future Appointments   Date Time Provider Karen Crow   2021  2:50 PM [facility] AND FLU/RED CLIN, COVID-19 VACCINE SCHEDULE AND FLU MMA       Daniela Cortes MA

## 2021-02-24 ENCOUNTER — IMMUNIZATION (OUTPATIENT)
Dept: PRIMARY CARE CLINIC | Age: 41
End: 2021-02-24
Payer: COMMERCIAL

## 2021-02-24 ENCOUNTER — HOSPITAL ENCOUNTER (OUTPATIENT)
Dept: CT IMAGING | Age: 41
Discharge: HOME | End: 2021-02-24
Payer: COMMERCIAL

## 2021-02-24 DIAGNOSIS — S22.060A CLOSED WEDGE COMPRESSION FRACTURE OF T7 VERTEBRA, INITIAL ENCOUNTER (HCC): ICD-10-CM

## 2021-02-24 PROCEDURE — 91301 COVID-19, MODERNA VACCINE 100MCG/0.5ML DOSE: CPT | Performed by: FAMILY MEDICINE

## 2021-02-24 PROCEDURE — 0011A PR IMM ADMN SARSCOV2 100 MCG/0.5 ML 1ST DOSE: CPT | Performed by: FAMILY MEDICINE

## 2021-02-24 PROCEDURE — 77011 CT SCAN FOR LOCALIZATION: CPT

## 2021-02-25 NOTE — OP NOTE
Operative Report    PATIENT NAME: Courtney Noel  YOB: 1980  MEDICAL RECORD# [id number]  SURGERY DATE: 2/17/2021  SURGEON:  Brittni Rodriguez MD, PhD  ASSISTANT:  Aníbal Fuentes PA-C., served as assistant on this surgery due to the complex nature of the surgery and the lack of a resident or fellow assistant. She provided assistance with critical tissue retraction at parts of the surgery, wound closure and assistance with protecting critical neuro elements  DICTATED BY:  Brittni Rodriguez MD          PRE-OPERATIVE DIAGNOSIS:   1. Osseous and epidural mass lesion T7, history of sarcoma  2. Severe central canal stenosis and cord compression T7      POST-OPERATIVE DIAGNOSIS:   1. Same     SURGEON: Javed Perez M.D., Ph.D.      ANESTHESIA: GETA     ESTIMATED BLOOD LOSS:  450 mL.       PROCEDURES PERFORMED:  1. Intraoperative monitoring for motor and sensory potentials  2. T5-T9 bilateral pedicle screw fixation   3. T5 to T9 posterolateral fusion with autograft and FiberGraft matrix  4. Computer-assisted stereotactic navigation with [employer] CT  5. SSEP, MEP, and EMG monitoring  6. Correction of kyphoscoliosis across 4 segments  7. T6-T8 central laminectomy and decompression of severe central stenosis  8. Bilateral T7 transpedicular approach for resection of ventral epidural mass       INDICATIONS:  Mrs. 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 She presented with severe back pain and leg weakness/paraesthesias. Imaging demontrated a pathologic compression fracture at T7 with kyphosis. MRI revealed an intraosseous tumor with extension into the epidural space and severe spinal cord compression. The patient was suffering from intractable back and leg pain/weakness. Given these findings and the patients symptoms, surgical removal of the mass and with stabilization and fusion was recommended.  The patient understands the risks and benefits of the procedure including but not limited to bleeding, infection, worsened neurologic outcome, vascular injury, cerebral spinal fluid leak, pseudoarthrosis, need for additional procedures, fracture of hardware, anesthesia risks, and death.      PROCEDURES IN DETAIL:       Under universal protocol and informed consent, the patient was brought to the operating room. General anesthesia was induced and the patient was intubated. An arterial line was placed. Neuromonitoring leads (SSEPs, MEPs, EMG) were placed. Intravenous antibiotics, 2 g Ancef, were administered. The patient was then positioned prone onto an open Ethlyn Clas frame. The arms were positioned on armboards and all pressure points were appropriately padded. The linear incision was marked to span T5-T9 levels. The surgical site was prepped and draped in the usual sterile fashion.     The incision was made with a #15 scalpel. Bovie electrocautery was then used to complete the dissection down to the fascia which was split at the midline. Subperiosteal dissection was undertaken to fully expose the remaining lamina, spinous processes, facets, and transverse processes of T5-T9 bilaterally. An image guidance array was then attached to the spinous process and the [facility] intraoperative CT was used to obtain images these were transferred to the navigation unit. The scan was done down to the scarum to allow for accurate counting of the levels. We confirmed that the system was accurate and proceeded with placement of pedicle screw instrumentation at T9. For each pedicle,  holes were drilled using the AM-8 attachment on the [employer] drill in accordance with anatomic and CT guidance. A navigated pedicle probe was then used to cannulate the pedicles. After confirming with a ball-point probe that the pedicle wall had not been breached, the screws were placed based on measurements derived via navigation. This was done at T5 to T9 bilaterally with excellent purchase.  At the completion of instrumentation, a scan was obtained with the intraoperative CT scanner to confirm the position of the hardware.       The rongeur was used to remove the bottom of the T6, T7 and top of the T8 spinous process. Using the high-speed [employer] drill, I performed a wide central laminectomy. The hypertrophic ligamentum flavum was removed with a 2 mm punch microsurgically and released from the underlying dura and exiting nerve roots. I then used the drill to open the foramen bilaterally, removing the pars and articular processes and performing a partial removal of the pedicle bilaterally to access the ventral epidural space. A firm, grey mass was seen wrapping around the dura ventrally and protruding into the foramen bilaterally. This mass was bipolared and removed progressively from both sides until the entire ventral circumference was explored and the mass was removed to the back of the vertebrae. The canal was widely decompressed at this point. The tissue was sent for pathology. 5.5 mm titanium rods were cut to the appropriate length and bent to fit into the polyaxial pedicle screw heads using the [place] coronel. Caps were placed and final-tightened over these to lock in the rods. Compression was performed across the T7-8 interspace to re-establish lordosis. Meticulous hemostasis was undertaken and the wound was irrigated copiously with antibiotic solution. The navigation frame was removed. The remaining laminae, facets, and transverse processes of T5-T9 were bilaterally decorticated with the high speed drill. Fibergraft and bone chips were placed along the exposed bone to provide a substrate for fusion from T9 to the pelvis. Two medium hemovac drains were then laid deep to the fascia, tunneled away from the incision line, and secured to the skin with a stitch.  The incision was then closed in layers using 0 Vicryl sutures in the deep muscles and fascia and 2-0 Vicryl sutures in the subcutaneous tissues. Vancomycin powder (2g) was sprinkled in the wound suprafascially during the closure. A 1:1 mixture of Exparel and Marcaine was injected klarissa-incisionally in the paraspinous muscles. The skin was closed with 4-0 Monocryl and Dermabond Preenio. The wound was dressed with telfa and tape.      All needle, sponge, and instrument counts were correct. Notably, SSEPs, MEPs, and EMGs remained stable for the duration of the case. The patient was turned back to supine position onto a hospital bed and extubated without difficulty. She was taken to the PACU in stable condition. I affirm in accordance with CMS regulations that I was present and scrubbed for all critical portions of the case.      IMPLANTS:   1. Synthes pedicle screws T5-T9 bilateral  2. Titanium rods 5.5 bilaterally  3. Fibergraft       SPECIMEN: None     DRAINS:   1.  Medium Hemovac (10F) drain x 2     DISPOSITION:  PACU in stable condition.       COMPLICATIONS:   1. None apparent

## 2021-03-10 NOTE — CONSULTS
[facility]  [id number]   1980   2/16/2021    Requesting physician: Memo Gonzalez MD    Reason for consultation:t7 mass    History of present illness: Patient is a 41 y.o. female w/ PMH of sarcoma which was in remission but recently she had radiologic imaging with MRI of her abdomen and was found to be positive for metastasis in her abdomen. She presented with hx of a month of mid back pain that circles around her abdomen with Left side worse. LE weakness when she stands with L>R. Her pain has progressively gotten worse and is unbearable. She is very tearful and appears very uncomfortable. Yesterday, she described a lightening bolt of pain that shot down her back and legs and she felt tingling in toes. The tingling was transient. The constant pain in mid back that circles her abdomen persists. ROS:   GENERAL:  Denies fever or recent illness.  Denies weight changes   EYES:  Denies vision change or diplopia  EARS:  Denies hearing loss  CARDIAC:  Denies chest pain  RESPIRATORY:  Denies shortness of breath  SKIN:  Denies rash or lesions   HEM:  Denies excessive bruising  PSYCH:  Denies anxiety or depression  NEURO:  Denies headache, numbness + tingling bilat toes + lateralizing weakness LE L>R  :  Denies urinary difficulty  GI: Denies nausea, vomiting, diarrhea or constipation  MUSCULOSKELETAL:  No arthralgias    Allergies   Allergen Reactions    Nsaids      Pt has Crohn's    Ondansetron Hcl Other (See Comments)     States it makes her feel weird  States it makes her feel weird      Topamax [Topiramate] Hives, Itching and Rash       Past Medical History:   Diagnosis Date    Anxiety     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     Crohn's disease ([facility]     Depression     Early menopause     follows with endocrine/gyne    IBS (irritable bowel syndrome)     IBS (irritable bowel syndrome)     Mixed hyperlipidemia 06/22/2017    Obesity     Osteoarthritis, knee     PONV (postoperative nausea and vomiting)     Prolonged emergence from general anesthesia     panic attacks on awakening, requests anxiety med on awakening    Sarcoma of left lower extremity ([address] 11/24/2017    s/p XRT, followed by surgery        Past Surgical History:   Procedure Laterality Date    ANKLE SURGERY Left     APPENDECTOMY      BREAST REDUCTION SURGERY      CARPAL TUNNEL RELEASE Right 07/2020    KNEE ARTHROSCOPY Right 01/2015    KNEE ARTHROSCOPY Right 07/2015    LAMINECTOMY N/A 2/17/2021    T7 LAMINECTOMY WITH T5-9 PEDICALE FIXATION AND REMOVAL OF EPIDURAL TUMOR AT T7 performed by Beti Stallworth.  Bianca Loera MD at [address] [place] [address] N/A 07/22/2019    ROBOTIC ASSISTED PARTIAL LIVER RESECTION AND ABDOMINAL MASS RESECTION performed by Luis Enrique Velazquez MD at [address] [place] [address] Left 11/2017    for sarcoma    OTHER SURGICAL HISTORY Left     biopsy of left thigh mass    OTHER SURGICAL HISTORY Left 02/26/2018    WIDE EXCISION LEFT THIGH SARCOMA          OTHER SURGICAL HISTORY Left 03/14/2018    INCISION AND DRAINAGE OF LEFT THIGH       OTHER SURGICAL HISTORY Left 03/22/2018    incision and drainage left posterior thigh    PORT SURGERY Right 11/30/2020    PORT REMOVAL performed by Luis Enrique Velazquez MD at [address] SALPINGECTOMY Left 2009    d/t scar tissue    TOTAL KNEE ARTHROPLASTY Right 09/2015       Social History     Occupational History     Comment: social work at [employer]   Tobacco Use    Smoking status: Former Smoker     Packs/day: 0.50     Years: 15.00     Pack years: 7.50     Types: Cigarettes    Smokeless tobacco: Never Used   Substance and Sexual Activity    Alcohol use: Yes     Comment: rare    Drug use: Yes     Types: Marijuana     Comment: medical marijuana    Sexual activity: Yes     Partners: Male     Comment:          Family History   Problem Relation Age of Onset    Hypertension Father     Elevated Lipids Father     Coronary Art Dis Maternal Grandfather 36  Heart Failure Paternal Grandfather 76    Hypertension Brother     Coronary Art Dis Maternal Uncle         Outpatient Medications Marked as Taking for the 2/15/21 encounter [facility] Encounter)   Medication Sig Dispense Refill    [] promethazine (PHENERGAN) 12.5 MG tablet Take 1 tablet by mouth every 6 hours as needed for Nausea 30 tablet 0    [] oxyCODONE (OXY-IR) 10 MG immediate release tablet Take 1-1.5 tablets by mouth every 6 hours as needed for Pain for up to 14 days. 80 tablet 0    [] diazePAM (VALIUM) 5 MG tablet Take 1 tablet by mouth every 6 hours as needed (spasms) for up to 10 days. 40 tablet 0    [] lidocaine 4 % external patch Place 1 patch onto the skin daily for 10 days 10 patch 0    sennosides-docusate sodium (SENOKOT-S) 8.6-50 MG tablet Take 2 tablets by mouth 2 times daily      naloxegol (MOVANTIK) 25 MG TABS tablet Take 1 tablet by mouth every morning 30 tablet 0    [] tiZANidine (ZANAFLEX) 4 MG tablet Take 1 tablet by mouth every 6 hours for 10 days 40 tablet 0    [] dexamethasone (DECADRON) 4 MG tablet Take 1 tablet by mouth every 8 hours for 6 doses FOLLOWED BY 1 TABLET EVERY 12 HOURS X 8 doses  Followed by 1 tablet daily x 4 doses 18 tablet 0    docusate sodium (COLACE) 100 MG capsule Take 1 capsule by mouth 2 times daily 30 capsule 0    [] polyethylene glycol (GLYCOLAX) 17 GM/SCOOP powder Take 17 g by mouth 2 times daily 510 g 0    Turmeric (QC TUMERIC COMPLEX) 500 MG CAPS Take 500 mg by mouth daily      budesonide (ENTOCORT EC) 3 MG extended release capsule           No current facility-administered medications for this encounter.       Current Outpatient Medications   Medication Sig Dispense Refill    sennosides-docusate sodium (SENOKOT-S) 8.6-50 MG tablet Take 2 tablets by mouth 2 times daily      naloxegol (MOVANTIK) 25 MG TABS tablet Take 1 tablet by mouth every morning 30 tablet 0    docusate sodium (COLACE) 100 MG capsule Take 1 capsule by mouth 2 times daily 30 capsule 0    Turmeric (QC TUMERIC COMPLEX) 500 MG CAPS Take 500 mg by mouth daily      budesonide (ENTOCORT EC) 3 MG extended release capsule       lidocaine-prilocaine (EMLA) 2.5-2.5 % cream Apply a dot of cream to top of port and cover with plastic wrap 30-60 minutes before needle placed. Objective:  /71   Pulse 58   Temp 98 °F (36.7 °C) (Oral)   Resp 18   Ht 5' 9\" (1.753 m)   Wt 263 lb 3.7 oz (119.4 kg)   SpO2 100%   BMI 38.87 kg/m²     Physical Exam:   Patient seen and examined  General: Well developed. Alert and cooperative and appears very uncomfortable     HENT: atraumatic, neck supple  Eyes: Optic discs: Not tested  Pulmonary: unlabored respiratory effort  Cardiovascular:  Warm well perfused. No peripheral edema  Gastrointestinal: abdomen soft, NT, ND    Neurological:  Mental Status: Awake, alert, oriented x 4, speech clear and appropriate  Attention: Intact  Language: No aphasia or dysarthria noted  Sensation: Intact to all extremities to light touch  Coordination: Intact    DTRs:    Right  Left    Gilman's - -   biceps  2 2   brachioradialis  2 2    Patella   1 1   ankle clonus  - -   toes (babinski)  d d     Cranial Nerves:  II: Visual acuity not tested, denies new visual changes / diplopia  III, IV, VI: PERRL, 3 mm bilaterally, EOMI, no nystagmus noted  V: Facial sensation intact bilaterally to touch  VII: Face symmetric  VIII: Hearing intact bilaterally to spoken voice  IX: Palate movement equal bilaterally  XI: Shoulder shrug equal bilaterally  XII: Tongue midline    Musculoskeletal:   Gait: Not tested   Assist devices: None   Tone: normal  Motor strength:    Right  Left    Right  Left    Deltoid  5 5  Hip Flex  4 4-   Biceps  5 5  Knee Extensors  4 4-   Triceps  5 5  Knee Flexors  4 4-   Wrist Ext  5 5  Ankle Dorsiflex. 4 4-   Wrist Flex  5 5  Ankle Plantarflex.   4 4-   Handgrip  5 5  Ext Clayton Longus  4 4-   Thumb Ext  5 5 Radiological Findings:  MRI lumbar spine  Bony metastasis involving T7 vertebral body and pedicles with epidural tumor extension surrounding and compressing the cord at this level with tumor extending to the left paravertebral region at this level. Mild wedging compression fracture at T7       Bony metastasis involving the T12 vertebral body and left pedicle as described.        Suspected bony metastasis involving the L3 vertebral body noted on the localizer image. CT abdomen  There is a 1.2 cm oval region of diffusion restriction in segment II of the liver that corresponds with the enhancing structure on CT from 9/21/2020. There is no other imaging correlate. This region is difficult to evaluate due to its proximity to the    diaphragm and subsequent degradation by respiratory motion. Consider follow-up imaging in 6 months. Labs:    Patient Active Problem List    Diagnosis Date Noted    Status post thoracic spinal fusion 02/19/2021    Sarcoma ([address] 02/15/2021    Neutropenic fever ([address] 10/10/2019    Metastatic cancer to liver ([address] 09/19/2019    Liver mass 07/22/2019    Weight loss counseling, encounter for     Moderate malnutrition ([address] 03/23/2018    Class 2 obesity due to excess calories with body mass index (BMI) of 39.0 to 39.9 in adult     Chronic depression     Sarcoma of left lower extremity ([address] 11/24/2017    Small bowel obstruction ([address] 10/11/2017    Vitamin B12 deficiency 07/10/2017    Morbid obesity with BMI of 40.0-44.9, adult ([address] 06/22/2017    Mixed hyperlipidemia 06/22/2017    IBS (irritable bowel syndrome)     Crohn's disease ([address]     Osteoarthritis, knee     Premature menopause 02/04/2011    Anxiety disorder 02/04/2011       Assessment:  Patient is a 41 y.o. female w/hx of sarcoma who presented with severe mid back pain. MRI shows a T7 mass with epidural extension and compression of the cord. Discussed surgical plan with pt and  over the phone.  Both verbalize understanding. All questions were answered. Plan:  1. Plan for T7 laminectomy with T5-T9 pedicle fixation and removal of mass tomorrow afternoon  2. Neurologic exams frequency: Floor: Q4H  3. For change in exam MUST contact neurosurgery team along with primary team  4. Muscle spasms: scheduled Robaxin and prn Valium  5. Pain control: roxicodone  6. Advance diet / activity per primary team  7. Oncology to see, spoke to Block  8. Rad oncology to see Discussed with Dr. Keila Collins  9. NPO after MN  10. Thank you for consult. Will follow inpatient.   Please call with any questions or decline in neurological status    Isaac Moreno MD, PhD  Irvin Llamas  Director, [address], [address] (c), [phone] (o)

## 2021-03-16 ENCOUNTER — HOSPITAL ENCOUNTER (OUTPATIENT)
Dept: MRI IMAGING | Age: 41
Discharge: HOME | End: 2021-03-16
Payer: COMMERCIAL

## 2021-03-16 DIAGNOSIS — C49.22 SARCOMA OF LEFT LOWER EXTREMITY (HCC): ICD-10-CM

## 2021-03-16 PROCEDURE — 72157 MRI CHEST SPINE W/O & W/DYE: CPT

## 2021-03-16 PROCEDURE — 72158 MRI LUMBAR SPINE W/O & W/DYE: CPT

## 2021-03-16 PROCEDURE — 6360000004 HC RX CONTRAST MEDICATION: Performed by: NURSE PRACTITIONER

## 2021-03-16 PROCEDURE — A9579 GAD-BASE MR CONTRAST NOS,1ML: HCPCS | Performed by: NURSE PRACTITIONER

## 2021-03-16 RX ADMIN — GADOTERIDOL 20 ML: 279.3 INJECTION, SOLUTION INTRAVENOUS at 15:34

## 2021-03-22 ENCOUNTER — HOSPITAL ENCOUNTER (INPATIENT)
Age: 41
LOS: 6 days | Discharge: HOME | DRG: 847 | End: 2021-03-28
Attending: INTERNAL MEDICINE | Admitting: INTERNAL MEDICINE
Payer: COMMERCIAL

## 2021-03-22 PROBLEM — C49.9: Status: ACTIVE | Noted: 2021-03-22

## 2021-03-22 LAB
A/G RATIO: 1.9 (ref 1.1–2.2)
ALBUMIN SERPL-MCNC: 4.3 G/DL (ref 3.4–5)
ALP BLD-CCNC: 94 U/L (ref 40–129)
ALT SERPL-CCNC: 24 U/L (ref 10–40)
ANION GAP SERPL CALCULATED.3IONS-SCNC: 13 MMOL/L (ref 3–16)
AST SERPL-CCNC: 20 U/L (ref 15–37)
BASOPHILS ABSOLUTE: 0.1 K/UL (ref 0–0.2)
BASOPHILS RELATIVE PERCENT: 0.8 %
BILIRUB SERPL-MCNC: <0.2 MG/DL (ref 0–1)
BUN BLDV-MCNC: 19 MG/DL (ref 7–20)
CALCIUM SERPL-MCNC: 8.9 MG/DL (ref 8.3–10.6)
CHLORIDE BLD-SCNC: 101 MMOL/L (ref 99–110)
CO2: 20 MMOL/L (ref 21–32)
CREAT SERPL-MCNC: <0.5 MG/DL (ref 0.6–1.1)
EKG ATRIAL RATE: 75 BPM
EKG DIAGNOSIS: NORMAL
EKG P AXIS: 36 DEGREES
EKG P-R INTERVAL: 124 MS
EKG Q-T INTERVAL: 382 MS
EKG QRS DURATION: 82 MS
EKG QTC CALCULATION (BAZETT): 426 MS
EKG R AXIS: 8 DEGREES
EKG T AXIS: 39 DEGREES
EKG VENTRICULAR RATE: 75 BPM
EOSINOPHILS ABSOLUTE: 0 K/UL (ref 0–0.6)
EOSINOPHILS RELATIVE PERCENT: 0 %
GFR AFRICAN AMERICAN: >60
GFR NON-AFRICAN AMERICAN: >60
GLOBULIN: 2.3 G/DL
GLUCOSE BLD-MCNC: 119 MG/DL (ref 70–99)
HCT VFR BLD CALC: 36.2 % (ref 36–48)
HEMOGLOBIN: 12 G/DL (ref 12–16)
LYMPHOCYTES ABSOLUTE: 0.3 K/UL (ref 1–5.1)
LYMPHOCYTES RELATIVE PERCENT: 4 %
MCH RBC QN AUTO: 32.1 PG (ref 26–34)
MCHC RBC AUTO-ENTMCNC: 33.2 G/DL (ref 31–36)
MCV RBC AUTO: 96.5 FL (ref 80–100)
MONOCYTES ABSOLUTE: 0.3 K/UL (ref 0–1.3)
MONOCYTES RELATIVE PERCENT: 4.5 %
NEUTROPHILS ABSOLUTE: 5.9 K/UL (ref 1.7–7.7)
NEUTROPHILS RELATIVE PERCENT: 90.7 %
PDW BLD-RTO: 14.9 % (ref 12.4–15.4)
PLATELET # BLD: 271 K/UL (ref 135–450)
PMV BLD AUTO: 7.5 FL (ref 5–10.5)
POTASSIUM SERPL-SCNC: 4.4 MMOL/L (ref 3.5–5.1)
RBC # BLD: 3.76 M/UL (ref 4–5.2)
SODIUM BLD-SCNC: 134 MMOL/L (ref 136–145)
TOTAL PROTEIN: 6.6 G/DL (ref 6.4–8.2)
URIC ACID, SERUM: 2.3 MG/DL (ref 2.6–6)
WBC # BLD: 6.5 K/UL (ref 4–11)

## 2021-03-22 PROCEDURE — 6360000002 HC RX W HCPCS: Performed by: INTERNAL MEDICINE

## 2021-03-22 PROCEDURE — 3E04305 INTRODUCTION OF OTHER ANTINEOPLASTIC INTO CENTRAL VEIN, PERCUTANEOUS APPROACH: ICD-10-PCS | Performed by: INTERNAL MEDICINE

## 2021-03-22 PROCEDURE — 93005 ELECTROCARDIOGRAM TRACING: CPT | Performed by: INTERNAL MEDICINE

## 2021-03-22 PROCEDURE — 36415 COLL VENOUS BLD VENIPUNCTURE: CPT

## 2021-03-22 PROCEDURE — 2500000003 HC RX 250 WO HCPCS: Performed by: INTERNAL MEDICINE

## 2021-03-22 PROCEDURE — 36569 INSJ PICC 5 YR+ W/O IMAGING: CPT

## 2021-03-22 PROCEDURE — 6370000000 HC RX 637 (ALT 250 FOR IP): Performed by: INTERNAL MEDICINE

## 2021-03-22 PROCEDURE — 02HV33Z INSERTION OF INFUSION DEVICE INTO SUPERIOR VENA CAVA, PERCUTANEOUS APPROACH: ICD-10-PCS | Performed by: INTERNAL MEDICINE

## 2021-03-22 PROCEDURE — 93010 ELECTROCARDIOGRAM REPORT: CPT | Performed by: INTERNAL MEDICINE

## 2021-03-22 PROCEDURE — 99221 1ST HOSP IP/OBS SF/LOW 40: CPT | Performed by: NURSE PRACTITIONER

## 2021-03-22 PROCEDURE — C1751 CATH, INF, PER/CENT/MIDLINE: HCPCS

## 2021-03-22 PROCEDURE — 84550 ASSAY OF BLOOD/URIC ACID: CPT

## 2021-03-22 PROCEDURE — 80053 COMPREHEN METABOLIC PANEL: CPT

## 2021-03-22 PROCEDURE — 96415 CHEMO IV INFUSION ADDL HR: CPT

## 2021-03-22 PROCEDURE — 2580000003 HC RX 258: Performed by: INTERNAL MEDICINE

## 2021-03-22 PROCEDURE — 77412 RADIATION TX DELIVERY LVL 3: CPT

## 2021-03-22 PROCEDURE — 2060000000 HC ICU INTERMEDIATE R&B

## 2021-03-22 PROCEDURE — DP0C3ZZ BEAM RADIATION OF OTHER BONE USING ELECTRONS: ICD-10-PCS | Performed by: INTERNAL MEDICINE

## 2021-03-22 PROCEDURE — 85025 COMPLETE CBC W/AUTO DIFF WBC: CPT

## 2021-03-22 PROCEDURE — 96413 CHEMO IV INFUSION 1 HR: CPT

## 2021-03-22 PROCEDURE — 6370000000 HC RX 637 (ALT 250 FOR IP): Performed by: NURSE PRACTITIONER

## 2021-03-22 RX ORDER — LIDOCAINE HYDROCHLORIDE 10 MG/ML
5 INJECTION, SOLUTION EPIDURAL; INFILTRATION; INTRACAUDAL; PERINEURAL ONCE
Status: COMPLETED | OUTPATIENT
Start: 2021-03-22 | End: 2021-03-22

## 2021-03-22 RX ORDER — OXYCODONE HYDROCHLORIDE 5 MG/1
15 CAPSULE ORAL EVERY 6 HOURS PRN
COMMUNITY

## 2021-03-22 RX ORDER — SODIUM CHLORIDE 9 MG/ML
INJECTION, SOLUTION INTRAVENOUS CONTINUOUS
Status: DISCONTINUED | OUTPATIENT
Start: 2021-03-22 | End: 2021-03-28 | Stop reason: HOSPADM

## 2021-03-22 RX ORDER — GABAPENTIN 300 MG/1
300 CAPSULE ORAL 3 TIMES DAILY
Status: DISCONTINUED | OUTPATIENT
Start: 2021-03-22 | End: 2021-03-28 | Stop reason: HOSPADM

## 2021-03-22 RX ORDER — PROCHLORPERAZINE EDISYLATE 5 MG/ML
10 INJECTION INTRAMUSCULAR; INTRAVENOUS EVERY 6 HOURS PRN
Status: DISCONTINUED | OUTPATIENT
Start: 2021-03-22 | End: 2021-03-28 | Stop reason: HOSPADM

## 2021-03-22 RX ORDER — FUROSEMIDE 10 MG/ML
40 INJECTION INTRAMUSCULAR; INTRAVENOUS 2 TIMES DAILY
Status: DISCONTINUED | OUTPATIENT
Start: 2021-03-23 | End: 2021-03-28 | Stop reason: HOSPADM

## 2021-03-22 RX ORDER — GRANISETRON HYDROCHLORIDE 1 MG/1
2 TABLET, FILM COATED ORAL DAILY
Status: COMPLETED | OUTPATIENT
Start: 2021-03-22 | End: 2021-03-25

## 2021-03-22 RX ORDER — SENNA AND DOCUSATE SODIUM 50; 8.6 MG/1; MG/1
4 TABLET, FILM COATED ORAL NIGHTLY
Status: DISCONTINUED | OUTPATIENT
Start: 2021-03-22 | End: 2021-03-28 | Stop reason: HOSPADM

## 2021-03-22 RX ORDER — SODIUM CHLORIDE 0.9 % (FLUSH) 0.9 %
10 SYRINGE (ML) INJECTION PRN
Status: DISCONTINUED | OUTPATIENT
Start: 2021-03-22 | End: 2021-03-28 | Stop reason: HOSPADM

## 2021-03-22 RX ORDER — ACETAMINOPHEN 325 MG/1
650 TABLET ORAL EVERY 6 HOURS PRN
Status: DISCONTINUED | OUTPATIENT
Start: 2021-03-22 | End: 2021-03-22 | Stop reason: DRUGHIGH

## 2021-03-22 RX ORDER — LORAZEPAM 2 MG/ML
0.5 INJECTION INTRAMUSCULAR EVERY 6 HOURS PRN
Status: DISCONTINUED | OUTPATIENT
Start: 2021-03-22 | End: 2021-03-28 | Stop reason: HOSPADM

## 2021-03-22 RX ORDER — METHOCARBAMOL 750 MG/1
750 TABLET, FILM COATED ORAL EVERY 6 HOURS
Status: ON HOLD | COMMUNITY
End: 2021-09-17 | Stop reason: SDUPTHER

## 2021-03-22 RX ORDER — METHOCARBAMOL 750 MG/1
750 TABLET, FILM COATED ORAL EVERY 4 HOURS
Status: DISCONTINUED | OUTPATIENT
Start: 2021-03-22 | End: 2021-03-28 | Stop reason: HOSPADM

## 2021-03-22 RX ORDER — POLYETHYLENE GLYCOL 3350 17 G/17G
17 POWDER, FOR SOLUTION ORAL DAILY PRN
Status: DISCONTINUED | OUTPATIENT
Start: 2021-03-22 | End: 2021-03-28 | Stop reason: HOSPADM

## 2021-03-22 RX ORDER — SIMETHICONE 80 MG
80 TABLET,CHEWABLE ORAL
Status: DISCONTINUED | OUTPATIENT
Start: 2021-03-22 | End: 2021-03-28 | Stop reason: HOSPADM

## 2021-03-22 RX ORDER — DEXAMETHASONE 4 MG/1
4 TABLET ORAL 4 TIMES DAILY
Status: ON HOLD | COMMUNITY
End: 2021-03-27 | Stop reason: SDUPTHER

## 2021-03-22 RX ORDER — POLYETHYLENE GLYCOL 3350 17 G/17G
17 POWDER, FOR SOLUTION ORAL DAILY
COMMUNITY

## 2021-03-22 RX ORDER — SODIUM CHLORIDE 9 MG/ML
INJECTION, SOLUTION INTRAVENOUS CONTINUOUS
Status: DISCONTINUED | OUTPATIENT
Start: 2021-03-22 | End: 2021-03-22

## 2021-03-22 RX ORDER — OMEPRAZOLE 20 MG/1
20 CAPSULE, DELAYED RELEASE ORAL DAILY
COMMUNITY

## 2021-03-22 RX ORDER — OXYCODONE HYDROCHLORIDE 5 MG/1
10 TABLET ORAL EVERY 6 HOURS PRN
Status: DISCONTINUED | OUTPATIENT
Start: 2021-03-22 | End: 2021-03-28 | Stop reason: HOSPADM

## 2021-03-22 RX ORDER — SODIUM CHLORIDE 0.9 % (FLUSH) 0.9 %
10 SYRINGE (ML) INJECTION EVERY 12 HOURS SCHEDULED
Status: DISCONTINUED | OUTPATIENT
Start: 2021-03-22 | End: 2021-03-28 | Stop reason: HOSPADM

## 2021-03-22 RX ORDER — SENNA AND DOCUSATE SODIUM 50; 8.6 MG/1; MG/1
4 TABLET, FILM COATED ORAL NIGHTLY
COMMUNITY

## 2021-03-22 RX ORDER — POLYETHYLENE GLYCOL 3350 17 G/17G
17 POWDER, FOR SOLUTION ORAL 2 TIMES DAILY
Status: DISCONTINUED | OUTPATIENT
Start: 2021-03-22 | End: 2021-03-28 | Stop reason: HOSPADM

## 2021-03-22 RX ORDER — DIPHENHYDRAMINE HYDROCHLORIDE AND LIDOCAINE HYDROCHLORIDE AND ALUMINUM HYDROXIDE AND MAGNESIUM HYDRO
5 KIT
Status: DISCONTINUED | OUTPATIENT
Start: 2021-03-22 | End: 2021-03-22 | Stop reason: SDUPTHER

## 2021-03-22 RX ORDER — DIAZEPAM 5 MG/1
5 TABLET ORAL EVERY 6 HOURS
Status: DISCONTINUED | OUTPATIENT
Start: 2021-03-22 | End: 2021-03-28 | Stop reason: HOSPADM

## 2021-03-22 RX ORDER — POLYETHYLENE GLYCOL 3350 17 G/17G
17 POWDER, FOR SOLUTION ORAL DAILY
Status: DISCONTINUED | OUTPATIENT
Start: 2021-03-22 | End: 2021-03-22

## 2021-03-22 RX ORDER — PANTOPRAZOLE SODIUM 40 MG/1
40 TABLET, DELAYED RELEASE ORAL
Status: DISCONTINUED | OUTPATIENT
Start: 2021-03-23 | End: 2021-03-28 | Stop reason: HOSPADM

## 2021-03-22 RX ORDER — ACETAMINOPHEN 500 MG
1000 TABLET ORAL EVERY 6 HOURS PRN
Status: DISCONTINUED | OUTPATIENT
Start: 2021-03-22 | End: 2021-03-28 | Stop reason: HOSPADM

## 2021-03-22 RX ORDER — ACETAMINOPHEN 650 MG/1
650 SUPPOSITORY RECTAL EVERY 6 HOURS PRN
Status: DISCONTINUED | OUTPATIENT
Start: 2021-03-22 | End: 2021-03-28 | Stop reason: HOSPADM

## 2021-03-22 RX ORDER — DEXAMETHASONE 4 MG/1
4 TABLET ORAL 4 TIMES DAILY
Status: DISCONTINUED | OUTPATIENT
Start: 2021-03-22 | End: 2021-03-28 | Stop reason: HOSPADM

## 2021-03-22 RX ORDER — ACETAMINOPHEN 500 MG
1000 TABLET ORAL EVERY 6 HOURS
Status: ON HOLD | COMMUNITY
End: 2021-06-23

## 2021-03-22 RX ORDER — SIMETHICONE 80 MG
80 TABLET,CHEWABLE ORAL
COMMUNITY

## 2021-03-22 RX ORDER — LORAZEPAM 0.5 MG/1
0.5 TABLET ORAL EVERY 6 HOURS PRN
Status: DISCONTINUED | OUTPATIENT
Start: 2021-03-22 | End: 2021-03-28 | Stop reason: HOSPADM

## 2021-03-22 RX ORDER — PROCHLORPERAZINE MALEATE 10 MG
10 TABLET ORAL EVERY 6 HOURS PRN
Status: DISCONTINUED | OUTPATIENT
Start: 2021-03-22 | End: 2021-03-28 | Stop reason: HOSPADM

## 2021-03-22 RX ORDER — GABAPENTIN 300 MG/1
300 CAPSULE ORAL 3 TIMES DAILY
COMMUNITY

## 2021-03-22 RX ORDER — DIAZEPAM 5 MG/1
5 TABLET ORAL EVERY 6 HOURS
Status: ON HOLD | COMMUNITY
End: 2021-06-25 | Stop reason: HOSPADM

## 2021-03-22 RX ORDER — SIMETHICONE 80 MG
80 TABLET,CHEWABLE ORAL
Status: DISCONTINUED | OUTPATIENT
Start: 2021-03-22 | End: 2021-03-22

## 2021-03-22 RX ADMIN — Medication 10 ML: at 21:25

## 2021-03-22 RX ADMIN — DEXAMETHASONE 4 MG: 4 TABLET ORAL at 13:03

## 2021-03-22 RX ADMIN — ACETAMINOPHEN 1000 MG: 500 TABLET, FILM COATED ORAL at 19:27

## 2021-03-22 RX ADMIN — Medication 10 ML: at 09:00

## 2021-03-22 RX ADMIN — DIAZEPAM 5 MG: 5 TABLET ORAL at 10:20

## 2021-03-22 RX ADMIN — SIMETHICONE CHEW TAB 80 MG 80 MG: 80 TABLET ORAL at 10:35

## 2021-03-22 RX ADMIN — SODIUM CHLORIDE: 9 INJECTION, SOLUTION INTRAVENOUS at 16:24

## 2021-03-22 RX ADMIN — DIAZEPAM 5 MG: 5 TABLET ORAL at 16:24

## 2021-03-22 RX ADMIN — METHOCARBAMOL 750 MG: 750 TABLET ORAL at 18:18

## 2021-03-22 RX ADMIN — FOSAPREPITANT 150 MG: 150 INJECTION, POWDER, LYOPHILIZED, FOR SOLUTION INTRAVENOUS at 17:38

## 2021-03-22 RX ADMIN — SIMETHICONE CHEW TAB 80 MG 80 MG: 80 TABLET ORAL at 21:24

## 2021-03-22 RX ADMIN — DOCUSATE SODIUM 50 MG AND SENNOSIDES 8.6 MG 4 TABLET: 8.6; 5 TABLET, FILM COATED ORAL at 21:24

## 2021-03-22 RX ADMIN — METHOCARBAMOL 750 MG: 750 TABLET ORAL at 10:20

## 2021-03-22 RX ADMIN — SIMETHICONE CHEW TAB 80 MG 80 MG: 80 TABLET ORAL at 13:03

## 2021-03-22 RX ADMIN — POLYETHYLENE GLYCOL 3350 17 G: 17 POWDER, FOR SOLUTION ORAL at 21:24

## 2021-03-22 RX ADMIN — METHOCARBAMOL 750 MG: 750 TABLET ORAL at 13:03

## 2021-03-22 RX ADMIN — SODIUM CHLORIDE: 9 INJECTION, SOLUTION INTRAVENOUS at 11:34

## 2021-03-22 RX ADMIN — ACETAMINOPHEN 1000 MG: 500 TABLET, FILM COATED ORAL at 13:03

## 2021-03-22 RX ADMIN — GRANISETRON HYDROCHLORIDE 2 MG: 1 TABLET ORAL at 17:39

## 2021-03-22 RX ADMIN — DEXAMETHASONE 4 MG: 4 TABLET ORAL at 21:25

## 2021-03-22 RX ADMIN — DIAZEPAM 5 MG: 5 TABLET ORAL at 21:24

## 2021-03-22 RX ADMIN — OXYCODONE 10 MG: 5 TABLET ORAL at 16:24

## 2021-03-22 RX ADMIN — GABAPENTIN 300 MG: 300 CAPSULE ORAL at 21:25

## 2021-03-22 RX ADMIN — METHOCARBAMOL 750 MG: 750 TABLET ORAL at 21:25

## 2021-03-22 RX ADMIN — MESNA: 100 INJECTION, SOLUTION INTRAVENOUS at 18:14

## 2021-03-22 RX ADMIN — SIMETHICONE CHEW TAB 80 MG 80 MG: 80 TABLET ORAL at 18:18

## 2021-03-22 RX ADMIN — GABAPENTIN 300 MG: 300 CAPSULE ORAL at 13:02

## 2021-03-22 RX ADMIN — DEXAMETHASONE 4 MG: 4 TABLET ORAL at 16:24

## 2021-03-22 RX ADMIN — LIDOCAINE HYDROCHLORIDE 5 ML: 10 INJECTION, SOLUTION EPIDURAL; INFILTRATION; INTRACAUDAL; PERINEURAL at 10:00

## 2021-03-22 RX ADMIN — OXYCODONE 10 MG: 5 TABLET ORAL at 10:10

## 2021-03-22 ASSESSMENT — PAIN DESCRIPTION - DIRECTION: RADIATING_TOWARDS: BACK

## 2021-03-22 ASSESSMENT — PAIN SCALES - GENERAL
PAINLEVEL_OUTOF10: 3
PAINLEVEL_OUTOF10: 7
PAINLEVEL_OUTOF10: 5

## 2021-03-22 ASSESSMENT — PAIN - FUNCTIONAL ASSESSMENT: PAIN_FUNCTIONAL_ASSESSMENT: ACTIVITIES ARE NOT PREVENTED

## 2021-03-22 ASSESSMENT — PAIN DESCRIPTION - PROGRESSION: CLINICAL_PROGRESSION: GRADUALLY WORSENING

## 2021-03-22 ASSESSMENT — PAIN DESCRIPTION - LOCATION
LOCATION: ABDOMEN;BACK;HIP
LOCATION: ABDOMEN

## 2021-03-22 ASSESSMENT — PAIN DESCRIPTION - FREQUENCY
FREQUENCY: CONTINUOUS
FREQUENCY: CONTINUOUS

## 2021-03-22 ASSESSMENT — PAIN DESCRIPTION - PAIN TYPE
TYPE: ACUTE PAIN
TYPE: ACUTE PAIN

## 2021-03-22 ASSESSMENT — ENCOUNTER SYMPTOMS
BACK PAIN: 1
CONSTIPATION: 1
ABDOMINAL DISTENTION: 1
NAUSEA: 1
RESPIRATORY NEGATIVE: 1

## 2021-03-22 ASSESSMENT — PAIN DESCRIPTION - ONSET: ONSET: ON-GOING

## 2021-03-22 NOTE — PROGRESS NOTES
[facility]    Pt admitted for single agent ifosphamide/mesna to be given with concurrent XRT per request Patrice Lala [facility]. Full note to follow.       Abe Bull MD, MPH

## 2021-03-22 NOTE — PROGRESS NOTES
Original chemotherapy orders reviewed and acknowledged. Appropriateness of chemotherapy treatment regimen ifos/mesna for diagnosis of sarcoma was verified. Patient educated on chemotherapy regimen. Acknowledgement of informed consent for chemotherapy obtained. Estimated body surface area is 2.46 meters squared as calculated from the following:    Height as of this encounter: 5' 9\" (1.753 m). Weight as of this encounter: 273 lb 9.6 oz (124.1 kg). verified. Appropriate dosing calculations of chemotherapy based on above height, weight, and BSA verified. Administration: Chemotherapy drug ifos/ mesna independently verified with Merrill Flannery RN prior to administration. Acknowledgement of informed consent for chemotherapy administration verified. Original order, appropriateness of regimen, drug supplied, height, weight, BSA, dose calculations, expiration dates/times, drug appearance, and two patient identifiers were verified by both RNs. Drug checked for vesicant/irritant status and for risk of hypersensitivity. Most recent laboratory values and allergies, were reviewed. Positive, brisk blood return via CVC was confirmed prior to administration. Chest x-ray for correct line placement reviewed. Karen Lilly and Merrill Flannery RN verified correct rate of chemotherapy and maintenance IV fluids. Patient was educated on chemotherapy regimen prior to administration including indication for treatment related to disease & side effects of chemotherapy drug. Patient verbalizes understanding of all instructions. Monitoring during infusion done per policy, see Flowsheets. Blood return verified before, during, and after infusion per policy; no signs of extravasation. Pt tolerating chemotherapy well and without incident. Chemotherapy infusion end time on the [facility] Will continue to monitor.

## 2021-03-22 NOTE — PROGRESS NOTES
4 Eyes Admission Assessment     I agree as the admission nurse that 2 RN's have performed a thorough Head to Toe Skin Assessment on the patient. ALL assessment sites listed below have been assessed on admission. Areas assessed by both nurses: Isidro Lacey RN and Lukas Franz RN  [x]   Head, Face, and Ears   [x]   Shoulders, Back, and Chest  [x]   Arms, Elbows, and Hands   [x]   Coccyx, Sacrum, and Ischium  [x]   Legs, Feet, and Heels        Does the Patient have Skin Breakdown?   No         Hitesh Prevention initiated:  No   Wound Care Orders initiated:  No      C nurse consulted for Pressure Injury (Stage 3,4, Unstageable, DTI, NWPT, and Complex wounds) or Hitesh score 18 or lower:  No      Nurse 1 eSignature: Electronically signed by Estella Mendez RN on 3/22/21 at 2:40 PM EDT    **SHARE this note so that the co-signing nurse is able to place an eSignature**    Nurse 2 eSignature: Electronically signed by Marissa Oswald RN on 3/22/21 at 8:06 PM EDT

## 2021-03-22 NOTE — ONCOLOGY
[address]    [phone]    DATE:3/22/21    AREA TREATED:Sacrum    DAILY DOSE: 300cGy    CUMULATIVE DOSE:900 cGy    # 3  OUT OF 10  TREATMENTS    NEXT PLANNED TREATMENT  DATE: Tuesday    Daily Treatments are Monday through Friday      INPATIENT CODING:  Beam Radiation   Photons   Photon energy : 15 mv

## 2021-03-22 NOTE — CONSULTS
The [facility]  Palliative Medicine Consultation Note      Date Of Admission:3/22/2021  Date of consult: 03/22/21  Seen by [facility] in the past:  Yes    Recommendations:        Pt is known to the palliative care team. Met with pt and her  at the bedside. Pt reports that she's generally feeling as well as can be expected and is hopeful about the future. She reports that her pain is well controlled on her current regimen. She has been drinking aloe water for her esophagitis and avoiding carbonated beverages as advised by radiation oncology. She does report some constipation. 1. Goals of Care/Advanced Care planning/Code status: Full code, did not discuss in depth today. Continue with current management, pt remains hopeful and reports that she has generally been doing OK at home. She has excellent insight into her disease process and values. Will continue to follow while hospitalized. 2. Pain: Pt reports mainly pain in her abdomen today which she attributes to swelling. She reports her current pain regimen is keeping her pain at a tolerable level. 3. SOB: Pt denies  4. Constipation: Pt reports she feels like she is getting constipated, which happens when she is hospitalized. Increased scheduled miralax to BID from once a day. 5. Disposition: home when medically ready for discharge    Reason for Consult:         [x]  Goals of Care  [x]  Code Status Discussion/Advanced Care Planning   [x]  Psychosocial/Family Support  []  Symptom Management  []  Other (Specify)    Requesting Physician: Dr. Angela Horne:  Sarcoma    History Obtained From:  patient, electronic medical record    History of Present Illness:         Cynthia Stiles is a 35 y.o. female with PMH of Crohn's disease, sarcoma of LLE who presented for william agent ifosphamide/mesna to be given with concurrent XRT per request Patrice L[facility]. .    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     [facility]'s disease ([facility]     Depression     Early menopause     follows with endocrine/gyne    IBS (irritable bowel syndrome)     IBS (irritable bowel syndrome)     Mixed hyperlipidemia 06/22/2017    Obesity     Osteoarthritis, knee     PONV (postoperative nausea and vomiting)     Prolonged emergence from general anesthesia     panic attacks on awakening, requests anxiety med on awakening    Sarcoma of left lower extremity ([facility] 11/24/2017    s/p XRT, followed by surgery       Past Surgical History:        Procedure Laterality Date    ANKLE SURGERY Left     APPENDECTOMY      BREAST REDUCTION SURGERY      CARPAL TUNNEL RELEASE Right 07/2020    KNEE ARTHROSCOPY Right 01/2015    KNEE ARTHROSCOPY Right 07/2015    LAMINECTOMY N/A 2/17/2021    T7 LAMINECTOMY WITH T5-9 PEDICALE FIXATION AND REMOVAL OF EPIDURAL TUMOR AT T7 performed by Odalys [place].  Tee Gil MD at [address] [place] [address] N/A 07/22/2019    ROBOTIC ASSISTED PARTIAL LIVER RESECTION AND ABDOMINAL MASS RESECTION performed by Jose Núñez MD at [address] [place] [address] Left 11/2017    for sarcoma    OTHER SURGICAL HISTORY Left     biopsy of left thigh mass    OTHER SURGICAL HISTORY Left 02/26/2018    WIDE EXCISION LEFT THIGH SARCOMA          OTHER SURGICAL HISTORY Left 03/14/2018    INCISION AND DRAINAGE OF LEFT THIGH       OTHER SURGICAL HISTORY Left 03/22/2018    incision and drainage left posterior thigh    PORT SURGERY Right 11/30/2020    PORT REMOVAL performed by Jose Núñez MD at [address] SALPINGECTOMY Left 2009    d/t scar tissue    TOTAL KNEE ARTHROPLASTY Right 09/2015       Current Medications:    Medications Prior to Admission: methocarbamol (ROBAXIN) 750 MG tablet, Take 750 mg by mouth every 4 hours  dexamethasone (DECADRON) 4 MG tablet, Take 4 mg by mouth 4 times daily  acetaminophen (TYLENOL) 500 MG tablet, Take 1,000 mg by mouth every 6 hours  diazePAM (VALIUM) 5 MG tablet, Take 5 mg by mouth every 6 hours. omeprazole (PRILOSEC) 20 MG delayed release capsule, Take 20 mg by mouth daily  gabapentin (NEURONTIN) 300 MG capsule, Take 300 mg by mouth 3 times daily. sennosides-docusate sodium (SENOKOT-S) 8.6-50 MG tablet, Take 4 tablets by mouth nightly  polyethylene glycol (GLYCOLAX) 17 g packet, Take 17 g by mouth daily  simethicone (MYLICON) 80 MG chewable tablet, Take 80 mg by mouth 4 times daily (after meals and at bedtime)  oxyCODONE 5 MG capsule, Take 10 mg by mouth every 6 hours as needed for Pain. DPH-Lido-AlHydr-MgHydr-Simeth (FIRST-MOUTHWASH BLM MT), Take 10 mLs by mouth every 3 hours as needed    Allergies:  Nsaids, Ondansetron hcl, and Topamax [topiramate]    Social History:    · TOBACCO: reports that she has quit smoking. Her smoking use included cigarettes. She has a 7.50 pack-year smoking history. She has never used smokeless tobacco.  · ETOH:   reports current alcohol use. · Patient currently lives with family , children    Review of Systems -   Review of Systems   Constitutional: Positive for fatigue. Respiratory: Negative. Cardiovascular: Negative. Gastrointestinal: Positive for abdominal distention, constipation and nausea. Genitourinary: Negative. Musculoskeletal: Positive for back pain. Skin: Negative. Neurological: Negative. Objective:          Physical Exam  Constitutional:       General: She is not in acute distress. Cardiovascular:      Rate and Rhythm: Normal rate and regular rhythm. Heart sounds: Normal heart sounds. Pulmonary:      Breath sounds: Normal breath sounds. Abdominal:      General: Bowel sounds are normal.      Palpations: Abdomen is soft. Musculoskeletal:      Right lower leg: No edema. Left lower leg: No edema. Skin:     General: Skin is warm and dry. Neurological:      Mental Status: She is alert and oriented to person, place, and time.           Palliative Performance Scale:  [x] 60% Ambulation reduced; Significant disease; Can't do hobbies/housework; intake normal or reduced; occasional assist; LOC full/confusion  [] 50% Mainly sit/lie; Extensive disease; Can't do any work; Considerable assist; intake normal  Or reduced; LOC full/confusion  [] 40% Mainly in bed; Extensive disease; Mainly assist; intake normal or reduced; occasional assist; LOC full/confusion  [] 30% Bed Bound; Extensive disease; Total care; intake reduced; LOC full/confusion  [] 20% Bed Bound; Extensive disease; Total care; intake minimal; Drowsy/coma  [] 10% Bed Bound; Extensive disease; Total care; Mouth care only; Drowsy/coma  [] 0% Death    PPS: 60    Vitals:    BP (!) 146/96   Pulse 84   Temp 98.1 °F (36.7 °C) (Oral)   Resp 16   Ht 5' 9\" (1.753 m)   Wt 273 lb 9.6 oz (124.1 kg)   LMP  (LMP Unknown)   SpO2 100%   BMI 40.40 kg/m²     Labs:    BMP:   Recent Labs     03/22/21  0808   *   K 4.4      CO2 20*   BUN 19   CREATININE <0.5*   GLUCOSE 119*     CBC:   Recent Labs     03/22/21  0808   WBC 6.5   HGB 12.0   HCT 36.2          LFT's:   Recent Labs     03/22/21  0808   AST 20   ALT 24   BILITOT <0.2   ALKPHOS 94     Troponin: No results for input(s): TROPONINI in the last 72 hours. BNP: No results for input(s): BNP in the last 72 hours. ABGs: No results for input(s): PHART, [id number], PO2ART in the last 72 hours. INR: No results for input(s): INR in the last 72 hours. U/A:No results for input(s): NITRITE, COLORU, PHUR, LABCAST, WBCUA, RBCUA, MUCUS, TRICHOMONAS, YEAST, BACTERIA, CLARITYU, SPECGRAV, LEUKOCYTESUR, UROBILINOGEN, BILIRUBINUR, BLOODU, GLUCOSEU, AMORPHOUS in the last 72 hours.     Invalid input(s): Allen Aguilar    No orders to display         Conclusion/Time spent:         Recommendations see above    Time spent with patient and/or family: 20  Time reviewing records: 10 min   Time communicating with staff: 5 min     A total of 35 minutes spent with the patient and family on unit greater than 50% in counseling regarding palliative care and in goals of care for the patient. Thank you to Dr. Ariel Jimenez for this consultation. We will continue to follow Ms. Hicks's care as needed.     [facility]  Inpatient Palliative Care  [phone]

## 2021-03-22 NOTE — H&P
cord compression at T7. On 221 she had a CT scan of the chest that showed tiny stable pulmonary nodule in the right lung apex. Radiation therapy was then administered T5-L1. Despite radiation therapy pain control became increasingly problematic. She had an MRI of her thoracic and lumbar spine on 3/16/2021 while she was in the midst of her radiation. This study confirmed osseous metastatic disease involving T7, T11, T12, L1, L2, L3, sacrum and pelvis. There was also progression associated with the epidural tumor at T5-T78 compared to 1521. There was also epidural tumor ventrally at L3. She was continuing to show evidence of lower extremity weakness. At this point the plan was to continue radiation and to simulate at L-spine lesions. Neurosurgery was again consulted and a call was placed to her provider at the [facility]. They recommended the addition of ifosfamide specifically 2 g/m² daily x3 or 4 days depending upon tolerance to be given concurrently with radiation and mesna support. The patient states that her pain control is currently adequate. She is remains on dexamethasone. She does have some right leg weakness. She has had a hard time coping with the changes that she has been going through.          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     [address]hn's disease ([address]     Depression     Early menopause     follows with endocrine/gyne    IBS (irritable bowel syndrome)     IBS (irritable bowel syndrome)     Mixed hyperlipidemia 06/22/2017    Obesity     Osteoarthritis, knee     PONV (postoperative nausea and vomiting)     Prolonged emergence from general anesthesia     panic attacks on awakening, requests anxiety med on awakening    Sarcoma of left lower extremity ([address] 11/24/2017    s/p XRT, followed by surgery     Past Surgical History:        Procedure Laterality Date    ANKLE SURGERY Left     APPENDECTOMY      BREAST REDUCTION SURGERY      CARPAL TUNNEL RELEASE Right 07/2020    KNEE ARTHROSCOPY Right 01/2015    KNEE ARTHROSCOPY Right 07/2015    LAMINECTOMY N/A 2/17/2021    T7 LAMINECTOMY WITH T5-9 PEDICALE FIXATION AND REMOVAL OF EPIDURAL TUMOR AT T7 performed by Hedy Simms.  Tee Gil MD at [address] N/A 07/22/2019    ROBOTIC ASSISTED PARTIAL LIVER RESECTION AND ABDOMINAL MASS RESECTION performed by Marilyn Howard MD at [address] [place] [address] Nw Left 11/2017    for sarcoma    OTHER SURGICAL HISTORY Left     biopsy of left thigh mass    OTHER SURGICAL HISTORY Left 02/26/2018    WIDE EXCISION LEFT THIGH SARCOMA          OTHER SURGICAL HISTORY Left 03/14/2018    INCISION AND DRAINAGE OF LEFT THIGH       OTHER SURGICAL HISTORY Left 03/22/2018    incision and drainage left posterior thigh    PORT SURGERY Right 11/30/2020    PORT REMOVAL performed by Marilyn Howard MD at [address] SALPINGECTOMY Left 2009    d/t scar tissue    TOTAL KNEE ARTHROPLASTY Right 09/2015       Current Medications:    Current Facility-Administered Medications: sodium chloride flush 0.9 % injection 10 mL, 10 mL, Intravenous, 2 times per day  sodium chloride flush 0.9 % injection 10 mL, 10 mL, Intravenous, PRN  enoxaparin (LOVENOX) injection 40 mg, 40 mg, Subcutaneous, Daily  polyethylene glycol (GLYCOLAX) packet 17 g, 17 g, Oral, Daily PRN  acetaminophen (TYLENOL) tablet 650 mg, 650 mg, Oral, Q6H PRN **OR** acetaminophen (TYLENOL) suppository 650 mg, 650 mg, Rectal, Q6H PRN  prochlorperazine (COMPAZINE) tablet 10 mg, 10 mg, Oral, Q6H PRN **OR** prochlorperazine (COMPAZINE) injection 10 mg, 10 mg, Intravenous, Q6H PRN  LORazepam (ATIVAN) tablet 0.5 mg, 0.5 mg, Oral, Q6H PRN **OR** LORazepam (ATIVAN) injection 0.5 mg, 0.5 mg, Intravenous, Q6H PRN  0.9 % sodium chloride infusion, , Intravenous, Continuous  lidocaine PF 1 % injection 5 mL, 5 mL, Intradermal, Once  sodium chloride flush 0.9 % injection 10 mL, 10 mL, Intravenous, 2 times per day  sodium chloride flush 0.9 % injection 10 mL, 10 mL, Intravenous, PRN  Allergies:  Nsaids, Ondansetron hcl, and Topamax [topiramate]    Social History:      Social History     Socioeconomic History    Marital status:      Spouse name: RERE    Number of children: 2    Years of education: college    Highest education level: Not on file   Occupational History     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[place] [address] strain: Not on file    Food insecurity     Worry: Not on file     Inability: Not on file   LP Amina needs     Medical: Not on file     Non-medical: Not on file   Tobacco Use    Smoking status: Former Smoker     Packs/day: 0.50     Years: 15.00     Pack years: 7.50     Types: Cigarettes    Smokeless tobacco: Never Used   Substance and Sexual Activity    Alcohol use: Yes     Comment: rare    Drug use: Yes     Types: Marijuana     Comment: medical marijuana    Sexual activity: Yes     Partners: Male     Comment:     Lifestyle    Physical activity     Days per week: Not on file     Minutes per session: Not on file    Stress: Not on file   Relationships    Social connections     Talks on phone: Not on file     Gets together: Not on file     Attends [employer] service: Not on file     Active member of club or organization: Not on file     Attends meetings of clubs or organizations: Not on file     Relationship status: Not on file    Intimate partner violence     Fear of current or ex partner: Not on file     Emotionally abused: Not on file     Physically abused: Not on file     Forced sexual activity: Not on file   Other Topics Concern    Not on file   Social History Narrative    Not on file          Family History:         Problem Relation Age of Onset    Hypertension Father     Elevated Lipids Father     Coronary Art Dis Maternal Grandfather 36    Heart Failure Paternal Grandfather 76    Hypertension Brother     Coronary Art Dis Maternal Uncle      REVIEW OF SYSTEMS:    ROS per the HPI other wise 10 point ROS negative     PHYSICAL EXAM:      Vitals: There were no vitals taken for this visit. CONSTITUTIONAL: awake, alert, cooperative, no apparent distress. Tearful. EYES: pupils equal, round and reactive to light, sclera clear and conjunctiva normal  ENT: Normocephalic, without obvious abnormality, atraumatic  NECK: supple, symmetrical, no jugular venous distension and no carotid bruits   HEMATOLOGIC/LYMPHATIC: no cervical, supraclavicular or axillary lymphadenopathy   LUNGS: no increased work of breathing and clear to auscultation   CARDIOVASCULAR: regular rate and rhythm, normal S1 and S2, no murmur noted  ABDOMEN: Soft NT  MUSCULOSKELETAL: full range of motion noted, tone is normal  NEUROLOGIC: awake, alert, oriented to name, place and time. Motor skills grossly intact. ? slight weakness RLE  SKIN: Normal skin color, texture, turgor and no jaundice.  appears intact   EXTREMITIES: no LE edema       DATA:    PT/INR:  No results found for: PTINR  PTT:    Lab Results   Component Value Date    APTT 35.3 02/16/2021     CMP:    Lab Results   Component Value Date     02/21/2021    K 4.4 02/21/2021    CL 99 02/21/2021    CO2 28 02/21/2021    BUN 13 02/21/2021    PROT 7.6 02/04/2021    PROT 7.1 03/08/2013     Magnesium:    Lab Results   Component Value Date    MG 2.20 07/27/2019     Phosphorus:  No components found for: PO4  Calcium:  No components found for: CA   CBC:    Lab Results   Component Value Date    WBC 7.8 02/21/2021    RBC 3.50 02/21/2021    HGB 11.1 02/21/2021    HCT 34.1 02/21/2021    MCV 97.2 02/21/2021    RDW 14.3 02/21/2021     02/21/2021     DIFF:    Lab Results   Component Value Date    MCV 97.2 02/21/2021    RDW 14.3 02/21/2021      LDH:  No results found for: LDH  Uric Acid:  No components found for: URIC    Coag:     Radiology Review:  none    Problem List  Patient Active Problem is the first step to fighting cancer; not the last.\"

## 2021-03-22 NOTE — PROCEDURES
DOUBLE PICC PROCEDURE NOTE  Chart reviewed for allergies, diagnosis, labs, known contraindications, reason for line placement and planned length of treatment. Informed consent noted to be signed and on chart. Insertion procedure discussed with patient/family member. Three patient identifiers  Patient name,   and MRN -  completed &  confirmed verbally. Time out performed Hat, mask and eye shield donned. PICC site cleaned with chlorhexidine wipes then scrubbed with Chloraprep one-step applicator for 30 seconds x 1. Hand Hygiene  performed with 3% Chlorhexidine surgical scrub x1 min prior to  Sterile gloves, sterile gown being donned. Patient draped using maximal sterile barrier technique ( head to toe ). PICC site scrubbed a 2nd time with Chloraprep One-Step applicator x 30 sec. Modified Seldinger  technique/ultrasound assisted and tip locating system utilized for insertion. 1% Lidocaine 5 ml injected intradermal pre-insertion. PICC tip location in the SVC confirmed by ECG technology Sherlock 3CG. Positive brisk blood return obtained from all lumens  and each lumen flushed with 10 mls  0.9% Sterile Sodium Chloride. All lumens flush easily with no resistance. Valve placed on all lumens followed by Alcohol Swab Caps on end of each. Bio-patch in place. Catheter secured with non-sutured locking device per hospital protocol. Sterile Tegaderm applied over PICC site. Sterile field maintained during procedure. Positioning and rhythm wire accounted for post procedure and disposed of in sharps. Appearance of site is Clean dry and intact without bleeding or edema. All edges of Tegaderm occlusive. Site marked with date and initials of RN placing line. Teaching performed to pt/family and noted in education section. Bed placed in low position post-procedure. Top 2 side rails in up position call button in reach. Pt.  Response to procedure tolerated well. RN notified of all of the above. A Double Lumen Power PICC was trimmed at  45 CM and placed per [facility] basilic vein, 4 cm showing from insertion site.

## 2021-03-23 LAB
ANION GAP SERPL CALCULATED.3IONS-SCNC: 9 MMOL/L (ref 3–16)
BASOPHILS ABSOLUTE: 0 K/UL (ref 0–0.2)
BASOPHILS RELATIVE PERCENT: 0.1 %
BUN BLDV-MCNC: 17 MG/DL (ref 7–20)
CALCIUM SERPL-MCNC: 8.9 MG/DL (ref 8.3–10.6)
CHLORIDE BLD-SCNC: 103 MMOL/L (ref 99–110)
CO2: 25 MMOL/L (ref 21–32)
CREAT SERPL-MCNC: <0.5 MG/DL (ref 0.6–1.1)
EOSINOPHILS ABSOLUTE: 0 K/UL (ref 0–0.6)
EOSINOPHILS RELATIVE PERCENT: 0 %
GFR AFRICAN AMERICAN: >60
GFR NON-AFRICAN AMERICAN: >60
GLUCOSE BLD-MCNC: 114 MG/DL (ref 70–99)
HCT VFR BLD CALC: 34.7 % (ref 36–48)
HEMOGLOBIN: 11.5 G/DL (ref 12–16)
LYMPHOCYTES ABSOLUTE: 0.1 K/UL (ref 1–5.1)
LYMPHOCYTES RELATIVE PERCENT: 2.3 %
MCH RBC QN AUTO: 32.2 PG (ref 26–34)
MCHC RBC AUTO-ENTMCNC: 33.1 G/DL (ref 31–36)
MCV RBC AUTO: 97.1 FL (ref 80–100)
MONOCYTES ABSOLUTE: 0.3 K/UL (ref 0–1.3)
MONOCYTES RELATIVE PERCENT: 5.1 %
NEUTROPHILS ABSOLUTE: 4.6 K/UL (ref 1.7–7.7)
NEUTROPHILS RELATIVE PERCENT: 92.5 %
PDW BLD-RTO: 15.4 % (ref 12.4–15.4)
PLATELET # BLD: 226 K/UL (ref 135–450)
PMV BLD AUTO: 7.4 FL (ref 5–10.5)
POTASSIUM SERPL-SCNC: 4.5 MMOL/L (ref 3.5–5.1)
RBC # BLD: 3.57 M/UL (ref 4–5.2)
SODIUM BLD-SCNC: 137 MMOL/L (ref 136–145)
WBC # BLD: 5 K/UL (ref 4–11)

## 2021-03-23 PROCEDURE — 96415 CHEMO IV INFUSION ADDL HR: CPT

## 2021-03-23 PROCEDURE — 6370000000 HC RX 637 (ALT 250 FOR IP): Performed by: NURSE PRACTITIONER

## 2021-03-23 PROCEDURE — 80048 BASIC METABOLIC PNL TOTAL CA: CPT

## 2021-03-23 PROCEDURE — 96413 CHEMO IV INFUSION 1 HR: CPT

## 2021-03-23 PROCEDURE — 6360000002 HC RX W HCPCS: Performed by: INTERNAL MEDICINE

## 2021-03-23 PROCEDURE — 36592 COLLECT BLOOD FROM PICC: CPT

## 2021-03-23 PROCEDURE — 6370000000 HC RX 637 (ALT 250 FOR IP): Performed by: INTERNAL MEDICINE

## 2021-03-23 PROCEDURE — 2580000003 HC RX 258: Performed by: INTERNAL MEDICINE

## 2021-03-23 PROCEDURE — 85025 COMPLETE CBC W/AUTO DIFF WBC: CPT

## 2021-03-23 PROCEDURE — 2060000000 HC ICU INTERMEDIATE R&B

## 2021-03-23 PROCEDURE — 77412 RADIATION TX DELIVERY LVL 3: CPT

## 2021-03-23 RX ADMIN — DEXAMETHASONE 4 MG: 4 TABLET ORAL at 17:57

## 2021-03-23 RX ADMIN — GABAPENTIN 300 MG: 300 CAPSULE ORAL at 21:09

## 2021-03-23 RX ADMIN — SIMETHICONE CHEW TAB 80 MG 80 MG: 80 TABLET ORAL at 19:00

## 2021-03-23 RX ADMIN — POLYETHYLENE GLYCOL 3350 17 G: 17 POWDER, FOR SOLUTION ORAL at 08:56

## 2021-03-23 RX ADMIN — OXYCODONE 10 MG: 5 TABLET ORAL at 21:38

## 2021-03-23 RX ADMIN — SIMETHICONE CHEW TAB 80 MG 80 MG: 80 TABLET ORAL at 08:57

## 2021-03-23 RX ADMIN — GABAPENTIN 300 MG: 300 CAPSULE ORAL at 08:57

## 2021-03-23 RX ADMIN — ENOXAPARIN SODIUM 40 MG: 40 INJECTION SUBCUTANEOUS at 08:55

## 2021-03-23 RX ADMIN — OXYCODONE 10 MG: 5 TABLET ORAL at 15:28

## 2021-03-23 RX ADMIN — SIMETHICONE CHEW TAB 80 MG 80 MG: 80 TABLET ORAL at 12:58

## 2021-03-23 RX ADMIN — OXYCODONE 10 MG: 5 TABLET ORAL at 03:28

## 2021-03-23 RX ADMIN — DOCUSATE SODIUM 50 MG AND SENNOSIDES 8.6 MG 4 TABLET: 8.6; 5 TABLET, FILM COATED ORAL at 21:09

## 2021-03-23 RX ADMIN — MESNA: 100 INJECTION, SOLUTION INTRAVENOUS at 21:06

## 2021-03-23 RX ADMIN — METHOCARBAMOL 750 MG: 750 TABLET ORAL at 08:56

## 2021-03-23 RX ADMIN — SIMETHICONE CHEW TAB 80 MG 80 MG: 80 TABLET ORAL at 21:09

## 2021-03-23 RX ADMIN — PANTOPRAZOLE SODIUM 40 MG: 40 TABLET, DELAYED RELEASE ORAL at 06:10

## 2021-03-23 RX ADMIN — POLYETHYLENE GLYCOL 3350 17 G: 17 POWDER, FOR SOLUTION ORAL at 20:52

## 2021-03-23 RX ADMIN — DIAZEPAM 5 MG: 5 TABLET ORAL at 08:57

## 2021-03-23 RX ADMIN — DEXAMETHASONE 4 MG: 4 TABLET ORAL at 21:09

## 2021-03-23 RX ADMIN — METHOCARBAMOL 750 MG: 750 TABLET ORAL at 02:18

## 2021-03-23 RX ADMIN — DIAZEPAM 5 MG: 5 TABLET ORAL at 21:38

## 2021-03-23 RX ADMIN — DEXAMETHASONE 4 MG: 4 TABLET ORAL at 08:57

## 2021-03-23 RX ADMIN — OXYCODONE 10 MG: 5 TABLET ORAL at 09:50

## 2021-03-23 RX ADMIN — Medication 10 ML: at 21:11

## 2021-03-23 RX ADMIN — DEXAMETHASONE 4 MG: 4 TABLET ORAL at 12:58

## 2021-03-23 RX ADMIN — Medication 10 ML: at 21:09

## 2021-03-23 RX ADMIN — METHOCARBAMOL 750 MG: 750 TABLET ORAL at 12:59

## 2021-03-23 RX ADMIN — METHOCARBAMOL 750 MG: 750 TABLET ORAL at 17:57

## 2021-03-23 RX ADMIN — DIAZEPAM 5 MG: 5 TABLET ORAL at 04:50

## 2021-03-23 RX ADMIN — GRANISETRON HYDROCHLORIDE 2 MG: 1 TABLET ORAL at 15:26

## 2021-03-23 RX ADMIN — METHOCARBAMOL 750 MG: 750 TABLET ORAL at 06:10

## 2021-03-23 RX ADMIN — METHOCARBAMOL 750 MG: 750 TABLET ORAL at 21:38

## 2021-03-23 RX ADMIN — DIAZEPAM 5 MG: 5 TABLET ORAL at 15:26

## 2021-03-23 RX ADMIN — GABAPENTIN 300 MG: 300 CAPSULE ORAL at 13:01

## 2021-03-23 ASSESSMENT — PAIN DESCRIPTION - LOCATION
LOCATION: BACK
LOCATION: ABDOMEN
LOCATION: BACK
LOCATION: BACK

## 2021-03-23 ASSESSMENT — PAIN DESCRIPTION - ORIENTATION
ORIENTATION: MID

## 2021-03-23 ASSESSMENT — PAIN DESCRIPTION - DIRECTION
RADIATING_TOWARDS: BACK

## 2021-03-23 ASSESSMENT — PAIN DESCRIPTION - DESCRIPTORS
DESCRIPTORS: CRAMPING
DESCRIPTORS: CONSTANT;DISCOMFORT
DESCRIPTORS: CRAMPING
DESCRIPTORS: CONSTANT;DISCOMFORT

## 2021-03-23 ASSESSMENT — PAIN DESCRIPTION - ONSET
ONSET: ON-GOING

## 2021-03-23 ASSESSMENT — PAIN DESCRIPTION - PAIN TYPE
TYPE: CHRONIC PAIN
TYPE: CHRONIC PAIN
TYPE: ACUTE PAIN
TYPE: CHRONIC PAIN
TYPE: CHRONIC PAIN

## 2021-03-23 ASSESSMENT — PAIN DESCRIPTION - PROGRESSION
CLINICAL_PROGRESSION: GRADUALLY WORSENING
CLINICAL_PROGRESSION: GRADUALLY WORSENING

## 2021-03-23 ASSESSMENT — PAIN DESCRIPTION - FREQUENCY
FREQUENCY: CONTINUOUS

## 2021-03-23 ASSESSMENT — PAIN - FUNCTIONAL ASSESSMENT
PAIN_FUNCTIONAL_ASSESSMENT: PREVENTS OR INTERFERES SOME ACTIVE ACTIVITIES AND ADLS
PAIN_FUNCTIONAL_ASSESSMENT: ACTIVITIES ARE NOT PREVENTED
PAIN_FUNCTIONAL_ASSESSMENT: PREVENTS OR INTERFERES SOME ACTIVE ACTIVITIES AND ADLS
PAIN_FUNCTIONAL_ASSESSMENT: PREVENTS OR INTERFERES SOME ACTIVE ACTIVITIES AND ADLS

## 2021-03-23 ASSESSMENT — PAIN SCALES - GENERAL
PAINLEVEL_OUTOF10: 6
PAINLEVEL_OUTOF10: 4
PAINLEVEL_OUTOF10: 6
PAINLEVEL_OUTOF10: 6
PAINLEVEL_OUTOF10: 4
PAINLEVEL_OUTOF10: 8

## 2021-03-23 NOTE — ONCOLOGY
Lissy 45    [phone]    DATE: 3/23/21    AREA TREATED: L3- SACRUM    DAILY DOSE: 300 cGy    CUMULATIVE DOSE: 1200 cGy    # 4  OUT OF 10 TREATMENTS    NEXT PLANNED TREATMENT  DATE: 3/24/21 8AM    Daily Treatments are Monday through Friday:       INPATIENT CODING:  Beam Radiation   Photons   Photon energy : >10mv

## 2021-03-23 NOTE — PLAN OF CARE
Problem: Falls - Risk of:  Goal: Will remain free from falls  Description: Will remain free from falls  Outcome: Ongoing  Note: Orthostatic vital signs obtained at start of shift - see flowsheet for details. Pt does not meet criteria for orthostasis. Pt is a Med fall risk. See Maricel Ashok Fall Score and ABCDS Injury Risk assessments. - Screening for Orthostasis AND not a [place] Risk per ARREAGA/ABCDS: Pt bed is in low position, side rails up, call light and belongings are in reach. Fall risk light is on outside pts room. Pt encouraged to call for assistance as needed. Will continue with hourly rounds for PO intake, pain needs, toileting and repositioning as needed.

## 2021-03-23 NOTE — PLAN OF CARE
Problem: Falls - Risk of:  Goal: Will remain free from falls  Description: Will remain free from falls  3/23/2021 1812 by Scott Santiago RN  Outcome: Ongoing  Note: Orthostatic vital signs obtained at start of shift - see flowsheet for details. Pt does not meet criteria for orthostasis. Pt is a Med fall risk. See Karo Benmukesh Fall Score and ABCDS Injury Risk assessments. Pt bed is in low position, side rails up, call light and belongings are in reach. Fall risk light is on outside pts room. Pt encouraged to call for assistance as needed. Will continue with hourly rounds for PO intake, pain needs, toileting and repositioning as needed. Problem: Pain:  Goal: Pain level will decrease  Description: Pain level will decrease  3/23/2021 1812 by Scott Santiago RN  Outcome: Ongoing  Note: Gwynneth Epley c/o pain in back - scheduled and PRN medication given with relief. Will continue to monitor. Problem: Bleeding:  Goal: Will show no signs and symptoms of excessive bleeding  Description: Will show no signs and symptoms of excessive bleeding  Outcome: Ongoing  Note: Patient's hemoglobin this AM:   Recent Labs     03/23/21  0513   HGB 11.5*     Patient's platelet count this AM:   Recent Labs     03/23/21  0513       Thrombocytopenia not present at this time. Patient showing no signs or symptoms of active bleeding. Transfusion not indicated at this time. Patient verbalizes understanding of all instructions. Will continue to assess and implement POC. Call light within reach and hourly rounding in place. Problem: Infection - Central Venous Catheter-Associated Bloodstream Infection:  Goal: Will show no infection signs and symptoms  Description: Will show no infection signs and symptoms  Outcome: Ongoing  Note: CVC site remains free of signs/symptoms of infection. No drainage, edema, erythema, pain, or warmth noted at site. Dressing changes continue per protocol and on an as needed basis - see flowsheet. Compliant with [facility] Bath Protocol:  Performed CHG bath today per [facility] protocol utilizing CHG solution in the shower. CVC site cleansed with CHG wipe over dressing, skin surrounding dressing, and first 6\" of IV tubing. Pt tolerated well. Continued to encourage daily CHG bathing per [facility] protocol. Problem: Discharge Planning:  Goal: Discharged to appropriate level of care  Description: Discharged to appropriate level of care  Outcome: Ongoing  Note: Will continue to update patient and family on any changes to POC     Problem: Venous Thromboembolism:  Goal: Will show no signs or symptoms of venous thromboembolism  Description: Will show no signs or symptoms of venous thromboembolism  Outcome: Ongoing  Note:  Pt is at risk for DVT d/t decreased mobility and cancer treatment. Pt educated on importance of activity. Pt has orders for Subcut prophylactic lovenox. Pt verbalizes understanding of need for prophylaxis while inpatient.

## 2021-03-23 NOTE — PROGRESS NOTES
[facility]   Progress Note      SUBJECTIVE:        No nausea vomiting. No headaches, confusion or any other CNS symptoms. No blood in the urine  She is drinking lots of fluids. Back pain is okay. OBJECTIVE    Physical  VITALS:  /83   Pulse 78   Temp 98.2 °F (36.8 °C) (Oral)   Resp 17   Ht 5' 9\" (1.753 m)   Wt 273 lb 9.6 oz (124.1 kg)   LMP  (LMP Unknown)   SpO2 98%   BMI 40.40 kg/m²   TEMPERATURE:  Current - Temp: 98.2 °F (36.8 °C); Max - Temp  Av.1 °F (36.7 °C)  Min: 97.6 °F (36.4 °C)  Max: 98.5 °F (36.9 °C)  BLOOD PRESSURE RANGE:  Systolic (69UME), IPJ:854 , Min:120 , DL   ; Diastolic (91AJH), SQW:38, Min:76, Max:96    24HR INTAKE/OUTPUT:      Intake/Output Summary (Last 24 hours) at 3/23/2021 0635  Last data filed at 3/23/2021 2551  Gross per 24 hour   Intake 2760 ml   Output 2000 ml   Net 760 ml       Conscious alert oriented  HEENT: No pallor or scleral icterus. Oral mucosa: No mucositis. No thrush. Neck: Supple, no lymphadenopathy. No thyromegaly  Lungs: No rales, wheezes, respiratory efforts are normal.  CVS: S1-S2 normal.  No murmurs or gallops heard. Abdomen: Soft, bowel sounds are heard. No tenderness. Neuro: No focal deficits. No cranial nerve palsies. Alert and oriented. Skin: No rashes, petechiae, ecchymosis. Extremities: No edema, tenderness, erythema.     Data  Labs:  General Labs:  CBC with Differential:    Lab Results   Component Value Date    WBC 5.0 2021    RBC 3.57 2021    HGB 11.5 2021    HCT 34.7 2021     2021    MCV 97.1 2021    MCH 32.2 2021    MCHC 33.1 2021    RDW 15.4 2021    SEGSPCT 72.1 2013    BANDSPCT 1 10/12/2019    LYMPHOPCT 2.3 2021    MONOPCT 5.1 2021    BASOPCT 0.1 2021    MONOSABS 0.3 2021    LYMPHSABS 0.1 2021    EOSABS 0.0 2021    BASOSABS 0.0 2021     BMP:    Lab Results   Component Value Date     2021    K 4.5 03/23/2021    K 4.4 02/21/2021     03/23/2021    CO2 25 03/23/2021    BUN 17 03/23/2021    LABALBU 4.3 03/22/2021    CREATININE <0.5 03/23/2021    CALCIUM 8.9 03/23/2021    GFRAA >60 03/23/2021    LABGLOM >60 03/23/2021    LABGLOM 107 03/08/2013    GLUCOSE 114 03/23/2021     Hepatic Function Panel:    Lab Results   Component Value Date    ALKPHOS 94 03/22/2021    ALT 24 03/22/2021    AST 20 03/22/2021    PROT 6.6 03/22/2021    PROT 7.1 03/08/2013    BILITOT <0.2 03/22/2021    BILIDIR <0.2 02/04/2021    IBILI see below 02/04/2021    LABALBU 4.3 03/22/2021     LDH:  No results found for: LDH  PT/INR:    Lab Results   Component Value Date    PROTIME 11.6 02/16/2021    INR 1.00 02/16/2021     PTT:    Lab Results   Component Value Date    APTT 35.3 02/16/2021   [APTT    Current Medications  Current Facility-Administered Medications: sodium chloride flush 0.9 % injection 10 mL, 10 mL, Intravenous, 2 times per day  sodium chloride flush 0.9 % injection 10 mL, 10 mL, Intravenous, PRN  enoxaparin (LOVENOX) injection 40 mg, 40 mg, Subcutaneous, Daily  polyethylene glycol (GLYCOLAX) packet 17 g, 17 g, Oral, Daily PRN  [DISCONTINUED] acetaminophen (TYLENOL) tablet 650 mg, 650 mg, Oral, Q6H PRN **OR** acetaminophen (TYLENOL) suppository 650 mg, 650 mg, Rectal, Q6H PRN  prochlorperazine (COMPAZINE) tablet 10 mg, 10 mg, Oral, Q6H PRN **OR** prochlorperazine (COMPAZINE) injection 10 mg, 10 mg, Intravenous, Q6H PRN  LORazepam (ATIVAN) tablet 0.5 mg, 0.5 mg, Oral, Q6H PRN **OR** LORazepam (ATIVAN) injection 0.5 mg, 0.5 mg, Intravenous, Q6H PRN  sodium chloride flush 0.9 % injection 10 mL, 10 mL, Intravenous, 2 times per day  sodium chloride flush 0.9 % injection 10 mL, 10 mL, Intravenous, PRN  acetaminophen (TYLENOL) tablet 1,000 mg, 1,000 mg, Oral, Q6H PRN  dexamethasone (DECADRON) tablet 4 mg, 4 mg, Oral, 4x Daily  diazePAM (VALIUM) tablet 5 mg, 5 mg, Oral, Q6H  gabapentin (NEURONTIN) capsule 300 mg, 300 mg, Oral, TID methocarbamol (ROBAXIN) tablet 750 mg, 750 mg, Oral, Q4H  pantoprazole (PROTONIX) tablet 40 mg, 40 mg, Oral, QAM AC  oxyCODONE (ROXICODONE) immediate release tablet 10 mg, 10 mg, Oral, Q6H PRN  sennosides-docusate sodium (SENOKOT-S) 8.6-50 MG tablet 4 tablet, 4 tablet, Oral, Nightly  magic (miracle) mouthwash, 5 mL, Swish & Spit, 4x Daily PRN  simethicone (MYLICON) chewable tablet 80 mg, 80 mg, Oral, 4x Daily PC & HS  furosemide (LASIX) injection 40 mg, 40 mg, Intravenous, BID  0.9 % sodium chloride infusion, , Intravenous, Continuous  polyethylene glycol (GLYCOLAX) packet 17 g, 17 g, Oral, BID  granisetron (KYTRIL) tablet 2 mg, 2 mg, Oral, Daily  mesna (MESNEX) 4,900 mg, ifosfamide (IFEX) 4,900 mg in sodium chloride 0.9 % 500 mL chemo infusion, , Intravenous, Q24H  [START ON 3/26/2021] mesna (MESNEX) 4,900 mg in sodium chloride 0.9 % 500 mL IVPB, 4,900 mg, Intravenous, Continuous    ASSESSMENT AND PLAN    61-year-old lady has    1. 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  -So far she is tolerating treatments well.  -Continue chemo, antiemetics, dexamethasone.     2.  Neoplasm related pain    -Oxycodone will be continued      Michelle Perez MD

## 2021-03-24 ENCOUNTER — APPOINTMENT (OUTPATIENT)
Dept: MRI IMAGING | Age: 41
DRG: 847 | End: 2021-03-24
Attending: INTERNAL MEDICINE
Payer: COMMERCIAL

## 2021-03-24 PROBLEM — R29.898 RIGHT LEG WEAKNESS: Status: ACTIVE | Noted: 2021-03-24

## 2021-03-24 PROBLEM — M54.16 LUMBAR NERVE ROOT COMPRESSION: Status: ACTIVE | Noted: 2021-03-24

## 2021-03-24 LAB
ANION GAP SERPL CALCULATED.3IONS-SCNC: 5 MMOL/L (ref 3–16)
BASOPHILS ABSOLUTE: 0 K/UL (ref 0–0.2)
BASOPHILS RELATIVE PERCENT: 0.1 %
BILIRUBIN URINE: NEGATIVE
BLOOD, URINE: NEGATIVE
BUN BLDV-MCNC: 16 MG/DL (ref 7–20)
CALCIUM SERPL-MCNC: 9 MG/DL (ref 8.3–10.6)
CHLORIDE BLD-SCNC: 106 MMOL/L (ref 99–110)
CLARITY: CLEAR
CO2: 25 MMOL/L (ref 21–32)
COLOR: YELLOW
CREAT SERPL-MCNC: 0.5 MG/DL (ref 0.6–1.1)
EOSINOPHILS ABSOLUTE: 0 K/UL (ref 0–0.6)
EOSINOPHILS RELATIVE PERCENT: 0 %
GFR AFRICAN AMERICAN: >60
GFR NON-AFRICAN AMERICAN: >60
GLUCOSE BLD-MCNC: 110 MG/DL (ref 70–99)
GLUCOSE URINE: NEGATIVE MG/DL
HCT VFR BLD CALC: 32.7 % (ref 36–48)
HEMOGLOBIN: 11.1 G/DL (ref 12–16)
KETONES, URINE: 40 MG/DL
LEUKOCYTE ESTERASE, URINE: NEGATIVE
LYMPHOCYTES ABSOLUTE: 0.2 K/UL (ref 1–5.1)
LYMPHOCYTES RELATIVE PERCENT: 3.5 %
MCH RBC QN AUTO: 32.9 PG (ref 26–34)
MCHC RBC AUTO-ENTMCNC: 34 G/DL (ref 31–36)
MCV RBC AUTO: 96.8 FL (ref 80–100)
MICROSCOPIC EXAMINATION: ABNORMAL
MONOCYTES ABSOLUTE: 0.4 K/UL (ref 0–1.3)
MONOCYTES RELATIVE PERCENT: 8 %
NEUTROPHILS ABSOLUTE: 4.9 K/UL (ref 1.7–7.7)
NEUTROPHILS RELATIVE PERCENT: 88.4 %
NITRITE, URINE: NEGATIVE
PDW BLD-RTO: 15.2 % (ref 12.4–15.4)
PH UA: 6.5 (ref 5–8)
PLATELET # BLD: 203 K/UL (ref 135–450)
PMV BLD AUTO: 7.4 FL (ref 5–10.5)
POTASSIUM SERPL-SCNC: 4.6 MMOL/L (ref 3.5–5.1)
PROTEIN UA: NEGATIVE MG/DL
RBC # BLD: 3.38 M/UL (ref 4–5.2)
SODIUM BLD-SCNC: 136 MMOL/L (ref 136–145)
SPECIFIC GRAVITY UA: 1.02 (ref 1–1.03)
URINE TYPE: ABNORMAL
UROBILINOGEN, URINE: 0.2 E.U./DL
WBC # BLD: 5.5 K/UL (ref 4–11)

## 2021-03-24 PROCEDURE — 2580000003 HC RX 258: Performed by: INTERNAL MEDICINE

## 2021-03-24 PROCEDURE — 96413 CHEMO IV INFUSION 1 HR: CPT

## 2021-03-24 PROCEDURE — 6360000004 HC RX CONTRAST MEDICATION: Performed by: INTERNAL MEDICINE

## 2021-03-24 PROCEDURE — 2060000000 HC ICU INTERMEDIATE R&B

## 2021-03-24 PROCEDURE — 70553 MRI BRAIN STEM W/O & W/DYE: CPT

## 2021-03-24 PROCEDURE — 6370000000 HC RX 637 (ALT 250 FOR IP): Performed by: INTERNAL MEDICINE

## 2021-03-24 PROCEDURE — 77412 RADIATION TX DELIVERY LVL 3: CPT

## 2021-03-24 PROCEDURE — 6370000000 HC RX 637 (ALT 250 FOR IP): Performed by: NURSE PRACTITIONER

## 2021-03-24 PROCEDURE — 36592 COLLECT BLOOD FROM PICC: CPT

## 2021-03-24 PROCEDURE — 77417 THER RADIOLOGY PORT IMAGE(S): CPT

## 2021-03-24 PROCEDURE — A9576 INJ PROHANCE MULTIPACK: HCPCS | Performed by: INTERNAL MEDICINE

## 2021-03-24 PROCEDURE — 72156 MRI NECK SPINE W/O & W/DYE: CPT

## 2021-03-24 PROCEDURE — 80048 BASIC METABOLIC PNL TOTAL CA: CPT

## 2021-03-24 PROCEDURE — 85025 COMPLETE CBC W/AUTO DIFF WBC: CPT

## 2021-03-24 PROCEDURE — 96415 CHEMO IV INFUSION ADDL HR: CPT

## 2021-03-24 PROCEDURE — 6360000002 HC RX W HCPCS: Performed by: INTERNAL MEDICINE

## 2021-03-24 PROCEDURE — 81003 URINALYSIS AUTO W/O SCOPE: CPT

## 2021-03-24 RX ADMIN — OXYCODONE 10 MG: 5 TABLET ORAL at 03:54

## 2021-03-24 RX ADMIN — PANTOPRAZOLE SODIUM 40 MG: 40 TABLET, DELAYED RELEASE ORAL at 07:28

## 2021-03-24 RX ADMIN — DIAZEPAM 5 MG: 5 TABLET ORAL at 03:54

## 2021-03-24 RX ADMIN — SIMETHICONE CHEW TAB 80 MG 80 MG: 80 TABLET ORAL at 18:00

## 2021-03-24 RX ADMIN — Medication 10 ML: at 22:32

## 2021-03-24 RX ADMIN — METHOCARBAMOL 750 MG: 750 TABLET ORAL at 07:28

## 2021-03-24 RX ADMIN — ENOXAPARIN SODIUM 40 MG: 40 INJECTION SUBCUTANEOUS at 09:58

## 2021-03-24 RX ADMIN — SODIUM CHLORIDE: 9 INJECTION, SOLUTION INTRAVENOUS at 03:02

## 2021-03-24 RX ADMIN — METHOCARBAMOL 750 MG: 750 TABLET ORAL at 13:26

## 2021-03-24 RX ADMIN — POLYETHYLENE GLYCOL 3350 17 G: 17 POWDER, FOR SOLUTION ORAL at 09:51

## 2021-03-24 RX ADMIN — DEXAMETHASONE 4 MG: 4 TABLET ORAL at 13:27

## 2021-03-24 RX ADMIN — POLYETHYLENE GLYCOL 3350 17 G: 17 POWDER, FOR SOLUTION ORAL at 21:16

## 2021-03-24 RX ADMIN — SIMETHICONE CHEW TAB 80 MG 80 MG: 80 TABLET ORAL at 09:52

## 2021-03-24 RX ADMIN — GABAPENTIN 300 MG: 300 CAPSULE ORAL at 21:17

## 2021-03-24 RX ADMIN — DEXAMETHASONE 4 MG: 4 TABLET ORAL at 09:53

## 2021-03-24 RX ADMIN — OXYCODONE 10 MG: 5 TABLET ORAL at 10:51

## 2021-03-24 RX ADMIN — METHOCARBAMOL 750 MG: 750 TABLET ORAL at 03:01

## 2021-03-24 RX ADMIN — ACETAMINOPHEN 1000 MG: 500 TABLET, FILM COATED ORAL at 03:01

## 2021-03-24 RX ADMIN — ACETAMINOPHEN 1000 MG: 500 TABLET, FILM COATED ORAL at 13:24

## 2021-03-24 RX ADMIN — SODIUM CHLORIDE: 9 INJECTION, SOLUTION INTRAVENOUS at 16:35

## 2021-03-24 RX ADMIN — DOCUSATE SODIUM 50 MG AND SENNOSIDES 8.6 MG 4 TABLET: 8.6; 5 TABLET, FILM COATED ORAL at 21:17

## 2021-03-24 RX ADMIN — DEXAMETHASONE 4 MG: 4 TABLET ORAL at 17:07

## 2021-03-24 RX ADMIN — GABAPENTIN 300 MG: 300 CAPSULE ORAL at 09:52

## 2021-03-24 RX ADMIN — GADOTERIDOL 20 ML: 279.3 INJECTION, SOLUTION INTRAVENOUS at 18:50

## 2021-03-24 RX ADMIN — OXYCODONE 10 MG: 5 TABLET ORAL at 15:54

## 2021-03-24 RX ADMIN — DIAZEPAM 5 MG: 5 TABLET ORAL at 15:25

## 2021-03-24 RX ADMIN — GRANISETRON HYDROCHLORIDE 2 MG: 1 TABLET ORAL at 15:25

## 2021-03-24 RX ADMIN — SIMETHICONE CHEW TAB 80 MG 80 MG: 80 TABLET ORAL at 21:17

## 2021-03-24 RX ADMIN — OXYCODONE 10 MG: 5 TABLET ORAL at 21:50

## 2021-03-24 RX ADMIN — METHOCARBAMOL 750 MG: 750 TABLET ORAL at 09:52

## 2021-03-24 RX ADMIN — DIAZEPAM 5 MG: 5 TABLET ORAL at 21:50

## 2021-03-24 RX ADMIN — METHOCARBAMOL 750 MG: 750 TABLET ORAL at 21:50

## 2021-03-24 RX ADMIN — ACETAMINOPHEN 1000 MG: 500 TABLET, FILM COATED ORAL at 19:22

## 2021-03-24 RX ADMIN — GABAPENTIN 300 MG: 300 CAPSULE ORAL at 13:27

## 2021-03-24 RX ADMIN — DEXAMETHASONE 4 MG: 4 TABLET ORAL at 21:17

## 2021-03-24 RX ADMIN — DIAZEPAM 5 MG: 5 TABLET ORAL at 09:51

## 2021-03-24 RX ADMIN — SIMETHICONE CHEW TAB 80 MG 80 MG: 80 TABLET ORAL at 13:13

## 2021-03-24 RX ADMIN — METHOCARBAMOL 750 MG: 750 TABLET ORAL at 17:55

## 2021-03-24 ASSESSMENT — PAIN SCALES - GENERAL
PAINLEVEL_OUTOF10: 5
PAINLEVEL_OUTOF10: 4
PAINLEVEL_OUTOF10: 5
PAINLEVEL_OUTOF10: 5
PAINLEVEL_OUTOF10: 8
PAINLEVEL_OUTOF10: 9
PAINLEVEL_OUTOF10: 5
PAINLEVEL_OUTOF10: 5
PAINLEVEL_OUTOF10: 7
PAINLEVEL_OUTOF10: 9

## 2021-03-24 ASSESSMENT — PAIN DESCRIPTION - DESCRIPTORS
DESCRIPTORS_3: PRESSURE;CONSTANT
DESCRIPTORS: ACHING;SHOOTING;DISCOMFORT
DESCRIPTORS: CONSTANT
DESCRIPTORS_3: CONSTANT;PRESSURE
DESCRIPTORS: ACHING;DISCOMFORT
DESCRIPTORS_2: CONSTANT
DESCRIPTORS_2: CONSTANT;PRESSURE

## 2021-03-24 ASSESSMENT — PAIN DESCRIPTION - LOCATION
LOCATION: BACK
LOCATION_3: HIP
LOCATION: BACK
LOCATION: BACK
LOCATION_2: ABDOMEN
LOCATION_2: ABDOMEN
LOCATION: BACK
LOCATION: BACK

## 2021-03-24 ASSESSMENT — PAIN DESCRIPTION - DIRECTION
RADIATING_TOWARDS: STOMACH
RADIATING_TOWARDS: ANTERIOR

## 2021-03-24 ASSESSMENT — PAIN DESCRIPTION - ORIENTATION
ORIENTATION: MID

## 2021-03-24 ASSESSMENT — PAIN DESCRIPTION - PROGRESSION
CLINICAL_PROGRESSION: NOT CHANGED

## 2021-03-24 ASSESSMENT — PAIN - FUNCTIONAL ASSESSMENT
PAIN_FUNCTIONAL_ASSESSMENT: PREVENTS OR INTERFERES SOME ACTIVE ACTIVITIES AND ADLS

## 2021-03-24 ASSESSMENT — PAIN DESCRIPTION - PAIN TYPE
TYPE: CHRONIC PAIN

## 2021-03-24 ASSESSMENT — PAIN DESCRIPTION - ONSET
ONSET: ON-GOING

## 2021-03-24 ASSESSMENT — PAIN DESCRIPTION - FREQUENCY
FREQUENCY: CONTINUOUS

## 2021-03-24 NOTE — PLAN OF CARE
Problem: Falls - Risk of:  Goal: Will remain free from falls  Description: Will remain free from falls  Outcome: Ongoing  Note: Orthostatic vital signs obtained at start of shift - see flowsheet for details. Pt does not meet criteria for orthostasis. Pt is a Med fall risk. See Maricel Ashok Fall Score and ABCDS Injury Risk assessments. Pt bed is in low position, side rails up, call light and belongings are in reach. Fall risk light is on outside pts room. Pt encouraged to call for assistance as needed. Will continue with hourly rounds for PO intake, pain needs, toileting and repositioning as needed. Problem: Pain:  Goal: Pain level will decrease  Description: Pain level will decrease  Outcome: Ongoing  Note: Adilia Shinirina c/o pain in back - scheduled and PRN medication given with relief. Will continue to monitor. Problem: Bleeding:  Goal: Will show no signs and symptoms of excessive bleeding  Description: Will show no signs and symptoms of excessive bleeding  Outcome: Ongoing  Note: Patient's hemoglobin this AM:   Recent Labs     03/23/21  0513   HGB 11.5*     Patient's platelet count this AM:   Recent Labs     03/23/21  0513       Thrombocytopenia not present at this time. Patient showing no signs or symptoms of active bleeding. Transfusion not indicated at this time. Patient verbalizes understanding of all instructions. Will continue to assess and implement POC. Call light within reach and hourly rounding in place. Problem: Infection - Central Venous Catheter-Associated Bloodstream Infection:  Goal: Will show no infection signs and symptoms  Description: Will show no infection signs and symptoms  Outcome: Ongoing  Note: CVC site remains free of signs/symptoms of infection. No drainage, edema, erythema, pain, or warmth noted at site. Dressing changes continue per protocol and on an as needed basis - see flowsheet.      Compliant with [facility] Bath Protocol:  Performed CHG bath today per [facility] protocol utilizing CHG solution in the shower. CVC site cleansed with CHG wipe over dressing, skin surrounding dressing, and first 6\" of IV tubing. Pt tolerated well. Continued to encourage daily CHG bathing per [facility] protocol. Problem: Discharge Planning:  Goal: Discharged to appropriate level of care  Description: Discharged to appropriate level of care  Outcome: Ongoing  Note: Will continue to update patient and family on any changes to POC     Problem: Venous Thromboembolism:  Goal: Will show no signs or symptoms of venous thromboembolism  Description: Will show no signs or symptoms of venous thromboembolism  Outcome: Ongoing  Note:  Pt is at risk for DVT d/t decreased mobility and cancer treatment. Pt educated on importance of activity. Pt has orders for Subcut prophylactic lovenox. Pt verbalizes understanding of need for prophylaxis while inpatient.

## 2021-03-24 NOTE — PLAN OF CARE
Problem: Falls - Risk of:  Goal: Will remain free from falls  Description: Will remain free from falls  3/24/2021 0432 by Jaison Walker RN  Outcome: Ongoing  Note: Orthostatic vital signs obtained at start of shift - see flowsheet for details. Pt does not meet criteria for orthostasis. Pt is a Med fall risk. See Paulette Dire Fall Score and ABCDS Injury Risk assessments. - Screening for Orthostasis AND not a Leverett Risk per ARREAGA/ABCDS: Pt bed is in low position, side rails up, call light and belongings are in reach. Fall risk light is on outside pts room. Pt encouraged to call for assistance as needed. Will continue with hourly rounds for PO intake, pain needs, toileting and repositioning as needed. Problem: Pain:  Goal: Pain level will decrease  Description: Pain level will decrease  3/24/2021 0432 by Jaison Walker RN  Outcome: Ongoing  Note: Patient is having chronic back pain rating between a 6-8. Patient receivng scheduled robaxin, oxycodone and tylenol-see MAR. Will continue to monitor. Problem: Bleeding:  Goal: Will show no signs and symptoms of excessive bleeding  Description: Will show no signs and symptoms of excessive bleeding  3/24/2021 0432 by Jaison Walker RN  Outcome: Ongoing  Note: Patient's hemoglobin this AM:   Recent Labs     03/24/21  0322   HGB 11.1*     Patient's platelet count this AM:   Recent Labs     03/24/21  0322       Thrombocytopenia not present at this time. Patient showing no signs or symptoms of active bleeding. Transfusion not indicated at this time. Patient verbalizes understanding of all instructions. Will continue to assess and implement POC. Call light within reach and hourly rounding in place.        Problem: Infection - Central Venous Catheter-Associated Bloodstream Infection:  Goal: Will show no infection signs and symptoms  Description: Will show no infection signs and symptoms  3/24/2021 0432 by Jaison Walker RN  Outcome: Ongoing Problem: Discharge Planning:  Goal: Discharged to appropriate level of care  Description: Discharged to appropriate level of care  3/24/2021 0432 by Dionicio Johansen RN  Outcome: Ongoing     Problem: Venous Thromboembolism:  Goal: Will show no signs or symptoms of venous thromboembolism  Description: Will show no signs or symptoms of venous thromboembolism  3/24/2021 0432 by Dionicio Johansen RN  Outcome: Ongoing  Note: Adherent with DVT Prevention: Pt is at risk for DVT d/t decreased mobility and cancer treatment. Pt educated on importance of activity. Pt ambulated in room several times during this shift. Pt verbalizes understanding of need for prophylaxis while inpatient.

## 2021-03-24 NOTE — ONCOLOGY
Patient Name: Janell Collazo  YOB: 1980  Patient MRN: [id number]    Date of Service: 03/24/2021    Site: L2 through sacrum    Current/Total Dose: 1500/3000 cGy  Current/Total Fractions: 5/10  Treatment Modality:  ( x ) 3D     (  ) IMRT     (  ) VMAT     Chemo:  ( x ) Yes   (  ) No      Ifex and mesna    Chief Complaint  Sarcoma ( Date of Dx:12/01/2017 )    Subjective:  Patient states abdominal pain is improving.  Is now taking MiraLAX twice daily.  States yesterday has been the best day in several weeks.  Right hip pain is slowly improving but still has frequent muscle cramps when she tries to ambulate.  Was able to ambulate 1 and half laps around her hospital unit yesterday. Sadia Sneedch only been able to do a half laps the day before.  Current pain without movement down to 3/10 so improving.      Has not had moisturizing cream to back since hospital admission.  Denies pain or tenderness to the back.      Is noting worsening fatigue in the afternoons but is trying to stay up because she has trouble sleeping at night.  States they are trying a new hospital bed with her today to see if this will help.      Patient denies specific pain with swallowing but states needs to eat smaller bites and also cannot eat food with temperature extremes without discomfort.  States it feels like food is getting stuck when swallowing. Exam  Blood pressure: 117/72, R arm, Regular, Pulse: 82, Temperature: 97.5 F, Respirations: , O2 sat: 99%, At Rest, Room Air, Pain Scale: 4, Height: 69 in, Weight: 273.2 lb, BSA: 2.46, BMI: 40.34 kg/m2    This is a 35year old female in no acute distress who appears her staged age.   Muscle strength 4/5 right lower extremity but improved over previous assessment.  Did not ambulate patient due to frequent pain. Grade 1 erythematous T-spine.  No erythematous sacrum but skin is dry.     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 Pain is improving.  Discussed with patient that usually we can as we start to see improvement in pain control so that she is a positive sign that her strength and her pain are improving already.  Discussed that it should continue to improve over the next several weeks. Patient is currently on Decadron 4 mg 4 times daily.  This will be managed by medical oncology because it is also needed for her systemic therapy. Discussed with patient that she should ask the nurses if they can apply moisturizing cream to her back daily after radiation until discharge to help with erythema.  Anticipate that she should start to have sacral erythema in the next week as well. Discussed that her fatigue pattern in the afternoon is normal for radiation.  Also probably compounded by systemic therapy. Patient was also ordered us an MRI of the cervical spine and brain to complete staging/surveillance of axial nervous system since multiple spine mets as an outpatient.  Since currently inpatient is an inpatient we will try to have this completed prior to discharge. All imaging for the week has been reviewed.     The current treatment plan has been reviewed and no changes are indicated. I have recommended that the patient follow CDC guidelines for prevention of COVID-19 infection. Ana Paige Nurse NP    This visit was completed in conjunction with Dr. Hill Perez. [facility], Radiation Oncology  Phone: [phone]  Fax: [phone]  Online: [email or web address]

## 2021-03-24 NOTE — ONCOLOGY
Lissy 45    [phone]    DATE: 3/24/2021     AREA TREATED: L2-SACRUM    DAILY DOSE: 300 cGy    CUMULATIVE DOSE: 1500 cGy    #  5 OUT OF 10 TREATMENTS    NEXT PLANNED TREATMENT  DATE: 3/25/2021    Daily Treatments are Monday through Friday:       INPATIENT CODING:  Beam Radiation /   Photons   Photon energy : / >10mv

## 2021-03-24 NOTE — PROGRESS NOTES
[facility]   Progress Note      SUBJECTIVE:      Doing well. Pain is decreased. No nausea or vomiting. Anticipates having MRI of the cervical spine and brain. Has questions relative to Neupogen    OBJECTIVE    Physical  VITALS:  /70   Pulse 78   Temp 97.8 °F (36.6 °C) (Oral)   Resp 18   Ht 5' 9\" (1.753 m)   Wt 275 lb 12.8 oz (125.1 kg)   LMP  (LMP Unknown)   SpO2 100%   BMI 40.73 kg/m²   TEMPERATURE:  Current - Temp: 97.8 °F (36.6 °C); Max - Temp  Av °F (36.7 °C)  Min: 97.8 °F (36.6 °C)  Max: 98.2 °F (36.8 °C)  BLOOD PRESSURE RANGE:  Systolic (23OFF), JIF:602 , Min:111 , TGJ:846   ; Diastolic (38BFA), UYS:56, Min:70, Max:93    24HR INTAKE/OUTPUT:      Intake/Output Summary (Last 24 hours) at 3/24/2021 1145  Last data filed at 3/24/2021 1055  Gross per 24 hour   Intake 3498 ml   Output 4600 ml   Net -1102 ml       Conscious alert oriented  HEENT: No pallor or scleral icterus. Oral mucosa: No mucositis. No thrush. Neck: Supple, no lymphadenopathy. No thyromegaly  Lungs: No rales, wheezes, respiratory efforts are normal.  CVS: S1-S2 normal.  No murmurs or gallops heard. Abdomen: Soft, bowel sounds are heard. No tenderness. Neuro: No focal deficits. No cranial nerve palsies. Alert and oriented. Skin: No rashes, petechiae, ecchymosis. Extremities: No edema, tenderness, erythema.  PICC line intact    Data  Labs:  General Labs:  CBC with Differential:    Lab Results   Component Value Date    WBC 5.5 2021    RBC 3.38 2021    HGB 11.1 2021    HCT 32.7 2021     2021    MCV 96.8 2021    MCH 32.9 2021    MCHC 34.0 2021    RDW 15.2 2021    SEGSPCT 72.1 2013    BANDSPCT 1 10/12/2019    LYMPHOPCT 3.5 2021    MONOPCT 8.0 2021    BASOPCT 0.1 2021    MONOSABS 0.4 2021    LYMPHSABS 0.2 2021    EOSABS 0.0 2021    BASOSABS 0.0 2021     BMP:    Lab Results   Component Value Date    NA 136 03/24/2021    K 4.6 03/24/2021    K 4.4 02/21/2021     03/24/2021    CO2 25 03/24/2021    BUN 16 03/24/2021    LABALBU 4.3 03/22/2021    CREATININE 0.5 03/24/2021    CALCIUM 9.0 03/24/2021    GFRAA >60 03/24/2021    LABGLOM >60 03/24/2021    LABGLOM 107 03/08/2013    GLUCOSE 110 03/24/2021     Hepatic Function Panel:    Lab Results   Component Value Date    ALKPHOS 94 03/22/2021    ALT 24 03/22/2021    AST 20 03/22/2021    PROT 6.6 03/22/2021    PROT 7.1 03/08/2013    BILITOT <0.2 03/22/2021    BILIDIR <0.2 02/04/2021    IBILI see below 02/04/2021    LABALBU 4.3 03/22/2021     LDH:  No results found for: LDH  PT/INR:    Lab Results   Component Value Date    PROTIME 11.6 02/16/2021    INR 1.00 02/16/2021     PTT:    Lab Results   Component Value Date    APTT 35.3 02/16/2021   [APTT    U/A -- Negative hematuria    Current Medications  Current Facility-Administered Medications: sodium chloride flush 0.9 % injection 10 mL, 10 mL, Intravenous, 2 times per day  sodium chloride flush 0.9 % injection 10 mL, 10 mL, Intravenous, PRN  enoxaparin (LOVENOX) injection 40 mg, 40 mg, Subcutaneous, Daily  polyethylene glycol (GLYCOLAX) packet 17 g, 17 g, Oral, Daily PRN  [DISCONTINUED] acetaminophen (TYLENOL) tablet 650 mg, 650 mg, Oral, Q6H PRN **OR** acetaminophen (TYLENOL) suppository 650 mg, 650 mg, Rectal, Q6H PRN  prochlorperazine (COMPAZINE) tablet 10 mg, 10 mg, Oral, Q6H PRN **OR** prochlorperazine (COMPAZINE) injection 10 mg, 10 mg, Intravenous, Q6H PRN  LORazepam (ATIVAN) tablet 0.5 mg, 0.5 mg, Oral, Q6H PRN **OR** LORazepam (ATIVAN) injection 0.5 mg, 0.5 mg, Intravenous, Q6H PRN  sodium chloride flush 0.9 % injection 10 mL, 10 mL, Intravenous, 2 times per day  sodium chloride flush 0.9 % injection 10 mL, 10 mL, Intravenous, PRN  acetaminophen (TYLENOL) tablet 1,000 mg, 1,000 mg, Oral, Q6H PRN  dexamethasone (DECADRON) tablet 4 mg, 4 mg, Oral, 4x Daily  diazePAM (VALIUM) tablet 5 mg, 5 mg, Oral, Q6H  gabapentin (NEURONTIN) capsule 300 mg, 300 mg, Oral, TID  methocarbamol (ROBAXIN) tablet 750 mg, 750 mg, Oral, Q4H  pantoprazole (PROTONIX) tablet 40 mg, 40 mg, Oral, QAM AC  oxyCODONE (ROXICODONE) immediate release tablet 10 mg, 10 mg, Oral, Q6H PRN  sennosides-docusate sodium (SENOKOT-S) 8.6-50 MG tablet 4 tablet, 4 tablet, Oral, Nightly  magic (miracle) mouthwash, 5 mL, Swish & Spit, 4x Daily PRN  simethicone (MYLICON) chewable tablet 80 mg, 80 mg, Oral, 4x Daily PC & HS  furosemide (LASIX) injection 40 mg, 40 mg, Intravenous, BID  0.9 % sodium chloride infusion, , Intravenous, Continuous  polyethylene glycol (GLYCOLAX) packet 17 g, 17 g, Oral, BID  granisetron (KYTRIL) tablet 2 mg, 2 mg, Oral, Daily  mesna (MESNEX) 4,900 mg, ifosfamide (IFEX) 4,900 mg in sodium chloride 0.9 % 500 mL chemo infusion, , Intravenous, Q24H  [START ON 3/26/2021] mesna (MESNEX) 4,900 mg in sodium chloride 0.9 % 500 mL IVPB, 4,900 mg, Intravenous, Continuous    ASSESSMENT AND PLAN    53-year-old lady has    1. 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  -So far she is tolerating treatments well.  -Continue chemo, antiemetics, dexamethasone. Will taper Dex post chemo per XRT  -Will get MRI CSpine and brain as requested XR  -POT for OP Nuelasta biosimilar in G2 for Sunday (may have to adjust date of therapy based on infusion times  -Plan currently for 4 days of ifos and 5 days of Mesna with neulasta as soon as discharged. T    2.  Neoplasm related pain    -Oxycodone will be continued      Alycia Fabian MD, MPH

## 2021-03-24 NOTE — ONCOLOGY
Administration: Chemotherapy drug Mesna/ifos independently verified with Renaldo Lainez RN prior to administration. Acknowledgement of informed consent for chemotherapy administration verified. Original order, appropriateness of regimen, drug supplied, height, weight, BSA, dose calculations, expiration dates/times, drug appearance, and two patient identifiers were verified by both RNs. Drug checked for vesicant/irritant status and for risk of hypersensitivity. Most recent laboratory values and allergies, were reviewed. Positive, brisk blood return via CVC was confirmed prior to administration. Chest x-ray for correct line placement reviewed. Jesús Sykes and Renaldo Lainez RN verified correct rate of chemotherapy and maintenance IV fluids. Patient was educated on chemotherapy regimen prior to administration including indication for treatment related to disease & side effects of chemotherapy drug. Patient verbalizes understanding of all instructions. Completion of Chemotherapy: Monitoring during infusion done per policy, see Flowsheets. Blood return verified before, during, and after infusion per policy; no signs of extravasation. Pt tolerating chemotherapy well and without incident. Will continue to monitor.

## 2021-03-25 LAB
ANION GAP SERPL CALCULATED.3IONS-SCNC: 10 MMOL/L (ref 3–16)
BASOPHILS ABSOLUTE: 0 K/UL (ref 0–0.2)
BASOPHILS RELATIVE PERCENT: 0 %
BILIRUBIN URINE: NEGATIVE
BLOOD, URINE: NEGATIVE
BUN BLDV-MCNC: 15 MG/DL (ref 7–20)
CALCIUM SERPL-MCNC: 8.7 MG/DL (ref 8.3–10.6)
CHLORIDE BLD-SCNC: 107 MMOL/L (ref 99–110)
CLARITY: CLEAR
CO2: 24 MMOL/L (ref 21–32)
COLOR: YELLOW
CREAT SERPL-MCNC: <0.5 MG/DL (ref 0.6–1.1)
EOSINOPHILS ABSOLUTE: 0 K/UL (ref 0–0.6)
EOSINOPHILS RELATIVE PERCENT: 0 %
GFR AFRICAN AMERICAN: >60
GFR NON-AFRICAN AMERICAN: >60
GLUCOSE BLD-MCNC: 92 MG/DL (ref 70–99)
GLUCOSE URINE: NEGATIVE MG/DL
HCT VFR BLD CALC: 33.7 % (ref 36–48)
HEMOGLOBIN: 11.3 G/DL (ref 12–16)
KETONES, URINE: 15 MG/DL
LEUKOCYTE ESTERASE, URINE: NEGATIVE
LYMPHOCYTES ABSOLUTE: 0.2 K/UL (ref 1–5.1)
LYMPHOCYTES RELATIVE PERCENT: 3.8 %
MCH RBC QN AUTO: 32.8 PG (ref 26–34)
MCHC RBC AUTO-ENTMCNC: 33.6 G/DL (ref 31–36)
MCV RBC AUTO: 97.6 FL (ref 80–100)
MICROSCOPIC EXAMINATION: ABNORMAL
MONOCYTES ABSOLUTE: 0.4 K/UL (ref 0–1.3)
MONOCYTES RELATIVE PERCENT: 7.6 %
NEUTROPHILS ABSOLUTE: 4.3 K/UL (ref 1.7–7.7)
NEUTROPHILS RELATIVE PERCENT: 88.6 %
NITRITE, URINE: NEGATIVE
PDW BLD-RTO: 15.3 % (ref 12.4–15.4)
PH UA: 6.5 (ref 5–8)
PLATELET # BLD: 182 K/UL (ref 135–450)
PMV BLD AUTO: 7.7 FL (ref 5–10.5)
POTASSIUM SERPL-SCNC: 4.2 MMOL/L (ref 3.5–5.1)
PROTEIN UA: NEGATIVE MG/DL
RBC # BLD: 3.45 M/UL (ref 4–5.2)
SODIUM BLD-SCNC: 141 MMOL/L (ref 136–145)
SPECIFIC GRAVITY UA: 1.01 (ref 1–1.03)
URINE TYPE: ABNORMAL
UROBILINOGEN, URINE: 0.2 E.U./DL
WBC # BLD: 4.8 K/UL (ref 4–11)

## 2021-03-25 PROCEDURE — 77336 RADIATION PHYSICS CONSULT: CPT

## 2021-03-25 PROCEDURE — 77412 RADIATION TX DELIVERY LVL 3: CPT

## 2021-03-25 PROCEDURE — 6370000000 HC RX 637 (ALT 250 FOR IP): Performed by: NURSE PRACTITIONER

## 2021-03-25 PROCEDURE — 36592 COLLECT BLOOD FROM PICC: CPT

## 2021-03-25 PROCEDURE — 6360000002 HC RX W HCPCS: Performed by: INTERNAL MEDICINE

## 2021-03-25 PROCEDURE — 6370000000 HC RX 637 (ALT 250 FOR IP): Performed by: INTERNAL MEDICINE

## 2021-03-25 PROCEDURE — 96415 CHEMO IV INFUSION ADDL HR: CPT

## 2021-03-25 PROCEDURE — 85025 COMPLETE CBC W/AUTO DIFF WBC: CPT

## 2021-03-25 PROCEDURE — 2060000000 HC ICU INTERMEDIATE R&B

## 2021-03-25 PROCEDURE — 81003 URINALYSIS AUTO W/O SCOPE: CPT

## 2021-03-25 PROCEDURE — 80048 BASIC METABOLIC PNL TOTAL CA: CPT

## 2021-03-25 PROCEDURE — 96413 CHEMO IV INFUSION 1 HR: CPT

## 2021-03-25 PROCEDURE — 2580000003 HC RX 258: Performed by: INTERNAL MEDICINE

## 2021-03-25 RX ADMIN — DEXAMETHASONE 4 MG: 4 TABLET ORAL at 08:35

## 2021-03-25 RX ADMIN — SIMETHICONE CHEW TAB 80 MG 80 MG: 80 TABLET ORAL at 13:16

## 2021-03-25 RX ADMIN — METHOCARBAMOL 750 MG: 750 TABLET ORAL at 18:50

## 2021-03-25 RX ADMIN — DIAZEPAM 5 MG: 5 TABLET ORAL at 03:37

## 2021-03-25 RX ADMIN — DOCUSATE SODIUM 50 MG AND SENNOSIDES 8.6 MG 4 TABLET: 8.6; 5 TABLET, FILM COATED ORAL at 22:02

## 2021-03-25 RX ADMIN — SIMETHICONE CHEW TAB 80 MG 80 MG: 80 TABLET ORAL at 08:36

## 2021-03-25 RX ADMIN — DIAZEPAM 5 MG: 5 TABLET ORAL at 16:13

## 2021-03-25 RX ADMIN — METHOCARBAMOL 750 MG: 750 TABLET ORAL at 09:48

## 2021-03-25 RX ADMIN — METHOCARBAMOL 750 MG: 750 TABLET ORAL at 02:15

## 2021-03-25 RX ADMIN — POLYETHYLENE GLYCOL 3350 17 G: 17 POWDER, FOR SOLUTION ORAL at 22:00

## 2021-03-25 RX ADMIN — DEXAMETHASONE 4 MG: 4 TABLET ORAL at 13:16

## 2021-03-25 RX ADMIN — GABAPENTIN 300 MG: 300 CAPSULE ORAL at 08:36

## 2021-03-25 RX ADMIN — METHOCARBAMOL 750 MG: 750 TABLET ORAL at 22:03

## 2021-03-25 RX ADMIN — ACETAMINOPHEN 1000 MG: 500 TABLET, FILM COATED ORAL at 01:27

## 2021-03-25 RX ADMIN — Medication 10 ML: at 22:00

## 2021-03-25 RX ADMIN — SODIUM CHLORIDE: 9 INJECTION, SOLUTION INTRAVENOUS at 16:20

## 2021-03-25 RX ADMIN — GABAPENTIN 300 MG: 300 CAPSULE ORAL at 13:15

## 2021-03-25 RX ADMIN — METHOCARBAMOL 750 MG: 750 TABLET ORAL at 13:16

## 2021-03-25 RX ADMIN — DEXAMETHASONE 4 MG: 4 TABLET ORAL at 16:13

## 2021-03-25 RX ADMIN — ENOXAPARIN SODIUM 40 MG: 40 INJECTION SUBCUTANEOUS at 08:35

## 2021-03-25 RX ADMIN — OXYCODONE 10 MG: 5 TABLET ORAL at 09:48

## 2021-03-25 RX ADMIN — OXYCODONE 10 MG: 5 TABLET ORAL at 03:37

## 2021-03-25 RX ADMIN — DIAZEPAM 5 MG: 5 TABLET ORAL at 09:48

## 2021-03-25 RX ADMIN — PANTOPRAZOLE SODIUM 40 MG: 40 TABLET, DELAYED RELEASE ORAL at 05:58

## 2021-03-25 RX ADMIN — GRANISETRON HYDROCHLORIDE 2 MG: 1 TABLET ORAL at 16:13

## 2021-03-25 RX ADMIN — SIMETHICONE CHEW TAB 80 MG 80 MG: 80 TABLET ORAL at 18:50

## 2021-03-25 RX ADMIN — DEXAMETHASONE 4 MG: 4 TABLET ORAL at 22:00

## 2021-03-25 RX ADMIN — SIMETHICONE CHEW TAB 80 MG 80 MG: 80 TABLET ORAL at 22:00

## 2021-03-25 RX ADMIN — GABAPENTIN 300 MG: 300 CAPSULE ORAL at 21:30

## 2021-03-25 RX ADMIN — POLYETHYLENE GLYCOL 3350 17 G: 17 POWDER, FOR SOLUTION ORAL at 08:36

## 2021-03-25 RX ADMIN — Medication 10 ML: at 08:36

## 2021-03-25 RX ADMIN — MESNA: 100 INJECTION, SOLUTION INTRAVENOUS at 03:33

## 2021-03-25 RX ADMIN — DIAZEPAM 5 MG: 5 TABLET ORAL at 22:03

## 2021-03-25 RX ADMIN — OXYCODONE 10 MG: 5 TABLET ORAL at 16:13

## 2021-03-25 RX ADMIN — METHOCARBAMOL 750 MG: 750 TABLET ORAL at 05:58

## 2021-03-25 RX ADMIN — ACETAMINOPHEN 1000 MG: 500 TABLET, FILM COATED ORAL at 07:32

## 2021-03-25 ASSESSMENT — PAIN DESCRIPTION - ONSET
ONSET: ON-GOING

## 2021-03-25 ASSESSMENT — PAIN DESCRIPTION - PROGRESSION: CLINICAL_PROGRESSION: NOT CHANGED

## 2021-03-25 ASSESSMENT — PAIN DESCRIPTION - FREQUENCY: FREQUENCY: CONTINUOUS

## 2021-03-25 ASSESSMENT — PAIN SCALES - GENERAL
PAINLEVEL_OUTOF10: 5
PAINLEVEL_OUTOF10: 7
PAINLEVEL_OUTOF10: 6

## 2021-03-25 ASSESSMENT — PAIN DESCRIPTION - DESCRIPTORS
DESCRIPTORS: CONSTANT;ACHING;DISCOMFORT
DESCRIPTORS: CONSTANT;ACHING;SPASM

## 2021-03-25 ASSESSMENT — PAIN DESCRIPTION - ORIENTATION
ORIENTATION: MID

## 2021-03-25 ASSESSMENT — PAIN DESCRIPTION - PAIN TYPE
TYPE: CHRONIC PAIN
TYPE: CHRONIC PAIN

## 2021-03-25 ASSESSMENT — PAIN DESCRIPTION - DIRECTION: RADIATING_TOWARDS: STOMACH

## 2021-03-25 NOTE — PLAN OF CARE
Problem: Falls - Risk of:  Goal: Will remain free from falls  Description: Will remain free from falls  Outcome: Ongoing  Note: Orthostatic vital signs obtained at start of shift - see flowsheet for details. Pt does not meet criteria for orthostasis. Pt is a Med fall risk. See Maricel Ashok Fall Score and ABCDS Injury Risk assessments. - Screening for Orthostasis AND not a [place] Risk per ARREAGA/ABCDS: Pt bed is in low position, side rails up, call light and belongings are in reach. Fall risk light is on outside pts room. Pt encouraged to call for assistance as needed. Will continue with hourly rounds for PO intake, pain needs, toileting and repositioning as needed. Problem: Pain:  Goal: Pain level will decrease  Description: Pain level will decrease  Outcome: Ongoing  Note: Pt has complained of pain during this shift. Medicated with pain medication. Pt satisfied. Problem: Bleeding:  Goal: Will show no signs and symptoms of excessive bleeding  Description: Will show no signs and symptoms of excessive bleeding  Outcome: Ongoing  Note: Patient's hemoglobin this AM:   Recent Labs     03/25/21  0345   HGB 11.3*     Patient's platelet count this AM:   Recent Labs     03/25/21  0345       Thrombocytopenia Precautions in place. Patient showing no signs or symptoms of active bleeding. Transfusion not indicated at this time. Patient verbalizes understanding of all instructions. Will continue to assess and implement POC. Call light within reach and hourly rounding in place. Problem: Infection - Central Venous Catheter-Associated Bloodstream Infection:  Goal: Will show no infection signs and symptoms  Description: Will show no infection signs and symptoms  Outcome: Ongoing  Note: CVC site remains free of signs/symptoms of infection. No drainage, edema, erythema, pain, or warmth noted at site. Dressing changes continue per protocol and on an as needed basis - see flowsheet.      Compliant with [facility] Bath Protocol:  Performed CHG bath today per [facility] protocol utilizing CHG solution in the shower. CVC site cleansed with CHG wipe over dressing, skin surrounding dressing, and first 6\" of IV tubing. Pt tolerated well. Continued to encourage daily CHG bathing per [facility] protocol. Problem: Discharge Planning:  Goal: Discharged to appropriate level of care  Description: Discharged to appropriate level of care  Outcome: Ongoing  Note: Pt has been updated on POC and has no questions at this time. Will continue to monitor. Problem: Venous Thromboembolism:  Goal: Will show no signs or symptoms of venous thromboembolism  Description: Will show no signs or symptoms of venous thromboembolism  Outcome: Ongoing  Note: Adherent with DVT Prevention: Pt is at risk for DVT d/t decreased mobility and cancer treatment. Pt educated on importance of activity. Pt has orders for SCDs while in bed. Pt verbalizes understanding of need for prophylaxis while inpatient.

## 2021-03-25 NOTE — PROGRESS NOTES
Palliative Care Chart Review  and Check in Note:     NAME:  Isaias Hall  Admit Date: 3/22/2021  Hospital Day:  Hospital Day: 4   Current Code status: Full Code    Palliative care is continuing to following Ms. Hicks for symptom management,  and goals of care discussion as needed. Patient's chart reviewed today 3/25/21. Ms. Tani Wolf is doing well today. She states that she is having regular bowel movements now and states that the twice daily miralax has helped tremendously. She states that she does get bloated after meals however Gas-x helps. She has no other complaints. The following are the currently established goals/code status, and Symptom management.      Goals of care: continue current management    Code status: FULL code    Discharge plan: continue current management      Amy Raya DO PGY-1  03/25/21  1:48 PM

## 2021-03-25 NOTE — ONCOLOGY
Administration: Chemotherapy drug Mesna/Ifos independently verified with Poli Doty RN prior to administration. Acknowledgement of informed consent for chemotherapy administration verified. Original order, appropriateness of regimen, drug supplied, height, weight, BSA, dose calculations, expiration dates/times, drug appearance, and two patient identifiers were verified by both RNs. Drug checked for vesicant/irritant status and for risk of hypersensitivity. Most recent laboratory values and allergies, were reviewed. Positive, brisk blood return via CVC was confirmed prior to administration. Chest x-ray for correct line placement reviewed. [facility] and [address] [place] [address] verified correct rate of chemotherapy and maintenance IV fluids. Patient was educated on chemotherapy regimen prior to administration including indication for treatment related to disease & side effects of chemotherapy drug. Patient verbalizes understanding of all instructions. Completion of Chemotherapy: Monitoring during infusion done per policy, see Flowsheets. Blood return verified before, during, and after infusion per policy; no signs of extravasation. Pt tolerating chemotherapy well and without incident. Will continue to monitor.

## 2021-03-25 NOTE — PROGRESS NOTES
Pt disconnected from telemetry and chemotherapy on hold while pt going to radiation per nursing communication orders. Will reattach when pt comes back to the unit.

## 2021-03-25 NOTE — PROGRESS NOTES
K 4.4 02/21/2021     03/25/2021    CO2 24 03/25/2021    BUN 15 03/25/2021    LABALBU 4.3 03/22/2021    CREATININE <0.5 03/25/2021    CALCIUM 8.7 03/25/2021    GFRAA >60 03/25/2021    LABGLOM >60 03/25/2021    LABGLOM 107 03/08/2013    GLUCOSE 92 03/25/2021     Hepatic Function Panel:    Lab Results   Component Value Date    ALKPHOS 94 03/22/2021    ALT 24 03/22/2021    AST 20 03/22/2021    PROT 6.6 03/22/2021    PROT 7.1 03/08/2013    BILITOT <0.2 03/22/2021    BILIDIR <0.2 02/04/2021    IBILI see below 02/04/2021    LABALBU 4.3 03/22/2021     LDH:  No results found for: LDH  PT/INR:    Lab Results   Component Value Date    PROTIME 11.6 02/16/2021    INR 1.00 02/16/2021     PTT:    Lab Results   Component Value Date    APTT 35.3 02/16/2021   [APTT    U/A -- Negative hematuria    Radiology Review:  Ct Guided Stereotactic Localization    Result Date: 2/24/2021  CT GUIDED STEREOTACTIC LOCALIZATION Indication: Closed wedge compression fracture of T7 vertebra, initial encounter ([facility]) Technique: Intraoperative cone beam CT images of the thoracic spine were acquired without the administration of intravenous contrast media. Axial, coronal and sagittal reformatted images were constructed from the raw data set. Individualized dose optimization technique was used in order to meet ALARA standards for radiation dose reduction. In addition to vendor specific dose reduction algorithms, the dose reduction techniques vary based on the specific scanner utilized but frequently include automated exposure control, adjustment of the mA and/or kV according to patient size, and use of iterative reconstruction technique. . Findings: Intraoperative cone beam CT was utilized during thoracic spine surgery. Images demonstrate localization of the previously noted T7 compression deformity with subsequent transpedicular posterior fusion     Impression: Intraoperative cone beam CT utilized, as above.     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 COMPARISON: 3/16/2021. TECHNIQUE: Multiplanar, multisequence MR imaging of the cervical spine obtained without and with IV contrast. IV contrast: 20 mL Prohance. FINDINGS: BRAINSTEM/SPINAL CORD: No pathologic signal. CEREBELLAR TONSILS: Normal. ALIGNMENT: The craniovertebral and cervical spine alignment are normal. BONES: No evidence of acute fracture. No abnormal enhancement. NECK SOFT TISSUES: Normal.     No evidence of metastatic disease within the cervical spine. Mri Thoracic Spine W Wo Contrast    Result Date: 3/16/2021  PROCEDURE: 1.  Magnetic resonance imaging (MRI) of the thoracic spine without and with contrast 2. Magnetic resonance imaging (MRI) of the lumbar spine without and with contrast INDICATION: Sarcoma of left lower extremity ([address] status post resection of epidural tumor at T7 and thoracic spinal fusion COMPARISON: 2/15/2021 TECHNIQUE: MRI of the thoracic and lumbar spine was performed prior to and following the uneventful administration of 20 mL ProHance intravenous gadolinium contrast according to standard protocol. FINDINGS: Thoracic spine: The patient is status post T5-T9 posterior instrumented spinal fusion and T7 laminectomy for resection of epidural tumor at T7. There is a 5 cm TV by 1.5 cm AP by 16 cm CC postoperative fluid collection in the soft tissues posterior to spinal fusion extending from T4 through T10 level. This fluid collection does not appear to directly communicate with CSF and may represent a postoperative seroma or pseudomeningocele.  There is progressed epidural tumor in the left dorsal epidural space at T5 level measuring 5 mm in thickness, progressed circumferential epidural tumor at T6 level measuring up to 5 mm in thickness, and residual epidural tumor in the ventral epidural space extending to the right and left neural foramen at T7 level measuring 5 mm AP with associated progressed left perivertebral tumor demonstrated at T7 level measuring 2.6 cm AP by 1.1 cm TV. There is moderate spinal canal stenosis at T6 level. The alignment is normal accounting for artifact related to pedicle screws. A chronic T7 compression fracture with 20% height loss is unchanged. A marrow replacing T1 hypointense enhancing metastatic lesion involving majority of the T12 vertebral body and left T12 pedicle is redemonstrated. There is also 4 mm metastatic lesion at the left posterior aspect of the T11 vertebral body. T7 vertebral body metastasis is redemonstrated. The spinal cord demonstrates normal signal. Lumbar spine: There is grade 1 retrolisthesis of L5 on S1. There is a T1 hypointense enhancing metastatic lesion involving the majority of the L3 vertebral body. There are also small osseous metastatic lesions involving the left aspect of the L2 body and inferior aspect of the L1 vertebral body. There is also partially imaged metastatic disease involving the left than the right aspect of the sacrum as well as right and left iliac wing. There is no acute fracture. The conus medullaris is at the level of L1 and the  distal spinal cord appears normal. There is also ventral epidural tumor at L3 level measuring 5 mm in thickness on the right and 4 mm in thickness on the left extending to the right neural foramen and right paravertebral soft tissues measuring approximately 2 cm in size. This results in right lateral recess stenosis and mild spinal canal stenosis at L3 level. Diffuse disc bulge L4-5 and L5-S1 contribute to mild bilateral neural foraminal narrowing. 1.  Osseous metastatic disease involving T7, T11, T12, L1, L2, L3, sacrum, and pelvis. 2.  Progressed associated epidural tumor at T5 through T7-T8 level compared to 2/15/2021 status post interval T7 laminectomy and T5-T9 posterior instrumented spinal fusion. Moderate spinal canal stenosis at T6 level.  3. metastatic lesion at the left posterior aspect of the T11 vertebral body. T7 vertebral body metastasis is redemonstrated. The spinal cord demonstrates normal signal. Lumbar spine: There is grade 1 retrolisthesis of L5 on S1. There is a T1 hypointense enhancing metastatic lesion involving the majority of the L3 vertebral body. There are also small osseous metastatic lesions involving the left aspect of the L2 body and inferior aspect of the L1 vertebral body. There is also partially imaged metastatic disease involving the left than the right aspect of the sacrum as well as right and left iliac wing. There is no acute fracture. The conus medullaris is at the level of L1 and the  distal spinal cord appears normal. There is also ventral epidural tumor at L3 level measuring 5 mm in thickness on the right and 4 mm in thickness on the left extending to the right neural foramen and right paravertebral soft tissues measuring approximately 2 cm in size. This results in right lateral recess stenosis and mild spinal canal stenosis at L3 level. Diffuse disc bulge L4-5 and L5-S1 contribute to mild bilateral neural foraminal narrowing. 1.  Osseous metastatic disease involving T7, T11, T12, L1, L2, L3, sacrum, and pelvis. 2.  Progressed associated epidural tumor at T5 through T7-T8 level compared to 2/15/2021 status post interval T7 laminectomy and T5-T9 posterior instrumented spinal fusion. Moderate spinal canal stenosis at T6 level. 3.  Epidural tumor ventrally at L3 level resulting in right lateral recess stenosis in mild spinal canal stenosis.        Current Medications  Current Facility-Administered Medications: sodium chloride flush 0.9 % injection 10 mL, 10 mL, Intravenous, 2 times per day  sodium chloride flush 0.9 % injection 10 mL, 10 mL, Intravenous, PRN  enoxaparin (LOVENOX) injection 40 mg, 40 mg, Subcutaneous, Daily  polyethylene glycol (GLYCOLAX) packet 17 g, 17 g, Oral, Daily PRN  [DISCONTINUED] acetaminophen (TYLENOL) tablet 650 mg, 650 mg, Oral, Q6H PRN **OR** acetaminophen (TYLENOL) suppository 650 mg, 650 mg, Rectal, Q6H PRN  prochlorperazine (COMPAZINE) tablet 10 mg, 10 mg, Oral, Q6H PRN **OR** prochlorperazine (COMPAZINE) injection 10 mg, 10 mg, Intravenous, Q6H PRN  LORazepam (ATIVAN) tablet 0.5 mg, 0.5 mg, Oral, Q6H PRN **OR** LORazepam (ATIVAN) injection 0.5 mg, 0.5 mg, Intravenous, Q6H PRN  sodium chloride flush 0.9 % injection 10 mL, 10 mL, Intravenous, 2 times per day  sodium chloride flush 0.9 % injection 10 mL, 10 mL, Intravenous, PRN  acetaminophen (TYLENOL) tablet 1,000 mg, 1,000 mg, Oral, Q6H PRN  dexamethasone (DECADRON) tablet 4 mg, 4 mg, Oral, 4x Daily  diazePAM (VALIUM) tablet 5 mg, 5 mg, Oral, Q6H  gabapentin (NEURONTIN) capsule 300 mg, 300 mg, Oral, TID  methocarbamol (ROBAXIN) tablet 750 mg, 750 mg, Oral, Q4H  pantoprazole (PROTONIX) tablet 40 mg, 40 mg, Oral, QAM AC  oxyCODONE (ROXICODONE) immediate release tablet 10 mg, 10 mg, Oral, Q6H PRN  sennosides-docusate sodium (SENOKOT-S) 8.6-50 MG tablet 4 tablet, 4 tablet, Oral, Nightly  magic (miracle) mouthwash, 5 mL, Swish & Spit, 4x Daily PRN  simethicone (MYLICON) chewable tablet 80 mg, 80 mg, Oral, 4x Daily PC & HS  furosemide (LASIX) injection 40 mg, 40 mg, Intravenous, BID  0.9 % sodium chloride infusion, , Intravenous, Continuous  polyethylene glycol (GLYCOLAX) packet 17 g, 17 g, Oral, BID  granisetron (KYTRIL) tablet 2 mg, 2 mg, Oral, Daily  mesna (MESNEX) 4,900 mg, ifosfamide (IFEX) 4,900 mg in sodium chloride 0.9 % 500 mL chemo infusion, , Intravenous, Q24H  [START ON 3/26/2021] mesna (MESNEX) 4,900 mg in sodium chloride 0.9 % 500 mL IVPB, 4,900 mg, Intravenous, Continuous    ASSESSMENT AND PLAN    78-year-old lady has    1.   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

## 2021-03-25 NOTE — PLAN OF CARE
Problem: Falls - Risk of:  Goal: Will remain free from falls  Description: Will remain free from falls  3/24/2021 0432 by Eugene Sanchez RN  Outcome: Ongoing  Note: Orthostatic vital signs obtained at start of shift - see flowsheet for details. Pt does not meet criteria for orthostasis. Pt is a Med fall risk. See [place] Aston Fall Score and ABCDS Injury Risk assessments. - Screening for Orthostasis AND not a [place] Risk per ARREAGA/ABCDS: Pt bed is in low position, side rails up, call light and belongings are in reach. Fall risk light is on outside pts room. Pt encouraged to call for assistance as needed. Will continue with hourly rounds for PO intake, pain needs, toileting and repositioning as needed. Problem: Pain:  Goal: Pain level will decrease  Description: Pain level will decrease  3/24/2021 0432 by Eugene Sanchez RN  Outcome: Ongoing  Note: Patient is having chronic back pain rating between a 6-8. Patient receivng scheduled robaxin, oxycodone and tylenol-see MAR. Will continue to monitor. Problem: Bleeding:  Goal: Will show no signs and symptoms of excessive bleeding  Description: Will show no signs and symptoms of excessive bleeding  3/24/2021 0432 by Eugene Sanchez RN  Outcome: Ongoing  Note: Patient's hemoglobin this AM:   Patient's hemoglobin this AM:   Recent Labs     03/25/21  0345   HGB 11.3*     Patient's platelet count this AM:   Recent Labs     03/25/21  0345       Thrombocytopenia not present at this time. Patient showing no signs or symptoms of active bleeding. Transfusion not indicated at this time. Patient verbalizes understanding of all instructions. Will continue to assess and implement POC. Call light within reach and hourly rounding in place.    Problem: Infection - Central Venous Catheter-Associated Bloodstream Infection:  Goal: Will show no infection signs and symptoms  Description: Will show no infection signs and symptoms  3/24/2021 0432 by Eugene Sanchez RN Outcome: Ongoing     Problem: Discharge Planning:  Goal: Discharged to appropriate level of care  Description: Discharged to appropriate level of care  3/24/2021 0432 by Ramya Davis RN  Outcome: Ongoing     Problem: Venous Thromboembolism:  Goal: Will show no signs or symptoms of venous thromboembolism  Description: Will show no signs or symptoms of venous thromboembolism  3/24/2021 0432 by Ramya Davis RN  Outcome: Ongoing  Note: Adherent with DVT Prevention: Pt is at risk for DVT d/t decreased mobility and cancer treatment. Pt educated on importance of activity. Pt ambulated in room several times during this shift. Pt verbalizes understanding of need for prophylaxis while inpatient.

## 2021-03-26 LAB
ANION GAP SERPL CALCULATED.3IONS-SCNC: 8 MMOL/L (ref 3–16)
BASOPHILS ABSOLUTE: 0 K/UL (ref 0–0.2)
BASOPHILS RELATIVE PERCENT: 0 %
BILIRUBIN URINE: NEGATIVE
BLOOD, URINE: NEGATIVE
BUN BLDV-MCNC: 13 MG/DL (ref 7–20)
CALCIUM SERPL-MCNC: 8.6 MG/DL (ref 8.3–10.6)
CHLORIDE BLD-SCNC: 104 MMOL/L (ref 99–110)
CLARITY: CLEAR
CO2: 25 MMOL/L (ref 21–32)
COLOR: YELLOW
CREAT SERPL-MCNC: <0.5 MG/DL (ref 0.6–1.1)
EOSINOPHILS ABSOLUTE: 0 K/UL (ref 0–0.6)
EOSINOPHILS RELATIVE PERCENT: 0.1 %
GFR AFRICAN AMERICAN: >60
GFR NON-AFRICAN AMERICAN: >60
GLUCOSE BLD-MCNC: 107 MG/DL (ref 70–99)
GLUCOSE URINE: NEGATIVE MG/DL
HCT VFR BLD CALC: 31.4 % (ref 36–48)
HEMOGLOBIN: 10.5 G/DL (ref 12–16)
KETONES, URINE: 15 MG/DL
LEUKOCYTE ESTERASE, URINE: NEGATIVE
LYMPHOCYTES ABSOLUTE: 0.2 K/UL (ref 1–5.1)
LYMPHOCYTES RELATIVE PERCENT: 4.6 %
MCH RBC QN AUTO: 32.7 PG (ref 26–34)
MCHC RBC AUTO-ENTMCNC: 33.4 G/DL (ref 31–36)
MCV RBC AUTO: 97.7 FL (ref 80–100)
MICROSCOPIC EXAMINATION: ABNORMAL
MONOCYTES ABSOLUTE: 0.3 K/UL (ref 0–1.3)
MONOCYTES RELATIVE PERCENT: 9.6 %
NEUTROPHILS ABSOLUTE: 3.1 K/UL (ref 1.7–7.7)
NEUTROPHILS RELATIVE PERCENT: 85.7 %
NITRITE, URINE: NEGATIVE
PDW BLD-RTO: 15.8 % (ref 12.4–15.4)
PH UA: 7 (ref 5–8)
PLATELET # BLD: 156 K/UL (ref 135–450)
PMV BLD AUTO: 7.7 FL (ref 5–10.5)
POTASSIUM SERPL-SCNC: 4.2 MMOL/L (ref 3.5–5.1)
PROTEIN UA: NEGATIVE MG/DL
RBC # BLD: 3.21 M/UL (ref 4–5.2)
SODIUM BLD-SCNC: 137 MMOL/L (ref 136–145)
SPECIFIC GRAVITY UA: 1.01 (ref 1–1.03)
URINE TYPE: ABNORMAL
UROBILINOGEN, URINE: 0.2 E.U./DL
WBC # BLD: 3.6 K/UL (ref 4–11)

## 2021-03-26 PROCEDURE — 85025 COMPLETE CBC W/AUTO DIFF WBC: CPT

## 2021-03-26 PROCEDURE — 6360000002 HC RX W HCPCS: Performed by: INTERNAL MEDICINE

## 2021-03-26 PROCEDURE — 81003 URINALYSIS AUTO W/O SCOPE: CPT

## 2021-03-26 PROCEDURE — 96415 CHEMO IV INFUSION ADDL HR: CPT

## 2021-03-26 PROCEDURE — 80048 BASIC METABOLIC PNL TOTAL CA: CPT

## 2021-03-26 PROCEDURE — 2060000000 HC ICU INTERMEDIATE R&B

## 2021-03-26 PROCEDURE — 6370000000 HC RX 637 (ALT 250 FOR IP): Performed by: INTERNAL MEDICINE

## 2021-03-26 PROCEDURE — 6370000000 HC RX 637 (ALT 250 FOR IP): Performed by: NURSE PRACTITIONER

## 2021-03-26 PROCEDURE — 2580000003 HC RX 258: Performed by: INTERNAL MEDICINE

## 2021-03-26 PROCEDURE — 96413 CHEMO IV INFUSION 1 HR: CPT

## 2021-03-26 PROCEDURE — 36592 COLLECT BLOOD FROM PICC: CPT

## 2021-03-26 PROCEDURE — 77412 RADIATION TX DELIVERY LVL 3: CPT

## 2021-03-26 RX ADMIN — OXYCODONE 10 MG: 5 TABLET ORAL at 14:26

## 2021-03-26 RX ADMIN — METHOCARBAMOL 750 MG: 750 TABLET ORAL at 06:01

## 2021-03-26 RX ADMIN — GABAPENTIN 300 MG: 300 CAPSULE ORAL at 21:30

## 2021-03-26 RX ADMIN — GABAPENTIN 300 MG: 300 CAPSULE ORAL at 12:58

## 2021-03-26 RX ADMIN — SIMETHICONE CHEW TAB 80 MG 80 MG: 80 TABLET ORAL at 18:16

## 2021-03-26 RX ADMIN — DEXAMETHASONE 4 MG: 4 TABLET ORAL at 16:00

## 2021-03-26 RX ADMIN — METHOCARBAMOL 750 MG: 750 TABLET ORAL at 10:18

## 2021-03-26 RX ADMIN — DIAZEPAM 5 MG: 5 TABLET ORAL at 22:13

## 2021-03-26 RX ADMIN — SIMETHICONE CHEW TAB 80 MG 80 MG: 80 TABLET ORAL at 12:58

## 2021-03-26 RX ADMIN — Medication 10 ML: at 22:00

## 2021-03-26 RX ADMIN — SIMETHICONE CHEW TAB 80 MG 80 MG: 80 TABLET ORAL at 22:00

## 2021-03-26 RX ADMIN — METHOCARBAMOL 750 MG: 750 TABLET ORAL at 02:12

## 2021-03-26 RX ADMIN — METHOCARBAMOL 750 MG: 750 TABLET ORAL at 14:26

## 2021-03-26 RX ADMIN — DIAZEPAM 5 MG: 5 TABLET ORAL at 10:18

## 2021-03-26 RX ADMIN — ENOXAPARIN SODIUM 40 MG: 40 INJECTION SUBCUTANEOUS at 08:21

## 2021-03-26 RX ADMIN — ACETAMINOPHEN 1000 MG: 500 TABLET, FILM COATED ORAL at 19:28

## 2021-03-26 RX ADMIN — SIMETHICONE CHEW TAB 80 MG 80 MG: 80 TABLET ORAL at 08:21

## 2021-03-26 RX ADMIN — OXYCODONE 10 MG: 5 TABLET ORAL at 08:24

## 2021-03-26 RX ADMIN — DOCUSATE SODIUM 50 MG AND SENNOSIDES 8.6 MG 4 TABLET: 8.6; 5 TABLET, FILM COATED ORAL at 22:12

## 2021-03-26 RX ADMIN — OXYCODONE 10 MG: 5 TABLET ORAL at 22:20

## 2021-03-26 RX ADMIN — SODIUM CHLORIDE: 9 INJECTION, SOLUTION INTRAVENOUS at 02:13

## 2021-03-26 RX ADMIN — GABAPENTIN 300 MG: 300 CAPSULE ORAL at 08:21

## 2021-03-26 RX ADMIN — DIAZEPAM 5 MG: 5 TABLET ORAL at 04:16

## 2021-03-26 RX ADMIN — PANTOPRAZOLE SODIUM 40 MG: 40 TABLET, DELAYED RELEASE ORAL at 06:01

## 2021-03-26 RX ADMIN — SODIUM CHLORIDE: 9 INJECTION, SOLUTION INTRAVENOUS at 15:48

## 2021-03-26 RX ADMIN — OXYCODONE 10 MG: 5 TABLET ORAL at 02:12

## 2021-03-26 RX ADMIN — METHOCARBAMOL 750 MG: 750 TABLET ORAL at 22:13

## 2021-03-26 RX ADMIN — DIAZEPAM 5 MG: 5 TABLET ORAL at 16:00

## 2021-03-26 RX ADMIN — POLYETHYLENE GLYCOL 3350 17 G: 17 POWDER, FOR SOLUTION ORAL at 22:00

## 2021-03-26 RX ADMIN — POLYETHYLENE GLYCOL 3350 17 G: 17 POWDER, FOR SOLUTION ORAL at 08:21

## 2021-03-26 RX ADMIN — ACETAMINOPHEN 1000 MG: 500 TABLET, FILM COATED ORAL at 06:58

## 2021-03-26 RX ADMIN — METHOCARBAMOL 750 MG: 750 TABLET ORAL at 18:16

## 2021-03-26 RX ADMIN — MESNA: 100 INJECTION, SOLUTION INTRAVENOUS at 06:53

## 2021-03-26 RX ADMIN — DEXAMETHASONE 4 MG: 4 TABLET ORAL at 12:58

## 2021-03-26 RX ADMIN — DEXAMETHASONE 4 MG: 4 TABLET ORAL at 22:00

## 2021-03-26 RX ADMIN — Medication 10 ML: at 19:28

## 2021-03-26 RX ADMIN — ACETAMINOPHEN 1000 MG: 500 TABLET, FILM COATED ORAL at 12:58

## 2021-03-26 RX ADMIN — DEXAMETHASONE 4 MG: 4 TABLET ORAL at 08:21

## 2021-03-26 ASSESSMENT — PAIN DESCRIPTION - DESCRIPTORS
DESCRIPTORS: CONSTANT

## 2021-03-26 ASSESSMENT — PAIN SCALES - GENERAL
PAINLEVEL_OUTOF10: 5
PAINLEVEL_OUTOF10: 2
PAINLEVEL_OUTOF10: 4
PAINLEVEL_OUTOF10: 6
PAINLEVEL_OUTOF10: 5

## 2021-03-26 ASSESSMENT — PAIN DESCRIPTION - FREQUENCY
FREQUENCY: CONTINUOUS

## 2021-03-26 ASSESSMENT — PAIN DESCRIPTION - ORIENTATION
ORIENTATION: MID;LOWER
ORIENTATION: MID;LOWER
ORIENTATION: LOWER;MID

## 2021-03-26 ASSESSMENT — PAIN DESCRIPTION - PROGRESSION
CLINICAL_PROGRESSION: NOT CHANGED
CLINICAL_PROGRESSION: GRADUALLY WORSENING
CLINICAL_PROGRESSION: NOT CHANGED
CLINICAL_PROGRESSION: GRADUALLY WORSENING

## 2021-03-26 ASSESSMENT — PAIN DESCRIPTION - LOCATION
LOCATION: BACK
LOCATION: BACK

## 2021-03-26 ASSESSMENT — PAIN - FUNCTIONAL ASSESSMENT
PAIN_FUNCTIONAL_ASSESSMENT: PREVENTS OR INTERFERES SOME ACTIVE ACTIVITIES AND ADLS

## 2021-03-26 ASSESSMENT — PAIN DESCRIPTION - PAIN TYPE
TYPE: ACUTE PAIN

## 2021-03-26 ASSESSMENT — PAIN DESCRIPTION - ONSET
ONSET: ON-GOING
ONSET: ON-GOING

## 2021-03-26 NOTE — PLAN OF CARE
Problem: Falls - Risk of:  Goal: Will remain free from falls  Description: Will remain free from falls  Outcome: Ongoing  Note: Orthostatic vital signs obtained at start of shift - see flowsheet for details. Pt does not meet criteria for orthostasis. Pt is a Med fall risk. See Art Lorenz Fall Score and ABCDS Injury Risk assessments. Pt bed is in low position, side rails up, call light and belongings are in reach. Fall risk light is on outside pts room. Pt encouraged to call for assistance as needed. Will continue with hourly rounds for PO intake, pain needs, toileting and repositioning as needed. Problem: Pain:  Goal: Pain level will decrease  Description: Pain level will decrease  Outcome: Ongoing  Note: Josh Serna c/o pain in back - scheduled and PRN medication given with relief. Will continue to monitor. Problem: Bleeding:  Goal: Will show no signs and symptoms of excessive bleeding  Description: Will show no signs and symptoms of excessive bleeding  Outcome: Ongoing  Note:   Patient's hemoglobin this AM:   Recent Labs     03/26/21  0230   HGB 10.5*     Patient's platelet count this AM:   Recent Labs     03/26/21  0230       Thrombocytopenia not present at this time. Patient showing no signs or symptoms of active bleeding. Transfusion not indicated at this time. Patient verbalizes understanding of all instructions. Will continue to assess and implement POC. Call light within reach and hourly rounding in place.         Problem: Infection - Central Venous Catheter-Associated Bloodstream Infection:  Goal: Will show no infection signs and symptoms  Description: Will show no infection signs and symptoms  Outcome: Ongoing  Note: CVC site remains free of signs/symptoms of infection. No drainage, edema, erythema, pain, or warmth noted at site.  Dressing changes continue per protocol and on an as needed basis - see flowsheet.      Compliant with [facility] Bath Protocol:  Performed CHG bath today per [facility] protocol utilizing CHG solution in the shower. CVC site cleansed with CHG wipe over dressing, skin surrounding dressing, and first 6\" of IV tubing. Pt tolerated well. Continued to encourage daily CHG bathing per [facility] protocol.        Problem: Discharge Planning:  Goal: Discharged to appropriate level of care  Description: Discharged to appropriate level of care  Outcome: Ongoing  Note: Will continue to update patient and family on any changes to POC     Problem: Venous Thromboembolism:  Goal: Will show no signs or symptoms of venous thromboembolism  Description: Will show no signs or symptoms of venous thromboembolism  Outcome: Ongoing  Note:  Pt is at risk for DVT d/t decreased mobility and cancer treatment. Pt educated on importance of activity. Pt has orders for Subcut prophylactic lovenox. Pt verbalizes understanding of need for prophylaxis while inpatient.

## 2021-03-26 NOTE — PLAN OF CARE
Problem: Falls - Risk of:  Goal: Will remain free from falls  Description: Will remain free from falls  Outcome: Met This Shift  Note: Orthostatic vital signs obtained at start of shift - see flowsheet for details. Pt does not meet criteria for orthostasis. Pt is a Med fall risk. See Vola Gomes Fall Score and ABCDS Injury Risk assessments. - Screening for Orthostasis AND not a [place] Risk per ARREAGA/ABCDS: Pt bed is in low position, side rails up, call light and belongings are in reach. Fall risk light is on outside pts room. Pt encouraged to call for assistance as needed. Will continue with hourly rounds for PO intake, pain needs, toileting and repositioning as needed. Problem: Bleeding:  Goal: Will show no signs and symptoms of excessive bleeding  Description: Will show no signs and symptoms of excessive bleeding  Outcome: Met This Shift  Note: Patient's hemoglobin this AM:   Recent Labs     03/26/21  0230   HGB 10.5*     Patient's platelet count this AM:   Recent Labs     03/26/21  0230       Thrombocytopenia Precautions in place. Patient showing no signs or symptoms of active bleeding. Transfusion not indicated at this time. Patient verbalizes understanding of all instructions. Will continue to assess and implement POC. Call light within reach and hourly rounding in place. Problem: Infection - Central Venous Catheter-Associated Bloodstream Infection:  Goal: Will show no infection signs and symptoms  Description: Will show no infection signs and symptoms  Outcome: Met This Shift  Note: CVC site remains free of signs/symptoms of infection. No drainage, edema, erythema, pain, or warmth noted at site. Dressing changes continue per protocol and on an as needed basis - see flowsheet. Compliant with [facility] Bath Protocol:  Performed CHG bath today per [facility] protocol utilizing CHG solution in the shower.   CVC site cleansed with CHG wipe over dressing, skin surrounding dressing, and first 6\" of IV tubing. Pt tolerated well. Continued to encourage daily CHG bathing per [facility] protocol. Problem: Venous Thromboembolism:  Goal: Will show no signs or symptoms of venous thromboembolism  Description: Will show no signs or symptoms of venous thromboembolism  Outcome: Met This Shift  Note: Adherent with DVT Prevention: Pt is at risk for DVT d/t decreased mobility and cancer treatment. Pt educated on importance of activity. Pt has orders for Subcut prophylactic lovenox. Pt verbalizes understanding of need for prophylaxis while inpatient. Problem: Pain:  Description: Pain management should include both nonpharmacologic and pharmacologic interventions. Goal: Pain level will decrease  Description: Pain level will decrease  Outcome: Ongoing  Note: Pt having pain in back. PRN and scheduled medication administered as directed. Will continue to monitor.       Problem: Discharge Planning:  Goal: Discharged to appropriate level of care  Description: Discharged to appropriate level of care  Outcome: Ongoing

## 2021-03-26 NOTE — PROGRESS NOTES
[facility]   Progress Note      SUBJECTIVE:      Doing well. Pain is Controlled. No nausea or vomiting. Yesterday, underwent MRI of the cervical spine and brain. Both were negative for metastases. OBJECTIVE    Physical  VITALS:  /87   Pulse 77   Temp 97.9 °F (36.6 °C) (Oral)   Resp 16   Ht 5' 9\" (1.753 m)   Wt 277 lb (125.6 kg)   LMP  (LMP Unknown)   SpO2 99%   BMI 40.91 kg/m²   TEMPERATURE:  Current - Temp: 97.9 °F (36.6 °C); Max - Temp  Av °F (36.7 °C)  Min: 97.6 °F (36.4 °C)  Max: 98.3 °F (36.8 °C)  BLOOD PRESSURE RANGE:  Systolic (26BNL), CDJ:522 , Min:96 , FMH:984   ; Diastolic (41FTV), NHR:79, Min:65, Max:92    24HR INTAKE/OUTPUT:      Intake/Output Summary (Last 24 hours) at 3/26/2021 0615  Last data filed at 3/26/2021 0600  Gross per 24 hour   Intake 5034 ml   Output 5550 ml   Net -516 ml       Conscious alert oriented  HEENT: No pallor or scleral icterus. Oral mucosa: No mucositis. No thrush. Neck: Supple, no lymphadenopathy. No thyromegaly  Lungs: No rales, wheezes, respiratory efforts are normal.  CVS: S1-S2 normal.  No murmurs or gallops heard. Abdomen: Soft, bowel sounds are heard. No tenderness. Neuro: No focal deficits. No cranial nerve palsies. Alert and oriented. Skin: No rashes, petechiae, ecchymosis. Extremities: No edema, tenderness, erythema.  PICC line intact    Data  Labs:  General Labs:  CBC with Differential:    Lab Results   Component Value Date    WBC 3.6 2021    RBC 3.21 2021    HGB 10.5 2021    HCT 31.4 2021     2021    MCV 97.7 2021    MCH 32.7 2021    MCHC 33.4 2021    RDW 15.8 2021    SEGSPCT 72.1 2013    BANDSPCT 1 10/12/2019    LYMPHOPCT 4.6 2021    MONOPCT 9.6 2021    BASOPCT 0.0 2021    MONOSABS 0.3 2021    LYMPHSABS 0.2 2021    EOSABS 0.0 2021    BASOSABS 0.0 2021     BMP:    Lab Results   Component Value Date     03/26/2021    K 4.2 03/26/2021    K 4.4 02/21/2021     03/26/2021    CO2 25 03/26/2021    BUN 13 03/26/2021    LABALBU 4.3 03/22/2021    CREATININE <0.5 03/26/2021    CALCIUM 8.6 03/26/2021    GFRAA >60 03/26/2021    LABGLOM >60 03/26/2021    LABGLOM 107 03/08/2013    GLUCOSE 107 03/26/2021     Hepatic Function Panel:    Lab Results   Component Value Date    ALKPHOS 94 03/22/2021    ALT 24 03/22/2021    AST 20 03/22/2021    PROT 6.6 03/22/2021    PROT 7.1 03/08/2013    BILITOT <0.2 03/22/2021    BILIDIR <0.2 02/04/2021    IBILI see below 02/04/2021    LABALBU 4.3 03/22/2021     LDH:  No results found for: LDH  PT/INR:    Lab Results   Component Value Date    PROTIME 11.6 02/16/2021    INR 1.00 02/16/2021     PTT:    Lab Results   Component Value Date    APTT 35.3 02/16/2021   [APTT    U/A -- Negative hematuria    Radiology Review:  Ct Guided Stereotactic Localization    Result Date: 2/24/2021  CT GUIDED STEREOTACTIC LOCALIZATION Indication: Closed wedge compression fracture of T7 vertebra, initial encounter ([facility]) Technique: Intraoperative cone beam CT images of the thoracic spine were acquired without the administration of intravenous contrast media. Axial, coronal and sagittal reformatted images were constructed from the raw data set. Individualized dose optimization technique was used in order to meet ALARA standards for radiation dose reduction. In addition to vendor specific dose reduction algorithms, the dose reduction techniques vary based on the specific scanner utilized but frequently include automated exposure control, adjustment of the mA and/or kV according to patient size, and use of iterative reconstruction technique. . Findings: Intraoperative cone beam CT was utilized during thoracic spine surgery. Images demonstrate localization of the previously noted T7 compression deformity with subsequent transpedicular posterior fusion     Impression: Intraoperative cone beam CT utilized, as above.     Mri left neural foramen at T7 level measuring 5 mm AP with associated progressed left perivertebral tumor demonstrated at T7 level measuring 2.6 cm AP by 1.1 cm TV. There is moderate spinal canal stenosis at T6 level. The alignment is normal accounting for artifact related to pedicle screws. A chronic T7 compression fracture with 20% height loss is unchanged. A marrow replacing T1 hypointense enhancing metastatic lesion involving majority of the T12 vertebral body and left T12 pedicle is redemonstrated. There is also 4 mm metastatic lesion at the left posterior aspect of the T11 vertebral body. T7 vertebral body metastasis is redemonstrated. The spinal cord demonstrates normal signal. Lumbar spine: There is grade 1 retrolisthesis of L5 on S1. There is a T1 hypointense enhancing metastatic lesion involving the majority of the L3 vertebral body. There are also small osseous metastatic lesions involving the left aspect of the L2 body and inferior aspect of the L1 vertebral body. There is also partially imaged metastatic disease involving the left than the right aspect of the sacrum as well as right and left iliac wing. There is no acute fracture. The conus medullaris is at the level of L1 and the  distal spinal cord appears normal. There is also ventral epidural tumor at L3 level measuring 5 mm in thickness on the right and 4 mm in thickness on the left extending to the right neural foramen and right paravertebral soft tissues measuring approximately 2 cm in size. This results in right lateral recess stenosis and mild spinal canal stenosis at L3 level. Diffuse disc bulge L4-5 and L5-S1 contribute to mild bilateral neural foraminal narrowing. 1.  Osseous metastatic disease involving T7, T11, T12, L1, L2, L3, sacrum, and pelvis. 2.  Progressed associated epidural tumor at T5 through T7-T8 level compared to 2/15/2021 status post interval T7 laminectomy and T5-T9 posterior instrumented spinal fusion.  Moderate spinal canal stenosis at T6 level. 3.  Epidural tumor ventrally at L3 level resulting in right lateral recess stenosis in mild spinal canal stenosis. Mri Lumbar Spine W Wo Contrast    Result Date: 3/16/2021  PROCEDURE: 1.  Magnetic resonance imaging (MRI) of the thoracic spine without and with contrast 2. Magnetic resonance imaging (MRI) of the lumbar spine without and with contrast INDICATION: Sarcoma of left lower extremity ([address] status post resection of epidural tumor at T7 and thoracic spinal fusion COMPARISON: 2/15/2021 TECHNIQUE: MRI of the thoracic and lumbar spine was performed prior to and following the uneventful administration of 20 mL ProHance intravenous gadolinium contrast according to standard protocol. FINDINGS: Thoracic spine: The patient is status post T5-T9 posterior instrumented spinal fusion and T7 laminectomy for resection of epidural tumor at T7. There is a 5 cm TV by 1.5 cm AP by 16 cm CC postoperative fluid collection in the soft tissues posterior to spinal fusion extending from T4 through T10 level. This fluid collection does not appear to directly communicate with CSF and may represent a postoperative seroma or pseudomeningocele. There is progressed epidural tumor in the left dorsal epidural space at T5 level measuring 5 mm in thickness, progressed circumferential epidural tumor at T6 level measuring up to 5 mm in thickness, and residual epidural tumor in the ventral epidural space extending to the right and left neural foramen at T7 level measuring 5 mm AP with associated progressed left perivertebral tumor demonstrated at T7 level measuring 2.6 cm AP by 1.1 cm TV. There is moderate spinal canal stenosis at T6 level. The alignment is normal accounting for artifact related to pedicle screws. A chronic T7 compression fracture with 20% height loss is unchanged.  A marrow replacing T1 hypointense enhancing metastatic lesion involving majority of the T12 vertebral body and left T12 pedicle is pituitary gland are normal. Craniovertebral alignment and cerebellar tonsils are normal. Ventricles: Normal size and configuration for patient age. Intracranial hemorrhage: None. Brain parenchyma: Brain parenchyma is normal signal. There is no diffusion restriction. No pathologic intracranial enhancement. No susceptibility lesions No intra-axial/extra-axial masses or fluid collections Intracranial vasculature: Major intracranial vessels are grossly patent. Orbits: Normal. Bone marrow: Bone marrow signal within normal limits. Visualized upper cervical spine: Normal Paranasal sinuses and mastoid bones: No acute sinusitis or mastoiditis. 1. No evidence metastatic disease.  2. Normal MRI of the head         Current Medications  Current Facility-Administered Medications: [START ON 3/27/2021] mesna (MESNEX) 4,900 mg in sodium chloride 0.9 % 500 mL IVPB, 4,900 mg, Intravenous, Continuous  sodium chloride flush 0.9 % injection 10 mL, 10 mL, Intravenous, 2 times per day  sodium chloride flush 0.9 % injection 10 mL, 10 mL, Intravenous, PRN  enoxaparin (LOVENOX) injection 40 mg, 40 mg, Subcutaneous, Daily  polyethylene glycol (GLYCOLAX) packet 17 g, 17 g, Oral, Daily PRN  [DISCONTINUED] acetaminophen (TYLENOL) tablet 650 mg, 650 mg, Oral, Q6H PRN **OR** acetaminophen (TYLENOL) suppository 650 mg, 650 mg, Rectal, Q6H PRN  prochlorperazine (COMPAZINE) tablet 10 mg, 10 mg, Oral, Q6H PRN **OR** prochlorperazine (COMPAZINE) injection 10 mg, 10 mg, Intravenous, Q6H PRN  LORazepam (ATIVAN) tablet 0.5 mg, 0.5 mg, Oral, Q6H PRN **OR** LORazepam (ATIVAN) injection 0.5 mg, 0.5 mg, Intravenous, Q6H PRN  sodium chloride flush 0.9 % injection 10 mL, 10 mL, Intravenous, 2 times per day  sodium chloride flush 0.9 % injection 10 mL, 10 mL, Intravenous, PRN  acetaminophen (TYLENOL) tablet 1,000 mg, 1,000 mg, Oral, Q6H PRN  dexamethasone (DECADRON) tablet 4 mg, 4 mg, Oral, 4x Daily  diazePAM (VALIUM) tablet 5 mg, 5 mg, Oral, Q6H  gabapentin

## 2021-03-26 NOTE — PROGRESS NOTES
Palliative Care Chart Review  and Check in Note:     NAME:  Anna Conway  Admit Date: 3/22/2021  Hospital Day:  Hospital Day: 5   Current Code status: Full Code    Palliative care is continuing to following Ms. Hicks for symptom management,  and goals of care discussion as needed. Patient's chart reviewed today 3/26/21. I spoke to Ms. Hicks today. She states that she was up all night having to urinate however is in good spirits. She endorses typical pain that is well controlled. She states she still is having bloating with meals however gas-x seems to help. She continues to have regular bowel movements. She has no other complaints this morning. The following are the currently established goals/code status, and Symptom management.      Goals of care: continue current management    Code status: FULL code    Discharge plan: continue current [address] [place] [address], DO PGY-1  03/26/21  10:19 AM

## 2021-03-26 NOTE — FLOWSHEET NOTE
03/26/21 1542   Encounter Summary   Services provided to: Patient and family together   Referral/Consult From: Rounding   Continue Visiting   (es 3/26)   Complexity of Encounter Low   Length of Encounter 15 minutes   Routine   Type Follow up   visit to assess Passover needs and provide holiday greetings.

## 2021-03-27 PROBLEM — M54.16 LUMBAR NERVE ROOT COMPRESSION: Status: RESOLVED | Noted: 2021-03-24 | Resolved: 2021-03-27

## 2021-03-27 PROBLEM — R29.898 RIGHT LEG WEAKNESS: Status: RESOLVED | Noted: 2021-03-24 | Resolved: 2021-03-27

## 2021-03-27 PROBLEM — C78.7 METASTATIC CANCER TO LIVER (HCC): Status: RESOLVED | Noted: 2019-09-19 | Resolved: 2021-03-27

## 2021-03-27 LAB
ANION GAP SERPL CALCULATED.3IONS-SCNC: 8 MMOL/L (ref 3–16)
BACTERIA: ABNORMAL /HPF
BASOPHILS ABSOLUTE: 0 K/UL (ref 0–0.2)
BASOPHILS RELATIVE PERCENT: 0 %
BILIRUBIN URINE: NEGATIVE
BLOOD, URINE: NEGATIVE
BUN BLDV-MCNC: 13 MG/DL (ref 7–20)
CALCIUM SERPL-MCNC: 8.5 MG/DL (ref 8.3–10.6)
CHLORIDE BLD-SCNC: 107 MMOL/L (ref 99–110)
CLARITY: CLEAR
CO2: 25 MMOL/L (ref 21–32)
COLOR: YELLOW
CREAT SERPL-MCNC: <0.5 MG/DL (ref 0.6–1.1)
EOSINOPHILS ABSOLUTE: 0 K/UL (ref 0–0.6)
EOSINOPHILS RELATIVE PERCENT: 0 %
EPITHELIAL CELLS, UA: ABNORMAL /HPF (ref 0–5)
GFR AFRICAN AMERICAN: >60
GFR NON-AFRICAN AMERICAN: >60
GLUCOSE BLD-MCNC: 109 MG/DL (ref 70–99)
GLUCOSE URINE: NEGATIVE MG/DL
HCT VFR BLD CALC: 30.5 % (ref 36–48)
HEMOGLOBIN: 10.2 G/DL (ref 12–16)
KETONES, URINE: >=80 MG/DL
LEUKOCYTE ESTERASE, URINE: NEGATIVE
LYMPHOCYTES ABSOLUTE: 0.2 K/UL (ref 1–5.1)
LYMPHOCYTES RELATIVE PERCENT: 6 %
MCH RBC QN AUTO: 32.8 PG (ref 26–34)
MCHC RBC AUTO-ENTMCNC: 33.4 G/DL (ref 31–36)
MCV RBC AUTO: 98.2 FL (ref 80–100)
MICROSCOPIC EXAMINATION: YES
MONOCYTES ABSOLUTE: 0.5 K/UL (ref 0–1.3)
MONOCYTES RELATIVE PERCENT: 13 %
NEUTROPHILS ABSOLUTE: 2.9 K/UL (ref 1.7–7.7)
NEUTROPHILS RELATIVE PERCENT: 81 %
NITRITE, URINE: NEGATIVE
PDW BLD-RTO: 15.7 % (ref 12.4–15.4)
PH UA: 6 (ref 5–8)
PLATELET # BLD: 150 K/UL (ref 135–450)
PLATELET SLIDE REVIEW: ADEQUATE
PMV BLD AUTO: 7.7 FL (ref 5–10.5)
POTASSIUM SERPL-SCNC: 4.2 MMOL/L (ref 3.5–5.1)
PROTEIN UA: ABNORMAL MG/DL
RBC # BLD: 3.11 M/UL (ref 4–5.2)
RBC # BLD: NORMAL 10*6/UL
RBC UA: ABNORMAL /HPF (ref 0–4)
SODIUM BLD-SCNC: 140 MMOL/L (ref 136–145)
SPECIFIC GRAVITY UA: 1.02 (ref 1–1.03)
URINE TYPE: ABNORMAL
UROBILINOGEN, URINE: 0.2 E.U./DL
WBC # BLD: 3.6 K/UL (ref 4–11)
WBC UA: ABNORMAL /HPF (ref 0–5)

## 2021-03-27 PROCEDURE — 6370000000 HC RX 637 (ALT 250 FOR IP): Performed by: INTERNAL MEDICINE

## 2021-03-27 PROCEDURE — 6370000000 HC RX 637 (ALT 250 FOR IP): Performed by: NURSE PRACTITIONER

## 2021-03-27 PROCEDURE — 2580000003 HC RX 258: Performed by: INTERNAL MEDICINE

## 2021-03-27 PROCEDURE — 36592 COLLECT BLOOD FROM PICC: CPT

## 2021-03-27 PROCEDURE — 81001 URINALYSIS AUTO W/SCOPE: CPT

## 2021-03-27 PROCEDURE — 6360000002 HC RX W HCPCS: Performed by: INTERNAL MEDICINE

## 2021-03-27 PROCEDURE — 80048 BASIC METABOLIC PNL TOTAL CA: CPT

## 2021-03-27 PROCEDURE — 85025 COMPLETE CBC W/AUTO DIFF WBC: CPT

## 2021-03-27 PROCEDURE — 2060000000 HC ICU INTERMEDIATE R&B

## 2021-03-27 RX ORDER — DEXAMETHASONE 4 MG/1
4 TABLET ORAL 2 TIMES DAILY
Qty: 60 TABLET | Refills: 0 | Status: ON HOLD | OUTPATIENT
Start: 2021-03-27 | End: 2021-06-25 | Stop reason: HOSPADM

## 2021-03-27 RX ADMIN — SODIUM CHLORIDE: 9 INJECTION, SOLUTION INTRAVENOUS at 18:33

## 2021-03-27 RX ADMIN — PANTOPRAZOLE SODIUM 40 MG: 40 TABLET, DELAYED RELEASE ORAL at 06:12

## 2021-03-27 RX ADMIN — MESNA 4900 MG: 100 INJECTION, SOLUTION INTRAVENOUS at 10:04

## 2021-03-27 RX ADMIN — ENOXAPARIN SODIUM 40 MG: 40 INJECTION SUBCUTANEOUS at 08:49

## 2021-03-27 RX ADMIN — DIAZEPAM 5 MG: 5 TABLET ORAL at 16:32

## 2021-03-27 RX ADMIN — DIAZEPAM 5 MG: 5 TABLET ORAL at 04:20

## 2021-03-27 RX ADMIN — SIMETHICONE CHEW TAB 80 MG 80 MG: 80 TABLET ORAL at 13:54

## 2021-03-27 RX ADMIN — SIMETHICONE CHEW TAB 80 MG 80 MG: 80 TABLET ORAL at 20:54

## 2021-03-27 RX ADMIN — DIAZEPAM 5 MG: 5 TABLET ORAL at 22:19

## 2021-03-27 RX ADMIN — POLYETHYLENE GLYCOL 3350 17 G: 17 POWDER, FOR SOLUTION ORAL at 08:50

## 2021-03-27 RX ADMIN — DIAZEPAM 5 MG: 5 TABLET ORAL at 10:41

## 2021-03-27 RX ADMIN — Medication 10 ML: at 08:50

## 2021-03-27 RX ADMIN — METHOCARBAMOL 750 MG: 750 TABLET ORAL at 17:48

## 2021-03-27 RX ADMIN — GABAPENTIN 300 MG: 300 CAPSULE ORAL at 13:54

## 2021-03-27 RX ADMIN — SODIUM CHLORIDE: 9 INJECTION, SOLUTION INTRAVENOUS at 02:18

## 2021-03-27 RX ADMIN — Medication 10 ML: at 20:55

## 2021-03-27 RX ADMIN — POLYETHYLENE GLYCOL 3350 17 G: 17 POWDER, FOR SOLUTION ORAL at 20:54

## 2021-03-27 RX ADMIN — OXYCODONE 10 MG: 5 TABLET ORAL at 23:19

## 2021-03-27 RX ADMIN — DEXAMETHASONE 4 MG: 4 TABLET ORAL at 13:54

## 2021-03-27 RX ADMIN — DEXAMETHASONE 4 MG: 4 TABLET ORAL at 08:49

## 2021-03-27 RX ADMIN — SIMETHICONE CHEW TAB 80 MG 80 MG: 80 TABLET ORAL at 08:49

## 2021-03-27 RX ADMIN — GABAPENTIN 300 MG: 300 CAPSULE ORAL at 20:54

## 2021-03-27 RX ADMIN — DEXAMETHASONE 4 MG: 4 TABLET ORAL at 16:32

## 2021-03-27 RX ADMIN — DEXAMETHASONE 4 MG: 4 TABLET ORAL at 20:54

## 2021-03-27 RX ADMIN — METHOCARBAMOL 750 MG: 750 TABLET ORAL at 06:12

## 2021-03-27 RX ADMIN — METHOCARBAMOL 750 MG: 750 TABLET ORAL at 02:17

## 2021-03-27 RX ADMIN — ACETAMINOPHEN 1000 MG: 500 TABLET, FILM COATED ORAL at 02:15

## 2021-03-27 RX ADMIN — METHOCARBAMOL 750 MG: 750 TABLET ORAL at 22:19

## 2021-03-27 RX ADMIN — GABAPENTIN 300 MG: 300 CAPSULE ORAL at 08:49

## 2021-03-27 RX ADMIN — ACETAMINOPHEN 1000 MG: 500 TABLET, FILM COATED ORAL at 08:49

## 2021-03-27 RX ADMIN — ACETAMINOPHEN 1000 MG: 500 TABLET, FILM COATED ORAL at 16:31

## 2021-03-27 RX ADMIN — ACETAMINOPHEN 1000 MG: 500 TABLET, FILM COATED ORAL at 22:19

## 2021-03-27 RX ADMIN — METHOCARBAMOL 750 MG: 750 TABLET ORAL at 13:54

## 2021-03-27 RX ADMIN — OXYCODONE 10 MG: 5 TABLET ORAL at 17:48

## 2021-03-27 RX ADMIN — OXYCODONE 10 MG: 5 TABLET ORAL at 10:41

## 2021-03-27 RX ADMIN — DOCUSATE SODIUM 50 MG AND SENNOSIDES 8.6 MG 4 TABLET: 8.6; 5 TABLET, FILM COATED ORAL at 20:54

## 2021-03-27 RX ADMIN — OXYCODONE 10 MG: 5 TABLET ORAL at 04:20

## 2021-03-27 RX ADMIN — METHOCARBAMOL 750 MG: 750 TABLET ORAL at 10:41

## 2021-03-27 ASSESSMENT — PAIN SCALES - GENERAL
PAINLEVEL_OUTOF10: 7
PAINLEVEL_OUTOF10: 6
PAINLEVEL_OUTOF10: 8
PAINLEVEL_OUTOF10: 6
PAINLEVEL_OUTOF10: 8

## 2021-03-27 ASSESSMENT — PAIN DESCRIPTION - PROGRESSION
CLINICAL_PROGRESSION: NOT CHANGED

## 2021-03-27 ASSESSMENT — PAIN DESCRIPTION - PAIN TYPE
TYPE: ACUTE PAIN

## 2021-03-27 ASSESSMENT — PAIN DESCRIPTION - ONSET
ONSET: ON-GOING

## 2021-03-27 ASSESSMENT — PAIN DESCRIPTION - LOCATION
LOCATION: BACK
LOCATION: BACK

## 2021-03-27 ASSESSMENT — PAIN DESCRIPTION - DESCRIPTORS
DESCRIPTORS: DISCOMFORT

## 2021-03-27 ASSESSMENT — PAIN - FUNCTIONAL ASSESSMENT
PAIN_FUNCTIONAL_ASSESSMENT: PREVENTS OR INTERFERES SOME ACTIVE ACTIVITIES AND ADLS

## 2021-03-27 ASSESSMENT — PAIN DESCRIPTION - DIRECTION
RADIATING_TOWARDS: ABDOMEN
RADIATING_TOWARDS: ABDOMEN
RADIATING_TOWARDS: ABD

## 2021-03-27 ASSESSMENT — PAIN DESCRIPTION - ORIENTATION
ORIENTATION: LOWER;MID
ORIENTATION: LOWER;MID

## 2021-03-27 ASSESSMENT — PAIN DESCRIPTION - FREQUENCY: FREQUENCY: CONTINUOUS

## 2021-03-27 NOTE — PROGRESS NOTES
[facility]   Progress Note      SUBJECTIVE:      She is doing ok   Denies nv  Denies hematuria  NO new pain   She asks about tapering steroids-I think we can start tomorrow       OBJECTIVE    Physical  VITALS:  /68   Pulse 75   Temp 98.1 °F (36.7 °C) (Oral)   Resp 16   Ht 5' 9\" (1.753 m)   Wt 277 lb 6.4 oz (125.8 kg)   LMP  (LMP Unknown)   SpO2 100%   BMI 40.96 kg/m²   TEMPERATURE:  Current - Temp: 98.1 °F (36.7 °C); Max - Temp  Av.2 °F (36.8 °C)  Min: 98 °F (36.7 °C)  Max: 98.4 °F (36.9 °C)  BLOOD PRESSURE RANGE:  Systolic (67UHH), ZIJ:354 , Min:99 , LPB:893   ; Diastolic (29QGP), ZG, Min:64, Max:78    24HR INTAKE/OUTPUT:      Intake/Output Summary (Last 24 hours) at 3/27/2021 0842  Last data filed at 3/27/2021 0537  Gross per 24 hour   Intake 2731 ml   Output 4450 ml   Net -1719 ml       Conscious alert oriented  HEENT: No pallor or scleral icterus. Oral mucosa: No mucositis. No thrush. Neck: Supple, no lymphadenopathy. No thyromegaly  Lungs: No rales, wheezes, respiratory efforts are normal.  CVS: S1-S2 normal.  No murmurs or gallops heard. Abdomen: Soft, bowel sounds are heard. No tenderness. Neuro: No focal deficits. No cranial nerve palsies. Alert and oriented. Skin: No rashes, petechiae, ecchymosis. Extremities: No edema, tenderness, erythema.  PICC line intact    Data  Labs:  General Labs:  CBC with Differential:    Lab Results   Component Value Date    WBC 3.6 2021    RBC 3.11 2021    HGB 10.2 2021    HCT 30.5 2021     2021    MCV 98.2 2021    MCH 32.8 2021    MCHC 33.4 2021    RDW 15.7 2021    SEGSPCT 72.1 2013    BANDSPCT 1 10/12/2019    LYMPHOPCT 6.0 2021    MONOPCT 13.0 2021    BASOPCT 0.0 2021    MONOSABS 0.5 2021    LYMPHSABS 0.2 2021    EOSABS 0.0 2021    BASOSABS 0.0 2021     BMP:    Lab Results   Component Value Date     2021    K 4.2 03/27/2021    K 4.4 02/21/2021     03/27/2021    CO2 25 03/27/2021    BUN 13 03/27/2021    LABALBU 4.3 03/22/2021    CREATININE <0.5 03/27/2021    CALCIUM 8.5 03/27/2021    GFRAA >60 03/27/2021    LABGLOM >60 03/27/2021    LABGLOM 107 03/08/2013    GLUCOSE 109 03/27/2021     Hepatic Function Panel:    Lab Results   Component Value Date    ALKPHOS 94 03/22/2021    ALT 24 03/22/2021    AST 20 03/22/2021    PROT 6.6 03/22/2021    PROT 7.1 03/08/2013    BILITOT <0.2 03/22/2021    BILIDIR <0.2 02/04/2021    IBILI see below 02/04/2021    LABALBU 4.3 03/22/2021     LDH:  No results found for: LDH  PT/INR:    Lab Results   Component Value Date    PROTIME 11.6 02/16/2021    INR 1.00 02/16/2021     PTT:    Lab Results   Component Value Date    APTT 35.3 02/16/2021   [APTT    U/A -- Negative hematuria    Radiology Review:  Ct Guided Stereotactic Localization    Result Date: 2/24/2021  CT GUIDED STEREOTACTIC LOCALIZATION Indication: Closed wedge compression fracture of T7 vertebra, initial encounter ([facility]) Technique: Intraoperative cone beam CT images of the thoracic spine were acquired without the administration of intravenous contrast media. Axial, coronal and sagittal reformatted images were constructed from the raw data set. Individualized dose optimization technique was used in order to meet ALARA standards for radiation dose reduction. In addition to vendor specific dose reduction algorithms, the dose reduction techniques vary based on the specific scanner utilized but frequently include automated exposure control, adjustment of the mA and/or kV according to patient size, and use of iterative reconstruction technique. . Findings: Intraoperative cone beam CT was utilized during thoracic spine surgery. Images demonstrate localization of the previously noted T7 compression deformity with subsequent transpedicular posterior fusion     Impression: Intraoperative cone beam CT utilized, as above.     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 COMPARISON: 3/16/2021. TECHNIQUE: Multiplanar, multisequence MR imaging of the cervical spine obtained without and with IV contrast. IV contrast: 20 mL Prohance. FINDINGS: BRAINSTEM/SPINAL CORD: No pathologic signal. CEREBELLAR TONSILS: Normal. ALIGNMENT: The craniovertebral and cervical spine alignment are normal. BONES: No evidence of acute fracture. No abnormal enhancement. NECK SOFT TISSUES: Normal.     No evidence of metastatic disease within the cervical spine. Mri Thoracic Spine W Wo Contrast    Result Date: 3/16/2021  PROCEDURE: 1.  Magnetic resonance imaging (MRI) of the thoracic spine without and with contrast 2. Magnetic resonance imaging (MRI) of the lumbar spine without and with contrast INDICATION: Sarcoma of left lower extremity ([address] status post resection of epidural tumor at T7 and thoracic spinal fusion COMPARISON: 2/15/2021 TECHNIQUE: MRI of the thoracic and lumbar spine was performed prior to and following the uneventful administration of 20 mL ProHance intravenous gadolinium contrast according to standard protocol. FINDINGS: Thoracic spine: The patient is status post T5-T9 posterior instrumented spinal fusion and T7 laminectomy for resection of epidural tumor at T7. There is a 5 cm TV by 1.5 cm AP by 16 cm CC postoperative fluid collection in the soft tissues posterior to spinal fusion extending from T4 through T10 level. This fluid collection does not appear to directly communicate with CSF and may represent a postoperative seroma or pseudomeningocele.  There is progressed epidural tumor in the left dorsal epidural space at T5 level measuring 5 mm in thickness, progressed circumferential epidural tumor at T6 level measuring up to 5 mm in thickness, and residual epidural tumor in the ventral epidural space extending to the right and left neural foramen at T7 level measuring 5 mm AP with associated progressed left perivertebral tumor demonstrated at T7 level measuring 2.6 cm AP by 1.1 cm TV. There is moderate spinal canal stenosis at T6 level. The alignment is normal accounting for artifact related to pedicle screws. A chronic T7 compression fracture with 20% height loss is unchanged. A marrow replacing T1 hypointense enhancing metastatic lesion involving majority of the T12 vertebral body and left T12 pedicle is redemonstrated. There is also 4 mm metastatic lesion at the left posterior aspect of the T11 vertebral body. T7 vertebral body metastasis is redemonstrated. The spinal cord demonstrates normal signal. Lumbar spine: There is grade 1 retrolisthesis of L5 on S1. There is a T1 hypointense enhancing metastatic lesion involving the majority of the L3 vertebral body. There are also small osseous metastatic lesions involving the left aspect of the L2 body and inferior aspect of the L1 vertebral body. There is also partially imaged metastatic disease involving the left than the right aspect of the sacrum as well as right and left iliac wing. There is no acute fracture. The conus medullaris is at the level of L1 and the  distal spinal cord appears normal. There is also ventral epidural tumor at L3 level measuring 5 mm in thickness on the right and 4 mm in thickness on the left extending to the right neural foramen and right paravertebral soft tissues measuring approximately 2 cm in size. This results in right lateral recess stenosis and mild spinal canal stenosis at L3 level. Diffuse disc bulge L4-5 and L5-S1 contribute to mild bilateral neural foraminal narrowing. 1.  Osseous metastatic disease involving T7, T11, T12, L1, L2, L3, sacrum, and pelvis. 2.  Progressed associated epidural tumor at T5 through T7-T8 level compared to 2/15/2021 status post interval T7 laminectomy and T5-T9 posterior instrumented spinal fusion.  Moderate spinal canal stenosis at T6 level. 3.  Epidural tumor ventrally at L3 level resulting in right lateral recess stenosis in mild spinal canal stenosis. Mri Lumbar Spine W Wo Contrast    Result Date: 3/16/2021  PROCEDURE: 1.  Magnetic resonance imaging (MRI) of the thoracic spine without and with contrast 2. Magnetic resonance imaging (MRI) of the lumbar spine without and with contrast INDICATION: Sarcoma of left lower extremity ([address] status post resection of epidural tumor at T7 and thoracic spinal fusion COMPARISON: 2/15/2021 TECHNIQUE: MRI of the thoracic and lumbar spine was performed prior to and following the uneventful administration of 20 mL ProHance intravenous gadolinium contrast according to standard protocol. FINDINGS: Thoracic spine: The patient is status post T5-T9 posterior instrumented spinal fusion and T7 laminectomy for resection of epidural tumor at T7. There is a 5 cm TV by 1.5 cm AP by 16 cm CC postoperative fluid collection in the soft tissues posterior to spinal fusion extending from T4 through T10 level. This fluid collection does not appear to directly communicate with CSF and may represent a postoperative seroma or pseudomeningocele. There is progressed epidural tumor in the left dorsal epidural space at T5 level measuring 5 mm in thickness, progressed circumferential epidural tumor at T6 level measuring up to 5 mm in thickness, and residual epidural tumor in the ventral epidural space extending to the right and left neural foramen at T7 level measuring 5 mm AP with associated progressed left perivertebral tumor demonstrated at T7 level measuring 2.6 cm AP by 1.1 cm TV. There is moderate spinal canal stenosis at T6 level. The alignment is normal accounting for artifact related to pedicle screws. A chronic T7 compression fracture with 20% height loss is unchanged. A marrow replacing T1 hypointense enhancing metastatic lesion involving majority of the T12 vertebral body and left T12 pedicle is redemonstrated.  There is also 4 mm metastatic lesion at the left posterior aspect of the T11 vertebral body. T7 vertebral body metastasis is redemonstrated. The spinal cord demonstrates normal signal. Lumbar spine: There is grade 1 retrolisthesis of L5 on S1. There is a T1 hypointense enhancing metastatic lesion involving the majority of the L3 vertebral body. There are also small osseous metastatic lesions involving the left aspect of the L2 body and inferior aspect of the L1 vertebral body. There is also partially imaged metastatic disease involving the left than the right aspect of the sacrum as well as right and left iliac wing. There is no acute fracture. The conus medullaris is at the level of L1 and the  distal spinal cord appears normal. There is also ventral epidural tumor at L3 level measuring 5 mm in thickness on the right and 4 mm in thickness on the left extending to the right neural foramen and right paravertebral soft tissues measuring approximately 2 cm in size. This results in right lateral recess stenosis and mild spinal canal stenosis at L3 level. Diffuse disc bulge L4-5 and L5-S1 contribute to mild bilateral neural foraminal narrowing. 1.  Osseous metastatic disease involving T7, T11, T12, L1, L2, L3, sacrum, and pelvis. 2.  Progressed associated epidural tumor at T5 through T7-T8 level compared to 2/15/2021 status post interval T7 laminectomy and T5-T9 posterior instrumented spinal fusion. Moderate spinal canal stenosis at T6 level. 3.  Epidural tumor ventrally at L3 level resulting in right lateral recess stenosis in mild spinal canal stenosis. Mri Brain W Wo Contrast    Result Date: 3/25/2021  EXAM: MRI BRAIN W WO CONTRAST INDICATION: complete staging sarcoma in spine COMPARISON: none TECHNIQUE: Multiplanar, multisequence MR imaging of the head obtained. IV contrast: ProHance 20 mL Diffusion, gradient FLAIR sequences Postcontrast MP. FINDINGS: Midline structures:  The sella turcica and pituitary gland are normal. Craniovertebral alignment and cerebellar tonsils are normal. Ventricles: Normal size and configuration for patient age. Intracranial hemorrhage: None. Brain parenchyma: Brain parenchyma is normal signal. There is no diffusion restriction. No pathologic intracranial enhancement. No susceptibility lesions No intra-axial/extra-axial masses or fluid collections Intracranial vasculature: Major intracranial vessels are grossly patent. Orbits: Normal. Bone marrow: Bone marrow signal within normal limits. Visualized upper cervical spine: Normal Paranasal sinuses and mastoid bones: No acute sinusitis or mastoiditis. 1. No evidence metastatic disease.  2. Normal MRI of the head         Current Medications  Current Facility-Administered Medications: mesna (MESNEX) 4,900 mg in sodium chloride 0.9 % 500 mL IVPB, 4,900 mg, Intravenous, Continuous  sodium chloride flush 0.9 % injection 10 mL, 10 mL, Intravenous, 2 times per day  sodium chloride flush 0.9 % injection 10 mL, 10 mL, Intravenous, PRN  enoxaparin (LOVENOX) injection 40 mg, 40 mg, Subcutaneous, Daily  polyethylene glycol (GLYCOLAX) packet 17 g, 17 g, Oral, Daily PRN  [DISCONTINUED] acetaminophen (TYLENOL) tablet 650 mg, 650 mg, Oral, Q6H PRN **OR** acetaminophen (TYLENOL) suppository 650 mg, 650 mg, Rectal, Q6H PRN  prochlorperazine (COMPAZINE) tablet 10 mg, 10 mg, Oral, Q6H PRN **OR** prochlorperazine (COMPAZINE) injection 10 mg, 10 mg, Intravenous, Q6H PRN  LORazepam (ATIVAN) tablet 0.5 mg, 0.5 mg, Oral, Q6H PRN **OR** LORazepam (ATIVAN) injection 0.5 mg, 0.5 mg, Intravenous, Q6H PRN  sodium chloride flush 0.9 % injection 10 mL, 10 mL, Intravenous, 2 times per day  sodium chloride flush 0.9 % injection 10 mL, 10 mL, Intravenous, PRN  acetaminophen (TYLENOL) tablet 1,000 mg, 1,000 mg, Oral, Q6H PRN  dexamethasone (DECADRON) tablet 4 mg, 4 mg, Oral, 4x Daily  diazePAM (VALIUM) tablet 5 mg, 5 mg, Oral, Q6H  gabapentin (NEURONTIN) capsule 300 mg, 300 mg, Oral, TID  methocarbamol (ROBAXIN) tablet 750 mg, 750 mg, Oral, Q4H  pantoprazole (PROTONIX) tablet 40 mg, 40 mg, Oral, QAM AC  oxyCODONE (ROXICODONE) immediate release tablet 10 mg, 10 mg, Oral, Q6H PRN  sennosides-docusate sodium (SENOKOT-S) 8.6-50 MG tablet 4 tablet, 4 tablet, Oral, Nightly  magic (miracle) mouthwash, 5 mL, Swish & Spit, 4x Daily PRN  simethicone (MYLICON) chewable tablet 80 mg, 80 mg, Oral, 4x Daily PC & HS  furosemide (LASIX) injection 40 mg, 40 mg, Intravenous, BID  0.9 % sodium chloride infusion, , Intravenous, Continuous  polyethylene glycol (GLYCOLAX) packet 17 g, 17 g, Oral, BID    ASSESSMENT AND PLAN    40-year-old lady has    1. 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 NO encephalopathy hematuria   -Continue chemo, antiemetics, dexamethasone. Will taper Dex post chemo per XRT  Will finish tomorrow dc when infusion is finished     -MRI CSpine negative for mets. Neulasta mon   NO labs that need action   -Plan currently for 4 days of ifos and 5 days of Mesna with neulasta as soon as discharged. Ifos finishes on Sat & the MESNA treatment will conclude on Sunday. 2.  Neoplasm related pain    -Oxycodone will be continued  Taper steroids     Follow-up  - Monday, March 29, 2021 at 10:15 AM in the [facility] office for a Neulasta Oneda Hew) injection. she is aware   - Monday, April 5, 2021 at 0945 AM at the [facility] office with Dr. Andrzej Scanlon for hospital follow-up.     Dc orders will be in for Sunday -can go when tx finished     Feli Longoria MD, PhD

## 2021-03-27 NOTE — PLAN OF CARE
Problem: Falls - Risk of:  Goal: Will remain free from falls  Description: Will remain free from falls  Outcome: Ongoing  Note: Orthostatic vital signs obtained at start of shift - see flowsheet for details. Pt does not meet criteria for orthostasis. Pt is a Med fall risk. See Maricel Ashok Fall Score and ABCDS Injury Risk assessments. - Screening for Orthostasis AND not a Bagley Risk per ARREAGA/ABCDS: Pt bed is in low position, side rails up, call light and belongings are in reach. Fall risk light is on outside pts room. Pt encouraged to call for assistance as needed. Will continue with hourly rounds for PO intake, pain needs, toileting and repositioning as needed. Problem: Pain:  Goal: Pain level will decrease  Description: Pain level will decrease  Outcome: Ongoing  Note: Pt has had complaints of pain this shift, is receiving PRN medications as requested. Pt is satisfied with results, will continue to monitor. Problem: Bleeding:  Goal: Will show no signs and symptoms of excessive bleeding  Description: Will show no signs and symptoms of excessive bleeding  Outcome: Ongoing  Note: Patient's hemoglobin this AM:   Recent Labs     03/27/21  0230   HGB 10.2*     Patient's platelet count this AM:   Recent Labs     03/27/21  0230       Thrombocytopenia Precautions in place. Patient showing no signs or symptoms of active bleeding. Transfusion not indicated at this time. Patient verbalizes understanding of all instructions. Will continue to assess and implement POC. Call light within reach and hourly rounding in place. Problem: Infection - Central Venous Catheter-Associated Bloodstream Infection:  Goal: Will show no infection signs and symptoms  Description: Will show no infection signs and symptoms  Outcome: Ongoing  Note: CVC site remains free of signs/symptoms of infection. No drainage, edema, erythema, pain, or warmth noted at site.  Dressing changes continue per protocol and on an as needed basis - see flowsheet. Compliant with [facility] Bath Protocol:  Performed CHG bath today per [facility] protocol utilizing CHG solution in the shower. CVC site cleansed with CHG wipe over dressing, skin surrounding dressing, and first 6\" of IV tubing. Pt tolerated well. Continued to encourage daily CHG bathing per [facility] protocol. Problem: Discharge Planning:  Goal: Discharged to appropriate level of care  Description: Discharged to appropriate level of care  Outcome: Ongoing  Note: Pt is up to date on plan of care, discharge orders for tomorrow are in, just waiting to complete mesna. Problem: Venous Thromboembolism:  Goal: Will show no signs or symptoms of venous thromboembolism  Description: Will show no signs or symptoms of venous thromboembolism  Outcome: Ongoing  Note: Adherent with DVT Prevention: Pt is at risk for DVT d/t decreased mobility and cancer treatment. Pt educated on importance of activity. Pt has orders for SCDs while in bed. Pt verbalizes understanding of need for prophylaxis while inpatient.

## 2021-03-27 NOTE — CARE COORDINATION
4:00 PM  Patient will be admitted through Friday for treatment. She is independent at baseline. She is from home with her . Following for possible needs at discharge.
Home- No Services Needed    LOC at discharge: Not Applicable  LUIS ANTONIO Completed: Not Indicated    Notification completed in HENS/PAS?:  Not Applicable    IMM Completed:   Not Indicated    Transportation:  Transportation PLAN for discharge: family   Mode of Transport: [address] ordered at discharge: Not [address]: Not Applicable  Orders faxed: No    Durable Medical Equipment:  DME Provider: none  Equipment obtained during hospitalization:     Home Oxygen and Respiratory Equipment:  Oxygen needed at discharge?: Not [address]: Not Applicable  Portable tank available for discharge?: Not Indicated    Dialysis:  Dialysis patient: No    Dialysis Center:  Not Applicable      Additional CM Notes: Pt from home with  will return home no needs at DC. 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  Not Indicated    Care Transitions patient: No    Jacob Zamorano RN  [facility]  Case Management Department  Ph: [phone]  Fax: [phone]

## 2021-03-27 NOTE — PLAN OF CARE
Problem: Falls - Risk of:  Goal: Will remain free from falls  Description: Will remain free from falls  Outcome: Ongoing  Note: Orthostatic vital signs obtained at start of shift - see flowsheet for details. Pt does not meet criteria for orthostasis. Pt is a Med fall risk. See Varghese Golds Fall Score and ABCDS Injury Risk assessments. Pt bed is in low position, side rails up, call light and belongings are in reach. Fall risk light is on outside pts room. Pt encouraged to call for assistance as needed. Will continue with hourly rounds for PO intake, pain needs, toileting and repositioning as needed. Problem: Pain:  Goal: Pain level will decrease  Description: Pain level will decrease  Outcome: Ongoing  Note: Chago Dress c/o pain in back - scheduled and PRN medication given with relief. Will continue to monitor. Problem: Bleeding:  Goal: Will show no signs and symptoms of excessive bleeding  Description: Will show no signs and symptoms of excessive bleeding  Outcome: Ongoing  Note:   Patient's hemoglobin this AM:   Recent Labs     03/27/21  0230   HGB 10.2*     Patient's platelet count this AM:   Recent Labs     03/27/21  0230       Thrombocytopenia Precautions in place. Patient showing no signs or symptoms of active bleeding. Transfusion not indicated at this time. Patient verbalizes understanding of all instructions. Will continue to assess and implement POC. Call light within reach and hourly rounding in place.         Problem: Infection - Central Venous Catheter-Associated Bloodstream Infection:  Goal: Will show no infection signs and symptoms  Description: Will show no infection signs and symptoms  Outcome: Ongoing  Note: CVC site remains free of signs/symptoms of infection. No drainage, edema, erythema, pain, or warmth noted at site.  Dressing changes continue per protocol and on an as needed basis - see flowsheet.      Compliant with [facility] Bath Protocol:  Performed CHG bath today per [facility] protocol utilizing CHG solution in the shower. CVC site cleansed with CHG wipe over dressing, skin surrounding dressing, and first 6\" of IV tubing. Pt tolerated well. Continued to encourage daily CHG bathing per [employer] [address] protocol.        Problem: Discharge Planning:  Goal: Discharged to appropriate level of care  Description: Discharged to appropriate level of care  Outcome: Ongoing  Note: Will continue to update patient and family on any changes to POC     Problem: Venous Thromboembolism:  Goal: Will show no signs or symptoms of venous thromboembolism  Description: Will show no signs or symptoms of venous thromboembolism  Outcome: Ongoing  Note:  Pt is at risk for DVT d/t decreased mobility and cancer treatment. Pt educated on importance of activity. Pt has orders for Subcut prophylactic lovenox. Pt verbalizes understanding of need for prophylaxis while inpatient.

## 2021-03-28 VITALS
BODY MASS INDEX: 41.26 KG/M2 | HEART RATE: 93 BPM | OXYGEN SATURATION: 100 % | SYSTOLIC BLOOD PRESSURE: 127 MMHG | RESPIRATION RATE: 18 BRPM | HEIGHT: 69 IN | DIASTOLIC BLOOD PRESSURE: 87 MMHG | TEMPERATURE: 98.1 F | WEIGHT: 278.6 LBS

## 2021-03-28 LAB
ANION GAP SERPL CALCULATED.3IONS-SCNC: 8 MMOL/L (ref 3–16)
BASOPHILS ABSOLUTE: 0 K/UL (ref 0–0.2)
BASOPHILS RELATIVE PERCENT: 0.3 %
BILIRUBIN URINE: NEGATIVE
BLOOD, URINE: NEGATIVE
BUN BLDV-MCNC: 13 MG/DL (ref 7–20)
CALCIUM SERPL-MCNC: 1.1 MG/DL (ref 8.3–10.6)
CALCIUM SERPL-MCNC: 9 MG/DL (ref 8.3–10.6)
CHLORIDE BLD-SCNC: 104 MMOL/L (ref 99–110)
CLARITY: CLEAR
CO2: 25 MMOL/L (ref 21–32)
COLOR: YELLOW
CREAT SERPL-MCNC: <0.5 MG/DL (ref 0.6–1.1)
EOSINOPHILS ABSOLUTE: 0 K/UL (ref 0–0.6)
EOSINOPHILS RELATIVE PERCENT: 0.4 %
GFR AFRICAN AMERICAN: >60
GFR NON-AFRICAN AMERICAN: >60
GLUCOSE BLD-MCNC: 99 MG/DL (ref 70–99)
GLUCOSE URINE: NEGATIVE MG/DL
HCT VFR BLD CALC: 32.3 % (ref 36–48)
HEMOGLOBIN: 10.9 G/DL (ref 12–16)
KETONES, URINE: 15 MG/DL
LEUKOCYTE ESTERASE, URINE: NEGATIVE
LYMPHOCYTES ABSOLUTE: 0.2 K/UL (ref 1–5.1)
LYMPHOCYTES RELATIVE PERCENT: 4.2 %
MCH RBC QN AUTO: 32.7 PG (ref 26–34)
MCHC RBC AUTO-ENTMCNC: 33.6 G/DL (ref 31–36)
MCV RBC AUTO: 97.2 FL (ref 80–100)
MICROSCOPIC EXAMINATION: ABNORMAL
MONOCYTES ABSOLUTE: 0.3 K/UL (ref 0–1.3)
MONOCYTES RELATIVE PERCENT: 5 %
NEUTROPHILS ABSOLUTE: 4.7 K/UL (ref 1.7–7.7)
NEUTROPHILS RELATIVE PERCENT: 90.1 %
NITRITE, URINE: NEGATIVE
PDW BLD-RTO: 15.5 % (ref 12.4–15.4)
PH UA: 7 (ref 5–8)
PLATELET # BLD: 135 K/UL (ref 135–450)
PMV BLD AUTO: 7.5 FL (ref 5–10.5)
POTASSIUM SERPL-SCNC: 3.7 MMOL/L (ref 3.5–5.1)
PROTEIN UA: NEGATIVE MG/DL
RBC # BLD: 3.32 M/UL (ref 4–5.2)
SODIUM BLD-SCNC: 137 MMOL/L (ref 136–145)
SPECIFIC GRAVITY UA: 1.01 (ref 1–1.03)
URINE TYPE: ABNORMAL
UROBILINOGEN, URINE: 0.2 E.U./DL
WBC # BLD: 5.3 K/UL (ref 4–11)

## 2021-03-28 PROCEDURE — 6370000000 HC RX 637 (ALT 250 FOR IP): Performed by: INTERNAL MEDICINE

## 2021-03-28 PROCEDURE — 6360000002 HC RX W HCPCS: Performed by: INTERNAL MEDICINE

## 2021-03-28 PROCEDURE — 81003 URINALYSIS AUTO W/O SCOPE: CPT

## 2021-03-28 PROCEDURE — 82310 ASSAY OF CALCIUM: CPT

## 2021-03-28 PROCEDURE — 36592 COLLECT BLOOD FROM PICC: CPT

## 2021-03-28 PROCEDURE — 6370000000 HC RX 637 (ALT 250 FOR IP): Performed by: NURSE PRACTITIONER

## 2021-03-28 PROCEDURE — 2580000003 HC RX 258: Performed by: INTERNAL MEDICINE

## 2021-03-28 PROCEDURE — 80048 BASIC METABOLIC PNL TOTAL CA: CPT

## 2021-03-28 PROCEDURE — 85025 COMPLETE CBC W/AUTO DIFF WBC: CPT

## 2021-03-28 RX ORDER — SUCRALFATE 1 G/1
1 TABLET ORAL 4 TIMES DAILY PRN
Qty: 120 TABLET | Refills: 3 | Status: ON HOLD | OUTPATIENT
Start: 2021-03-28 | End: 2021-06-23

## 2021-03-28 RX ORDER — SUCRALFATE 1 G/1
1 TABLET ORAL EVERY 6 HOURS SCHEDULED
Status: DISCONTINUED | OUTPATIENT
Start: 2021-03-28 | End: 2021-03-28 | Stop reason: HOSPADM

## 2021-03-28 RX ADMIN — DIAZEPAM 5 MG: 5 TABLET ORAL at 10:37

## 2021-03-28 RX ADMIN — GABAPENTIN 300 MG: 300 CAPSULE ORAL at 09:09

## 2021-03-28 RX ADMIN — ACETAMINOPHEN 1000 MG: 500 TABLET, FILM COATED ORAL at 04:26

## 2021-03-28 RX ADMIN — SIMETHICONE CHEW TAB 80 MG 80 MG: 80 TABLET ORAL at 09:10

## 2021-03-28 RX ADMIN — METHOCARBAMOL 750 MG: 750 TABLET ORAL at 06:28

## 2021-03-28 RX ADMIN — DEXAMETHASONE 4 MG: 4 TABLET ORAL at 09:09

## 2021-03-28 RX ADMIN — DIAZEPAM 5 MG: 5 TABLET ORAL at 04:26

## 2021-03-28 RX ADMIN — PANTOPRAZOLE SODIUM 40 MG: 40 TABLET, DELAYED RELEASE ORAL at 06:28

## 2021-03-28 RX ADMIN — METHOCARBAMOL 750 MG: 750 TABLET ORAL at 10:37

## 2021-03-28 RX ADMIN — Medication 10 ML: at 09:10

## 2021-03-28 RX ADMIN — ACETAMINOPHEN 1000 MG: 500 TABLET, FILM COATED ORAL at 10:37

## 2021-03-28 RX ADMIN — METHOCARBAMOL 750 MG: 750 TABLET ORAL at 02:39

## 2021-03-28 RX ADMIN — OXYCODONE 10 MG: 5 TABLET ORAL at 06:28

## 2021-03-28 RX ADMIN — SUCRALFATE 1 G: 1 TABLET ORAL at 11:07

## 2021-03-28 RX ADMIN — ENOXAPARIN SODIUM 40 MG: 40 INJECTION SUBCUTANEOUS at 09:09

## 2021-03-28 RX ADMIN — POLYETHYLENE GLYCOL 3350 17 G: 17 POWDER, FOR SOLUTION ORAL at 09:09

## 2021-03-28 RX ADMIN — LIDOCAINE HYDROCHLORIDE: 20 SOLUTION ORAL; TOPICAL at 10:44

## 2021-03-28 ASSESSMENT — PAIN DESCRIPTION - ONSET: ONSET: ON-GOING

## 2021-03-28 ASSESSMENT — PAIN SCALES - GENERAL
PAINLEVEL_OUTOF10: 5
PAINLEVEL_OUTOF10: 6
PAINLEVEL_OUTOF10: 9

## 2021-03-28 ASSESSMENT — PAIN DESCRIPTION - PAIN TYPE: TYPE: ACUTE PAIN

## 2021-03-28 ASSESSMENT — PAIN DESCRIPTION - LOCATION: LOCATION: BACK

## 2021-03-28 ASSESSMENT — PAIN DESCRIPTION - PROGRESSION: CLINICAL_PROGRESSION: NOT CHANGED

## 2021-03-28 ASSESSMENT — PAIN DESCRIPTION - ORIENTATION: ORIENTATION: LOWER;OUTER

## 2021-03-28 NOTE — PLAN OF CARE
Problem: Falls - Risk of:  Goal: Will remain free from falls  Description: Will remain free from falls  Outcome: Completed  Goal: Absence of physical injury  Description: Absence of physical injury  Outcome: Completed     Problem: Pain:  Goal: Pain level will decrease  Description: Pain level will decrease  Outcome: Completed  Goal: Control of acute pain  Description: Control of acute pain  Outcome: Completed  Goal: Control of chronic pain  Description: Control of chronic pain  Outcome: Completed     Problem: Bleeding:  Goal: Will show no signs and symptoms of excessive bleeding  Description: Will show no signs and symptoms of excessive bleeding  Outcome: Completed     Problem: Infection - Central Venous Catheter-Associated Bloodstream Infection:  Goal: Will show no infection signs and symptoms  Description: Will show no infection signs and symptoms  Outcome: Completed     Problem: Discharge Planning:  Goal: Discharged to appropriate level of care  Description: Discharged to appropriate level of care  Outcome: Completed     Problem: Venous Thromboembolism:  Goal: Will show no signs or symptoms of venous thromboembolism  Description: Will show no signs or symptoms of venous thromboembolism  Outcome: Completed  Goal: Absence of signs or symptoms of impaired coagulation  Description: Absence of signs or symptoms of impaired coagulation  Outcome: Completed

## 2021-03-28 NOTE — DISCHARGE SUMMARY
Physician Discharge Summary     Patient ID:  Anny Carter  [id number]  31 y.o.  1980    Admit date: 3/22/2021    Discharge date and time: No discharge date for patient encounter. 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]    Discharge Diagnoses: Same chemo admitssion     Admission Condition: fair    Discharged Condition: fair    Indication for Admission: inpt chemo due to ifosfamide use     Hospital Course: lucinda was admittd for sarcoma chemo infusion and due to ifos use was admitted due to potential encephalopahty, continuous infusiones etc  She is currently undergoing radiation aond chemo   She has done well-today she has stomach irritation -it may be related to xrt and chemo   We added a one time dose of gi cocktail and will try carafate at home   She has neulasta tomorrow  Labs are safe for dc  No sign of hemorrhagic cystitis or encephalopathy   She has close fu   She will need instructions on tapering dexamethasone-I placed her on 4 bid     Consults: na    Significant Diagnostic Studies:cervical spine mri   Brain mri -no new lesions     Outstanding Order Results     No orders found from 2/21/2021 to 3/23/2021.           Treatments: iv ifos fmesna     Discharge Exam:  /87   Pulse 93   Temp 98.1 °F (36.7 °C) (Oral)   Resp 18   Ht 5' 9\" (1.753 m)   Wt 278 lb 9.6 oz (126.4 kg)   LMP  (LMP Unknown)   SpO2 100%   BMI 41.14 kg/m²     General Appearance:    Alert, cooperative, no distress, appears stated age   Head:    Normocephalic, without obvious abnormality, atraumatic   Eyes:    PERRL, conjunctiva/corneas clear, EOM's intact, benign, both eyes       Nose:   Nares normal, septum midline, mucosa normal, no drainage    or sinus tenderness   Throat:   Lips, mucosa, and tongue normal; teeth and gums normal   Neck:   Supple, symmetrical, trachea midline, no adenopathy; thyroid:  no enlargement/tenderness/nodules; no carotid    bruit or JVD   Back:     Symmetric, no curvature, ROM normal, no CVA tenderness   Lungs:     Clear to auscultation bilaterally, respirations unlabored   Chest Wall:    No tenderness or deformity    Heart:    Regular rate and rhythm, S1 and S2 normal, no murmur, rub   or gallop       Abdomen:     Soft,slight tender , bowel sounds active all four quadrants,     no masses, no organomegaly               Pulses:   2+ and symmetric all extremities   Skin:   Skin color, texture, turgor normal, no rashes or lesions   Lymph nodes:   Cervical, supraclavicular nodes normal   Neurologic:   CNII-XII intact,       Disposition: home     Patient Instructions:      Medication List      START taking these medications    sucralfate 1 GM tablet  Commonly known as: CARAFATE  Take 1 tablet by mouth 4 times daily as needed (stomach pain) May dissolve pill in water if unable to take full pill        CHANGE how you take these medications    dexamethasone 4 MG tablet  Commonly known as: DECADRON  Take 1 tablet by mouth 2 times daily  What changed: when to take this     sennosides-docusate sodium 8.6-50 MG tablet  Commonly known as: SENOKOT-S  What changed: Another medication with the same name was removed. Continue taking this medication, and follow the directions you see here.         CONTINUE taking these medications    acetaminophen 500 MG tablet  Commonly known as: TYLENOL     diazePAM 5 MG tablet  Commonly known as: VALIUM     FIRST-MOUTHWASH BLM MT     gabapentin 300 MG capsule  Commonly known as: NEURONTIN     methocarbamol 750 MG tablet  Commonly known as: ROBAXIN     omeprazole 20 MG delayed release capsule  Commonly known as: PRILOSEC     oxyCODONE 5 MG capsule     polyethylene glycol 17 g packet  Commonly known as: GLYCOLAX     simethicone 80 MG chewable tablet  Commonly known as: MYLICON        STOP taking these medications    budesonide 3 MG extended release capsule Commonly known as: ENTOCORT EC     docusate sodium 100 MG capsule  Commonly known as: Colace     lidocaine-prilocaine 2.5-2.5 % cream  Commonly known as: EMLA     naloxegol 25 MG Tabs tablet  Commonly known as: MOVANTIK     QC Tumeric Complex 500 MG Caps  Generic drug: Turmeric           Where to Get Your Medications      These medications were sent to [facility], [address] [place] [address] [address] [address]    Phone: [phone]   · dexamethasone 4 MG tablet  · sucralfate 1 GM tablet       Activity: activity as tolerated  Diet: regular diet  Wound Care: none needed    Follow-up with dr Bethanie Ramirez and neulasta Monday   And fu in a week for labs-=already arranged     Signed:  Daymon Seip  3/28/2021  10:39 AM

## 2021-03-28 NOTE — PLAN OF CARE
Problem: Falls - Risk of:  Goal: Will remain free from falls  Description: Will remain free from falls  Outcome: Ongoing  Note: Orthostatic vital signs obtained at start of shift - see flowsheet for details. Pt does not meet criteria for orthostasis. Pt is a Med fall risk. See Maricel Ashok Fall Score and ABCDS Injury Risk assessments. Pt bed is in low position, side rails up, call light and belongings are in reach. Fall risk light is on outside pts room. Pt encouraged to call for assistance as needed. Will continue with hourly rounds for PO intake, pain needs, toileting and repositioning as needed. Problem: Pain:  Goal: Pain level will decrease  Description: Pain level will decrease  Outcome: Ongoing  Note: Pat Shines c/o pain in back - scheduled and PRN medication given with relief. Will continue to monitor. Problem: Bleeding:  Goal: Will show no signs and symptoms of excessive bleeding  Description: Will show no signs and symptoms of excessive bleeding  Outcome: Ongoing  Note:   Patient's hemoglobin this AM:   Recent Labs     03/28/21  0245   HGB 10.9*     Patient's platelet count this AM:   Recent Labs     03/28/21  0245       Thrombocytopenia Precautions in place. Patient showing no signs or symptoms of active bleeding. Transfusion not indicated at this time. Patient verbalizes understanding of all instructions. Will continue to assess and implement POC. Call light within reach and hourly rounding in place.          Problem: Infection - Central Venous Catheter-Associated Bloodstream Infection:  Goal: Will show no infection signs and symptoms  Description: Will show no infection signs and symptoms  Outcome: Ongoing  Note: CVC site remains free of signs/symptoms of infection. No drainage, edema, erythema, pain, or warmth noted at site.  Dressing changes continue per protocol and on an as needed basis - see flowsheet.      Compliant with [facility] Bath Protocol:  Performed CHG bath today per [facility] protocol utilizing CHG solution in the shower. CVC site cleansed with CHG wipe over dressing, skin surrounding dressing, and first 6\" of IV tubing. Pt tolerated well. Continued to encourage daily CHG bathing per [facility] protocol.        Problem: Discharge Planning:  Goal: Discharged to appropriate level of care  Description: Discharged to appropriate level of care  Outcome: Ongoing  Note: Will continue to update patient and family on any changes to POC     Problem: Venous Thromboembolism:  Goal: Will show no signs or symptoms of venous thromboembolism  Description: Will show no signs or symptoms of venous thromboembolism  Outcome: Ongoing  Note:  Pt is at risk for DVT d/t decreased mobility and cancer treatment. Pt educated on importance of activity. Pt has orders for Subcut prophylactic lovenox. Pt verbalizes understanding of need for prophylaxis while inpatient.

## 2021-03-28 NOTE — DISCHARGE SUMMARY
Reviewed discharge instructions with patient and  spouse. Reviewed discharge medications including dosing, schedule, indication, and adverse reactions. Reviewed which medications were already taken today and next dosage due for each medication. Reviewed signs and symptoms that prompt a call to the physician and appropriate phone numbers. [facility] card given to the patient with explanations of its use. Reviewed follow up appointments that have been made in [facility] and Outpatient Oncology. Low microbial diet, activity restrictions, and increased risk of infection were reviewed. Patient verbalized understanding of all instructions and questions were answered to her. satisfaction. Signed discharge instructions were given to the patient and a copy placed in the paper-lite chart. Patient discharged to home per wheelchair with spouse.       Genette Schlatter

## 2021-03-28 NOTE — ONCOLOGY
Administration: Chemotherapy drug Mesna & Ifosfamide independently verified with Jan Li RN prior to administration. Acknowledgement of informed consent for chemotherapy administration verified. Original order, appropriateness of regimen, drug supplied, height, weight, BSA, dose calculations, expiration dates/times, drug appearance, and two patient identifiers were verified by both RNs. Drug checked for vesicant/irritant status and for risk of hypersensitivity. Most recent laboratory values and allergies, were reviewed. Positive, brisk blood return via CVC was confirmed prior to administration. Chest x-ray for correct line placement reviewed. Taniya Miller and Vance Mosley RN verified correct rate of chemotherapy and maintenance IV fluids. Patient was educated on chemotherapy regimen prior to administration including indication for treatment related to disease & side effects of chemotherapy drug. Patient verbalizes understanding of all instructions. Completion of Chemotherapy: Monitoring during infusion done per policy, see Flowsheets. Blood return verified before, during, and after infusion per policy; no signs of extravasation. Pt tolerating chemotherapy well and without incident. Chemotherapy infusion end time on the [facility] Will continue to monitor.

## 2021-03-29 NOTE — FLOWSHEET NOTE
to assess goals progression  [] Other:     ASSESSMENT: BLE functional weakness     Treatment/Activity Tolerance:  [x] Patient tolerated treatment well [] Patient limited by fatique  [] Patient limited by pain  [] Patient limited by other medical complications  [] Other:     Prognosis: [x] Good [] Fair  [] Poor    Patient Requires Follow-up: [x] Yes  [] No    PLAN FOR NEXT SESSION:     PLAN: See eval  [x] Continue per plan of care [] Alter current plan (see comments)  [] Plan of care initiated [] Hold pending MD visit [] Discharge    *If patient does not return for further follow ups after this date. Please consider this as the patients discharge from physical therapy.      Electronically signed by: Rick Conrad, Yasmin Foy 85, Anne Nicolas 1
DISPLAY PLAN FREE TEXT

## 2021-04-05 NOTE — PROGRESS NOTES
MD contacted to clarify orders. No response as of now. Nasal Turnover Hinge Flap Text: The defect edges were debeveled with a #15 scalpel blade.  Given the size, depth, location of the defect and the defect being full thickness a nasal turnover hinge flap was deemed most appropriate.  Using a sterile surgical marker, an appropriate hinge flap was drawn incorporating the defect. The area thus outlined was incised with a #15 scalpel blade. The flap was designed to recreate the nasal mucosal lining and the alar rim. The skin margins were undermined to an appropriate distance in all directions utilizing iris scissors.

## 2021-04-06 ENCOUNTER — IMMUNIZATION (OUTPATIENT)
Dept: PRIMARY CARE CLINIC | Age: 41
End: 2021-04-06
Payer: COMMERCIAL

## 2021-04-06 PROCEDURE — 91301 COVID-19, MODERNA VACCINE 100MCG/0.5ML DOSE: CPT | Performed by: FAMILY MEDICINE

## 2021-04-06 PROCEDURE — 0012A COVID-19, MODERNA VACCINE 100MCG/0.5ML DOSE: CPT | Performed by: FAMILY MEDICINE

## 2021-04-19 ENCOUNTER — HOSPITAL ENCOUNTER (OUTPATIENT)
Dept: INTERVENTIONAL RADIOLOGY/VASCULAR | Age: 41
Discharge: HOME | End: 2021-04-19
Payer: COMMERCIAL

## 2021-04-19 VITALS — HEIGHT: 69 IN | TEMPERATURE: 98.3 F | BODY MASS INDEX: 40.73 KG/M2 | WEIGHT: 275 LBS

## 2021-04-19 DIAGNOSIS — C49.22 SOFT TISSUE SARCOMA OF THIGH, LEFT (HCC): ICD-10-CM

## 2021-04-19 LAB
HCT VFR BLD CALC: 32.7 % (ref 36–48)
HEMOGLOBIN: 11.1 G/DL (ref 12–16)
INR BLD: 0.91 (ref 0.86–1.14)
MCH RBC QN AUTO: 33.2 PG (ref 26–34)
MCHC RBC AUTO-ENTMCNC: 33.9 G/DL (ref 31–36)
MCV RBC AUTO: 97.9 FL (ref 80–100)
PDW BLD-RTO: 17.9 % (ref 12.4–15.4)
PLATELET # BLD: 224 K/UL (ref 135–450)
PMV BLD AUTO: 7.3 FL (ref 5–10.5)
PROTHROMBIN TIME: 10.5 SEC (ref 10–13.2)
RBC # BLD: 3.34 M/UL (ref 4–5.2)
WBC # BLD: 8.3 K/UL (ref 4–11)

## 2021-04-19 PROCEDURE — C1894 INTRO/SHEATH, NON-LASER: HCPCS

## 2021-04-19 PROCEDURE — 85027 COMPLETE CBC AUTOMATED: CPT

## 2021-04-19 PROCEDURE — 77001 FLUOROGUIDE FOR VEIN DEVICE: CPT | Performed by: RADIOLOGY

## 2021-04-19 PROCEDURE — 36561 INSERT TUNNELED CV CATH: CPT | Performed by: RADIOLOGY

## 2021-04-19 PROCEDURE — 2500000003 HC RX 250 WO HCPCS

## 2021-04-19 PROCEDURE — 99152 MOD SED SAME PHYS/QHP 5/>YRS: CPT | Performed by: RADIOLOGY

## 2021-04-19 PROCEDURE — 85610 PROTHROMBIN TIME: CPT

## 2021-04-19 PROCEDURE — 6360000002 HC RX W HCPCS

## 2021-04-19 PROCEDURE — C1788 PORT, INDWELLING, IMP: HCPCS

## 2021-04-19 ASSESSMENT — PAIN SCALES - GENERAL: PAINLEVEL_OUTOF10: 4

## 2021-04-19 NOTE — PROCEDURES
IR Brief Postoperative Note    Camryn Duty  YOB: 1980  [id number]    Pre-operative Diagnosis: cancer    Post-operative Diagnosis: Same    Procedure: right IJ chest port placement ok for use    Anesthesia: moderate    Surgeons/Assistants: sandra    Estimated Blood Loss: Minimal    Complications: none    Specimens: were not obtained    See full procedure dictation to follow      Jose Flores MD  4/19/2021

## 2021-04-19 NOTE — H&P
Patient:  Johnnie Kemp   :   1980      Relevant clinical history, particularly as it involves the pending procedure, was reviewed and discussed. The procedure including risks and benefits was discussed at length with the patient (or designated family member) and all questions were answered. Informed consent to proceed with the procedure was given. Vital signs were monitored and documented by the Radiology nurse. Targeted physical examination  Heart : regular rate and rhythm  Lungs : clear, breathing easily  Condition : stable    [facility] nurses notes reviewed and agreed. 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     Crohn's disease ([facility] [address]     Depression     Early menopause     follows with endocrine/gyne    IBS (irritable bowel syndrome)     IBS (irritable bowel syndrome)     Mixed hyperlipidemia 2017    Obesity     Osteoarthritis, knee     PONV (postoperative nausea and vomiting)     Prolonged emergence from general anesthesia     panic attacks on awakening, requests anxiety med on awakening    Sarcoma of left lower extremity ([facility] [address] 2017    s/p XRT, followed by surgery       Past Surgical History:           Procedure Laterality Date    ANKLE SURGERY Left     APPENDECTOMY      BREAST REDUCTION SURGERY      CARPAL TUNNEL RELEASE Right 2020    KNEE ARTHROSCOPY Right 2015    KNEE ARTHROSCOPY Right 2015    LAMINECTOMY N/A 2021    T7 LAMINECTOMY WITH T5-9 PEDICALE FIXATION AND REMOVAL OF EPIDURAL TUMOR AT T7 performed by Marlee Felty.  Tee Gil MD at [address] [place] [address] N/A 2019    ROBOTIC ASSISTED PARTIAL LIVER RESECTION AND ABDOMINAL MASS RESECTION performed by Jayden Mcadams MD at [address] [place] [address] Nw Left 2017    for sarcoma    OTHER SURGICAL HISTORY Left     biopsy of left thigh mass    OTHER SURGICAL HISTORY Left 2018    WIDE EXCISION LEFT THIGH SARCOMA          OTHER SURGICAL HISTORY Left 03/14/2018    INCISION AND DRAINAGE OF LEFT THIGH       OTHER SURGICAL HISTORY Left 03/22/2018    incision and drainage left posterior thigh    PORT SURGERY Right 11/30/2020    PORT REMOVAL performed by Epi Burkett MD at [address] SALPINGECTOMY Left 2009    d/t scar tissue    TOTAL KNEE ARTHROPLASTY Right 09/2015       Allergies:  Nsaids, Ondansetron hcl, and Topamax [topiramate]    Medications:   Home Meds  Current Outpatient Medications on File Prior to Encounter   Medication Sig Dispense Refill    sucralfate (CARAFATE) 1 GM tablet Take 1 tablet by mouth 4 times daily as needed (stomach pain) May dissolve pill in water if unable to take full pill 120 tablet 3    dexamethasone (DECADRON) 4 MG tablet Take 1 tablet by mouth 2 times daily 60 tablet 0    methocarbamol (ROBAXIN) 750 MG tablet Take 750 mg by mouth every 4 hours      acetaminophen (TYLENOL) 500 MG tablet Take 1,000 mg by mouth every 6 hours      diazePAM (VALIUM) 5 MG tablet Take 5 mg by mouth every 6 hours.  omeprazole (PRILOSEC) 20 MG delayed release capsule Take 20 mg by mouth daily      gabapentin (NEURONTIN) 300 MG capsule Take 300 mg by mouth 3 times daily.  sennosides-docusate sodium (SENOKOT-S) 8.6-50 MG tablet Take 4 tablets by mouth nightly      polyethylene glycol (GLYCOLAX) 17 g packet Take 17 g by mouth daily      simethicone (MYLICON) 80 MG chewable tablet Take 80 mg by mouth 4 times daily (after meals and at bedtime)      oxyCODONE 5 MG capsule Take 10 mg by mouth every 6 hours as needed for Pain.  DPH-Lido-AlHydr-MgHydr-Simeth (FIRST-MOUTHWASH BLM MT) Take 10 mLs by mouth every 3 hours as needed       No current facility-administered medications on file prior to encounter. Current Meds  No current facility-administered medications for this encounter.          ASA 2 - Patient with mild systemic disease with no functional limitations    II (soft palate, uvula, fauces visible)    Activity:  2 - Able to move 4 extremities voluntarily on command  Respiration:  2 - Able to breathe deeply and cough freely  Circulation:  2 - BP+/- 20mmHg of normal  Consciousness:  2 - Fully awake  Oxygen Saturation (color):  2 - Able to maintain oxygen saturation >92% on room air    Sedation : Moderate sedation planned

## 2021-04-20 LAB — INR BLD: 1.2 (ref 0.86–1.14)

## 2021-04-26 ENCOUNTER — HOSPITAL ENCOUNTER (OUTPATIENT)
Dept: MRI IMAGING | Age: 41
Discharge: HOME | End: 2021-04-26
Payer: COMMERCIAL

## 2021-04-26 DIAGNOSIS — C79.51 BONE METASTASIS (HCC): ICD-10-CM

## 2021-04-26 PROCEDURE — 73723 MRI JOINT LWR EXTR W/O&W/DYE: CPT

## 2021-04-26 PROCEDURE — 6360000004 HC RX CONTRAST MEDICATION: Performed by: NURSE PRACTITIONER

## 2021-04-26 PROCEDURE — A9576 INJ PROHANCE MULTIPACK: HCPCS | Performed by: NURSE PRACTITIONER

## 2021-04-26 RX ADMIN — GADOTERIDOL 20 ML: 279.3 INJECTION, SOLUTION INTRAVENOUS at 15:58

## 2021-04-28 ENCOUNTER — HOSPITAL ENCOUNTER (OUTPATIENT)
Dept: GENERAL RADIOLOGY | Age: 41
Discharge: HOME | End: 2021-04-28
Payer: COMMERCIAL

## 2021-04-28 DIAGNOSIS — C79.52 SECONDARY MALIGNANT NEOPLASM OF BONE AND BONE MARROW (HCC): ICD-10-CM

## 2021-04-28 DIAGNOSIS — C49.22 SOFT TISSUE SARCOMA OF THIGH, LEFT (HCC): ICD-10-CM

## 2021-04-28 DIAGNOSIS — C79.51 BONE METASTASIS (HCC): ICD-10-CM

## 2021-04-28 DIAGNOSIS — C79.51 SECONDARY MALIGNANT NEOPLASM OF BONE AND BONE MARROW (HCC): ICD-10-CM

## 2021-04-28 PROCEDURE — 73552 X-RAY EXAM OF FEMUR 2/>: CPT

## 2021-06-02 ENCOUNTER — CLINICAL DOCUMENTATION (OUTPATIENT)
Dept: OTHER | Age: 41
End: 2021-06-02

## 2021-06-22 ENCOUNTER — HOSPITAL ENCOUNTER (OUTPATIENT)
Dept: MRI IMAGING | Age: 41
Discharge: HOME | DRG: 542 | End: 2021-06-22
Payer: COMMERCIAL

## 2021-06-22 DIAGNOSIS — G89.3 CANCER ASSOCIATED PAIN: ICD-10-CM

## 2021-06-22 DIAGNOSIS — M54.6 PAIN IN THORACIC SPINE: ICD-10-CM

## 2021-06-22 DIAGNOSIS — C49.22 LIPOSARCOMA OF LEFT THIGH (HCC): ICD-10-CM

## 2021-06-22 PROCEDURE — 72157 MRI CHEST SPINE W/O & W/DYE: CPT

## 2021-06-22 PROCEDURE — 6360000004 HC RX CONTRAST MEDICATION: Performed by: INTERNAL MEDICINE

## 2021-06-22 PROCEDURE — A9576 INJ PROHANCE MULTIPACK: HCPCS | Performed by: INTERNAL MEDICINE

## 2021-06-22 RX ADMIN — GADOTERIDOL 20 ML: 279.3 INJECTION, SOLUTION INTRAVENOUS at 17:29

## 2021-06-23 ENCOUNTER — APPOINTMENT (OUTPATIENT)
Dept: CT IMAGING | Age: 41
DRG: 542 | End: 2021-06-23
Payer: COMMERCIAL

## 2021-06-23 ENCOUNTER — APPOINTMENT (OUTPATIENT)
Dept: GENERAL RADIOLOGY | Age: 41
DRG: 542 | End: 2021-06-23
Payer: COMMERCIAL

## 2021-06-23 ENCOUNTER — HOSPITAL ENCOUNTER (INPATIENT)
Age: 41
LOS: 2 days | Discharge: HOME | DRG: 542 | End: 2021-06-25
Attending: EMERGENCY MEDICINE | Admitting: INTERNAL MEDICINE
Payer: COMMERCIAL

## 2021-06-23 ENCOUNTER — TELEPHONE (OUTPATIENT)
Dept: FAMILY MEDICINE CLINIC | Age: 41
End: 2021-06-23

## 2021-06-23 DIAGNOSIS — C49.9 CLEAR CELL SARCOMA (HCC): ICD-10-CM

## 2021-06-23 DIAGNOSIS — J18.9 PNEUMONIA OF BOTH LUNGS DUE TO INFECTIOUS ORGANISM, UNSPECIFIED PART OF LUNG: ICD-10-CM

## 2021-06-23 DIAGNOSIS — C79.51 SPINE METASTASIS (HCC): Primary | ICD-10-CM

## 2021-06-23 PROBLEM — R52 INTRACTABLE PAIN: Status: ACTIVE | Noted: 2021-06-23

## 2021-06-23 LAB
ACETAMINOPHEN LEVEL: <5 UG/ML (ref 10–30)
ALBUMIN SERPL-MCNC: 3.3 G/DL (ref 3.4–5)
ALP BLD-CCNC: 117 U/L (ref 40–129)
ALT SERPL-CCNC: 434 U/L (ref 10–40)
AMMONIA: 11 UMOL/L (ref 11–51)
ANION GAP SERPL CALCULATED.3IONS-SCNC: 10 MMOL/L (ref 3–16)
ANISOCYTOSIS: ABNORMAL
AST SERPL-CCNC: 600 U/L (ref 15–37)
BASOPHILIC STIPPLING: ABNORMAL
BASOPHILS ABSOLUTE: 0 K/UL (ref 0–0.2)
BASOPHILS RELATIVE PERCENT: 0 %
BILIRUB SERPL-MCNC: 0.7 MG/DL (ref 0–1)
BILIRUBIN DIRECT: 0.4 MG/DL (ref 0–0.3)
BILIRUBIN URINE: ABNORMAL
BILIRUBIN, INDIRECT: 0.3 MG/DL (ref 0–1)
BLOOD, URINE: NEGATIVE
BUN BLDV-MCNC: 11 MG/DL (ref 7–20)
CALCIUM SERPL-MCNC: 8.1 MG/DL (ref 8.3–10.6)
CHLORIDE BLD-SCNC: 100 MMOL/L (ref 99–110)
CLARITY: CLEAR
CO2: 24 MMOL/L (ref 21–32)
COLOR: YELLOW
CREAT SERPL-MCNC: 0.8 MG/DL (ref 0.6–1.1)
EOSINOPHILS ABSOLUTE: 0 K/UL (ref 0–0.6)
EOSINOPHILS RELATIVE PERCENT: 0 %
EPITHELIAL CELLS, UA: ABNORMAL /HPF (ref 0–5)
GFR AFRICAN AMERICAN: >60
GFR NON-AFRICAN AMERICAN: >60
GLUCOSE BLD-MCNC: 113 MG/DL (ref 70–99)
GLUCOSE URINE: NEGATIVE MG/DL
HCT VFR BLD CALC: 24.3 % (ref 36–48)
HEMOGLOBIN: 8 G/DL (ref 12–16)
HYPOCHROMIA: ABNORMAL
KETONES, URINE: 40 MG/DL
LEUKOCYTE ESTERASE, URINE: NEGATIVE
LYMPHOCYTES ABSOLUTE: 0 K/UL (ref 1–5.1)
LYMPHOCYTES RELATIVE PERCENT: 0 %
MACROCYTES: ABNORMAL
MCH RBC QN AUTO: 33.2 PG (ref 26–34)
MCHC RBC AUTO-ENTMCNC: 32.7 G/DL (ref 31–36)
MCV RBC AUTO: 101.5 FL (ref 80–100)
MICROSCOPIC EXAMINATION: YES
MONOCYTES ABSOLUTE: 0.3 K/UL (ref 0–1.3)
MONOCYTES RELATIVE PERCENT: 2 %
NEUTROPHILS ABSOLUTE: 12.9 K/UL (ref 1.7–7.7)
NEUTROPHILS RELATIVE PERCENT: 98 %
NITRITE, URINE: NEGATIVE
PDW BLD-RTO: 18.9 % (ref 12.4–15.4)
PH UA: 6 (ref 5–8)
PLATELET # BLD: 139 K/UL (ref 135–450)
PMV BLD AUTO: 8.6 FL (ref 5–10.5)
POLYCHROMASIA: ABNORMAL
POTASSIUM REFLEX MAGNESIUM: 4.9 MMOL/L (ref 3.5–5.1)
PROTEIN UA: 30 MG/DL
RBC # BLD: 2.4 M/UL (ref 4–5.2)
RBC UA: ABNORMAL /HPF (ref 0–4)
SODIUM BLD-SCNC: 134 MMOL/L (ref 136–145)
SPECIFIC GRAVITY UA: 1.01 (ref 1–1.03)
TOTAL PROTEIN: 5.9 G/DL (ref 6.4–8.2)
URINE REFLEX TO CULTURE: ABNORMAL
URINE TYPE: ABNORMAL
UROBILINOGEN, URINE: 1 E.U./DL
WBC # BLD: 13.2 K/UL (ref 4–11)
WBC UA: ABNORMAL /HPF (ref 0–5)

## 2021-06-23 PROCEDURE — 80048 BASIC METABOLIC PNL TOTAL CA: CPT

## 2021-06-23 PROCEDURE — 80076 HEPATIC FUNCTION PANEL: CPT

## 2021-06-23 PROCEDURE — 74177 CT ABD & PELVIS W/CONTRAST: CPT

## 2021-06-23 PROCEDURE — 1200000000 HC SEMI PRIVATE

## 2021-06-23 PROCEDURE — 70450 CT HEAD/BRAIN W/O DYE: CPT

## 2021-06-23 PROCEDURE — 87040 BLOOD CULTURE FOR BACTERIA: CPT

## 2021-06-23 PROCEDURE — 6360000002 HC RX W HCPCS

## 2021-06-23 PROCEDURE — 6360000004 HC RX CONTRAST MEDICATION: Performed by: PHYSICIAN ASSISTANT

## 2021-06-23 PROCEDURE — 87449 NOS EACH ORGANISM AG IA: CPT

## 2021-06-23 PROCEDURE — 99284 EMERGENCY DEPT VISIT MOD MDM: CPT

## 2021-06-23 PROCEDURE — 2580000003 HC RX 258: Performed by: INTERNAL MEDICINE

## 2021-06-23 PROCEDURE — 6360000002 HC RX W HCPCS: Performed by: INTERNAL MEDICINE

## 2021-06-23 PROCEDURE — 82140 ASSAY OF AMMONIA: CPT

## 2021-06-23 PROCEDURE — 80143 DRUG ASSAY ACETAMINOPHEN: CPT

## 2021-06-23 PROCEDURE — 6360000002 HC RX W HCPCS: Performed by: NURSE PRACTITIONER

## 2021-06-23 PROCEDURE — 36415 COLL VENOUS BLD VENIPUNCTURE: CPT

## 2021-06-23 PROCEDURE — 6370000000 HC RX 637 (ALT 250 FOR IP): Performed by: INTERNAL MEDICINE

## 2021-06-23 PROCEDURE — 6360000002 HC RX W HCPCS: Performed by: PHYSICIAN ASSISTANT

## 2021-06-23 PROCEDURE — 71045 X-RAY EXAM CHEST 1 VIEW: CPT

## 2021-06-23 PROCEDURE — 2580000003 HC RX 258: Performed by: NURSE PRACTITIONER

## 2021-06-23 PROCEDURE — 96375 TX/PRO/DX INJ NEW DRUG ADDON: CPT

## 2021-06-23 PROCEDURE — 81001 URINALYSIS AUTO W/SCOPE: CPT

## 2021-06-23 PROCEDURE — 85025 COMPLETE CBC W/AUTO DIFF WBC: CPT

## 2021-06-23 PROCEDURE — 96365 THER/PROPH/DIAG IV INF INIT: CPT

## 2021-06-23 PROCEDURE — 2580000003 HC RX 258: Performed by: PHYSICIAN ASSISTANT

## 2021-06-23 RX ORDER — PROMETHAZINE HYDROCHLORIDE 12.5 MG/1
12.5 TABLET ORAL EVERY 6 HOURS PRN
Status: DISCONTINUED | OUTPATIENT
Start: 2021-06-23 | End: 2021-06-25 | Stop reason: HOSPADM

## 2021-06-23 RX ORDER — GABAPENTIN 100 MG/1
200 CAPSULE ORAL 3 TIMES DAILY
Status: DISCONTINUED | OUTPATIENT
Start: 2021-06-23 | End: 2021-06-25 | Stop reason: HOSPADM

## 2021-06-23 RX ORDER — METHOCARBAMOL 750 MG/1
750 TABLET, FILM COATED ORAL EVERY 4 HOURS
Status: DISCONTINUED | OUTPATIENT
Start: 2021-06-23 | End: 2021-06-23 | Stop reason: SDUPTHER

## 2021-06-23 RX ORDER — PROMETHAZINE HYDROCHLORIDE 12.5 MG/1
12.5 TABLET ORAL EVERY 6 HOURS PRN
COMMUNITY

## 2021-06-23 RX ORDER — SUCRALFATE 1 G/1
1 TABLET ORAL 4 TIMES DAILY PRN
Status: DISCONTINUED | OUTPATIENT
Start: 2021-06-23 | End: 2021-06-23

## 2021-06-23 RX ORDER — SODIUM CHLORIDE, SODIUM LACTATE, POTASSIUM CHLORIDE, CALCIUM CHLORIDE 600; 310; 30; 20 MG/100ML; MG/100ML; MG/100ML; MG/100ML
1000 INJECTION, SOLUTION INTRAVENOUS ONCE
Status: COMPLETED | OUTPATIENT
Start: 2021-06-23 | End: 2021-06-23

## 2021-06-23 RX ORDER — SODIUM CHLORIDE 0.9 % (FLUSH) 0.9 %
5-40 SYRINGE (ML) INJECTION EVERY 12 HOURS SCHEDULED
Status: DISCONTINUED | OUTPATIENT
Start: 2021-06-23 | End: 2021-06-25 | Stop reason: HOSPADM

## 2021-06-23 RX ORDER — CLINDAMYCIN HYDROCHLORIDE 300 MG/1
300 CAPSULE ORAL 4 TIMES DAILY
Status: ON HOLD | COMMUNITY
Start: 2021-06-17 | End: 2021-06-25 | Stop reason: HOSPADM

## 2021-06-23 RX ORDER — FAMOTIDINE 20 MG/1
20 TABLET, FILM COATED ORAL DAILY
COMMUNITY

## 2021-06-23 RX ORDER — ONDANSETRON 2 MG/ML
4 INJECTION INTRAMUSCULAR; INTRAVENOUS EVERY 6 HOURS PRN
Status: DISCONTINUED | OUTPATIENT
Start: 2021-06-23 | End: 2021-06-25 | Stop reason: HOSPADM

## 2021-06-23 RX ORDER — SODIUM CHLORIDE 0.9 % (FLUSH) 0.9 %
5-40 SYRINGE (ML) INJECTION PRN
Status: DISCONTINUED | OUTPATIENT
Start: 2021-06-23 | End: 2021-06-25 | Stop reason: HOSPADM

## 2021-06-23 RX ORDER — SENNA AND DOCUSATE SODIUM 50; 8.6 MG/1; MG/1
4 TABLET, FILM COATED ORAL NIGHTLY
Status: DISCONTINUED | OUTPATIENT
Start: 2021-06-23 | End: 2021-06-25 | Stop reason: HOSPADM

## 2021-06-23 RX ORDER — METHOCARBAMOL 500 MG/1
750 TABLET, FILM COATED ORAL EVERY 6 HOURS PRN
Status: DISCONTINUED | OUTPATIENT
Start: 2021-06-24 | End: 2021-06-25 | Stop reason: HOSPADM

## 2021-06-23 RX ORDER — OXYCODONE HYDROCHLORIDE 5 MG/1
10 TABLET ORAL EVERY 6 HOURS PRN
Status: DISCONTINUED | OUTPATIENT
Start: 2021-06-23 | End: 2021-06-23

## 2021-06-23 RX ORDER — SODIUM CHLORIDE 9 MG/ML
25 INJECTION, SOLUTION INTRAVENOUS PRN
Status: DISCONTINUED | OUTPATIENT
Start: 2021-06-23 | End: 2021-06-25 | Stop reason: HOSPADM

## 2021-06-23 RX ORDER — POLYETHYLENE GLYCOL 3350 17 G/17G
17 POWDER, FOR SOLUTION ORAL DAILY PRN
Status: DISCONTINUED | OUTPATIENT
Start: 2021-06-23 | End: 2021-06-25 | Stop reason: HOSPADM

## 2021-06-23 RX ORDER — MORPHINE SULFATE 30 MG/1
60 TABLET, FILM COATED, EXTENDED RELEASE ORAL ONCE
Status: COMPLETED | OUTPATIENT
Start: 2021-06-23 | End: 2021-06-23

## 2021-06-23 RX ORDER — GRANISETRON HYDROCHLORIDE 1 MG/1
1 TABLET, FILM COATED ORAL EVERY 12 HOURS PRN
COMMUNITY

## 2021-06-23 RX ORDER — MORPHINE SULFATE 60 MG/1
60 TABLET, FILM COATED, EXTENDED RELEASE ORAL 2 TIMES DAILY
COMMUNITY

## 2021-06-23 RX ADMIN — CEFTRIAXONE SODIUM 2000 MG: 2 INJECTION, POWDER, FOR SOLUTION INTRAMUSCULAR; INTRAVENOUS at 17:47

## 2021-06-23 RX ADMIN — METHOCARBAMOL 1000 MG: 100 INJECTION, SOLUTION INTRAMUSCULAR; INTRAVENOUS at 19:47

## 2021-06-23 RX ADMIN — CEFTRIAXONE 1000 MG: 1 INJECTION, POWDER, FOR SOLUTION INTRAMUSCULAR; INTRAVENOUS at 23:34

## 2021-06-23 RX ADMIN — IOPAMIDOL 80 ML: 755 INJECTION, SOLUTION INTRAVENOUS at 16:09

## 2021-06-23 RX ADMIN — Medication 10 ML: at 23:34

## 2021-06-23 RX ADMIN — MORPHINE SULFATE 60 MG: 30 TABLET, FILM COATED, EXTENDED RELEASE ORAL at 23:34

## 2021-06-23 RX ADMIN — SODIUM CHLORIDE, POTASSIUM CHLORIDE, SODIUM LACTATE AND CALCIUM CHLORIDE 1000 ML: 600; 310; 30; 20 INJECTION, SOLUTION INTRAVENOUS at 16:26

## 2021-06-23 RX ADMIN — ENOXAPARIN SODIUM 40 MG: 40 INJECTION SUBCUTANEOUS at 22:02

## 2021-06-23 RX ADMIN — HYDROMORPHONE HYDROCHLORIDE 1 MG: 1 INJECTION, SOLUTION INTRAMUSCULAR; INTRAVENOUS; SUBCUTANEOUS at 18:40

## 2021-06-23 RX ADMIN — Medication 1 MG: at 18:40

## 2021-06-23 RX ADMIN — AZITHROMYCIN MONOHYDRATE 500 MG: 500 INJECTION, POWDER, LYOPHILIZED, FOR SOLUTION INTRAVENOUS at 18:41

## 2021-06-23 RX ADMIN — GABAPENTIN 200 MG: 100 CAPSULE ORAL at 22:02

## 2021-06-23 RX ADMIN — Medication 10 ML: at 22:03

## 2021-06-23 ASSESSMENT — PAIN SCALES - GENERAL
PAINLEVEL_OUTOF10: 4
PAINLEVEL_OUTOF10: 3
PAINLEVEL_OUTOF10: 5
PAINLEVEL_OUTOF10: 4
PAINLEVEL_OUTOF10: 7

## 2021-06-23 ASSESSMENT — PAIN DESCRIPTION - PAIN TYPE
TYPE: CHRONIC PAIN
TYPE: ACUTE PAIN

## 2021-06-23 ASSESSMENT — ENCOUNTER SYMPTOMS
ABDOMINAL PAIN: 0
BACK PAIN: 1
SHORTNESS OF BREATH: 0

## 2021-06-23 ASSESSMENT — PAIN DESCRIPTION - DESCRIPTORS
DESCRIPTORS: SQUEEZING
DESCRIPTORS: SQUEEZING

## 2021-06-23 ASSESSMENT — PAIN DESCRIPTION - FREQUENCY
FREQUENCY: CONTINUOUS
FREQUENCY: CONTINUOUS

## 2021-06-23 ASSESSMENT — PAIN DESCRIPTION - LOCATION
LOCATION: BACK
LOCATION: BACK

## 2021-06-23 ASSESSMENT — PAIN DESCRIPTION - PROGRESSION: CLINICAL_PROGRESSION: NOT CHANGED

## 2021-06-23 ASSESSMENT — PAIN - FUNCTIONAL ASSESSMENT: PAIN_FUNCTIONAL_ASSESSMENT: ACTIVITIES ARE NOT PREVENTED

## 2021-06-23 ASSESSMENT — PAIN DESCRIPTION - ONSET: ONSET: ON-GOING

## 2021-06-23 ASSESSMENT — PAIN DESCRIPTION - ORIENTATION: ORIENTATION: LOWER

## 2021-06-23 NOTE — ED NOTES
Bed: B14-14  Expected date:   Expected time:   Means of arrival:   Comments:  [facility], RN  06/23/21 [id number]

## 2021-06-23 NOTE — CONSULTS
[facility]  [id number]   1980   6/23/2021    Requesting physician: No admitting provider for patient encounter. Reason for consultation: spinal mets     History of present illness: Patient is a 35 y.o. female w/ PMH of sarcoma who presented on 6/23/2021 with worsening pain unable to be managed at home, she was sent to the ED by her PCP to be admitted for pain control. Patient has history of multiple osseous metastasis from her sarcoma. She underwent a T5-T9 posterior fusion on 2/17/2021 for resection of an intradural mass with Dr. Navya Messer at [facility]. Since then she has been in the care of multiple oncologist between [place] and [facility]. She received radiation and chemo after her thoracic spine surgery in February. She started a new IV chemo approx 6-7 weeks ago that she gets transfused once q week at [place]. Over the last 3 weeks the patient has had very little PO intake, hardly any food at all. She does drink some, but patient and family both say it is very little. She has nausea/vomiting and diarrhea with blood in it. She has become generally weak all over, she has had one fall when she slipped in the shower.  reports increasing lethargy and that she falls asleep throughout the entire day, he also reports some intermittent confusion which is new. She reports worsening back pain around T6-T7 area with radiation to both flanks R>L. She rates the pain a 10/10. She denies current hip pain, she denies pain that radiates down either leg. She has had no changes in her LE sensation, no saddle paraesthesias and no loss of control of bowel/bladder. She was walking up until yesterday around her house. Her pain management doctor ordered her an updated MRI thoracic spine yesterday, evidence of new osseous met to T4 and further extension of epidural tumor.      ROS:   GENERAL:  + weight loss, + fatigue   EYES:  Denies vision change or diplopia  EARS:  Denies hearing metastases of the T4 vertebral body. Stable osseous metastatic disease of T11, T12. Previously noted osseous metastasis at T7 is not well appreciated on this study. Epidural extension of tumor is again noted centered about the T7 level, however, this extends cranial to T6 and T5, and upward to the T4-5 disc space. This is predominantly on the left resulting in rightward displacement of the spinal cord and effacement  of the CSF space. This appears progressed when compared to the recent prior studies. CT head wo contrast  6/23/2021  No evidence of acute intracranial abnormality. CT abdomen pelvis w contrast  6/23/2021 1610  1. Eccentric thrombus noted adherent to the wall within the infrarenal inferior vena cava. This is chronic in appearance. Follow-up CT of the abdomen in 6 months is recommended. 2. Hepatic steatosis. 3. Mild gallbladder wall thickening, nonspecific. 4. Stable appearance of extraosseous tumor extending into the iliopsoas musculature on the right at the L3 level. 5. Bilateral lower lobe bronchopneumonia.      Labs  Recent Labs     06/23/21  1515   *      CO2 24   BUN 11   CREATININE 0.8   GLUCOSE 113*     Recent Labs     06/23/21  1515   WBC 13.2*   RBC 2.40*     Patient Active Problem List    Diagnosis Date Noted    Clear cell sarcoma ([address] 03/22/2021    Status post thoracic spinal fusion 02/19/2021    Sarcoma ([address] 02/15/2021    Neutropenic fever ([address] 10/10/2019    Liver mass 07/22/2019    Weight loss counseling, encounter for     Moderate malnutrition ([address] 03/23/2018    Class 2 obesity due to excess calories with body mass index (BMI) of 39.0 to 39.9 in adult     Chronic depression     Sarcoma of left lower extremity ([address] 11/24/2017    Small bowel obstruction ([address] 10/11/2017    Vitamin B12 deficiency 07/10/2017    Morbid obesity with BMI of 40.0-44.9, adult ([address] 06/22/2017    Mixed hyperlipidemia 06/22/2017    IBS (irritable bowel syndrome)     Cro[facility]'s disease [facility]     Osteoarthritis, knee     Premature menopause 02/04/2011       Assessment:  36 yo female with history of sarcoma w/ multiple osseous mets, she is s/p T5-T9 posterior fusion on 2/17/2021 for resection of an intradural mass. Admitted today with worsening back pain uncontrolled at home, MRI thoracic spine with new osseous met to T4 and further extension of epidural tumor. Plan:        1. Will discuss updated MRI of thoracic spine with Dr. Clifton Almeida and update plan of care as indicated, per last meeting with patient there was no further surgical intervention recommended         2. Neurologic exams frequency: q 4   3. Pain control per primary team  4. Will add IV robaxin for 3 doses for muscle spasms  5. Oncology consult  6. Would consider palliative care consult  7. Activity/diet per primary team  8. Thank you for consult, will follow, please call with questions      DISPO- patient being admitted to hospitalist for pain control  TRACY Morales-CNP  Neurosurgery Nurse Practitioner  [phone]    Patient was seen and examined with Dr. Clifton Almeida who agrees with above assessment and plan. Electronically signed by:  TRACY Garcia NP, 6/23/2021 4:03 PM

## 2021-06-23 NOTE — TELEPHONE ENCOUNTER
Judy Gross spouse (on most recent HIPAA), called requesting to speak with Dr. Yari Finn directly regarding Skylar Limon. I offered to transfer him to the triage nurse and he declined. He states Skylar Limon has tumor on her spine. She had an MRI yesterday and she is in extreme pain today. She can barely walk. He wants her to be directly admitted to [facility]. He said Dr. Yari Finn has helped with this in the past  I let him know that I would send a message high priority. Last OV 2/9/2021. Please advise. Thanks.         Lorrin Mortimer [phone]

## 2021-06-23 NOTE — ED PROVIDER NOTES
ED Attending Attestation Note     Date of evaluation: 6/23/2021    This patient was seen by the resident. I have seen and examined the patient, agree with the workup, evaluation, management and diagnosis. The care plan has been discussed. My assessment reveals awake alert female with metastatic sarcoma to the spine who apparently has been having increasing pain had MRI which showed new lesion in T4 yesterday. Has also had some confusion according to caregiver. She is awake alert knows where she is out having mid back pain. Discussed with neurosurgery.   I did speak with Dr. Katheryn Burrows who the caregiver called and with her symptomatology will require admission     Misael Cortez MD  06/23/21 2206

## 2021-06-23 NOTE — ED TRIAGE NOTES
Had an OP MRI yesterday that revealed spinal cord compression at around T7. Her MD advised there to come in today.  was going to bring her in anyway for pain control. Patient took her usual morphine 60mg PO prior to leaving.   Presently rats pain 5/10

## 2021-06-23 NOTE — ED PROVIDER NOTES
[address] ENCOUNTER          PHYSICIAN ASSISTANT NOTE       Date of evaluation: 6/23/2021    Chief Complaint     Back Pain (cord compression)      History of Present Illness     HPI: Thom Damon is a 35 y.o. 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 Patient has had increased pain to her lower back with difficulty/pain with walking for the past several weeks. Patient had an MRI performed yesterday that shows new osseous metastases of the T4 vertebral body with epidural extension of the tumor centered around the T7 level resulting in some rightward displacement of the spinal cord and effacement of the CSF space. Patient has acutely had worsening pain overnight despite having home Percocet and oral morphine. Patient's  also feels that she has been more confused over the past 24 hours. Patient contacted her primary care provider who recommended that she come to the emergency department. With the exception of the above, there are no aggravating or alleviating factors. Review of Systems     Review of Systems   Constitutional: Negative for chills and fever. Respiratory: Negative for shortness of breath. Cardiovascular: Negative for chest pain. Gastrointestinal: Negative for abdominal pain. Musculoskeletal: Positive for back pain and gait problem. Neurological: Negative for dizziness and headaches. As stated above, all other systems reviewed and are otherwise negative.      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 She has a past surgical history that includes salpingectomy (Left, 2009); Appendectomy; Ankle surgery (Left); Breast reduction surgery; Knee arthroscopy (Right, 01/2015); Knee arthroscopy (Right, 07/2015); Total knee arthroplasty (Right, 09/2015); other surgical history (Left); Muscle biopsy (Left, 11/2017); other surgical history (Left, 02/26/2018); other surgical history (Left, 03/14/2018); other surgical history (Left, 03/22/2018); Liver Resection (N/A, 07/22/2019); Carpal tunnel release (Right, 07/2020); Port Surgery (Right, 11/30/2020); and laminectomy (N/A, 2/17/2021). Her family history includes Coronary Art Dis in her maternal uncle; Coronary Art Dis (age of onset: 36) in her maternal grandfather; Elevated Lipids in her father; Heart Failure (age of onset: 76) in her paternal grandfather; Hypertension in her brother and father. She reports that she has quit smoking. Her smoking use included cigarettes. She has a 7.50 pack-year smoking history. She has never used smokeless tobacco. She reports current alcohol use. She reports current drug use. Drug: Marijuana. Medications     Previous Medications    ACETAMINOPHEN (TYLENOL) 500 MG TABLET    Take 1,000 mg by mouth every 6 hours    DEXAMETHASONE (DECADRON) 4 MG TABLET    Take 1 tablet by mouth 2 times daily    DIAZEPAM (VALIUM) 5 MG TABLET    Take 5 mg by mouth every 6 hours. DPH-LIDO-ALHYDR-MGHYDR-SIMETH (FIRST-MOUTHWASH BLM MT)    Take 10 mLs by mouth every 3 hours as needed    GABAPENTIN (NEURONTIN) 300 MG CAPSULE    Take 300 mg by mouth 3 times daily. METHOCARBAMOL (ROBAXIN) 750 MG TABLET    Take 750 mg by mouth every 4 hours    OMEPRAZOLE (PRILOSEC) 20 MG DELAYED RELEASE CAPSULE    Take 20 mg by mouth daily    OXYCODONE 5 MG CAPSULE    Take 10 mg by mouth every 6 hours as needed for Pain.     POLYETHYLENE GLYCOL (GLYCOLAX) 17 G PACKET    Take 17 g by mouth daily    SENNOSIDES-DOCUSATE SODIUM (SENOKOT-S) 8.6-50 MG TABLET Take 4 tablets by mouth nightly    SIMETHICONE (MYLICON) 80 MG CHEWABLE TABLET    Take 80 mg by mouth 4 times daily (after meals and at bedtime)    SUCRALFATE (CARAFATE) 1 GM TABLET    Take 1 tablet by mouth 4 times daily as needed (stomach pain) May dissolve pill in water if unable to take full pill       Allergies     She is allergic to ketamine, nsaids, ondansetron hcl, and topamax [topiramate]. Physical Exam     INITIAL VITALS: BP: 109/68, Temp: 98.7 °F (37.1 °C), Pulse: 97, Resp: 18, SpO2: 93 %  Physical Exam  Vitals and nursing note reviewed. Constitutional:       General: She is not in acute distress. Appearance: Normal appearance. She is normal weight. She is ill-appearing. She is not toxic-appearing or diaphoretic. Comments: Chronically ill in appearance sitting up in bed. HENT:      Head: Normocephalic and atraumatic. Right Ear: External ear normal.      Left Ear: External ear normal.      Nose: Nose normal.      Mouth/Throat:      Mouth: Mucous membranes are moist.      Pharynx: Oropharynx is clear. Eyes:      Extraocular Movements: Extraocular movements intact. Cardiovascular:      Rate and Rhythm: Normal rate. Pulses: Normal pulses. Pulmonary:      Effort: Pulmonary effort is normal. No respiratory distress. Abdominal:      General: Abdomen is flat. There is no distension. Palpations: Abdomen is soft. Tenderness: There is no abdominal tenderness. There is no guarding or rebound. Comments: No right upper quadrant tenderness on exam.   Musculoskeletal:         General: Normal range of motion. Cervical back: Normal range of motion. Comments: Able to raise bilateral lower extremities against gravity. Equal plantar and dorsiflexion bilaterally. 2+ DP pulses bilaterally. Normal sensation to light touch to bilateral lower extremities. Skin:     General: Skin is warm. Coloration: Skin is pale.    Neurological:      General: No focal deficit present. Mental Status: Mental status is at baseline. Psychiatric:         Mood and Affect: Mood normal.         Behavior: Behavior normal.         Diagnostic Results     RADIOLOGY:  CT ABDOMEN PELVIS W IV CONTRAST Additional Contrast? None   Final Result   1. Eccentric thrombus noted adherent to the wall within the infrarenal inferior vena cava. This is chronic in appearance. Follow-up CT of the abdomen in 6 months is recommended. 2. Hepatic steatosis. 3. Mild gallbladder wall thickening, nonspecific. 4. Stable appearance of extraosseous tumor extending into the iliopsoas musculature on the right at the L3 level. 5. Bilateral lower lobe bronchopneumonia. Findings discussed with the emergency department at the time of report. CT Head WO Contrast   Final Result      No evidence of acute intracranial abnormality.                      XR CHEST PORTABLE   Final Result      No acute disease          LABS:   Results for orders placed or performed during the hospital encounter of 06/23/21   CBC Auto Differential   Result Value Ref Range    WBC 13.2 (H) 4.0 - 11.0 K/uL    RBC 2.40 (L) 4.00 - 5.20 M/uL    Hemoglobin 8.0 (L) 12.0 - 16.0 g/dL    Hematocrit 24.3 (L) 36.0 - 48.0 %    .5 (H) 80.0 - 100.0 fL    MCH 33.2 26.0 - 34.0 pg    MCHC 32.7 31.0 - 36.0 g/dL    RDW 18.9 (H) 12.4 - 15.4 %    Platelets 468 719 - 349 K/uL    MPV 8.6 5.0 - 10.5 fL    Neutrophils % 98.0 %    Lymphocytes % 0.0 %    Monocytes % 2.0 %    Eosinophils % 0.0 %    Basophils % 0.0 %    Neutrophils Absolute 12.9 (H) 1.7 - 7.7 K/uL    Lymphocytes Absolute 0.0 (L) 1.0 - 5.1 K/uL    Monocytes Absolute 0.3 0.0 - 1.3 K/uL    Eosinophils Absolute 0.0 0.0 - 0.6 K/uL    Basophils Absolute 0.0 0.0 - 0.2 K/uL    Anisocytosis 1+ (A)     Macrocytes 1+ (A)     Polychromasia Occasional (A)     Hypochromia Occasional (A)     Basophilic Stippling Occasional (A)    Basic Metabolic Panel w/ Reflex to MG   Result Value Ref Range    Sodium Antimicrobial Indications     Answer:   Pneumonia (CAP)    azithromycin (ZITHROMAX) 500 mg in dextrose 5 % 250 mL IVPB     Order Specific Question:   Antimicrobial Indications     Answer:   Pneumonia (CAP)    FOLLOWED BY Linked Order Group     methocarbamol (ROBAXIN) 1,000 mg in dextrose 5 % 100 mL IVPB     methocarbamol (ROBAXIN) tablet 750 mg    HYDROmorphone (DILAUDID) injection 1 mg    HYDROmorphone (DILAUDID) 1 MG/ML injection     Anna Crandall: cabinet override       CONSULTS:  IP CONSULT TO NEUROSURGERY  IP CONSULT TO ONCOLOGY  IP CONSULT TO HOSPITALIST  IP CONSULT TO Jonathan / ASSESSMENT / PLAN     Vitals:    06/23/21 1700 06/23/21 1730 06/23/21 1800 06/23/21 1830   BP:    120/62   Pulse: 89 88 88 105   Resp: 20 22 17 18   Temp:       SpO2: 93% 91% 91% 98%   Weight:       Height:           Johnnie Kemp is a 35 y.o. female who presents to the emergency department with increased pain. Patient over the past several weeks has had increased lower back pain and difficulty with ambulation. She has known metastatic tumors from her sarcoma to her spine with new T4 vertebral body metastases seen on MRI yesterday. Patient also has reportedly been more confused per her  for the past 24 hours. The patient has remained hemodynamically stable throughout their stay in the emergency department, and appears chronically ill. Patient did not want initial pain control, though after some time it did request pain medication. She was given 1 mg of Dilaudid. Patient has a mild leukocytosis. Patient has significantly elevated liver function, with an ALT of 434 and AST of 600. This is significantly elevated in comparison to her most recent labs at outside hospital.  CT scan was obtained of her abdomen that did not show any acute findings besides a concern for eccentric thrombus noted in the wall of the infrarenal IVC. It did not show any other acute new findings.   CT scan was obtained of her head given the concerns for confusion that did not show any acute findings as well. The inferior portion of the patient CT scan of her abdomen did show concerns for bilateral pneumonia for which the patient was covered with is a thrown Rocephin, and while the patient was here she did become hypoxic at which time she was placed on oxygen. I feel that patient's hepatotoxicity is likely related to her chemotherapy agent, and this was discussed with the patient's oncologist who is through [facility]. We did offer her transfer to [facility] to the patient and her family who are interested in staying here secondary to the patient's neurosurgery team being here. I feel that the patient requires admission to the hospital for pain control in the setting of her metastatic disease as well as her new oxygen requirement in the setting of bilateral pneumonia. At this point in time, patient will require admission to the hospital for further management of their clinical condition. I spoke with the admitting team who is in agreement with plan for admission. Patient and family are in agreement with plan for admission. Patient will be cared for in the ED prior to a bed becoming available upstairs. The patient was evaluated by myself and the ED Attending Physician, Dr. Dionna Barrios. All management and disposition plans were discussed and agreed upon. Clinical Impression     1. Spine metastasis ([address]    2. Pneumonia of both lungs due to infectious organism, unspecified part of lung      This note was dictated using voice-recognition software, which occasionally leads to inadvertent typographic errors.     Disposition     DISPOSITION Admitted 06/23/2021 06:57:51 PM     Natty Rivero  06/23/21 1912

## 2021-06-24 LAB
A/G RATIO: 1.2 (ref 1.1–2.2)
ALBUMIN SERPL-MCNC: 3.2 G/DL (ref 3.4–5)
ALP BLD-CCNC: 115 U/L (ref 40–129)
ALT SERPL-CCNC: 362 U/L (ref 10–40)
ANION GAP SERPL CALCULATED.3IONS-SCNC: 12 MMOL/L (ref 3–16)
AST SERPL-CCNC: 438 U/L (ref 15–37)
BILIRUB SERPL-MCNC: 0.4 MG/DL (ref 0–1)
BUN BLDV-MCNC: 11 MG/DL (ref 7–20)
CALCIUM SERPL-MCNC: 8.2 MG/DL (ref 8.3–10.6)
CHLORIDE BLD-SCNC: 100 MMOL/L (ref 99–110)
CO2: 23 MMOL/L (ref 21–32)
CREAT SERPL-MCNC: 0.7 MG/DL (ref 0.6–1.1)
GFR AFRICAN AMERICAN: >60
GFR NON-AFRICAN AMERICAN: >60
GLOBULIN: 2.7 G/DL
GLUCOSE BLD-MCNC: 110 MG/DL (ref 70–99)
HCT VFR BLD CALC: 24 % (ref 36–48)
HEMOGLOBIN: 7.8 G/DL (ref 12–16)
L. PNEUMOPHILA SEROGP 1 UR AG: NORMAL
MCH RBC QN AUTO: 33.6 PG (ref 26–34)
MCHC RBC AUTO-ENTMCNC: 32.7 G/DL (ref 31–36)
MCV RBC AUTO: 102.9 FL (ref 80–100)
PDW BLD-RTO: 18.9 % (ref 12.4–15.4)
PLATELET # BLD: 107 K/UL (ref 135–450)
PMV BLD AUTO: 8.7 FL (ref 5–10.5)
POTASSIUM REFLEX MAGNESIUM: 4.7 MMOL/L (ref 3.5–5.1)
RBC # BLD: 2.33 M/UL (ref 4–5.2)
SODIUM BLD-SCNC: 135 MMOL/L (ref 136–145)
STREP PNEUMONIAE ANTIGEN, URINE: NORMAL
TOTAL PROTEIN: 5.9 G/DL (ref 6.4–8.2)
WBC # BLD: 9.9 K/UL (ref 4–11)

## 2021-06-24 PROCEDURE — 1200000000 HC SEMI PRIVATE

## 2021-06-24 PROCEDURE — 6370000000 HC RX 637 (ALT 250 FOR IP): Performed by: INTERNAL MEDICINE

## 2021-06-24 PROCEDURE — 6360000002 HC RX W HCPCS: Performed by: INTERNAL MEDICINE

## 2021-06-24 PROCEDURE — 80053 COMPREHEN METABOLIC PANEL: CPT

## 2021-06-24 PROCEDURE — 85027 COMPLETE CBC AUTOMATED: CPT

## 2021-06-24 PROCEDURE — 6370000000 HC RX 637 (ALT 250 FOR IP): Performed by: NURSE PRACTITIONER

## 2021-06-24 PROCEDURE — 2580000003 HC RX 258: Performed by: INTERNAL MEDICINE

## 2021-06-24 PROCEDURE — 99221 1ST HOSP IP/OBS SF/LOW 40: CPT | Performed by: NURSE PRACTITIONER

## 2021-06-24 PROCEDURE — 36415 COLL VENOUS BLD VENIPUNCTURE: CPT

## 2021-06-24 RX ORDER — DIAZEPAM 5 MG/1
5 TABLET ORAL EVERY 6 HOURS PRN
Status: DISCONTINUED | OUTPATIENT
Start: 2021-06-24 | End: 2021-06-25 | Stop reason: HOSPADM

## 2021-06-24 RX ORDER — OXYCODONE HYDROCHLORIDE 5 MG/1
10 TABLET ORAL EVERY 4 HOURS PRN
Status: DISCONTINUED | OUTPATIENT
Start: 2021-06-24 | End: 2021-06-25 | Stop reason: HOSPADM

## 2021-06-24 RX ORDER — OXYCODONE HYDROCHLORIDE 5 MG/1
5 TABLET ORAL EVERY 4 HOURS PRN
Status: DISCONTINUED | OUTPATIENT
Start: 2021-06-24 | End: 2021-06-25 | Stop reason: HOSPADM

## 2021-06-24 RX ORDER — OXYCODONE HYDROCHLORIDE AND ACETAMINOPHEN 5; 325 MG/1; MG/1
1 TABLET ORAL EVERY 4 HOURS PRN
Status: DISCONTINUED | OUTPATIENT
Start: 2021-06-24 | End: 2021-06-25 | Stop reason: HOSPADM

## 2021-06-24 RX ORDER — MORPHINE SULFATE 30 MG/1
60 TABLET, FILM COATED, EXTENDED RELEASE ORAL EVERY 12 HOURS SCHEDULED
Status: DISCONTINUED | OUTPATIENT
Start: 2021-06-24 | End: 2021-06-25 | Stop reason: HOSPADM

## 2021-06-24 RX ORDER — OXYCODONE HYDROCHLORIDE AND ACETAMINOPHEN 5; 325 MG/1; MG/1
2 TABLET ORAL EVERY 4 HOURS PRN
Status: DISCONTINUED | OUTPATIENT
Start: 2021-06-24 | End: 2021-06-25 | Stop reason: HOSPADM

## 2021-06-24 RX ADMIN — GABAPENTIN 200 MG: 100 CAPSULE ORAL at 21:02

## 2021-06-24 RX ADMIN — METHOCARBAMOL 1000 MG: 100 INJECTION, SOLUTION INTRAMUSCULAR; INTRAVENOUS at 02:34

## 2021-06-24 RX ADMIN — METHOCARBAMOL 1000 MG: 100 INJECTION, SOLUTION INTRAMUSCULAR; INTRAVENOUS at 11:22

## 2021-06-24 RX ADMIN — OXYCODONE 10 MG: 5 TABLET ORAL at 15:21

## 2021-06-24 RX ADMIN — ENOXAPARIN SODIUM 40 MG: 40 INJECTION SUBCUTANEOUS at 09:03

## 2021-06-24 RX ADMIN — DIAZEPAM 5 MG: 5 TABLET ORAL at 11:26

## 2021-06-24 RX ADMIN — HYDROMORPHONE HYDROCHLORIDE 0.5 MG: 1 INJECTION, SOLUTION INTRAMUSCULAR; INTRAVENOUS; SUBCUTANEOUS at 17:21

## 2021-06-24 RX ADMIN — OXYCODONE 10 MG: 5 TABLET ORAL at 11:08

## 2021-06-24 RX ADMIN — GABAPENTIN 200 MG: 100 CAPSULE ORAL at 14:08

## 2021-06-24 RX ADMIN — DOCUSATE SODIUM 50 MG AND SENNOSIDES 8.6 MG 4 TABLET: 8.6; 5 TABLET, FILM COATED ORAL at 21:02

## 2021-06-24 RX ADMIN — MORPHINE SULFATE 60 MG: 30 TABLET, FILM COATED, EXTENDED RELEASE ORAL at 09:03

## 2021-06-24 RX ADMIN — MORPHINE SULFATE 60 MG: 30 TABLET, FILM COATED, EXTENDED RELEASE ORAL at 21:02

## 2021-06-24 RX ADMIN — Medication 10 ML: at 09:08

## 2021-06-24 RX ADMIN — Medication 10 ML: at 02:33

## 2021-06-24 RX ADMIN — Medication 10 ML: at 21:04

## 2021-06-24 RX ADMIN — OXYCODONE AND ACETAMINOPHEN 2 TABLET: 5; 325 TABLET ORAL at 23:43

## 2021-06-24 RX ADMIN — GABAPENTIN 200 MG: 100 CAPSULE ORAL at 09:03

## 2021-06-24 RX ADMIN — HYDROMORPHONE HYDROCHLORIDE 0.5 MG: 1 INJECTION, SOLUTION INTRAMUSCULAR; INTRAVENOUS; SUBCUTANEOUS at 12:40

## 2021-06-24 RX ADMIN — AZITHROMYCIN MONOHYDRATE 500 MG: 500 INJECTION, POWDER, LYOPHILIZED, FOR SOLUTION INTRAVENOUS at 09:11

## 2021-06-24 ASSESSMENT — ENCOUNTER SYMPTOMS
DIARRHEA: 1
EYES NEGATIVE: 1
ALLERGIC/IMMUNOLOGIC NEGATIVE: 1
BACK PAIN: 1
RESPIRATORY NEGATIVE: 1

## 2021-06-24 ASSESSMENT — PAIN DESCRIPTION - LOCATION
LOCATION: BACK

## 2021-06-24 ASSESSMENT — PAIN DESCRIPTION - FREQUENCY
FREQUENCY: CONTINUOUS

## 2021-06-24 ASSESSMENT — PAIN DESCRIPTION - PAIN TYPE
TYPE: CHRONIC PAIN

## 2021-06-24 ASSESSMENT — PAIN SCALES - GENERAL
PAINLEVEL_OUTOF10: 9
PAINLEVEL_OUTOF10: 10
PAINLEVEL_OUTOF10: 7
PAINLEVEL_OUTOF10: 8
PAINLEVEL_OUTOF10: 9
PAINLEVEL_OUTOF10: 7
PAINLEVEL_OUTOF10: 9
PAINLEVEL_OUTOF10: 7
PAINLEVEL_OUTOF10: 8

## 2021-06-24 ASSESSMENT — PAIN DESCRIPTION - PROGRESSION
CLINICAL_PROGRESSION: NOT CHANGED

## 2021-06-24 ASSESSMENT — PAIN DESCRIPTION - ORIENTATION
ORIENTATION: LOWER
ORIENTATION: LOWER

## 2021-06-24 ASSESSMENT — PAIN DESCRIPTION - ONSET
ONSET: ON-GOING

## 2021-06-24 ASSESSMENT — PAIN DESCRIPTION - DESCRIPTORS
DESCRIPTORS: SQUEEZING
DESCRIPTORS: SQUEEZING

## 2021-06-24 NOTE — PROGRESS NOTES
4 Eyes Admission Assessment     I agree as the admission nurse that 2 RN's have performed a thorough Head to Toe Skin Assessment on the patient. ALL assessment sites listed below have been assessed on admission. Areas assessed by both nurses:   [x]   Head, Face, and Ears   [x]   Shoulders, Back, and Chest: Open wound to R chest - Port removal  [x]   Arms, Elbows, and Hands  L bicep: irritation/ open blister from Picc dressing  [x]   Coccyx, Sacrum, and Ischium  [x]   Legs, Feet, and Heels        Does the Patient have Skin Breakdown?   Yes a wound was noted on the Admission Assessment and an LDA was Initiated documentation include the Aleja-wound, Wound Assessment, Measurements, Dressing Treatment, Drainage, and Color\",         Hitesh Prevention initiated:  No   Wound Care Orders initiated:  No      [facility] nurse consulted for Pressure Injury (Stage 3,4, Unstageable, DTI, NWPT, and Complex wounds) or Hitesh score 18 or lower:  No      Nurse 1 eSignature: Electronically signed by Michael Zuluaga RN on 6/23/21 at 9:53 PM EDT    **SHARE this note so that the co-signing nurse is able to place an eSignature**    Nurse 2 eSignature: Electronically signed by Omi Hogue RN on 6/23/21 at 10:23 PM EDT

## 2021-06-24 NOTE — PLAN OF CARE
Problem: Pain:  Goal: Pain level will decrease  Description: Pain level will decrease  6/24/2021 0955 by Pepe Mora RN  Outcome: Ongoing  Note: Pt was complaining of 8 out of 10 pain to back. Pt was medicated per MAR. Upon reassessment pt pain had decreased. Will continue to monitor.

## 2021-06-24 NOTE — PROGRESS NOTES
Patient admitted to room 5508 at 2015 from ED. Patient is a/o x4. Patient denies complaints of nausea, patient has c/o pain. Non-skid socks given. Oriented patient to room and call light. Bed locked and in lowest positioned. Bedside table, personal belongings, and nurse call light within reach. Instructed patient to utilize call light for assistance. Bed alarm on. Will continue to monitor.

## 2021-06-24 NOTE — CONSULTS
[facility]  Palliative Medicine Consultation Note      Date Of Admission:6/23/2021  Date of consult: 06/24/21  Seen by [facility] in the past:  Yes    Recommendations:        Pt is well known to the palliative care team. Met with the pt and her  Riri Henry at the bedside. Pt reports recent decline at home where she is unable to walk due to weakness and is falling asleep in the midst of tasks. Discussed their understanding of changes in her medical condition. Her  reports that he has called their oncologist at [address] [place] [address],[address] to determine whether or not they will recommend continuing her current systemic therapy, and will make decisions based off of that recommendation. They are also waiting to hear from [address] [place] [address] whether or not any surgery is indicated. 1. Goals of Care/Advanced Care planning/Code status: Full Code, continue with current management. Pt and  planning to speak with her oncologist at [facility] who is directing her care about whether or not they will continue systemic therapy. 2. Pain 2/2 metastatic sarcoma: Pt reports pain had been well controlled at home until about a month ago when back pain worsened. She reports back pain is 7/10 today. She does take 10 mg oxycodone q4h for breakthrough pain at home, d/w Dr David Jamil and reordered this admission  3. SOB: Pt denies   4. PNA: Pt with new O2 requirement in the ED [facility], on room air on exam this morning.  Abx per primary team.   5. Disposition: Likely home with family when medically ready for discharge    Reason for Consult:         [x]  Goals of Care  [x]  Code Status Discussion/Advanced Care Planning   [x]  Psychosocial/Family Support  []  Symptom Management  []  Other (Specify)    Requesting Physician: Dr. Francisca Husain:  Intractable Back Pain    History Obtained From:  patient, electronic medical record    History of Present Illness:         Barb Ceja is a 35 y.o. female with PMH of metastatic sarcoma, Crohn's disease who presented with intractable back pain. Family also reported some confusion and increased weakness as well as decreased PO intake. She started a new IV chemo approx 6-7 weeks ago that she gets transfused once a week. Of note, she follows with [place] for her sarcoma. She had an MRI of the back done yesterday as outpatient which showed new osseous metastasis of the T4 vertebral body as well as stable metastatic disease of T11-T12. 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     Crohn's disease ([facility] [address]     Depression     Early menopause     follows with endocrine/gyne    IBS (irritable bowel syndrome)     IBS (irritable bowel syndrome)     Mixed hyperlipidemia 06/22/2017    Obesity     Osteoarthritis, knee     PONV (postoperative nausea and vomiting)     Prolonged emergence from general anesthesia     panic attacks on awakening, requests anxiety med on awakening    Sarcoma of left lower extremity ([facility] [address] 11/24/2017    s/p XRT, followed by surgery       Past Surgical History:        Procedure Laterality Date    ANKLE SURGERY Left     APPENDECTOMY      BREAST REDUCTION SURGERY      CARPAL TUNNEL RELEASE Right 07/2020    KNEE ARTHROSCOPY Right 01/2015    KNEE ARTHROSCOPY Right 07/2015    LAMINECTOMY N/A 2/17/2021    T7 LAMINECTOMY WITH T5-9 PEDICALE FIXATION AND REMOVAL OF EPIDURAL TUMOR AT T7 performed by Karla Rice.  [place], MD at [address] [place] [address] N/A 07/22/2019    ROBOTIC ASSISTED PARTIAL LIVER RESECTION AND ABDOMINAL MASS RESECTION performed by Hayden Simon MD at [address] [place] [address] Nw Left 11/2017    for sarcoma    OTHER SURGICAL HISTORY Left     biopsy of left thigh mass    OTHER SURGICAL HISTORY Left 02/26/2018    WIDE EXCISION LEFT THIGH SARCOMA          OTHER SURGICAL HISTORY Left 03/14/2018    INCISION AND DRAINAGE OF LEFT THIGH       OTHER [topiramate]    Social History:    · TOBACCO: reports that she has quit smoking. Her smoking use included cigarettes. She has a 7.50 pack-year smoking history. She has never used smokeless tobacco.  · ETOH:   reports current alcohol use. · Patient currently lives with family  and children    Review of Systems -   Review of Systems: A 10 point review of systems was conducted, significant findings as notedin HPI. Objective:          Physical Exam  Constitutional:       Appearance: She is ill-appearing. Cardiovascular:      Rate and Rhythm: Normal rate and regular rhythm. Heart sounds: Normal heart sounds. Pulmonary:      Effort: Pulmonary effort is normal.      Breath sounds: Normal breath sounds. Abdominal:      General: Bowel sounds are normal.      Palpations: Abdomen is soft. Musculoskeletal:      Right lower leg: Edema present. Left lower leg: Edema present. Skin:     General: Skin is warm and dry. Neurological:      Mental Status: She is alert and oriented to person, place, and time. Palliative Performance Scale:  [x] 60% Ambulation reduced; Significant disease; Can't do hobbies/housework; intake normal or reduced; occasional assist; LOC full/confusion  [] 50% Mainly sit/lie; Extensive disease; Can't do any work; Considerable assist; intake normal  Or reduced; LOC full/confusion  [] 40% Mainly in bed; Extensive disease; Mainly assist; intake normal or reduced; occasional assist; LOC full/confusion  [] 30% Bed Bound; Extensive disease; Total care; intake reduced; LOC full/confusion  [] 20% Bed Bound; Extensive disease; Total care; intake minimal; Drowsy/coma  [] 10% Bed Bound; Extensive disease;  Total care; Mouth care only; Drowsy/coma  [] 0% Death    PPS: 60    Vitals:    /76   Pulse 90   Temp 98.2 °F (36.8 °C) (Oral)   Resp 16   Ht 5' 9\" (1.753 m)   Wt 298 lb (135.2 kg)   LMP  (LMP Unknown)   SpO2 91%   BMI 44.01 kg/m²     Labs:    BMP:   Recent Labs 06/23/21  1515 06/24/21  0618   * 135*   K 4.9 4.7    100   CO2 24 23   BUN 11 11   CREATININE 0.8 0.7   GLUCOSE 113* 110*     CBC:   Recent Labs     06/23/21  1515 06/24/21  0618   WBC 13.2* 9.9   HGB 8.0* 7.8*   HCT 24.3* 24.0*    107*       LFT's:   Recent Labs     06/23/21  1515 06/24/21 0618   * 438*   * 362*   BILITOT 0.7 0.4   ALKPHOS 117 115     Troponin: No results for input(s): TROPONINI in the last 72 hours. BNP: No results for input(s): BNP in the last 72 hours. ABGs: No results for input(s): PHART, EQB8WFS, PO2ART in the last 72 hours. INR: No results for input(s): INR in the last 72 hours. U/A:  Recent Labs     06/23/21  1830   COLORU Yellow   PHUR 6.0   WBCUA 3-5   RBCUA None seen   CLARITYU Clear   SPECGRAV 1.015   LEUKOCYTESUR Negative   UROBILINOGEN 1.0   BILIRUBINUR SMALL*   BLOODU Negative   GLUCOSEU Negative       CT ABDOMEN PELVIS W IV CONTRAST Additional Contrast? None   Final Result   1. Eccentric thrombus noted adherent to the wall within the infrarenal inferior vena cava. This is chronic in appearance. Follow-up CT of the abdomen in 6 months is recommended. 2. Hepatic steatosis. 3. Mild gallbladder wall thickening, nonspecific. 4. Stable appearance of extraosseous tumor extending into the iliopsoas musculature on the right at the L3 level. 5. Bilateral lower lobe bronchopneumonia. Findings discussed with the emergency department at the time of report. CT Head WO Contrast   Final Result      No evidence of acute intracranial abnormality.                      XR CHEST PORTABLE   Final Result      No acute disease            Conclusion/Time spent:         Recommendations see above    Time spent with patient and/or family: 30  Time reviewing records: 10 min   Time communicating with staff: 5 min     A total of 45 minutes spent with the patient and family on unit greater than 50% in counseling regarding palliative care and in goals of care for the patient. Thank you to Dr. Cristian Parra for this consultation. We will continue to follow Ms. Hicks's care as needed.     [address] [facility]  [phone]

## 2021-06-24 NOTE — CONSULTS
Department of Radiation Oncology  Attending Consult Note      Reason for Consult:  Increasing back pain  Requesting Physician:  Vladislav Mark MD    CHIEF COMPLAINT:  Worsening back pain in mid-back, radiating more to left side    History Obtained From:  patient, spouse, electronic medical record    HISTORY OF PRESENT ILLNESS:      The patient is a 35 y.o. 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 In  she was found to have a liver metastasis and this has progressed to metastatic disease in the spine requiring a decompressive laminectomy at T7 by Dr Andres Felder in . She underwent adjuvant radiation to the T5-sacrum in March receiving 30 Gy in 10 fractions. Since finishing her xrt she has been on Trabectedin and recently received there third dose. She presents with worsening back pain and an MRI of the T spine shows recurrent/progressive disease in the T5-T7 area and a new lesion in the T4 vertebral body.       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     Crohn's disease ([facility]     Depression     Early menopause     follows with endocrine/gyne    IBS (irritable bowel syndrome)     IBS (irritable bowel syndrome)     Mixed hyperlipidemia 2017    Obesity     Osteoarthritis, knee     PONV (postoperative nausea and vomiting)     Prolonged emergence from general anesthesia     panic attacks on awakening, requests anxiety med on awakening  Sarcoma of left lower extremity ([facility] [address] 11/24/2017    s/p XRT, followed by surgery     Past Surgical History:        Procedure Laterality Date    ANKLE SURGERY Left     APPENDECTOMY      BREAST REDUCTION SURGERY      CARPAL TUNNEL RELEASE Right 07/2020    KNEE ARTHROSCOPY Right 01/2015    KNEE ARTHROSCOPY Right 07/2015    LAMINECTOMY N/A 2/17/2021    T7 LAMINECTOMY WITH T5-9 PEDICALE FIXATION AND REMOVAL OF EPIDURAL TUMOR AT T7 performed by Bailey Saavedra MD at [address] [place] [address] N/A 07/22/2019    ROBOTIC ASSISTED PARTIAL LIVER RESECTION AND ABDOMINAL MASS RESECTION performed by Rhonda Garcia MD at [address] [place] [address] Left 11/2017    for sarcoma    OTHER SURGICAL HISTORY Left     biopsy of left thigh mass    OTHER SURGICAL HISTORY Left 02/26/2018    WIDE EXCISION LEFT THIGH SARCOMA          OTHER SURGICAL HISTORY Left 03/14/2018    INCISION AND DRAINAGE OF LEFT THIGH       OTHER SURGICAL HISTORY Left 03/22/2018    incision and drainage left posterior thigh    PORT SURGERY Right 11/30/2020    PORT REMOVAL performed by Rhonda Garcia MD at [address] SALPINGECTOMY Left 2009    d/t scar tissue    TOTAL KNEE ARTHROPLASTY Right 09/2015       Current Medications:    Current Facility-Administered Medications: morphine (MS CONTIN) extended release tablet 60 mg, 60 mg, Oral, 2 times per day  oxyCODONE (ROXICODONE) immediate release tablet 5 mg, 5 mg, Oral, Q4H PRN **OR** oxyCODONE (ROXICODONE) immediate release tablet 10 mg, 10 mg, Oral, Q4H PRN  diazePAM (VALIUM) tablet 5 mg, 5 mg, Oral, Q6H PRN  oxyCODONE-acetaminophen (PERCOCET) 5-325 MG per tablet 1 tablet, 1 tablet, Oral, Q4H PRN **OR** oxyCODONE-acetaminophen (PERCOCET) 5-325 MG per tablet 2 tablet, 2 tablet, Oral, Q4H PRN  methocarbamol (ROBAXIN) 1,000 mg in dextrose 5 % 100 mL IVPB, 1,000 mg, Intravenous, Q8H **FOLLOWED BY** methocarbamol (ROBAXIN) tablet 750 mg, 750 mg, Oral, Q6H PRN  gabapentin (NEURONTIN) capsule 200 mg, 200 mg, Oral, TID  sennosides-docusate sodium (SENOKOT-S) 8.6-50 MG tablet 4 tablet, 4 tablet, Oral, Nightly  sodium chloride flush 0.9 % injection 5-40 mL, 5-40 mL, Intravenous, 2 times per day  sodium chloride flush 0.9 % injection 5-40 mL, 5-40 mL, Intravenous, PRN  0.9 % sodium chloride infusion, 25 mL, Intravenous, PRN  enoxaparin (LOVENOX) injection 40 mg, 40 mg, Subcutaneous, Daily  polyethylene glycol (GLYCOLAX) packet 17 g, 17 g, Oral, Daily PRN  promethazine (PHENERGAN) tablet 12.5 mg, 12.5 mg, Oral, Q6H PRN **OR** ondansetron (ZOFRAN) injection 4 mg, 4 mg, Intravenous, Q6H PRN  cefTRIAXone (ROCEPHIN) 1000 mg IVPB in 50 mL D5W minibag, 1,000 mg, Intravenous, Q24H  azithromycin (ZITHROMAX) 500 mg in dextrose 5 % 250 mL IVPB, 500 mg, Intravenous, Q24H    Allergies:  Review of patient's allergies indicates no known allergies. 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[place], [place] [address]way Use    Smoking status: Former Smoker     Packs/day: 0.50     Years: 15.00     Pack years: 7.50     Types: Cigarettes    Smokeless tobacco: Never Used   Substance and Sexual Activity    Alcohol use: Yes     Comment: rare    Drug use: Yes     Types: Marijuana     Comment: medical marijuana    Sexual activity: Yes     Partners: Male     Comment:     Other Topics Concern    Not on file   Social History Narrative    Not on file     Social Determinants of Health     Financial Resource Strain:     Difficulty of Paying Living Expenses:    Food Insecurity:     Worried About Running Out of Food in the Last Year:     [address] in the Last Year:    Transportation Needs:     Lack of Transportation (Medical):      Lack of Transportation (Non-Medical):    Physical Activity:     Days of Exercise per Week:     Minutes of Exercise per Session:    Stress:     Feeling of Stress :    Social Connections:     Frequency of Communication with Friends and Family:     Frequency of Social Gatherings with Friends and Family:     Attends Denominational Services:     Active Member of Clubs or Organizations:     Attends Club or Organization Meetings:     Marital Status:    Intimate Partner Violence:     Fear of Current or Ex-Partner:     Emotionally Abused:     Physically Abused:     Sexually Abused:        Family History:       Problem Relation Age of Onset    Hypertension Father     Elevated Lipids Father     Coronary Art Dis Maternal Grandfather 36    Heart Failure Paternal Grandfather 76    Hypertension Brother     Coronary Art Dis Maternal Uncle      REVIEW OF SYSTEMS:    As outlined in HPI and chart review, otherwise review of general, eyes, nose, throat, pulmonary, cardiac, GI, , musculoskeletal, neurological, and integumentary systems is negative. PHYSICAL EXAM:      Vitals:    Vitals:    06/24/21 1115   BP: 115/76   Pulse: 92   Resp: 16   Temp: 97.5 °F (36.4 °C)   SpO2: 94%       Sitting in chair, emotionally upset. Reports pain pointing to mid back and lt lat ribs. Not moving much. Breathing comfortably on RA. DATA:      XR CHEST (2 VW)    Result Date: 6/10/2021  Site: [facility] #: [id number] #: [id number]: [facility] #: [de-identified] #: [id number] #: [id number]: XR CHEST PA AND LATERALExam Date/Time: 06/10/2021 07:55 PMAdmitting Diagnosis: [id number] for Exam: SOB Dictated by: Parth Schulz MISTY: 06/10/2021 08:22 PMT: This document is confidential medical information. Unauthorized disclosure or use of this information is prohibited by law. If you are not the intended recipient of this document, please advise us by calling immediately [phone].  Impression/Conclusion below HISTORY:   SOB leg swelling dvt COMPARISON: CT chest 5/24/2021 NOTE:  If posteriorly. This measures approximately 9 mm. 6 mm metastasis of the superior endplate of the T4 vertebral body is also new in the interval. Status post T5-T9 posterior fusion. T7 laminectomy defect. Epidural tumor is again identified most prominently at T7, however, this extends up to the T5 level on the left, as noted on image 23 of series 16. This is more prominent than on prior studies, particularly when compared with presurgical images of February 2021. At this time, epidural extension of tumor is noted to the T6 level but very little if at all T5. On the current study, there is leftward epidural extension of tumor at T4 and more prominently at T5 with rightward displacement of the cord and effacement of the CSF space. Previously noted metastasis of T7 is not well appreciated. Metastatic involvement of the T12 vertebral body diffusely is similar to prior. Small metastasis of T11 is also unchanged. There is no critical canal or foraminal narrowing otherwise within the thoracic spine. New osseous metastases of the T4 vertebral body. Stable osseous metastatic disease of T11, T12. Previously noted osseous metastasis at T7 is not well appreciated on this study. Epidural extension of tumor is again noted centered about the T7 level, however, this extends cranial to T6 and T5, and upward to the T4-5 disc space. This is predominantly on the left resulting in rightward displacement of the spinal cord and effacement  of the CSF space. This appears progressed when compared to the recent prior studies. CT ABDOMEN PELVIS W IV CONTRAST Additional Contrast? None    Result Date: 6/23/2021  EXAM: CT Abdomen and Pelvis with Contrast INDICATION: elevated LFTs COMPARISON: 3/16/2021 TECHNIQUE: Multiplanar reformatted images of the abdomen and pelvis are provided for review. Up-to-date CT equipment and radiation dose reduction techniques were employed.  IV Contrast: 80 mL, Isovue-370 Oral Contrast: None CT radiation dose optimization techniques (automated exposure control, use of a iterative reconstruction techniques, or adjustment of the mA or KV according to the patients size) were used to limit patient radiation dose. FINDINGS: Lung bases: Pulmonary consolidation is noted of the bilateral lower lobes. Liver: Decreased attenuation of the hepatic parenchyma, indicative of steatosis. Gallbladder and Biliary Tree: Mild gallbladder wall thickening. No calcified gallstones. No intra- or extrahepatic biliary dilatation. Pancreas: Normal. Spleen: Normal. Adrenal Glands: Normal. Kidneys and Ureters: There is no obstructing urolithiasis or hydronephrosis. Renal parenchymal enhancement is symmetric. Urinary Bladder: Normal. Bowel: Normal diameter, nonobstructed. Reproductive Organs: No associated masses. Lymph Nodes: No abnormally enlarged nodes. Peritoneum/Retroperitoneum: No ascites or free air. Paraspinal and retroperitoneal thickening involving the right iliopsoas muscle most apparent at the L3 level is similar to the prior MRI of March 16, 2021 Vessels: Within the infrarenal inferior vena cava, there is an eccentric filling defect adherent to the wall measuring 2.3 x 1.2 cm, image 78 of series 601 and image 61 of series 602. The visualized aspects of the bilateral common femoral veins and iliac  venous system appear patent. Splenic vein, SMV, PV and hepatic veins demonstrate enhancement. Abdominal Wall: Normal. Bones: Osseous metastatic disease burden is better identified on recent prior MRIs. There is no pathologic fracture or acute bony abnormality. Other Findings: None. 1. Eccentric thrombus noted adherent to the wall within the infrarenal inferior vena cava. This is chronic in appearance. Follow-up CT of the abdomen in 6 months is recommended. 2. Hepatic steatosis. 3. Mild gallbladder wall thickening, nonspecific. 4. Stable appearance of extraosseous tumor extending into the iliopsoas musculature on the right at the L3 level. 5. Bilateral lower lobe bronchopneumonia. Findings discussed with the emergency department at the time of report. XR CHEST PORTABLE    Result Date: 6/23/2021  1 view chest HISTORY: Altered mental status COMPARISON: February 15, 2021. FINDINGS: The cardiomediastinal contours are normal.  The lungs are clear. No pleural abnormality is evident. 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 She just had this area surgically decompressed and radiated in feb/mar and now has evidence of recurrence causing pain in the area as well as radiating around the left riley ribs. Given the short time to failure, additional xrt is unlikely to be of any lasting help. She is not responding to her current chemo regimen. I spoke with Dr Darell Arnett at [address] sarcoma clinic, who the patient has worked with before. I also spoke with Dr Sabina Aburto who is her local oncologist and asked her to reach out to him to consult on possible additional chemo regimens. I did discuss hospice care with the patient's . She was understandably emotionally upset and did not want to discuss care options. Please call with any questions. Thank you for inviting me to participate in Mrs Hicks's care. Sandra Macias MD  [facility] Radiation Oncology  [phone]    ECOG Performance Status (ECOG Scale 0-4): Score 3:  Patient is capable of only limited self-care, confined to bed or chair greater than 50% of waking hours.

## 2021-06-24 NOTE — CARE COORDINATION
Case Management Assessment           Initial Evaluation                Date / Time of Evaluation: 6/24/2021 10:59 AM                 Assessment Completed by: Andie Toledo RN    Patient Name: Leonie Ambrose     YOB: 1980  Diagnosis: Intractable pain [R52]     Date / Time: 6/23/2021  2:11 PM    Patient Admission Status: Inpatient     Met with pt at the bedside to complete initial assessment. Spouse present for interview. She is alert and oriented x 4. She lives at home w  and children. She is independent with ADL's. No assistive devices required. No needs anticipated at discharge. Palliative care is following. If patient is discharged prior to next notation, then this note serves as note for discharge by case management. Current PCP: Leona Andrade MD  Clinic Patient: No    Chart Reviewed: Yes  Patient/ Family Interviewed: Yes    Initial assessment completed at bedside with: Pt and spouse    Hospitalization in the last 30 days: No    Emergency Contacts:  Extended Emergency Contact Information  Primary Emergency Contact: [address] [place] [address] Phone: [phone]  Relation: Spouse  Secondary Emergency Contact: Alannah Willis  [place] Phone: [phone]  Relation: Parent   needed? No    Advance Directives:   Code Status: Full Code    Financial  Payor: [id number] / Plan: [id number] NAP CHOICE POS II / Product Type: *No Product type* /     Pre-cert required for SNF: Yes    Pharmacy    [address] [place], [facility] [place] [address] [place] [address]  Phone: [phone] Fax: [phone]      Potential assistance Purchasing Medications: Potential Assistance Purchasing Medications: No  Does Patient want to participate in local refill/ meds to beds program?: Yes    Meds To Beds General Rules:  1. Can ONLY be done Monday- Friday between 8:30am-5pm  2.  Prescription(s) must be in pharmacy by 3pm to be filled same day  3. Copy of patient's insurance/ prescription drug card and patient face sheet must be sent along with the prescription(s)  4. Cost of Rx cannot be added to hospital bill. If financial assistance is needed, please contact unit  or ;  or  CANNOT provide pharmacy voucher for patients co-pays  5. Patients can then  the prescription on their way out of the hospital at discharge, or pharmacy can deliver to the bedside if staff is available. (payment due at time of pick-up or delivery - cash, check, or card accepted)     Able to afford home medications/ co-pay costs: Yes    ADLS  Support Systems: Spouse/Significant Other, Children    PT AM-PAC:   /24  OT AM-PAC:   /24    HOUSING  Home Environment: House  Steps: n/a    Plans to RETURN to current housing: Yes  Barriers to RETURNING to current housing: None    Mandy Jewell 76  Currently ACTIVE with [address]: No  Home Care Agency: Not Applicable      DISCHARGE PLAN:  Disposition: Home- No Services Needed    Transportation PLAN for discharge: family     Factors facilitating achievement of predicted outcomes: Family support, Motivated, Cooperative, Pleasant and Good insight into deficits    Barriers to discharge: Pain      The Plan for Transition of Care is related to the following treatment goals of Intractable pain [R52]    The Patient and/or patient representative Alysha Barahona and her family were provided with a choice of provider and agrees with the discharge plan Yes    Freedom of choice list was provided with basic dialogue that supports the patient's individualized plan of care/goals and shares the quality data associated with the providers.  Yes    Care Transition patient: No    Guy Ceron, KYLE RN-BC  [facility]  Case Management Department  [phone]

## 2021-06-24 NOTE — PROGRESS NOTES
Pt complaining of pain/anxiety. MD was notified and asked if PRN medications could be ordered. Pt was medicated per MAR.

## 2021-06-24 NOTE — PROGRESS NOTES
NEUROSURGERY PROGRESS NOTE    6/24/2021 10:42 AM                               Sierra Garcia                      LOS: 1 day     Subjective: Patient sitting up in chair upon entering the room. No acute events overnight. Patient continues to c/o generalized weakness and back pain. Physical Exam:  Patient seen and examined    Vitals:    06/24/21 0725   BP: 116/76   Pulse: 90   Resp: 16   Temp: 98.2 °F (36.8 °C)   SpO2: 91%     GCS:  4 - Opens eyes on own  5 - Alert and oriented  6 - Follows simple motor commands  General: Well developed. Alert and cooperative in no acute distress. HENT: atraumatic, neck supple  Eyes: Optic discs: Not tested  Pulmonary: unlabored respiratory effort  Cardiovascular:  Warm well perfused. No peripheral edema  Gastrointestinal: abdomen soft, NT, ND    Neurological:  Mental Status: Awake, alert, oriented x 4, speech clear and appropriate  Attention: Intact  Language: No aphasia or dysarthria noted  Sensation: Intact to all extremities to light touch  Coordination: Intact  DTRs:    Right  Left    ankle clonus  Neg Neg   toes (babinski)  Down Down     Cranial Nerves:  II: Visual acuity not tested, denies new visual changes / diplopia  III, IV, VI: PERRL, 3 mm bilaterally, EOMI, no nystagmus noted  V: Facial sensation intact bilaterally to touch  VII: Face symmetric  VIII: Hearing intact bilaterally to spoken voice  IX: Palate movement equal bilaterally  XI: Shoulder shrug equal bilaterally  XII: Tongue midline    Musculoskeletal:   Gait: Not tested   Assist devices: None   Tone: Normal  Motor strength:    Right  Left    Right  Left    Deltoid  4 4  Hip Flex  3+ 3+   Biceps  4 4  Knee Extensors  3+ 3+   Triceps  4 4  Knee Flexors  3+ 3+   Wrist Ext  4 4  Ankle Dorsiflex. 3+ 3+   Wrist Flex  4 4  Ankle Plantarflex.   3+ 3+   Handgrip  4 4  Ext Clayton Longus  3+ 3+   Thumb Ext  4 4         Radiological Findings:  CT Head WO Contrast  Result Date: 6/23/2021  No evidence of acute intracranial abnormality. CT ABDOMEN PELVIS W IV CONTRAST Additional Contrast? None  Result Date: 6/23/2021  1. Eccentric thrombus noted adherent to the wall within the infrarenal inferior vena cava. This is chronic in appearance. Follow-up CT of the abdomen in 6 months is recommended. 2. Hepatic steatosis. 3. Mild gallbladder wall thickening, nonspecific. 4. Stable appearance of extraosseous tumor extending into the iliopsoas musculature on the right at the L3 level. 5. Bilateral lower lobe bronchopneumonia. Findings discussed with the emergency department at the time of report. MRI THORACIC SPINE W WO CONTRAST  Result Date: 6/23/2021  New osseous metastases of the T4 vertebral body. Stable osseous metastatic disease of T11, T12. Previously noted osseous metastasis at T7 is not well appreciated on this study. Epidural extension of tumor is again noted centered about the T7 level, however, this extends cranial to T6 and T5, and upward to the T4-5 disc space. This is predominantly on the left resulting in rightward displacement of the spinal cord and effacement  of the CSF space. This appears progressed when compared to the recent prior studies. Labs:  Recent Labs     06/24/21  0618   WBC 9.9   HGB 7.8*   HCT 24.0*   *       Recent Labs     06/24/21  0618   *   K 4.7      CO2 23   BUN 11   CREATININE 0.7   GLUCOSE 110*   CALCIUM 8.2*       No results for input(s): PROTIME, INR, APTT in the last 72 hours.     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

## 2021-06-24 NOTE — H&P
Hospital Medicine History & Physical      PCP: Dmitriy Chavis MD    Date of Admission: 6/23/2021    Date of Service: Pt seen/examined on 06/23/21and Admitted to Inpatient     Chief Complaint: Intractable back pain      History Of Present Illness: The patient is a 35 y.o. female who is presenting with worsening back pain. Patient has history of sarcoma of lower extremity and follows up with [facility] oncology and [address] [place] [address],[address].  patient states that for the last few days she has been feeling extremely weak struggling in her day-to-day life, she was also confused as per her family. She had worsening back pain which has progressed. She had an MRI of the back done yesterday as outpatient which showed new osseous metastasis of the T4 vertebral body as well as stable metastatic disease of T11-T12.     ED work-up with elevated liver enzymes, which triggered a CT of her abdomen which showed bilateral lower lobe pneumonia, she is requiring 2 L of oxygen      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     Crohn's disease ([address]     Depression     Early menopause     follows with endocrine/gyne    IBS (irritable bowel syndrome)     IBS (irritable bowel syndrome)     Mixed hyperlipidemia 06/22/2017    Obesity     Osteoarthritis, knee     PONV (postoperative nausea and vomiting)     Prolonged emergence from general anesthesia     panic attacks on awakening, requests anxiety med on awakening    Sarcoma of left lower extremity ([address] 11/24/2017    s/p XRT, followed by surgery       Past Surgical History:        Procedure Laterality Date    ANKLE SURGERY Left     APPENDECTOMY      BREAST REDUCTION SURGERY      CARPAL TUNNEL RELEASE Right 07/2020    KNEE ARTHROSCOPY Right 01/2015    KNEE ARTHROSCOPY Right 07/2015  LAMINECTOMY N/A 2/17/2021    T7 LAMINECTOMY WITH T5-9 PEDICALE FIXATION AND REMOVAL OF EPIDURAL TUMOR AT T7 performed by Otoniel Costello. Belem Navarro MD at [address] N/A 07/22/2019    ROBOTIC ASSISTED PARTIAL LIVER RESECTION AND ABDOMINAL MASS RESECTION performed by Mary Martell MD at [address] [facility] [address] Left 11/2017    for sarcoma    OTHER SURGICAL HISTORY Left     biopsy of left thigh mass    OTHER SURGICAL HISTORY Left 02/26/2018    WIDE EXCISION LEFT THIGH SARCOMA          OTHER SURGICAL HISTORY Left 03/14/2018    INCISION AND DRAINAGE OF LEFT THIGH       OTHER SURGICAL HISTORY Left 03/22/2018    incision and drainage left posterior thigh    PORT SURGERY Right 11/30/2020    PORT REMOVAL performed by Mary Martell MD at [address] SALPINGECTOMY Left 2009    d/t scar tissue    TOTAL KNEE ARTHROPLASTY Right 09/2015       Medications Prior to Admission:    Prior to Admission medications    Medication Sig Start Date End Date Taking? Authorizing Provider   sucralfate (CARAFATE) 1 GM tablet Take 1 tablet by mouth 4 times daily as needed (stomach pain) May dissolve pill in water if unable to take full pill 3/28/21   Zane Ospina MD   dexamethasone (DECADRON) 4 MG tablet Take 1 tablet by mouth 2 times daily 3/27/21   Zane Ospina MD   methocarbamol (ROBAXIN) 750 MG tablet Take 750 mg by mouth every 4 hours    Historical Provider, MD   acetaminophen (TYLENOL) 500 MG tablet Take 1,000 mg by mouth every 6 hours    Historical Provider, MD   diazePAM (VALIUM) 5 MG tablet Take 5 mg by mouth every 6 hours. Historical Provider, MD   omeprazole (PRILOSEC) 20 MG delayed release capsule Take 20 mg by mouth daily    Historical Provider, MD   gabapentin (NEURONTIN) 300 MG capsule Take 300 mg by mouth 3 times daily.     Historical Provider, MD   sennosides-docusate sodium (SENOKOT-S) 8.6-50 MG tablet Take 4 tablets by mouth nightly    Historical Provider, MD   polyethylene glycol (GLYCOLAX) 17 g packet Take 17 g by mouth daily    Historical Provider, MD   simethicone (MYLICON) 80 MG chewable tablet Take 80 mg by mouth 4 times daily (after meals and at bedtime)    Historical Provider, MD   oxyCODONE 5 MG capsule Take 10 mg by mouth every 6 hours as needed for Pain. Historical Provider, MD   DPH-Lido-AlHydr-MgHydr-Simeth (FIRST-MOUTHWASH BLM MT) Take 10 mLs by mouth every 3 hours as needed    Historical Provider, MD       Allergies:  Ketamine, Nsaids, Ondansetron hcl, and Topamax [topiramate]    Social History:  The patient currently lives  At home with family    TOBACCO:   reports that she has quit smoking. Her smoking use included cigarettes. She has a 7.50 pack-year smoking history. She has never used smokeless tobacco.  ETOH:   reports current alcohol use. Family History:  Reviewed in detail and negative for DM, Early CAD, Cancer, CVA. Positive as follows:        Problem Relation Age of Onset    Hypertension Father     Elevated Lipids Father     Coronary Art Dis Maternal Grandfather 36    Heart Failure Paternal Grandfather 76    Hypertension Brother     Coronary Art Dis Maternal Uncle        REVIEW OF SYSTEMS:   Positive  And negative  noted in the HPI. All other systems reviewed and negative. PHYSICAL EXAM:    BP 94/60   Pulse 92   Temp 98.2 °F (36.8 °C) (Oral)   Resp 16   Ht 5' 9\" (1.753 m)   Wt 298 lb (135.2 kg)   LMP  (LMP Unknown)   SpO2 92%   BMI 44.01 kg/m²     General appearance: No apparent distress appears stated age and cooperative. HEENT Normal cephalic, atraumatic without obvious deformity. Pupils equal, round, and reactive to light. Extra ocular muscles intact. Conjunctivae/corneas clear. Neck: Supple, No jugular venous distention/bruits. Trachea midline without thyromegaly or adenopathy with full range of motion. Lungs: Clear to auscultation, bilaterally without Rales/Wheezes/Rhonchi with good respiratory effort.   Heart: Regular rate and rhythm with Normal S1/S2 without murmurs, rubs or gallops, point of maximum impulse non-displaced  Abdomen: Soft, non-tender or non-distended without rigidity or guarding and positive bowel sounds all four quadrants. Extremities: Bilateral leg swelling , left more than right ,  is improving per patient  neurologic: Alert and oriented X 3, neurovascularly intact with sensory/motor intact upper extremities/lower extremities, bilaterally. Cranial nerves: II-XII intact, grossly non-focal.  Mental status: Alert, oriented, thought content appropriate. Capillary Refill: Acceptable  < 3 seconds  Peripheral Pulses: +3 Easily felt, not easily obliterated with pressure      CXR:  I have reviewed the CXR with the following interpretation: poor film , normal study  EKG:  I have reviewed the EKG with the following interpretation: n/a     CBC   Recent Labs     06/23/21  1515   WBC 13.2*   HGB 8.0*   HCT 24.3*         RENAL  Recent Labs     06/23/21  1515   *   K 4.9      CO2 24   BUN 11   CREATININE 0.8     LFT'S  Recent Labs     06/23/21  1515   *   *   BILIDIR 0.4*   BILITOT 0.7   ALKPHOS 117     COAG  No results for input(s): INR in the last 72 hours. CARDIAC ENZYMES  No results for input(s): CKTOTAL, CKMB, CKMBINDEX, TROPONINI in the last 72 hours.     U/A:    Lab Results   Component Value Date    NITRITE neg 03/08/2013    COLORU Yellow 06/23/2021    WBCUA 3-5 06/23/2021    RBCUA None seen 06/23/2021    BACTERIA Rare 03/27/2021    CLARITYU Clear 06/23/2021    SPECGRAV 1.015 06/23/2021    LEUKOCYTESUR Negative 06/23/2021    BLOODU Negative 06/23/2021    GLUCOSEU Negative 06/23/2021       ABG  No results found for: ZCC9ZCR, BEART, U7CQMPYO, PHART, THGBART, IHD5QRK, PO2ART, CWP9LKA        Active Hospital Problems    Diagnosis Date Noted    Intractable pain [R52] 06/23/2021         PHYSICIANS CERTIFICATION:    I certify that [facility] is expected to be hospitalized for more  than 2 midnights based on the following assessment and plan:      ASSESSMENT/PLAN:    Intractable back pain due to Bone  metastasis of the T4 vertebral body  Neurosurgery consulted  Oncology consulted  Palliative care consulted  Continue home morphine with hold parameters    Elevated LFTS  Likely due to chemo as per oncology  Check CMP daily  Normal ammonia    B/l pna  Started on IV ceftriaxone and azithromycin  Check urine Legionella and streptococci    Acute anemia  Likely due to above  Check an H&H every 12 hourly    Leukocytosis:- due to above  Continue to monitor    DVT Prophylaxis: lovenox  Diet: ADULT DIET; Regular  Code Status: Full Code  PT/OT Eval Status: Will order once pain is better    Dispo - inpatient        Yogi Travis MD    Thank you Bud Rocha MD for the opportunity to be involved in this patient's care. If you have any questions or concerns please feel free to contact me at [phone].

## 2021-06-24 NOTE — CONSULTS
Oncology Hematology Care  Consult Note      Requesting Physician: Petty Phelps MD    CHIEF COMPLAINT:  Pain    HISTORY OF PRESENT ILLNESS:  Ms. Duane Kang  is a 35 y.o. female we are seeing in consultation for metastatic sarcoma. Her primary oncologist is Dr Lakeisha Nicole at [facility]. Her pain specialist is Dr Sari Rico at [facility]. History of her cancer as follows -  1. Initial Diagnosis - She developed a posterior left thigh mass in 2017. MR 11/24/17 showed a 18 x 11 x 9.3 cm mass. 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 CT chest, abdomen, pelvis 12/06/17 was normal. Bone scan 12/06/17 showed uptake in the mass but not in skeleton. 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 She underwent pre-operative radiation 12/28/17 - 2/2/18 to 50 Gy in 25 fractions. She did not meet with a medical oncologist to discuss chemotherapy. She underwent wide resection of the sarcoma 2/26/18 by Dr. Ava Hurt. MR femur 6/2018 showed seroma. CT chest and MR abdomen 6/2018 were normal.    2. First recurrence - surveillance CT chest 1/29/19 identified a new mas in the dome of the liver. MR abdomen 1/30/19 showed a 1.7 x 1.6 cm lesion in the medial segment left lobe of liver.  CT chest 6/7/19 showed interval growth of the liver lesion but clear lungs and MR abdomen 6/20/19 showed enlarging liver mass and a 1.4 x 1 cm enhancing nodule in the anterior peritoneum. MRI femur showed no recurrence of sarcoma. Needle biopsy of liver lesion 6/27/19 showed metastatic myxoid liposarcoma. CT abdomen/pelvis 7/18/19 showed the large liver mass, 2 indeterminate small liver masses and the peritoneal nodules. She underwent resection of all known sites of disease on 7/22/19. The larger liver lesion and peritoneal nodule were metastatic myxoid liposarcoma. The 2 small liver lesions were benign and unrelated to sarcoma. She started doxorubicin and ifosfamide 8/22/19.  Dose of ifosfamide was reduced after cycle #3 was complicated by neutropenic fever and admission to the hospital. Cycles 4-6 were tolerated well with prophylactic levofloxacin during katelin period. Chemotherapy was completed December 2019.  3. Second recurrence - In Feb 2021, was diagnosed with metastasis involving T7 with epidural extension, T12 and L3 based on MRI scan February 15, 2021. She underwent urgent T7 laminectomy and fusion of T5 through T9 on February 17, 2021. 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 Tumor was sent to [facility] for sequencing which showed MSI stable, tumor mutational burden low, pathogenic variant in TERT promoter and TP53, and PDL1 expression was negative. It appears that translocations/fusions were not evaluated. During postoperative recovery she underwent MRI scan of the brain and cervical spine on March 25th and March 24th respectively which were normal. MRI of the thoracic and lumbar spine on March 16, 2021 showed metastatic disease involving T7, T11, T12, L1, L2, L3, the sacrum and the pelvis. Epidural tumor extension was noted at L3 and T5 through T7. She reports receiving 10 fractions of radiation therapy to T5 through T9 in 10 fractions to the lumbar and sacral area concurrently with ifosphomide. MRI scan of the pelvis April 27, 2021 showed multiple osseous metastasis in the pelvis including the right femoral neck. She underwent ORIF with stabilization on 4/30/21 with Dr. Epi Jones     She started palliative treatment with trabectidin June 21, 2021. Hospitalization, she presents with increasing pain and decreasing functional status. Decreased p.o. intake. She has been experiencing nausea and vomiting with diarrhea. She saw her primary oncologist on June 22. The pain management plan at that time was increased use of oxycodone for breakthrough and consultation with Dr. Maki Valle for consideration of possible T7 kyphoplasty. A follow-up MRI of the back on June 22 revealed a new bony metastasis at T4.     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     [facility]'s disease ([facility]     Depression     Early menopause     follows with endocrine/gyne    IBS (irritable bowel syndrome)     IBS (irritable bowel syndrome)     Mixed hyperlipidemia 06/22/2017    Obesity     Osteoarthritis, knee     PONV (postoperative nausea and vomiting)     Prolonged emergence from general anesthesia     panic attacks on awakening, requests anxiety med on awakening    Sarcoma of left lower extremity ([facility] 11/24/2017    s/p XRT, followed by surgery       Past Surgical History:        Procedure Laterality Date    ANKLE SURGERY Left     APPENDECTOMY      BREAST REDUCTION SURGERY      CARPAL TUNNEL RELEASE Right 07/2020    KNEE ARTHROSCOPY Right 01/2015    KNEE ARTHROSCOPY Right 07/2015    LAMINECTOMY N/A 2/17/2021    T7 LAMINECTOMY WITH T5-9 PEDICALE FIXATION AND REMOVAL OF EPIDURAL TUMOR AT T7 performed by Thanh Toro.  Shereen Saavedra MD at [address] [place] [address] N/A 07/22/2019    ROBOTIC ASSISTED PARTIAL LIVER RESECTION AND ABDOMINAL MASS RESECTION performed by Rhonda Garcia MD at [address] [place] [address] [address] Left 11/2017    for sarcoma    OTHER SURGICAL HISTORY Left     biopsy of left thigh mass    OTHER SURGICAL HISTORY Left 02/26/2018    WIDE EXCISION LEFT THIGH SARCOMA          OTHER SURGICAL HISTORY Left 03/14/2018    INCISION AND DRAINAGE OF LEFT THIGH       OTHER SURGICAL HISTORY Left 03/22/2018    incision and drainage left posterior thigh    PORT SURGERY Right 11/30/2020    PORT REMOVAL performed by Rhonda Garcia MD at [facility] [address] SALPINGECTOMY Left 2009    d/t scar tissue    TOTAL KNEE ARTHROPLASTY Right 09/2015       Current Medications:  Current Facility-Administered Medications: morphine (MS CONTIN) extended release tablet 60 mg, 60 mg, Oral, 2 times per day  methocarbamol (ROBAXIN) 1,000 mg in dextrose 5 % 100 mL IVPB, 1,000 mg, Intravenous, Q8H **FOLLOWED BY** methocarbamol (ROBAXIN) tablet 750 mg, 750 mg, Oral, Q6H PRN  gabapentin (NEURONTIN) capsule 200 mg, 200 mg, Oral, TID  sennosides-docusate sodium (SENOKOT-S) 8.6-50 MG tablet 4 tablet, 4 tablet, Oral, Nightly  sodium chloride flush 0.9 % injection 5-40 mL, 5-40 mL, Intravenous, 2 times per day  sodium chloride flush 0.9 % injection 5-40 mL, 5-40 mL, Intravenous, PRN  0.9 % sodium chloride infusion, 25 mL, Intravenous, PRN  enoxaparin (LOVENOX) injection 40 mg, 40 mg, Subcutaneous, Daily  polyethylene glycol (GLYCOLAX) packet 17 g, 17 g, Oral, Daily PRN  promethazine (PHENERGAN) tablet 12.5 mg, 12.5 mg, Oral, Q6H PRN **OR** ondansetron (ZOFRAN) injection 4 mg, 4 mg, Intravenous, Q6H PRN  cefTRIAXone (ROCEPHIN) 1000 mg IVPB in 50 mL D5W minibag, 1,000 mg, Intravenous, Q24H  azithromycin (ZITHROMAX) 500 mg in dextrose 5 % 250 mL IVPB, 500 mg, Intravenous, Q24H    Allergies:  Ketamine, Nsaids, Ondansetron hcl, and Topamax [topiramate]    Social History:      Social History     Socioeconomic History    Marital status:      Spouse name: RERE    Number of children: 2    Years of education: college    Highest education level: Not on file   Occupational History     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[place], [place] [address]way Use    Smoking status: Former Smoker     Packs/day: 0.50     Years: 15.00     Pack years: 7.50     Types: Cigarettes    Smokeless tobacco: Never Used   Substance and Sexual Activity    Alcohol use: Yes     Comment: rare    Drug use: Yes     Types: Marijuana     Comment: medical marijuana    Sexual activity: Yes     Partners: Male     Comment:     Other Topics Concern    Not on file   Social History Narrative    Not on file     Social Determinants of Health     Financial Resource Strain:     Difficulty of Paying Living Expenses:    Food Insecurity:     Worried About Running Out of Food in the Last Year:     Ran Out of Food in the Last Year:    Transportation Needs:     Lack of Transportation (Medical):  Lack of Transportation (Non-Medical):    Physical Activity:     Days of Exercise per Week:     Minutes of Exercise per Session:    Stress:     Feeling of Stress :    Social Connections:     Frequency of Communication with Friends and Family:     Frequency of Social Gatherings with Friends and Family:     Attends [employer]ology Services:     Active Member of Clubs or Organizations:     Attends Club or Organization Meetings:     Marital Status:    Intimate Partner Violence:     Fear of Current or Ex-Partner:     Emotionally Abused:     Physically Abused:     Sexually Abused:        Family History:         Problem Relation Age of Onset    Hypertension Father     Elevated Lipids Father     Coronary Art Dis Maternal Grandfather 36    Heart Failure Paternal Grandfather 76    Hypertension Brother     Coronary Art Dis Maternal Uncle        REVIEW OF SYSTEMS:    Review of Systems   Constitutional: Negative for fever. HENT: Negative. Eyes: Negative. Respiratory: Negative. Cardiovascular: Negative. Gastrointestinal: Positive for diarrhea. Endocrine: Negative. Genitourinary: Negative. Negative for difficulty urinating. Musculoskeletal: Positive for back pain. Skin: Negative. Allergic/Immunologic: Negative. Neurological: Positive for weakness. Negative for numbness. Hematological: Negative. PHYSICAL EXAM:      Vitals:  /64   Pulse 96   Temp 98.2 °F (36.8 °C) (Oral)   Resp 16   Ht 5' 9\" (1.753 m)   Wt 298 lb (135.2 kg)   LMP  (LMP Unknown)   SpO2 92%   BMI 44.01 kg/m²       Intake/Output Summary (Last 24 hours) at 6/24/2021 6454  Last data filed at 6/24/2021 0530  Gross per 24 hour   Intake 150 ml   Output    Net 150 ml       Physical Exam  Constitutional:       General: She is not in acute distress. Appearance: She is ill-appearing. Eyes:      General: No scleral icterus.   Cardiovascular:      Rate and Rhythm: Normal rate and regular rhythm. Heart sounds: No murmur heard. No gallop. Pulmonary:      Effort: Pulmonary effort is normal.      Breath sounds: Normal breath sounds. Abdominal:      Palpations: Abdomen is soft. Tenderness: There is no abdominal tenderness. Musculoskeletal:         General: No swelling. Lymphadenopathy:      Cervical: No cervical adenopathy. Skin:     General: Skin is warm and dry. Neurological:      Mental Status: She is oriented to person, place, and time. Sensory: No sensory deficit.          DATA:  Recent Labs     06/23/21  1515   WBC 13.2*   NEUTROABS 12.9*   LYMPHOPCT 0.0   RBC 2.40*   HGB 8.0*   HCT 24.3*   .5*   MCH 33.2   MCHC 32.7   RDW 18.9*          Lab Results   Component Value Date     (L) 06/23/2021    K 4.9 06/23/2021     06/23/2021    CO2 24 06/23/2021    GLUCOSE 113 (H) 06/23/2021    BUN 11 06/23/2021    CREATININE 0.8 06/23/2021    LABGLOM >60 06/23/2021    GFRAA >60 06/23/2021    CALCIUM 8.1 (L) 06/23/2021    PROT 5.9 (L) 06/23/2021    LABALBU 3.3 (L) 06/23/2021    AGRATIO 1.9 03/22/2021    BILITOT 0.7 06/23/2021    ALKPHOS 117 06/23/2021     (H) 06/23/2021     (H) 06/23/2021    GLOB 2.3 03/22/2021    MG 2.20 07/27/2019       Lab Results   Component Value Date    PROT 5.9 (L) 06/23/2021    PROT 6.6 03/22/2021    PROT 7.6 02/04/2021    INR 1.20 (H) 04/19/2021    INR 0.91 04/19/2021    INR 1.00 02/16/2021     Lab Results   Component Value Date    APTT 35.3 02/16/2021    APTT 26.9 07/19/2019    APTT 30.9 06/27/2019       TUMOR MARKERS: No results found for: PSA, CEA, , DT4970,       Radiology Review:  XR CHEST (2 VW)    Result Date: 6/10/2021  Site: Jaswinder Brown #: [id number] #: [id number]: Paulina Gomez #: [de-identified] #: [id number] #: [id number]: XR CHEST PA AND LATERALExam Date/Time: 06/10/2021 07:55 PMAdmitting Diagnosis: DFLYCNUMK for Exam: SOB Dictated by: Anthony Muniz MISTY: back pain. Comparison: February 15, 2021 Technique: Multiplanar multisequence MR images obtained of the thoracic spine without and with intravenous contrast. Contrast:  20 mL ProHance intravenous FINDINGS: Localizer images and extraspinal structures: No additional abnormality. There is a new osseous metastasis of the T4 vertebral body at its mid aspect posteriorly. This measures approximately 9 mm. 6 mm metastasis of the superior endplate of the T4 vertebral body is also new in the interval. Status post T5-T9 posterior fusion. T7 laminectomy defect. Epidural tumor is again identified most prominently at T7, however, this extends up to the T5 level on the left, as noted on image 23 of series 16. This is more prominent than on prior studies, particularly when compared with presurgical images of February 2021. At this time, epidural extension of tumor is noted to the T6 level but very little if at all T5. On the current study, there is leftward epidural extension of tumor at T4 and more prominently at T5 with rightward displacement of the cord and effacement of the CSF space. Previously noted metastasis of T7 is not well appreciated. Metastatic involvement of the T12 vertebral body diffusely is similar to prior. Small metastasis of T11 is also unchanged. There is no critical canal or foraminal narrowing otherwise within the thoracic spine. New osseous metastases of the T4 vertebral body. Stable osseous metastatic disease of T11, T12. Previously noted osseous metastasis at T7 is not well appreciated on this study. Epidural extension of tumor is again noted centered about the T7 level, however, this extends cranial to T6 and T5, and upward to the T4-5 disc space. This is predominantly on the left resulting in rightward displacement of the spinal cord and effacement  of the CSF space. This appears progressed when compared to the recent prior studies.      CT ABDOMEN PELVIS W IV CONTRAST Additional Contrast?

## 2021-06-24 NOTE — PLAN OF CARE
Problem: Pain:  Goal: Pain level will decrease  Description: Pain level will decrease  Outcome: Ongoing  Note: Patient states pain is a 5/10 upon arrival to unit. Per patient, this is a tolerable pain level and denies any needs for intervention at this time. Educated patient on current PRN pain medications and patient verbalized understanding. However requesting home dose of of PO Morphine rather than PRN medications. MD notified via perfect serve and aware. PRN orders discontinued and one time dose of PO morphine ordered. Problem: Falls - Risk of:  Goal: Will remain free from falls  Description: Will remain free from falls  Outcome: Ongoing  Note: Hourly rounding on patient for needs. Non-skid socks on, bed in lowest position and locked. Bedside table, personal belongs, and nurse call light within reach. Instructed patient to use call light for assistance. Bed alarm on. Floor clear of clutter. Patient remains free of falls at this time. Will continue to monitor.

## 2021-06-24 NOTE — CONSULTS
NUTRITION ASSESSMENT  Admission Date: 6/23/2021     Type and Reason for Visit: Initial, Positive Nutrition Screen    NUTRITION RECOMMENDATIONS:   1. PO Diet: Continue regular diet  2. ONS: Start Ensure Enlive BID, Ensure Milkshake qd, Boost pudding qd  3. Please obtain actual weight. NUTRITION ASSESSMENT:  Nutritional summary & status: Pt +screen for poor appetite/po intakes. CBW stated. RD ordered new wt to monitor for wt loss. Per MD pt reports little to no po intakes x3 weeks with nausea, vomiting, diarrhea, and weakness. Pt endorses a good appetite but reports after having a few bites of a meal she can not eat anymore. Pt reports no nausea, pain, or early satiety, just no desire to eat. Pt agreeable to trying ons to increase protein/calorie intakes. RD discussed small, frequent meals/sips of supplements as needed. Pt may benefit from appetite stimulant if ONS to not increase intakes. RD to monitor po intakes and nutrition status. 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, Crohn's, IBS     MALNUTRITION ASSESSMENT  Context of Malnutrition: Acute Illness   Malnutrition Status:  At risk for malnutrition (Comment) (updated wt needed)  Findings of the 6 clinical characteristics of malnutrition (Minimum of 2 out of 6 clinical characteristics is required to make the diagnosis of moderate or severe Protein Calorie Malnutrition based on AND/ASPEN Guidelines):  Energy Intake: Less than/equal to 50% of estimated energy requirements    Energy Intake Time: 3 weeks   Weight Loss %: Unable to assess  -CBW stated - RD ordered new wt  Weight loss Time: Unable to assess   Body Fat Loss: No significant loss    Body Fat Location: No Significant   Body Muscle Loss: No significant loss    Body Muscle Loss Location: No significant    Fluid Accumulation: Unable to assess    Fluid Accumulation Location: Unable to assess     Strength: Not Performed; Not Measured     NUTRITION DIAGNOSIS   Problem: Problem #1: Inadequate oral intake   Etiology: Decreased ability to consume sufficient energy   Signs & Symptoms: Diet history of poor intake     NUTRITION INTERVENTION  Food and/or Nutrient Delivery: Continue Current Diet   Nutrition Education/Counseling: No recommendation at this time   Coordination of Nutrition Care: Continue to monitor while inpatient     NUTRITION MONITORING & EVALUATION:  Evaluation:Goals set   Goals: Pt will improve po intakes to consume >50% of meals and supplements during admission. Monitoring: Meal Intake  or Supplement Intake      OBJECTIVE DATA: Significant to nutrition assessment  · Nutrition-Focused Physical Findings: no BM yet noted  · Labs: Reviewed; Na 135  · Meds: Reviewed; Senokot  · Wounds None      ANTHROPOMETRICS  Current Height: 5' 9\" (175.3 cm)  Current Weight: 298 lb (135.2 kg)    Admission weight: 298 lb (135.2 kg)  Ideal Bodyweight 145 lbs   Usual Bodyweight YUMIKO - wt fluctuations   Weight Changes YUMIKO - CBW needed       BMI BMI (Calculated): 44.1    Wt Readings from Last 50 Encounters:   06/23/21 298 lb (135.2 kg)   04/19/21 275 lb (124.7 kg)   03/28/21 278 lb 9.6 oz (126.4 kg)   02/15/21 263 lb 3.7 oz (119.4 kg)   11/30/20 255 lb (115.7 kg)   09/22/20 255 lb (115.7 kg)   08/07/20 250 lb (113.4 kg)   06/19/20 254 lb (115.2 kg)       COMPARATIVE STANDARDS  Estimated Total Kcals/Day : 30-35 Ideal Bodyweight  (66 kg) 8477-3242  kcal/day    Estimated Total Protein (g/day) : 1.5-1.8 Ideal Bodyweight  (66 kg)  g/day  Estimated Daily Total Fluid (ml/day): 8674-6062 mL per day     Food / Nutrition-Related History  Pre-Admission / Home Diet:  Pre-Admission/Home Diet: General   Home Supplements / Herbals:    none noted  Food Restrictions / Cultural Requests:    none noted    Current Nutrition Therapies   ADULT DIET;  Regular     PO Intake: None   PO Supplement: None   PO Supplement Intake: None   IVF: n/a NUTRITION RISK LEVEL: Risk Level: High     Crispin Bobo, [address] [place] [address],   Minh:  [phone]  Office:  [phone]

## 2021-06-25 VITALS
DIASTOLIC BLOOD PRESSURE: 68 MMHG | HEART RATE: 82 BPM | WEIGHT: 293 LBS | TEMPERATURE: 98 F | RESPIRATION RATE: 16 BRPM | SYSTOLIC BLOOD PRESSURE: 105 MMHG | BODY MASS INDEX: 43.4 KG/M2 | HEIGHT: 69 IN | OXYGEN SATURATION: 92 %

## 2021-06-25 LAB
HCT VFR BLD CALC: 25.2 % (ref 36–48)
HEMOGLOBIN: 8.3 G/DL (ref 12–16)
MCH RBC QN AUTO: 33.4 PG (ref 26–34)
MCHC RBC AUTO-ENTMCNC: 32.8 G/DL (ref 31–36)
MCV RBC AUTO: 101.8 FL (ref 80–100)
PDW BLD-RTO: 18.8 % (ref 12.4–15.4)
PLATELET # BLD: 97 K/UL (ref 135–450)
PMV BLD AUTO: 8.2 FL (ref 5–10.5)
RBC # BLD: 2.47 M/UL (ref 4–5.2)
WBC # BLD: 4.8 K/UL (ref 4–11)

## 2021-06-25 PROCEDURE — 6360000002 HC RX W HCPCS: Performed by: INTERNAL MEDICINE

## 2021-06-25 PROCEDURE — 2580000003 HC RX 258: Performed by: INTERNAL MEDICINE

## 2021-06-25 PROCEDURE — 85027 COMPLETE CBC AUTOMATED: CPT

## 2021-06-25 PROCEDURE — 6370000000 HC RX 637 (ALT 250 FOR IP): Performed by: INTERNAL MEDICINE

## 2021-06-25 PROCEDURE — 36415 COLL VENOUS BLD VENIPUNCTURE: CPT

## 2021-06-25 PROCEDURE — 6370000000 HC RX 637 (ALT 250 FOR IP): Performed by: NURSE PRACTITIONER

## 2021-06-25 RX ORDER — LORAZEPAM 0.5 MG/1
0.5 TABLET ORAL EVERY 8 HOURS PRN
Qty: 12 TABLET | Refills: 0 | Status: SHIPPED | OUTPATIENT
Start: 2021-06-25 | End: 2021-06-29

## 2021-06-25 RX ORDER — LEVOFLOXACIN 500 MG/1
500 TABLET, FILM COATED ORAL DAILY
Qty: 7 TABLET | Refills: 0 | Status: SHIPPED | OUTPATIENT
Start: 2021-06-25 | End: 2021-07-02

## 2021-06-25 RX ORDER — LORAZEPAM 1 MG/1
1 TABLET ORAL
Status: COMPLETED | OUTPATIENT
Start: 2021-06-25 | End: 2021-06-25

## 2021-06-25 RX ADMIN — OXYCODONE 10 MG: 5 TABLET ORAL at 10:45

## 2021-06-25 RX ADMIN — MORPHINE SULFATE 60 MG: 30 TABLET, FILM COATED, EXTENDED RELEASE ORAL at 09:04

## 2021-06-25 RX ADMIN — OXYCODONE AND ACETAMINOPHEN 2 TABLET: 5; 325 TABLET ORAL at 06:31

## 2021-06-25 RX ADMIN — GABAPENTIN 200 MG: 100 CAPSULE ORAL at 09:04

## 2021-06-25 RX ADMIN — AZITHROMYCIN MONOHYDRATE 500 MG: 500 INJECTION, POWDER, LYOPHILIZED, FOR SOLUTION INTRAVENOUS at 09:10

## 2021-06-25 RX ADMIN — LORAZEPAM 1 MG: 1 TABLET ORAL at 10:08

## 2021-06-25 RX ADMIN — METHOCARBAMOL 750 MG: 500 TABLET ORAL at 03:14

## 2021-06-25 RX ADMIN — CEFTRIAXONE 1000 MG: 1 INJECTION, POWDER, FOR SOLUTION INTRAMUSCULAR; INTRAVENOUS at 00:43

## 2021-06-25 RX ADMIN — ENOXAPARIN SODIUM 40 MG: 40 INJECTION SUBCUTANEOUS at 09:04

## 2021-06-25 ASSESSMENT — PAIN SCALES - GENERAL
PAINLEVEL_OUTOF10: 0
PAINLEVEL_OUTOF10: 8
PAINLEVEL_OUTOF10: 0
PAINLEVEL_OUTOF10: 9
PAINLEVEL_OUTOF10: 5

## 2021-06-25 ASSESSMENT — PAIN DESCRIPTION - FREQUENCY: FREQUENCY: CONTINUOUS

## 2021-06-25 ASSESSMENT — PAIN DESCRIPTION - PROGRESSION: CLINICAL_PROGRESSION: NOT CHANGED

## 2021-06-25 ASSESSMENT — PAIN DESCRIPTION - PAIN TYPE
TYPE: CHRONIC PAIN

## 2021-06-25 ASSESSMENT — PAIN DESCRIPTION - LOCATION
LOCATION: BACK

## 2021-06-25 ASSESSMENT — PAIN DESCRIPTION - ORIENTATION: ORIENTATION: LOWER

## 2021-06-25 ASSESSMENT — PAIN DESCRIPTION - ONSET: ONSET: ON-GOING

## 2021-06-25 ASSESSMENT — PAIN DESCRIPTION - DESCRIPTORS: DESCRIPTORS: SQUEEZING

## 2021-06-25 NOTE — DISCHARGE SUMMARY
Hospital Medicine Discharge Summary    Patient ID: Enid Cline      Patient's PCP: Lamonte Arellano MD    Admit Date: 6/23/2021     Discharge Date: 6/25/2021      Admitting Physician: Mauricio Freeman MD     Discharge Physician: Allan Stevens MD     Discharge Diagnoses: Active Hospital Problems    Diagnosis Date Noted    Intractable pain [R52] 06/23/2021       The patient was seen and examined on day of discharge and this discharge summary is in conjunction with any daily progress note from day of discharge. Hospital Course: Patient is 22-year-old female with history of metastatic sarcoma status post radiation and chemo came into ER with a complaint of worsening of back pain. She had an MRI done 1 day prior to admission showed new osseous metastasis of T4 vertebral body as well as a stable metastatic disease of T11-T12. She had a CT of her abdomen which showed bilateral lower lobe pneumonia and she was requiring 2 L of oxygen. Admitted with neurosurgery oncology, radiation oncology consultation. Radiation oncology recommended hospice. Patient was seen and examined on day of discharge reports that she is feeling better today. Denies nausea, vomiting, fever, chills. Stable to be discharged to follow-up with primary oncologist.    Final diagnosis  #Pneumonia likely gram-negative DC on Levaquin. #Intractable back pain due to bone metastasis  #Elevated LFTs suspected due to chemotherapy. #Acute anemia stable    Drug report reviewed. Patient was given a prescription for Ativan for anxiety. Physical Exam Performed:     /68   Pulse 82   Temp 98 °F (36.7 °C) (Oral)   Resp 16   Ht 5' 9\" (1.753 m)   Wt 298 lb (135.2 kg)   LMP  (LMP Unknown)   SpO2 92%   BMI 44.01 kg/m²       General appearance: Ill looking. HEENT:  Normal cephalic, atraumatic without obvious deformity. Pupils equal, round, and reactive to light. Extra ocular muscles intact.  Conjunctivae/corneas clear.  Neck: Supple, with full range of motion. No jugular venous distention. Trachea midline. Respiratory:  Normal respiratory effort. Clear to auscultation, bilaterally without Rales/Wheezes/Rhonchi. Cardiovascular:  Regular rate and rhythm with normal S1/S2 without murmurs, rubs or gallops. Abdomen: Soft, non-tender, non-distended with normal bowel sounds. Musculoskeletal:  No clubbing, cyanosis or edema bilaterally. Full range of motion without deformity. Skin: Skin color, texture, turgor normal.  No rashes or lesions. Neurologic:  Neurovascularly intact without any focal sensory/motor deficits. Cranial nerves: II-XII intact, grossly non-focal.  Psychiatric:  Alert and oriented, thought content appropriate, normal insight  Capillary Refill: Brisk,< 3 seconds   Peripheral Pulses: +2 palpable, equal bilaterally       Labs: For convenience and continuity at follow-up the following most recent labs are provided:      CBC:    Lab Results   Component Value Date    WBC 4.8 06/25/2021    HGB 8.3 06/25/2021    HCT 25.2 06/25/2021    PLT 97 06/25/2021       Renal:    Lab Results   Component Value Date     06/24/2021    K 4.7 06/24/2021     06/24/2021    CO2 23 06/24/2021    BUN 11 06/24/2021    CREATININE 0.7 06/24/2021    CALCIUM 8.2 06/24/2021    PHOS 3.9 07/27/2019         Significant Diagnostic Studies    Radiology:   CT ABDOMEN PELVIS W IV CONTRAST Additional Contrast? None   Final Result   1. Eccentric thrombus noted adherent to the wall within the infrarenal inferior vena cava. This is chronic in appearance. Follow-up CT of the abdomen in 6 months is recommended. 2. Hepatic steatosis. 3. Mild gallbladder wall thickening, nonspecific. 4. Stable appearance of extraosseous tumor extending into the iliopsoas musculature on the right at the L3 level. 5. Bilateral lower lobe bronchopneumonia. Findings discussed with the emergency department at the time of report.           CT Head WO Contrast   Final Result      No evidence of acute intracranial abnormality. XR CHEST PORTABLE   Final Result      No acute disease             Consults:     IP CONSULT TO NEUROSURGERY  IP CONSULT TO ONCOLOGY  IP CONSULT TO HOSPITALIST  IP CONSULT TO ONCOLOGY  IP CONSULT TO PALLIATIVE CARE  IP CONSULT TO RADIATION ONCOLOGY    Disposition: Home    Condition at Discharge: Stable    Discharge Instructions/Follow-up: PCP    Code Status:  Full Code     Activity: activity as tolerated    Diet: cardiac diet      Discharge Medications:     Discharge Medication List as of 6/25/2021 11:14 AM           Details   levoFLOXacin (LEVAQUIN) 500 MG tablet Take 1 tablet by mouth daily for 7 days, Disp-7 tablet, R-0Normal      LORazepam (ATIVAN) 0.5 MG tablet Take 1 tablet by mouth every 8 hours as needed for Anxiety for up to 4 days. , Disp-12 tablet, R-0Print              Details   morphine (MS CONTIN) 60 MG extended release tablet Take 60 mg by mouth 2 times daily. Historical Med      famotidine (PEPCID) 20 MG tablet Take 20 mg by mouth dailyHistorical Med      promethazine (PHENERGAN) 12.5 MG tablet Take 12.5 mg by mouth every 6 hours as needed for NauseaHistorical Med      granisetron (KYTRIL) 1 MG tablet Take 1 mg by mouth every 12 hours as needed for NauseaHistorical Med      methocarbamol (ROBAXIN) 750 MG tablet Take 750 mg by mouth every 6 hours Historical Med      omeprazole (PRILOSEC) 20 MG delayed release capsule Take 20 mg by mouth dailyHistorical Med      gabapentin (NEURONTIN) 300 MG capsule Take 300 mg by mouth 3 times daily. Historical Med      sennosides-docusate sodium (SENOKOT-S) 8.6-50 MG tablet Take 4 tablets by mouth nightlyHistorical Med      polyethylene glycol (GLYCOLAX) 17 g packet Take 17 g by mouth dailyHistorical Med      simethicone (MYLICON) 80 MG chewable tablet Take 80 mg by mouth 4 times daily (after meals and at bedtime)Historical Med      oxyCODONE 5 MG capsule Take 10 mg by mouth every 6 hours as needed for Pain. Historical Med             Time Spent on discharge is more than 30 minutes in the examination, evaluation, counseling and review of medications and discharge plan. This chart was likely completed using voice recognition technology and may contain unintended grammatical , phraseology,and/or punctuation errors    Signed:    Do Navas MD   6/25/2021      Thank you Pattie Oconnor MD for the opportunity to be involved in this patient's care. If you have any questions or concerns please feel free to contact me at [phone].

## 2021-06-25 NOTE — PLAN OF CARE
Problem: Pain:  Goal: Pain level will decrease  Description: Pain level will decrease  Outcome: Ongoing  RN assesses pain using 0-10 scale. Patient understands how to rate pain using 0-10 scale. Pain is controled with medication per MAR. RN encourages patient to call out for breakthrough pain. Will continue to monitor and reassess. Problem: Falls - Risk of:  Goal: Will remain free from falls  Description: Will remain free from falls  Outcome: Ongoing   Patient remains free from falls during this shift. Patient is up x1 person assist with walker. Bed is in the lowest position and the bed and chair alarm is activated. Anti-slip socks are on. Call light is within reach. Will continue to monitor and reassess.

## 2021-06-25 NOTE — PROGRESS NOTES
Pt Prescriptions sent down Meds to Beds per Pt request. Pt was given Oral Ativan for anxiety per order.  Pt in bed, family at bedside

## 2021-06-25 NOTE — PROGRESS NOTES
Palliative Care Chart Review  and Check in Note:     NAME:  Sierra Garcia  Admit Date: 6/23/2021  Hospital Day:  Hospital Day: 3   Current Code status: Full Code    Palliative care is continuing to following Ms. Hicks for symptom management,  and goals of care discussion as needed. Patient's chart reviewed today 6/25/21.      1140: Came to see the pt at the bedside, pt not in room. Noted discharge orders today. Will follow up as time allows. The following are the currently established goals/code status, and Symptom management. Goals of care: Continue with current management, yesterday when spoke with pt and her  they were planning on speaking with their oncologist from [facility] about treatment options.      Code status: Full Code    Discharge plan: Home with family      TRACY Waters CNP  06/25/21  11:42 AM

## 2021-06-25 NOTE — PROGRESS NOTES
Patient is alert and oriented. Vital signs are stable. Patient's pain is controlled with pain medication per MAR. Patient ambulates x1 person assist with a walker. Patient tolerates ambulation well. Patient is voiding urine without complication. Bed is in the lowest position. Bed alarm is activated. Call light is within reach. Will continue to monitor and reassess.

## 2021-06-27 LAB
BLOOD CULTURE, ROUTINE: NORMAL
CULTURE, BLOOD 2: NORMAL

## 2021-06-28 ENCOUNTER — TELEPHONE (OUTPATIENT)
Dept: FAMILY MEDICINE CLINIC | Age: 41
End: 2021-06-28

## 2021-06-28 NOTE — CONSULTS
[address] [facility]  [id number]   1980   06/23/2021    Requesting physician: Grisel Monique MD    Reason for consultation: spinal mets     History of present illness: Patient is a 35 y.o. female w/ PMH of sarcoma who presented on 6/27/2021 with worsening pain unable to be managed at home, she was sent to the ED by her PCP to be admitted for pain control. Patient has history of multiple osseous metastasis from her sarcoma. She underwent a T5-T9 posterior fusion on 2/17/2021 for resection of an intradural mass with Dr. Juliocesar Santos at [facility]. Since then she has been in the care of multiple oncologist between [place] and [place], [facility]. She received radiation and chemo after her thoracic spine surgery in February. She started a new IV chemo approx 6-7 weeks ago that she gets transfused once q week at [place]. Over the last 3 weeks the patient has had very little PO intake, hardly any food at all. She does drink some, but patient and family both say it is very little. She has nausea/vomiting and diarrhea with blood in it. She has become generally weak all over, she has had one fall when she slipped in the shower.  reports increasing lethargy and that she falls asleep throughout the entire day, he also reports some intermittent confusion which is new. She reports worsening back pain around T6-T7 area with radiation to both flanks R>L. She rates the pain a 10/10. She denies current hip pain, she denies pain that radiates down either leg. She has had no changes in her LE sensation, no saddle paraesthesias and no loss of control of bowel/bladder. She was walking up until yesterday around her house. Her pain management doctor ordered her an updated MRI thoracic spine yesterday, evidence of new osseous met to T4 and further extension of epidural tumor.      ROS:   GENERAL:  + weight loss, + fatigue   EYES:  Denies vision change or diplopia  EARS:  Denies hearing loss  CARDIAC:  Denies chest SURGICAL HISTORY Left 03/14/2018    INCISION AND DRAINAGE OF LEFT THIGH       OTHER SURGICAL HISTORY Left 03/22/2018    incision and drainage left posterior thigh    PORT SURGERY Right 11/30/2020    PORT REMOVAL performed by Mel Connell MD at [address] SALPINGECTOMY Left 2009    d/t scar tissue    TOTAL KNEE ARTHROPLASTY Right 09/2015       Social History     Occupational History     Comment: social work at [employer]   Tobacco Use    Smoking status: Former Smoker     Packs/day: 0.50     Years: 15.00     Pack years: 7.50     Types: Cigarettes    Smokeless tobacco: Never Used   Substance and Sexual Activity    Alcohol use: Yes     Comment: rare    Drug use: Yes     Types: Marijuana     Comment: medical marijuana    Sexual activity: Yes     Partners: Male     Comment:          Family History   Problem Relation Age of Onset    Hypertension Father     Elevated Lipids Father     Coronary Art Dis Maternal Grandfather 36    Heart Failure Paternal Grandfather 76    Hypertension Brother     Coronary Art Dis Maternal Uncle         Outpatient Medications Marked as Taking for the 6/23/21 encounter [facility] Encounter)   Medication Sig Dispense Refill    levoFLOXacin (LEVAQUIN) 500 MG tablet Take 1 tablet by mouth daily for 7 days 7 tablet 0    LORazepam (ATIVAN) 0.5 MG tablet Take 1 tablet by mouth every 8 hours as needed for Anxiety for up to 4 days. 12 tablet 0    morphine (MS CONTIN) 60 MG extended release tablet Take 60 mg by mouth 2 times daily.  famotidine (PEPCID) 20 MG tablet Take 20 mg by mouth daily      promethazine (PHENERGAN) 12.5 MG tablet Take 12.5 mg by mouth every 6 hours as needed for Nausea      granisetron (KYTRIL) 1 MG tablet Take 1 mg by mouth every 12 hours as needed for Nausea      methocarbamol (ROBAXIN) 750 MG tablet Take 750 mg by mouth every 6 hours       gabapentin (NEURONTIN) 300 MG capsule Take 300 mg by mouth 3 times daily.       sennosides-docusate sodium (SENOKOT-S) 8.6-50 MG tablet Take 4 tablets by mouth nightly      oxyCODONE 5 MG capsule Take 10 mg by mouth every 6 hours as needed for Pain. No current facility-administered medications for this encounter. Current Outpatient Medications   Medication Sig Dispense Refill    levoFLOXacin (LEVAQUIN) 500 MG tablet Take 1 tablet by mouth daily for 7 days 7 tablet 0    LORazepam (ATIVAN) 0.5 MG tablet Take 1 tablet by mouth every 8 hours as needed for Anxiety for up to 4 days. 12 tablet 0    morphine (MS CONTIN) 60 MG extended release tablet Take 60 mg by mouth 2 times daily.  famotidine (PEPCID) 20 MG tablet Take 20 mg by mouth daily      promethazine (PHENERGAN) 12.5 MG tablet Take 12.5 mg by mouth every 6 hours as needed for Nausea      granisetron (KYTRIL) 1 MG tablet Take 1 mg by mouth every 12 hours as needed for Nausea      methocarbamol (ROBAXIN) 750 MG tablet Take 750 mg by mouth every 6 hours       gabapentin (NEURONTIN) 300 MG capsule Take 300 mg by mouth 3 times daily.  sennosides-docusate sodium (SENOKOT-S) 8.6-50 MG tablet Take 4 tablets by mouth nightly      oxyCODONE 5 MG capsule Take 10 mg by mouth every 6 hours as needed for Pain.  omeprazole (PRILOSEC) 20 MG delayed release capsule Take 20 mg by mouth daily      polyethylene glycol (GLYCOLAX) 17 g packet Take 17 g by mouth daily      simethicone (MYLICON) 80 MG chewable tablet Take 80 mg by mouth 4 times daily (after meals and at bedtime)        Objective:  /68   Pulse 82   Temp 98 °F (36.7 °C) (Oral)   Resp 16   Ht 5' 9\" (1.753 m)   Wt 298 lb (135.2 kg)   LMP  (LMP Unknown)   SpO2 92%   BMI 44.01 kg/m²     Physical Exam:  Patient seen and examined   General: appear ill, in no acute distress at time of assessment. HENT: atraumatic, neck supple  Eyes: Optic discs: Not tested  Pulmonary: unlabored respiratory effort, is on supplemental O2 via nasal cannula   Cardiovascular:  Warm well perfused.  BLE edema present  Gastrointestinal: abdomen soft, NT, ND    Neurologic Exam:  Neurological:  Mental Status: Awake, alert, oriented x 4   Attention: Intact  Language: soft spoken, No aphasia or dysarthria noted  Sensation: tingling to RLE but intact to light touch throughout   Coordination: Intact  DTRs:    Right  Left    ankle clonus  - -   toes (babinski)  down down     Musculoskeletal:   Gait: Not tested   Assist devices: None   Tone: normal   Motor strength:    Right  Left    Right  Left    Deltoid  4 4  Hip Flex  3+ 3+   Biceps  4 4  Knee Extensors  4- 4-   Triceps  4 4  Knee Flexors  4- 4-   Wrist Ext  4 4  Ankle Dorsiflex. 4- 4-   Wrist Flex  4 4  Ankle Plantarflex. 4- 4-   Handgrip  4 4  Ext Clayton Longus  4- 4-   Thumb Ext  4            Radiological Findings:  MRI thoracic spine w wo contrast  6/22/2021 1729  New osseous metastases of the T4 vertebral body. Stable osseous metastatic disease of T11, T12. Previously noted osseous metastasis at T7 is not well appreciated on this study. Epidural extension of tumor is again noted centered about the T7 level, however, this extends cranial to T6 and T5, and upward to the T4-5 disc space. This is predominantly on the left resulting in rightward displacement of the spinal cord and effacement  of the CSF space. This appears progressed when compared to the recent prior studies. CT head wo contrast  6/23/2021  No evidence of acute intracranial abnormality. CT abdomen pelvis w contrast  6/23/2021 1610  1. Eccentric thrombus noted adherent to the wall within the infrarenal inferior vena cava. This is chronic in appearance. Follow-up CT of the abdomen in 6 months is recommended. 2. Hepatic steatosis. 3. Mild gallbladder wall thickening, nonspecific. 4. Stable appearance of extraosseous tumor extending into the iliopsoas musculature on the right at the L3 level. 5. Bilateral lower lobe bronchopneumonia.      Labs  No results for input(s): NA, CL, CO2, BUN, CREATININE, GLUCOSE, ALB, PHOS, MG in the last 72 hours. 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    Status post thoracic spinal fusion 02/19/2021    Sarcoma ([facility] 02/15/2021    Neutropenic fever ([address] 10/10/2019    Liver mass 07/22/2019    Weight loss counseling, encounter for     Moderate malnutrition ([address] 03/23/2018    Class 2 obesity due to excess calories with body mass index (BMI) of 39.0 to 39.9 in adult     Chronic depression     Sarcoma of left lower extremity ([facility] 11/24/2017    Small bowel obstruction ([address] 10/11/2017    Vitamin B12 deficiency 07/10/2017    Morbid obesity with BMI of 40.0-44.9, adult ([address] 06/22/2017    Mixed hyperlipidemia 06/22/2017    IBS (irritable bowel syndrome)     Crohn's disease ([address]     Osteoarthritis, knee     Premature menopause 02/04/2011       Assessment:  36 yo female with history of sarcoma w/ multiple osseous mets, she is s/p T5-T9 posterior fusion on 2/17/2021 for resection of an intradural mass. Admitted today with worsening back pain uncontrolled at home, MRI thoracic spine with new osseous met to T4 and further extension of epidural tumor. Plan:        1. Will discuss updated MRI of thoracic spine with Dr. Mayra Swan and update plan of care as indicated, per last meeting with patient there was no further surgical intervention recommended         2. Neurologic exams frequency: q 4   3. Pain control per primary team  4. Will add IV robaxin for 3 doses for muscle spasms  5. Oncology consult  6. Would consider palliative care consult  7. Activity/diet per primary team  8.  Thank you for consult, will follow, please call with questions        Patient recently underwent resection and adjuvant radiation to the thoracic lesion, no recurrent and progressive through XRT and chemotherapy. I had a long discussion with the patient and her . Given the failure of systemic agents to control progression, I do not believe further surgery is warranted. Unfortunately the thoracic lesion will likely progress further and could result in loss of neurologic function (paraplegia and bowel/bladder control). Mrs. Steve Best was understandably upset with this news, this is a difficult position. We have suggested consultation with palliative care to control her worsening pain.     Jeremiah Jewell MD, PhD  Anthony Cedeno  Director, [address], [facility] [address] (c), [phone] (o)

## 2021-06-28 NOTE — TELEPHONE ENCOUNTER
[facility] Transitions Initial Follow Up Call    Outreach made within 2 business days of discharge: Yes    Patient: Eddie Lauren Patient : 1980   MRN: [id number]  Reason for Admission: There are no discharge diagnoses documented for the most recent discharge. Discharge Date: 21       Spoke with: Left voicemail for patient to call office with questions, concerns and to schedule HFU. Discharge department/facility: [facility]    Scheduled appointment with PCP within 7-14 days    Follow Up  No future appointments.     [place], [place]

## 2021-08-20 NOTE — PROGRESS NOTES
Labs sent. PICC nurse notified of order. Consent signed at this time. Pt verbalized understanding. Carlos A Osmel

## 2021-09-14 ENCOUNTER — ANESTHESIA EVENT (OUTPATIENT)
Dept: OPERATING ROOM | Age: 41
DRG: 029 | End: 2021-09-14
Payer: COMMERCIAL

## 2021-09-14 RX ORDER — SERTRALINE HYDROCHLORIDE 100 MG/1
TABLET, FILM COATED ORAL
COMMUNITY
Start: 2021-09-13

## 2021-09-14 RX ORDER — POTASSIUM CHLORIDE 20 MEQ/1
20 TABLET, EXTENDED RELEASE ORAL 2 TIMES DAILY
COMMUNITY
Start: 2021-09-13

## 2021-09-14 NOTE — PROGRESS NOTES
[address] [facility] patients having surgery or anesthesia are required to be Covid tested OR to have been vaccinated at least 14 days prior to your procedure. It is very important to return our call to [phone] and notify the staff of your last vaccination date otherwise you will be required to complete Covid PCR test within the 5-6 days prior to surgery & quarantine. The results will need to be faxed to PreAdmission Testing at [phone]. PRIOR TO PROCEDURE DATE:        1. PLEASE FOLLOW ANY  GUIDELINES/ INSTRUCTIONS PRIOR TO YOUR PROCEDURE AS ADVISED BY YOUR SURGEON. 2. Arrange for someone to drive you home and be with you for the first 24 hours after discharge for your safety after your procedure for which you received sedation. Ensure it is someone we can share information with regarding your discharge. 3. You must contact your surgeon for instructions IF:   You are taking any blood thinners, aspirin, anti-inflammatory or vitamin E.   There is a change in your physical condition such as a cold, fever, rash, cuts, sores or any other infection, especially near your surgical site. 4. Do not drink alcohol the day before or day of your procedure. 5. A Pre-op History and Physical for surgery MUST be completed by your Physician or Urgent Care within 30 days of your procedure date. Please bring a copy with you on the day of your procedure and along with any other testing performed. THE DAY OF YOUR PROCEDURE:  1. Follow instructions for ARRIVAL TIME as DIRECTED BY YOUR SURGEON. 2. Enter the MAIN entrance from [employer] and follow the signs to the free [employer] or [facility] parking (offered free of charge 6am-5pm). 3. Enter the Main Entrance of the hospital (do not enter from the lower level of the parking garage). Upon entrance, check in with the  at the main desk on your left. If no one is available at the desk, proceed into the [facility] Waiting Room and go through the door directly into the [facility]. There is a Check-in desk ACROSS from Room 5 (marked with a sign hanging from the ceiling). The phone number for the surgery center is [phone]. 4. Please call [phone] option #2 option #2 if you have not been preregistered yet. On the day of your procedure bring your insurance card and photo ID. You will be registered at your bedside once brought back to your room. 5. DO NOT EAT ANYTHING eight hours prior to your arrival for surgery. May have 8 ounces of water 4 hours prior to your arrival for surgery. NOTE: ALL Gastric, Bariatric and Bowel surgery patients MUST follow their surgeon's instructions. 6. MEDICATIONS    Take the following medications with a SMALL sip of water: pain med, gabapentin, zoloft.  Bariatric patient's call surgeon if on diabetic medications as some need to be stopped 1 week preop   Use your usual dose of inhalers the morning of surgery. BRING your rescue inhaler with you to hospital.    Anesthesia does NOT want you to take insulin the morning of surgery. They will control your blood sugar while you are at the hospital. Please contact your ordering physician for instructions regarding your insulin the night before your procedure. If you have an insulin pump, please keep it set on basal rate. 7. Do not swallow water when brushing teeth. No gum, candy, mints or ice chips. Refrain from smoking or at least decrease the amount. 8. Dress in loose, comfortable clothing appropriate for redressing after your procedure. Do not wear jewelry (including body piercings), make-up (especially NO eye make-up), fingernail polish (NO toenail polish if foot/leg surgery), lotion, powders or metal hairclips. 9. Dentures, glasses, or contacts will need to be removed before your procedure.  Bring cases for your glasses, contacts, dentures, or hearing aids to protect them while you are in surgery. 10. If you use a CPAP, please bring it with you on the day of your procedure. 11. We recommend that valuable personal  belongings such as cash, cell phones, e-tablets or jewelry, be left at home during your stay. The hospital will not be responsible for valuables that are not secured in the hospital safe. However, if your insurance requires a co-pay, you may want to bring a method of payment, i.e. Check or credit card, if you wish to pay your co-pay the day of surgery. 12. If you are to stay overnight, you may bring a bag with personal items. Please have any large items you may need brought in by your family after your arrival to your hospital room. 15. If you have a Living Will or Durable Power of , please bring a copy on the day of your procedure. 15. With your permission, one family member may accompany you while you are being prepared for surgery. Once you are ready, additional family members may join you. HOW WE KEEP YOU SAFE and WORK TO PREVENT SURGICAL SITE INFECTIONS:  1. Health care workers should always check your ID bracelet to verify your name and birth date. You will be asked many times to state your name, date of birth, and allergies. 2. Health care workers should always clean their hands with soap or alcohol gel before providing care to you. It is okay to ask anyone if they cleaned their hands before they touch you. 3. You will be actively involved in verifying the type of procedure you are having and ensuring the correct surgical site. This will be confirmed multiple times prior to your procedure. Do NOT flaca your surgery site UNLESS instructed to by your surgeon. 4. Do not shave or wax for 72 hours prior to procedure near your operative site. Shaving with a razor can irritate your skin and make it easier to develop an infection.  On the day of your procedure, any hair that needs to be removed near the surgical site will be clipped by a healthcare worker using a special clippers designed to avoid skin irritation. 5. When you are in the operating room, your surgical site will be cleansed with a special soap, and in most cases, you will be given an antibiotic before the surgery begins. What to expect AFTER YOUR PROCEDURE:  1. Immediately following your procedure, your will be taken to the PACU for the first phase of your recovery. Your nurse will help you recover from any potential side effects of anesthesia, such as extreme drowsiness, changes in your vital signs or breathing patterns. Nausea, headache, muscle aches, or sore throat may also occur after anesthesia. Your nurse will help you manage these potential side effects. 2. For comfort and safety, arrange to have someone at home with you for the first 24 hours after discharge. 3. You and your family will be given written instructions about your diet, activity, dressing care, medications, and return visits. 4. Once at home, should issues with nausea, pain, or bleeding occur, or should you notice any signs of infection, you should call your surgeon. 5. Always clean your hands before and after caring for your wound. Do not let your family touch your surgery site without cleaning their hands. 6. Narcotic pain medications can cause significant constipation. You may want to add a stool softener to your postoperative medication schedule or speak to your surgeon on how best to manage this SIDE EFFECT. SPECIAL INSTRUCTIONS     Thank you for allowing us to care for you. We strive to exceed your expectations in the delivery of care and service provided to you and your family. If you need to contact the [place] [address] staff for any reason, please call us at [phone]    Instructions reviewed with patient during preadmission testing phone interview.   Shanika Blanco RN.9/14/2021 .4:39 PM      ADDITIONAL EDUCATIONAL INFORMATION REVIEWED PER PHONE WITH YOU AND/OR YOUR FAMILY:  No Hibiclens® Bathing Instructions   Yes Antibacterial Soap

## 2021-09-15 ENCOUNTER — APPOINTMENT (OUTPATIENT)
Dept: CT IMAGING | Age: 41
DRG: 029 | End: 2021-09-15
Attending: NEUROLOGICAL SURGERY
Payer: COMMERCIAL

## 2021-09-15 ENCOUNTER — ANESTHESIA (OUTPATIENT)
Dept: OPERATING ROOM | Age: 41
DRG: 029 | End: 2021-09-15
Payer: COMMERCIAL

## 2021-09-15 ENCOUNTER — HOSPITAL ENCOUNTER (INPATIENT)
Age: 41
LOS: 4 days | Discharge: HOME | DRG: 029 | End: 2021-09-19
Attending: NEUROLOGICAL SURGERY | Admitting: NEUROLOGICAL SURGERY
Payer: COMMERCIAL

## 2021-09-15 VITALS
DIASTOLIC BLOOD PRESSURE: 75 MMHG | RESPIRATION RATE: 9 BRPM | TEMPERATURE: 97 F | SYSTOLIC BLOOD PRESSURE: 112 MMHG | OXYGEN SATURATION: 99 %

## 2021-09-15 DIAGNOSIS — Z98.1 S/P FUSION OF THORACIC SPINE: Primary | ICD-10-CM

## 2021-09-15 PROBLEM — M89.8X8 MASS OF SPINE: Status: ACTIVE | Noted: 2021-09-15

## 2021-09-15 LAB
ABO/RH: NORMAL
ANION GAP SERPL CALCULATED.3IONS-SCNC: 13 MMOL/L (ref 3–16)
ANTIBODY SCREEN: NORMAL
APTT: 27 SEC (ref 26.2–38.6)
BUN BLDV-MCNC: 8 MG/DL (ref 7–20)
CALCIUM SERPL-MCNC: 9.2 MG/DL (ref 8.3–10.6)
CHLORIDE BLD-SCNC: 103 MMOL/L (ref 99–110)
CO2: 23 MMOL/L (ref 21–32)
CREAT SERPL-MCNC: 0.5 MG/DL (ref 0.6–1.1)
GFR AFRICAN AMERICAN: >60
GFR NON-AFRICAN AMERICAN: >60
GLUCOSE BLD-MCNC: 108 MG/DL (ref 70–99)
HCT VFR BLD CALC: 29.3 % (ref 36–48)
HEMOGLOBIN: 9.3 G/DL (ref 12–16)
INR BLD: 1.12 (ref 0.88–1.12)
MAGNESIUM: 2 MG/DL (ref 1.8–2.4)
MCH RBC QN AUTO: 30.8 PG (ref 26–34)
MCHC RBC AUTO-ENTMCNC: 31.9 G/DL (ref 31–36)
MCV RBC AUTO: 96.6 FL (ref 80–100)
PDW BLD-RTO: 19.5 % (ref 12.4–15.4)
PLATELET # BLD: 173 K/UL (ref 135–450)
PMV BLD AUTO: 9.1 FL (ref 5–10.5)
POTASSIUM REFLEX MAGNESIUM: 3.5 MMOL/L (ref 3.5–5.1)
PROTHROMBIN TIME: 12.7 SEC (ref 9.9–12.7)
RBC # BLD: 3.03 M/UL (ref 4–5.2)
SODIUM BLD-SCNC: 139 MMOL/L (ref 136–145)
WBC # BLD: 5.9 K/UL (ref 4–11)

## 2021-09-15 PROCEDURE — 6360000002 HC RX W HCPCS: Performed by: ANESTHESIOLOGY

## 2021-09-15 PROCEDURE — 6360000002 HC RX W HCPCS: Performed by: NURSE PRACTITIONER

## 2021-09-15 PROCEDURE — 85730 THROMBOPLASTIN TIME PARTIAL: CPT

## 2021-09-15 PROCEDURE — C9359 IMPLNT,BON VOID FILLER-PUTTY: HCPCS | Performed by: NEUROLOGICAL SURGERY

## 2021-09-15 PROCEDURE — 86923 COMPATIBILITY TEST ELECTRIC: CPT

## 2021-09-15 PROCEDURE — 00BX0ZZ EXCISION OF THORACIC SPINAL CORD, OPEN APPROACH: ICD-10-PCS | Performed by: NEUROLOGICAL SURGERY

## 2021-09-15 PROCEDURE — 86901 BLOOD TYPING SEROLOGIC RH(D): CPT

## 2021-09-15 PROCEDURE — 7100000001 HC PACU RECOVERY - ADDTL 15 MIN: Performed by: NEUROLOGICAL SURGERY

## 2021-09-15 PROCEDURE — C1713 ANCHOR/SCREW BN/BN,TIS/BN: HCPCS | Performed by: NEUROLOGICAL SURGERY

## 2021-09-15 PROCEDURE — 6370000000 HC RX 637 (ALT 250 FOR IP): Performed by: ANESTHESIOLOGY

## 2021-09-15 PROCEDURE — 2580000003 HC RX 258: Performed by: NURSE PRACTITIONER

## 2021-09-15 PROCEDURE — 3700000000 HC ANESTHESIA ATTENDED CARE: Performed by: NEUROLOGICAL SURGERY

## 2021-09-15 PROCEDURE — 80048 BASIC METABOLIC PNL TOTAL CA: CPT

## 2021-09-15 PROCEDURE — 2580000003 HC RX 258: Performed by: NEUROLOGICAL SURGERY

## 2021-09-15 PROCEDURE — 86850 RBC ANTIBODY SCREEN: CPT

## 2021-09-15 PROCEDURE — 6370000000 HC RX 637 (ALT 250 FOR IP): Performed by: NEUROLOGICAL SURGERY

## 2021-09-15 PROCEDURE — 2580000003 HC RX 258: Performed by: PHYSICIAN ASSISTANT

## 2021-09-15 PROCEDURE — P9016 RBC LEUKOCYTES REDUCED: HCPCS

## 2021-09-15 PROCEDURE — 6360000002 HC RX W HCPCS: Performed by: NURSE ANESTHETIST, CERTIFIED REGISTERED

## 2021-09-15 PROCEDURE — 2709999900 HC NON-CHARGEABLE SUPPLY: Performed by: NEUROLOGICAL SURGERY

## 2021-09-15 PROCEDURE — 3600000004 HC SURGERY LEVEL 4 BASE: Performed by: NEUROLOGICAL SURGERY

## 2021-09-15 PROCEDURE — 6360000002 HC RX W HCPCS: Performed by: PHYSICIAN ASSISTANT

## 2021-09-15 PROCEDURE — 85610 PROTHROMBIN TIME: CPT

## 2021-09-15 PROCEDURE — 2500000003 HC RX 250 WO HCPCS: Performed by: NURSE ANESTHETIST, CERTIFIED REGISTERED

## 2021-09-15 PROCEDURE — 00NX0ZZ RELEASE THORACIC SPINAL CORD, OPEN APPROACH: ICD-10-PCS | Performed by: NEUROLOGICAL SURGERY

## 2021-09-15 PROCEDURE — 2580000003 HC RX 258: Performed by: ANESTHESIOLOGY

## 2021-09-15 PROCEDURE — 7100000000 HC PACU RECOVERY - FIRST 15 MIN: Performed by: NEUROLOGICAL SURGERY

## 2021-09-15 PROCEDURE — 0RG7071 FUSION OF 2 TO 7 THORACIC VERTEBRAL JOINTS WITH AUTOLOGOUS TISSUE SUBSTITUTE, POSTERIOR APPROACH, POSTERIOR COLUMN, OPEN APPROACH: ICD-10-PCS | Performed by: NEUROLOGICAL SURGERY

## 2021-09-15 PROCEDURE — 3600000014 HC SURGERY LEVEL 4 ADDTL 15MIN: Performed by: NEUROLOGICAL SURGERY

## 2021-09-15 PROCEDURE — 6370000000 HC RX 637 (ALT 250 FOR IP): Performed by: PHYSICIAN ASSISTANT

## 2021-09-15 PROCEDURE — 2500000003 HC RX 250 WO HCPCS: Performed by: NEUROLOGICAL SURGERY

## 2021-09-15 PROCEDURE — 6360000002 HC RX W HCPCS: Performed by: NEUROLOGICAL SURGERY

## 2021-09-15 PROCEDURE — 83735 ASSAY OF MAGNESIUM: CPT

## 2021-09-15 PROCEDURE — 2580000003 HC RX 258: Performed by: NURSE ANESTHETIST, CERTIFIED REGISTERED

## 2021-09-15 PROCEDURE — 86900 BLOOD TYPING SEROLOGIC ABO: CPT

## 2021-09-15 PROCEDURE — 85027 COMPLETE CBC AUTOMATED: CPT

## 2021-09-15 PROCEDURE — C9290 INJ, BUPIVACAINE LIPOSOME: HCPCS | Performed by: NEUROLOGICAL SURGERY

## 2021-09-15 PROCEDURE — 3700000001 HC ADD 15 MINUTES (ANESTHESIA): Performed by: NEUROLOGICAL SURGERY

## 2021-09-15 PROCEDURE — P9045 ALBUMIN (HUMAN), 5%, 250 ML: HCPCS | Performed by: NURSE ANESTHETIST, CERTIFIED REGISTERED

## 2021-09-15 PROCEDURE — 2720000010 HC SURG SUPPLY STERILE: Performed by: NEUROLOGICAL SURGERY

## 2021-09-15 PROCEDURE — 1200000000 HC SEMI PRIVATE

## 2021-09-15 PROCEDURE — 88307 TISSUE EXAM BY PATHOLOGIST: CPT

## 2021-09-15 PROCEDURE — 01N80ZZ RELEASE THORACIC NERVE, OPEN APPROACH: ICD-10-PCS | Performed by: NEUROLOGICAL SURGERY

## 2021-09-15 PROCEDURE — 77011 CT SCAN FOR LOCALIZATION: CPT

## 2021-09-15 DEVICE — BONE GRFT SUB L 12.5CC BIOACTIVE GLS MTRX: Type: IMPLANTABLE DEVICE | Site: SPINE THORACIC | Status: FUNCTIONAL

## 2021-09-15 DEVICE — Z DUP USE 2254610 SCREW SET SINGLE INNIE FOR ROD TI NS EXPEDIUM: Type: IMPLANTABLE DEVICE | Site: SPINE THORACIC | Status: FUNCTIONAL

## 2021-09-15 DEVICE — IMPLANTABLE DEVICE: Type: IMPLANTABLE DEVICE | Site: SPINE THORACIC | Status: FUNCTIONAL

## 2021-09-15 RX ORDER — MIDAZOLAM HYDROCHLORIDE 1 MG/ML
INJECTION INTRAMUSCULAR; INTRAVENOUS PRN
Status: DISCONTINUED | OUTPATIENT
Start: 2021-09-15 | End: 2021-09-15 | Stop reason: SDUPTHER

## 2021-09-15 RX ORDER — SENNA AND DOCUSATE SODIUM 50; 8.6 MG/1; MG/1
4 TABLET, FILM COATED ORAL NIGHTLY
Status: DISCONTINUED | OUTPATIENT
Start: 2021-09-15 | End: 2021-09-19 | Stop reason: HOSPADM

## 2021-09-15 RX ORDER — SERTRALINE HYDROCHLORIDE 100 MG/1
100 TABLET, FILM COATED ORAL DAILY
Status: DISCONTINUED | OUTPATIENT
Start: 2021-09-15 | End: 2021-09-19 | Stop reason: HOSPADM

## 2021-09-15 RX ORDER — ALBUMIN, HUMAN INJ 5% 5 %
SOLUTION INTRAVENOUS PRN
Status: DISCONTINUED | OUTPATIENT
Start: 2021-09-15 | End: 2021-09-15 | Stop reason: SDUPTHER

## 2021-09-15 RX ORDER — SODIUM CHLORIDE 9 MG/ML
INJECTION, SOLUTION INTRAVENOUS CONTINUOUS
Status: DISCONTINUED | OUTPATIENT
Start: 2021-09-16 | End: 2021-09-15

## 2021-09-15 RX ORDER — ROCURONIUM BROMIDE 10 MG/ML
INJECTION, SOLUTION INTRAVENOUS PRN
Status: DISCONTINUED | OUTPATIENT
Start: 2021-09-15 | End: 2021-09-15 | Stop reason: SDUPTHER

## 2021-09-15 RX ORDER — FENTANYL CITRATE 50 UG/ML
INJECTION, SOLUTION INTRAMUSCULAR; INTRAVENOUS PRN
Status: DISCONTINUED | OUTPATIENT
Start: 2021-09-15 | End: 2021-09-15 | Stop reason: SDUPTHER

## 2021-09-15 RX ORDER — VANCOMYCIN HYDROCHLORIDE 1 G/20ML
INJECTION, POWDER, LYOPHILIZED, FOR SOLUTION INTRAVENOUS PRN
Status: DISCONTINUED | OUTPATIENT
Start: 2021-09-15 | End: 2021-09-15 | Stop reason: ALTCHOICE

## 2021-09-15 RX ORDER — GINSENG 100 MG
CAPSULE ORAL PRN
Status: DISCONTINUED | OUTPATIENT
Start: 2021-09-15 | End: 2021-09-15 | Stop reason: ALTCHOICE

## 2021-09-15 RX ORDER — MORPHINE SULFATE 30 MG/1
60 TABLET, FILM COATED, EXTENDED RELEASE ORAL 2 TIMES DAILY
Status: DISCONTINUED | OUTPATIENT
Start: 2021-09-15 | End: 2021-09-19 | Stop reason: HOSPADM

## 2021-09-15 RX ORDER — PROMETHAZINE HYDROCHLORIDE 25 MG/1
25 TABLET ORAL EVERY 6 HOURS PRN
Status: DISCONTINUED | OUTPATIENT
Start: 2021-09-15 | End: 2021-09-19 | Stop reason: HOSPADM

## 2021-09-15 RX ORDER — SODIUM CHLORIDE 9 MG/ML
INJECTION, SOLUTION INTRAVENOUS CONTINUOUS
Status: DISCONTINUED | OUTPATIENT
Start: 2021-09-15 | End: 2021-09-16

## 2021-09-15 RX ORDER — OXYCODONE HYDROCHLORIDE AND ACETAMINOPHEN 5; 325 MG/1; MG/1
1 TABLET ORAL
Status: DISCONTINUED | OUTPATIENT
Start: 2021-09-15 | End: 2021-09-15 | Stop reason: HOSPADM

## 2021-09-15 RX ORDER — DIAZEPAM 5 MG/1
5 TABLET ORAL EVERY 6 HOURS PRN
Status: DISCONTINUED | OUTPATIENT
Start: 2021-09-15 | End: 2021-09-19 | Stop reason: HOSPADM

## 2021-09-15 RX ORDER — SUCCINYLCHOLINE/SOD CL,ISO/PF 100 MG/5ML
SYRINGE (ML) INTRAVENOUS PRN
Status: DISCONTINUED | OUTPATIENT
Start: 2021-09-15 | End: 2021-09-15 | Stop reason: SDUPTHER

## 2021-09-15 RX ORDER — HYDRALAZINE HYDROCHLORIDE 20 MG/ML
5 INJECTION INTRAMUSCULAR; INTRAVENOUS EVERY 10 MIN PRN
Status: DISCONTINUED | OUTPATIENT
Start: 2021-09-15 | End: 2021-09-15 | Stop reason: HOSPADM

## 2021-09-15 RX ORDER — SODIUM CHLORIDE 0.9 % (FLUSH) 0.9 %
5-40 SYRINGE (ML) INJECTION PRN
Status: DISCONTINUED | OUTPATIENT
Start: 2021-09-15 | End: 2021-09-19 | Stop reason: HOSPADM

## 2021-09-15 RX ORDER — LIDOCAINE HYDROCHLORIDE ANHYDROUS AND DEXTROSE MONOHYDRATE .4; 5 G/100ML; G/100ML
INJECTION, SOLUTION INTRAVENOUS CONTINUOUS PRN
Status: DISCONTINUED | OUTPATIENT
Start: 2021-09-15 | End: 2021-09-15 | Stop reason: SDUPTHER

## 2021-09-15 RX ORDER — REMIFENTANIL HYDROCHLORIDE 1 MG/ML
INJECTION, POWDER, LYOPHILIZED, FOR SOLUTION INTRAVENOUS CONTINUOUS PRN
Status: DISCONTINUED | OUTPATIENT
Start: 2021-09-15 | End: 2021-09-15 | Stop reason: SDUPTHER

## 2021-09-15 RX ORDER — SODIUM CHLORIDE, SODIUM LACTATE, POTASSIUM CHLORIDE, CALCIUM CHLORIDE 600; 310; 30; 20 MG/100ML; MG/100ML; MG/100ML; MG/100ML
INJECTION, SOLUTION INTRAVENOUS CONTINUOUS
Status: DISCONTINUED | OUTPATIENT
Start: 2021-09-15 | End: 2021-09-15

## 2021-09-15 RX ORDER — SODIUM CHLORIDE 0.9 % (FLUSH) 0.9 %
5-40 SYRINGE (ML) INJECTION EVERY 12 HOURS SCHEDULED
Status: DISCONTINUED | OUTPATIENT
Start: 2021-09-15 | End: 2021-09-19 | Stop reason: HOSPADM

## 2021-09-15 RX ORDER — PROPOFOL 10 MG/ML
INJECTION, EMULSION INTRAVENOUS CONTINUOUS PRN
Status: DISCONTINUED | OUTPATIENT
Start: 2021-09-15 | End: 2021-09-15 | Stop reason: SDUPTHER

## 2021-09-15 RX ORDER — APREPITANT 40 MG/1
40 CAPSULE ORAL ONCE
Status: COMPLETED | OUTPATIENT
Start: 2021-09-15 | End: 2021-09-15

## 2021-09-15 RX ORDER — HYDROMORPHONE HCL 110MG/55ML
PATIENT CONTROLLED ANALGESIA SYRINGE INTRAVENOUS PRN
Status: DISCONTINUED | OUTPATIENT
Start: 2021-09-15 | End: 2021-09-15 | Stop reason: SDUPTHER

## 2021-09-15 RX ORDER — FENTANYL CITRATE 50 UG/ML
25 INJECTION, SOLUTION INTRAMUSCULAR; INTRAVENOUS EVERY 5 MIN PRN
Status: DISCONTINUED | OUTPATIENT
Start: 2021-09-15 | End: 2021-09-15 | Stop reason: HOSPADM

## 2021-09-15 RX ORDER — GABAPENTIN 400 MG/1
400 CAPSULE ORAL 3 TIMES DAILY
Status: DISCONTINUED | OUTPATIENT
Start: 2021-09-15 | End: 2021-09-19 | Stop reason: HOSPADM

## 2021-09-15 RX ORDER — LABETALOL HYDROCHLORIDE 5 MG/ML
5 INJECTION, SOLUTION INTRAVENOUS EVERY 10 MIN PRN
Status: DISCONTINUED | OUTPATIENT
Start: 2021-09-15 | End: 2021-09-15 | Stop reason: HOSPADM

## 2021-09-15 RX ORDER — FENTANYL CITRATE 50 UG/ML
50 INJECTION, SOLUTION INTRAMUSCULAR; INTRAVENOUS EVERY 5 MIN PRN
Status: DISCONTINUED | OUTPATIENT
Start: 2021-09-15 | End: 2021-09-15 | Stop reason: HOSPADM

## 2021-09-15 RX ORDER — PROPOFOL 10 MG/ML
INJECTION, EMULSION INTRAVENOUS PRN
Status: DISCONTINUED | OUTPATIENT
Start: 2021-09-15 | End: 2021-09-15

## 2021-09-15 RX ORDER — POTASSIUM CHLORIDE 20 MEQ/1
20 TABLET, EXTENDED RELEASE ORAL 2 TIMES DAILY
Status: DISCONTINUED | OUTPATIENT
Start: 2021-09-15 | End: 2021-09-19 | Stop reason: HOSPADM

## 2021-09-15 RX ORDER — SCOLOPAMINE TRANSDERMAL SYSTEM 1 MG/1
1 PATCH, EXTENDED RELEASE TRANSDERMAL ONCE
Status: COMPLETED | OUTPATIENT
Start: 2021-09-15 | End: 2021-09-18

## 2021-09-15 RX ORDER — OXYCODONE HYDROCHLORIDE 5 MG/1
15 TABLET ORAL EVERY 6 HOURS PRN
Status: DISCONTINUED | OUTPATIENT
Start: 2021-09-15 | End: 2021-09-19 | Stop reason: HOSPADM

## 2021-09-15 RX ORDER — PROMETHAZINE HYDROCHLORIDE 25 MG/ML
6.25 INJECTION, SOLUTION INTRAMUSCULAR; INTRAVENOUS
Status: DISCONTINUED | OUTPATIENT
Start: 2021-09-15 | End: 2021-09-15 | Stop reason: HOSPADM

## 2021-09-15 RX ORDER — MIDAZOLAM HYDROCHLORIDE 1 MG/ML
2 INJECTION INTRAMUSCULAR; INTRAVENOUS ONCE
Status: COMPLETED | OUTPATIENT
Start: 2021-09-15 | End: 2021-09-15

## 2021-09-15 RX ORDER — SODIUM CHLORIDE 9 MG/ML
25 INJECTION, SOLUTION INTRAVENOUS PRN
Status: DISCONTINUED | OUTPATIENT
Start: 2021-09-15 | End: 2021-09-19 | Stop reason: HOSPADM

## 2021-09-15 RX ADMIN — PHENYLEPHRINE HYDROCHLORIDE 100 MCG: 10 INJECTION, SOLUTION INTRAMUSCULAR; INTRAVENOUS; SUBCUTANEOUS at 15:34

## 2021-09-15 RX ADMIN — HYDROMORPHONE HYDROCHLORIDE 0.4 MG: 2 INJECTION, SOLUTION INTRAMUSCULAR; INTRAVENOUS; SUBCUTANEOUS at 16:09

## 2021-09-15 RX ADMIN — MIDAZOLAM HYDROCHLORIDE 2 MG: 2 INJECTION, SOLUTION INTRAMUSCULAR; INTRAVENOUS at 12:58

## 2021-09-15 RX ADMIN — METHOCARBAMOL 1000 MG: 100 INJECTION, SOLUTION INTRAMUSCULAR; INTRAVENOUS at 17:19

## 2021-09-15 RX ADMIN — Medication 10 ML: at 21:55

## 2021-09-15 RX ADMIN — CEFAZOLIN 2000 MG: 10 INJECTION, POWDER, FOR SOLUTION INTRAVENOUS at 16:19

## 2021-09-15 RX ADMIN — POTASSIUM CHLORIDE 20 MEQ: 1500 TABLET, EXTENDED RELEASE ORAL at 21:54

## 2021-09-15 RX ADMIN — HYDROMORPHONE HYDROCHLORIDE 0.2 MG: 2 INJECTION, SOLUTION INTRAMUSCULAR; INTRAVENOUS; SUBCUTANEOUS at 15:54

## 2021-09-15 RX ADMIN — GABAPENTIN 400 MG: 400 CAPSULE ORAL at 21:55

## 2021-09-15 RX ADMIN — ROCURONIUM BROMIDE 40 MG: 10 INJECTION INTRAVENOUS at 13:37

## 2021-09-15 RX ADMIN — DEXMEDETOMIDINE HYDROCHLORIDE 8 MCG: 100 INJECTION, SOLUTION INTRAVENOUS at 14:21

## 2021-09-15 RX ADMIN — HYDROMORPHONE HYDROCHLORIDE 0.5 MG: 1 INJECTION, SOLUTION INTRAMUSCULAR; INTRAVENOUS; SUBCUTANEOUS at 18:36

## 2021-09-15 RX ADMIN — REMIFENTANIL HYDROCHLORIDE 0.15 MCG/KG/MIN: 1 INJECTION, POWDER, LYOPHILIZED, FOR SOLUTION INTRAVENOUS at 14:01

## 2021-09-15 RX ADMIN — DEXMEDETOMIDINE HYDROCHLORIDE 8 MCG: 100 INJECTION, SOLUTION INTRAVENOUS at 16:45

## 2021-09-15 RX ADMIN — DEXMEDETOMIDINE HYDROCHLORIDE 8 MCG: 100 INJECTION, SOLUTION INTRAVENOUS at 15:25

## 2021-09-15 RX ADMIN — ROCURONIUM BROMIDE 50 MG: 10 INJECTION INTRAVENOUS at 13:54

## 2021-09-15 RX ADMIN — MORPHINE SULFATE 60 MG: 30 TABLET, FILM COATED, EXTENDED RELEASE ORAL at 21:54

## 2021-09-15 RX ADMIN — APREPITANT 40 MG: 40 CAPSULE ORAL at 12:27

## 2021-09-15 RX ADMIN — REMIFENTANIL HYDROCHLORIDE 0.12 MCG/KG/MIN: 1 INJECTION, POWDER, LYOPHILIZED, FOR SOLUTION INTRAVENOUS at 13:09

## 2021-09-15 RX ADMIN — HYDROMORPHONE HYDROCHLORIDE 0.4 MG: 2 INJECTION, SOLUTION INTRAMUSCULAR; INTRAVENOUS; SUBCUTANEOUS at 16:00

## 2021-09-15 RX ADMIN — LIDOCAINE HYDROCHLORIDE ANHYDROUS AND DEXTROSE MONOHYDRATE 3 MG/MIN: .4; 5 INJECTION, SOLUTION INTRAVENOUS at 13:09

## 2021-09-15 RX ADMIN — MIDAZOLAM 2 MG: 1 INJECTION INTRAMUSCULAR; INTRAVENOUS at 12:29

## 2021-09-15 RX ADMIN — OXYCODONE 15 MG: 5 TABLET ORAL at 20:17

## 2021-09-15 RX ADMIN — SUGAMMADEX 200 MG: 100 INJECTION, SOLUTION INTRAVENOUS at 14:56

## 2021-09-15 RX ADMIN — SODIUM CHLORIDE: 9 INJECTION, SOLUTION INTRAVENOUS at 17:11

## 2021-09-15 RX ADMIN — CEFAZOLIN 2000 MG: 10 INJECTION, POWDER, FOR SOLUTION INTRAVENOUS at 12:58

## 2021-09-15 RX ADMIN — DEXMEDETOMIDINE HYDROCHLORIDE 4 MCG: 100 INJECTION, SOLUTION INTRAVENOUS at 16:23

## 2021-09-15 RX ADMIN — HYDROMORPHONE HYDROCHLORIDE 0.5 MG: 1 INJECTION, SOLUTION INTRAMUSCULAR; INTRAVENOUS; SUBCUTANEOUS at 21:55

## 2021-09-15 RX ADMIN — PROPOFOL 150 MCG/KG/MIN: 10 INJECTION, EMULSION INTRAVENOUS at 13:09

## 2021-09-15 RX ADMIN — HYDROMORPHONE HYDROCHLORIDE 0.2 MG: 2 INJECTION, SOLUTION INTRAMUSCULAR; INTRAVENOUS; SUBCUTANEOUS at 15:25

## 2021-09-15 RX ADMIN — ALBUMIN (HUMAN) 250 ML: 12.5 SOLUTION INTRAVENOUS at 15:49

## 2021-09-15 RX ADMIN — SODIUM CHLORIDE, POTASSIUM CHLORIDE, SODIUM LACTATE AND CALCIUM CHLORIDE: 600; 310; 30; 20 INJECTION, SOLUTION INTRAVENOUS at 14:39

## 2021-09-15 RX ADMIN — PHENYLEPHRINE HYDROCHLORIDE 100 MCG: 10 INJECTION, SOLUTION INTRAMUSCULAR; INTRAVENOUS; SUBCUTANEOUS at 15:31

## 2021-09-15 RX ADMIN — DEXMEDETOMIDINE HYDROCHLORIDE 8 MCG: 100 INJECTION, SOLUTION INTRAVENOUS at 13:59

## 2021-09-15 RX ADMIN — HYDROMORPHONE HYDROCHLORIDE 0.2 MG: 2 INJECTION, SOLUTION INTRAMUSCULAR; INTRAVENOUS; SUBCUTANEOUS at 14:41

## 2021-09-15 RX ADMIN — SODIUM CHLORIDE, POTASSIUM CHLORIDE, SODIUM LACTATE AND CALCIUM CHLORIDE: 600; 310; 30; 20 INJECTION, SOLUTION INTRAVENOUS at 12:11

## 2021-09-15 RX ADMIN — DEXMEDETOMIDINE HYDROCHLORIDE 4 MCG: 100 INJECTION, SOLUTION INTRAVENOUS at 15:54

## 2021-09-15 RX ADMIN — SODIUM CHLORIDE: 9 INJECTION, SOLUTION INTRAVENOUS at 12:58

## 2021-09-15 RX ADMIN — Medication 120 MG: at 13:07

## 2021-09-15 RX ADMIN — PHENYLEPHRINE HYDROCHLORIDE 50 MCG: 10 INJECTION, SOLUTION INTRAMUSCULAR; INTRAVENOUS; SUBCUTANEOUS at 14:41

## 2021-09-15 RX ADMIN — DEXMEDETOMIDINE HYDROCHLORIDE 4 MCG: 100 INJECTION, SOLUTION INTRAVENOUS at 14:12

## 2021-09-15 RX ADMIN — ROCURONIUM BROMIDE 10 MG: 10 INJECTION INTRAVENOUS at 13:07

## 2021-09-15 RX ADMIN — SERTRALINE HYDROCHLORIDE 100 MG: 100 TABLET ORAL at 21:54

## 2021-09-15 RX ADMIN — FENTANYL CITRATE 50 MCG: 50 INJECTION, SOLUTION INTRAMUSCULAR; INTRAVENOUS at 13:06

## 2021-09-15 RX ADMIN — REMIFENTANIL HYDROCHLORIDE 0.15 MCG/KG/MIN: 1 INJECTION, POWDER, LYOPHILIZED, FOR SOLUTION INTRAVENOUS at 14:58

## 2021-09-15 RX ADMIN — PHENYLEPHRINE HYDROCHLORIDE 100 MCG: 10 INJECTION, SOLUTION INTRAMUSCULAR; INTRAVENOUS; SUBCUTANEOUS at 15:48

## 2021-09-15 RX ADMIN — DOCUSATE SODIUM 50 MG AND SENNOSIDES 8.6 MG 4 TABLET: 8.6; 5 TABLET, FILM COATED ORAL at 21:54

## 2021-09-15 RX ADMIN — HYDROMORPHONE HYDROCHLORIDE 0.2 MG: 2 INJECTION, SOLUTION INTRAMUSCULAR; INTRAVENOUS; SUBCUTANEOUS at 14:12

## 2021-09-15 RX ADMIN — DEXMEDETOMIDINE HYDROCHLORIDE 8 MCG: 100 INJECTION, SOLUTION INTRAVENOUS at 14:41

## 2021-09-15 RX ADMIN — FENTANYL CITRATE 50 MCG: 50 INJECTION, SOLUTION INTRAMUSCULAR; INTRAVENOUS at 13:03

## 2021-09-15 RX ADMIN — HYDROMORPHONE HYDROCHLORIDE 0.4 MG: 2 INJECTION, SOLUTION INTRAMUSCULAR; INTRAVENOUS; SUBCUTANEOUS at 15:42

## 2021-09-15 ASSESSMENT — PULMONARY FUNCTION TESTS
PIF_VALUE: 23
PIF_VALUE: 22
PIF_VALUE: 24
PIF_VALUE: 19
PIF_VALUE: 24
PIF_VALUE: 23
PIF_VALUE: 23
PIF_VALUE: 24
PIF_VALUE: 25
PIF_VALUE: 23
PIF_VALUE: 0
PIF_VALUE: 24
PIF_VALUE: 22
PIF_VALUE: 23
PIF_VALUE: 24
PIF_VALUE: 23
PIF_VALUE: 24
PIF_VALUE: 22
PIF_VALUE: 25
PIF_VALUE: 23
PIF_VALUE: 23
PIF_VALUE: 24
PIF_VALUE: 22
PIF_VALUE: 24
PIF_VALUE: 21
PIF_VALUE: 22
PIF_VALUE: 23
PIF_VALUE: 24
PIF_VALUE: 23
PIF_VALUE: 21
PIF_VALUE: 19
PIF_VALUE: 24
PIF_VALUE: 23
PIF_VALUE: 25
PIF_VALUE: 20
PIF_VALUE: 24
PIF_VALUE: 19
PIF_VALUE: 0
PIF_VALUE: 22
PIF_VALUE: 22
PIF_VALUE: 23
PIF_VALUE: 24
PIF_VALUE: 22
PIF_VALUE: 24
PIF_VALUE: 25
PIF_VALUE: 2
PIF_VALUE: 24
PIF_VALUE: 25
PIF_VALUE: 24
PIF_VALUE: 24
PIF_VALUE: 18
PIF_VALUE: 24
PIF_VALUE: 25
PIF_VALUE: 25
PIF_VALUE: 17
PIF_VALUE: 26
PIF_VALUE: 24
PIF_VALUE: 23
PIF_VALUE: 24
PIF_VALUE: 23
PIF_VALUE: 24
PIF_VALUE: 23
PIF_VALUE: 25
PIF_VALUE: 23
PIF_VALUE: 24
PIF_VALUE: 24
PIF_VALUE: 22
PIF_VALUE: 24
PIF_VALUE: 22
PIF_VALUE: 23
PIF_VALUE: 24
PIF_VALUE: 24
PIF_VALUE: 7
PIF_VALUE: 23
PIF_VALUE: 19
PIF_VALUE: 22
PIF_VALUE: 24
PIF_VALUE: 23
PIF_VALUE: 23
PIF_VALUE: 20
PIF_VALUE: 24
PIF_VALUE: 24
PIF_VALUE: 22
PIF_VALUE: 24
PIF_VALUE: 20
PIF_VALUE: 22
PIF_VALUE: 23
PIF_VALUE: 23
PIF_VALUE: 24
PIF_VALUE: 25
PIF_VALUE: 24
PIF_VALUE: 23
PIF_VALUE: 24
PIF_VALUE: 23
PIF_VALUE: 3
PIF_VALUE: 24
PIF_VALUE: 23
PIF_VALUE: 24
PIF_VALUE: 25
PIF_VALUE: 23
PIF_VALUE: 23
PIF_VALUE: 21
PIF_VALUE: 2
PIF_VALUE: 22
PIF_VALUE: 24
PIF_VALUE: 24
PIF_VALUE: 22
PIF_VALUE: 25
PIF_VALUE: 20
PIF_VALUE: 24
PIF_VALUE: 21
PIF_VALUE: 23
PIF_VALUE: 24
PIF_VALUE: 18
PIF_VALUE: 25
PIF_VALUE: 23
PIF_VALUE: 25
PIF_VALUE: 24
PIF_VALUE: 23
PIF_VALUE: 24
PIF_VALUE: 23
PIF_VALUE: 25
PIF_VALUE: 25
PIF_VALUE: 24
PIF_VALUE: 22
PIF_VALUE: 25
PIF_VALUE: 24
PIF_VALUE: 22
PIF_VALUE: 24
PIF_VALUE: 23
PIF_VALUE: 24
PIF_VALUE: 23
PIF_VALUE: 24
PIF_VALUE: 24
PIF_VALUE: 22
PIF_VALUE: 20
PIF_VALUE: 23
PIF_VALUE: 24
PIF_VALUE: 13
PIF_VALUE: 24
PIF_VALUE: 24
PIF_VALUE: 22
PIF_VALUE: 24
PIF_VALUE: 24
PIF_VALUE: 3
PIF_VALUE: 24
PIF_VALUE: 23
PIF_VALUE: 24
PIF_VALUE: 24
PIF_VALUE: 5
PIF_VALUE: 24
PIF_VALUE: 0
PIF_VALUE: 24
PIF_VALUE: 23
PIF_VALUE: 24
PIF_VALUE: 24
PIF_VALUE: 23
PIF_VALUE: 24
PIF_VALUE: 24
PIF_VALUE: 22
PIF_VALUE: 24
PIF_VALUE: 25
PIF_VALUE: 24
PIF_VALUE: 19
PIF_VALUE: 24
PIF_VALUE: 23
PIF_VALUE: 25
PIF_VALUE: 23
PIF_VALUE: 23
PIF_VALUE: 2
PIF_VALUE: 24
PIF_VALUE: 23
PIF_VALUE: 24
PIF_VALUE: 20
PIF_VALUE: 25
PIF_VALUE: 23
PIF_VALUE: 24
PIF_VALUE: 24
PIF_VALUE: 1
PIF_VALUE: 23
PIF_VALUE: 24
PIF_VALUE: 9
PIF_VALUE: 20
PIF_VALUE: 25
PIF_VALUE: 24
PIF_VALUE: 19
PIF_VALUE: 23
PIF_VALUE: 24
PIF_VALUE: 25
PIF_VALUE: 24

## 2021-09-15 ASSESSMENT — PAIN DESCRIPTION - FREQUENCY
FREQUENCY: CONTINUOUS
FREQUENCY: CONTINUOUS

## 2021-09-15 ASSESSMENT — PAIN SCALES - GENERAL
PAINLEVEL_OUTOF10: 0
PAINLEVEL_OUTOF10: 7
PAINLEVEL_OUTOF10: 8
PAINLEVEL_OUTOF10: 5
PAINLEVEL_OUTOF10: 0
PAINLEVEL_OUTOF10: 4

## 2021-09-15 ASSESSMENT — PAIN DESCRIPTION - PAIN TYPE
TYPE: SURGICAL PAIN
TYPE: SURGICAL PAIN

## 2021-09-15 ASSESSMENT — PAIN - FUNCTIONAL ASSESSMENT
PAIN_FUNCTIONAL_ASSESSMENT: PREVENTS OR INTERFERES SOME ACTIVE ACTIVITIES AND ADLS
PAIN_FUNCTIONAL_ASSESSMENT: PREVENTS OR INTERFERES SOME ACTIVE ACTIVITIES AND ADLS
PAIN_FUNCTIONAL_ASSESSMENT: 0-10

## 2021-09-15 ASSESSMENT — PAIN DESCRIPTION - LOCATION
LOCATION: BACK
LOCATION: BACK

## 2021-09-15 ASSESSMENT — PAIN DESCRIPTION - DESCRIPTORS
DESCRIPTORS: ACHING

## 2021-09-15 ASSESSMENT — PAIN DESCRIPTION - ONSET
ONSET: ON-GOING
ONSET: ON-GOING

## 2021-09-15 ASSESSMENT — PAIN DESCRIPTION - PROGRESSION
CLINICAL_PROGRESSION: GRADUALLY WORSENING
CLINICAL_PROGRESSION: GRADUALLY WORSENING

## 2021-09-15 ASSESSMENT — PAIN DESCRIPTION - ORIENTATION
ORIENTATION: MID
ORIENTATION: MID

## 2021-09-15 NOTE — PROGRESS NOTES
PACU Transfer to Floor Note    Current Allergies: Ketamine, Nsaids, Ondansetron hcl, and Topamax [topiramate]    Pt meets criteria as per Ramses Score and ASPAN Standards to transfer to next phase of care. No results for input(s): POCGLU in the last 72 hours. Vitals:    09/15/21 1745   BP: 102/66   Pulse: 82   Resp: 11   Temp: 97.3 °F (36.3 °C)   SpO2: 95%     Vitals within 20% of pt's admission vitals as per RAMSES SCORE    SpO2: 95 %    O2 Flow Rate (L/min): 2 L/min      Intake/Output Summary (Last 24 hours) at 9/15/2021 1803  Last data filed at 9/15/2021 1751  Gross per 24 hour   Intake 2700 ml   Output 1525 ml   Net 1175 ml       Pain assessment:  level of pain (1-10, 10 severe),     Pain Level: 0      Handoff report given at bedside.    Family updated and directed to pt room      9/15/2021 6:03 PM

## 2021-09-15 NOTE — ANESTHESIA PRE PROCEDURE
Department of Anesthesiology  Preprocedure Note       Name:  Yulia Del Valle   Age:  35 y.o.  :  1980                                          MRN:  [id number]         Date:  9/15/2021      Surgeon: Mick Martin):  Hui Lowe. Criss Bhardwaj MD    Procedure: Procedure(s):  T4-T7 LAMINECTOMY, DECOMPRESSION, FORAMINOTOMY, POSSIBLE FUSION , FIXATION, POSSIBLE RHIZOTOMY    Medications prior to admission:   Prior to Admission medications    Medication Sig Start Date End Date Taking? Authorizing Provider   sertraline (ZOLOFT) 100 MG tablet  21   Historical Provider, MD   potassium chloride (KLOR-CON M) 20 MEQ extended release tablet Take 20 mEq by mouth 2 times daily 21   Historical Provider, MD   morphine (MS CONTIN) 60 MG extended release tablet Take 60 mg by mouth 2 times daily. Historical Provider, MD   famotidine (PEPCID) 20 MG tablet Take 20 mg by mouth daily    Historical Provider, MD   promethazine (PHENERGAN) 12.5 MG tablet Take 12.5 mg by mouth every 6 hours as needed for Nausea    Historical Provider, MD   granisetron (KYTRIL) 1 MG tablet Take 1 mg by mouth every 12 hours as needed for Nausea    Historical Provider, MD   methocarbamol (ROBAXIN) 750 MG tablet Take 750 mg by mouth every 6 hours     Historical Provider, MD   omeprazole (PRILOSEC) 20 MG delayed release capsule Take 20 mg by mouth daily    Historical Provider, MD   gabapentin (NEURONTIN) 300 MG capsule Take 400 mg by mouth 3 times daily. Historical Provider, MD   sennosides-docusate sodium (SENOKOT-S) 8.6-50 MG tablet Take 4 tablets by mouth nightly    Historical Provider, MD   polyethylene glycol (GLYCOLAX) 17 g packet Take 17 g by mouth daily    Historical Provider, MD   simethicone (MYLICON) 80 MG chewable tablet Take 80 mg by mouth 4 times daily (after meals and at bedtime)    Historical Provider, MD   oxyCODONE 5 MG capsule Take 15 mg by mouth every 6 hours as needed for Pain.      Historical Provider, MD       Current medications:    No current facility-administered medications for this visit. No current outpatient medications on file. Facility-Administered Medications Ordered in Other Visits   Medication Dose Route Frequency Provider Last Rate Last Admin    lactated ringers infusion   IntraVENous Continuous Jagdeep Loan,  mL/hr at 09/15/21 1211 New Bag at 09/15/21 1211    [START ON 9/16/2021] 0.9 % sodium chloride infusion   IntraVENous Continuous Trinity Griffin APRN - CNP        ceFAZolin (ANCEF) 2000 mg in dextrose 5 % 50 mL IVPB  2,000 mg IntraVENous On Call to [address], APRN - CNP        scopolamine (TRANSDERM-SCOP) transdermal patch 1 patch  1 patch TransDERmal Once Jagdeep Loan, DO   1 patch at 09/15/21 1228       Allergies:     Allergies   Allergen Reactions    Ketamine     Nsaids      Pt has Crohn's    Ondansetron Hcl Other (See Comments)     States it makes her feel weird  States it makes her feel weird      Topamax [Topiramate] Hives, Itching and Rash       Problem List:    Patient Active Problem List   Diagnosis Code    Premature menopause E28.319    Osteoarthritis, knee M17.10    Crohn's disease ([facility] K50.90    IBS (irritable bowel syndrome) K58.9    Morbid obesity with BMI of 40.0-44.9, adult ([facility] E66.01, Z68.41    Mixed hyperlipidemia E78.2    Vitamin B12 deficiency E53.8    Small bowel obstruction ([facility] K56.609    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    Moderate malnutrition ([facility] E44.0    Class 2 obesity due to excess calories with body mass index (BMI) of 39.0 to 39.9 in adult E66.09, Z68.39    Chronic depression F32.9    Weight loss counseling, encounter for Z71.3    Liver mass R16.0    Neutropenic fever (HCC) D70.9, R50.81    Sarcoma (HCC) C49.9    Status post thoracic spinal fusion Z98.1    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    Intractable pain R52       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     Crohn's disease ([facility]     Depression     Early menopause     follows with endocrine/gyne    History of blood transfusion     IBS (irritable bowel syndrome)     IBS (irritable bowel syndrome)     Mixed hyperlipidemia 2017    Obesity     Osteoarthritis, knee     PONV (postoperative nausea and vomiting)     Prolonged emergence from general anesthesia     panic attacks on awakening, requests anxiety med on awakening    Sarcoma of left lower extremity ([facility] 2017    s/p XRT, followed by surgery       Past Surgical History:        Procedure Laterality Date    ANKLE SURGERY Left     APPENDECTOMY      BREAST REDUCTION SURGERY      CARPAL TUNNEL RELEASE Right 2020    KNEE ARTHROSCOPY Right 2015    KNEE ARTHROSCOPY Right 2015    LAMINECTOMY N/A 2021    T7 LAMINECTOMY WITH T5-9 PEDICALE FIXATION AND REMOVAL OF EPIDURAL TUMOR AT T7 performed by Yenifer Perales.  Tee Gil MD at [address] [place] [address] N/A 2019    ROBOTIC ASSISTED PARTIAL LIVER RESECTION AND ABDOMINAL MASS RESECTION performed by Lizbeth Carter MD at [address] [place] [address] Nw Left 2017    for sarcoma    OTHER SURGICAL HISTORY Left     biopsy of left thigh mass    OTHER SURGICAL HISTORY Left 2018    WIDE EXCISION LEFT THIGH SARCOMA          OTHER SURGICAL HISTORY Left 2018    INCISION AND DRAINAGE OF LEFT THIGH       OTHER SURGICAL HISTORY Left 2018    incision and drainage left posterior thigh    PORT SURGERY Right 2020    PORT REMOVAL performed by Lizbeth Carter MD at [address] [place] [address] Av SALPINGECTOMY Left     d/t scar tissue    TOTAL KNEE ARTHROPLASTY Right 2015       Social History:    Social History     Tobacco Use    Smoking status: Former Smoker     Packs/day: 0.50     Years: 15.00     Pack years: 7.50     Types: Cigarettes     Quit date: 2010     Years since quittin.0    Smokeless tobacco: Never Used   Substance Use Topics    Alcohol use: Yes     Comment: rare                                Counseling given: Not Answered      Vital Signs (Current): There were no vitals filed for this visit. BP Readings from Last 3 Encounters:   09/15/21 102/67   06/25/21 105/68   03/28/21 127/87       NPO Status:                                                                                 BMI:   Wt Readings from Last 3 Encounters:   09/14/21 250 lb (113.4 kg)   06/23/21 298 lb (135.2 kg)   04/19/21 275 lb (124.7 kg)     There is no height or weight on file to calculate BMI.    CBC:   Lab Results   Component Value Date    WBC 5.9 09/15/2021    RBC 3.03 09/15/2021    HGB 9.3 09/15/2021    HCT 29.3 09/15/2021    MCV 96.6 09/15/2021    RDW 19.5 09/15/2021     09/15/2021       CMP:   Lab Results   Component Value Date     09/15/2021    K 3.5 09/15/2021     09/15/2021    CO2 23 09/15/2021    BUN 8 09/15/2021    CREATININE 0.5 09/15/2021    GFRAA >60 09/15/2021    AGRATIO 1.2 06/24/2021    LABGLOM >60 09/15/2021    LABGLOM 107 03/08/2013    GLUCOSE 108 09/15/2021    PROT 5.9 06/24/2021    PROT 7.1 03/08/2013    CALCIUM 9.2 09/15/2021    BILITOT 0.4 06/24/2021    ALKPHOS 115 06/24/2021     06/24/2021     06/24/2021       POC Tests: No results for input(s): POCGLU, POCNA, POCK, POCCL, POCBUN, POCHEMO, POCHCT in the last 72 hours.     Coags:   Lab Results   Component Value Date    PROTIME 10.5 04/19/2021    INR 1.20 04/19/2021    APTT 35.3 02/16/2021       HCG (If Applicable):   Lab Results   Component Value Date    PREGTESTUR Negative 02/16/2021        ABGs: No results found for: PHART, PO2ART, SFB2MWO, NMT7UJG, BEART, A7YMZVAG     Type & Screen (If Applicable):  No results found for: LABABO, LABRH    Drug/Infectious Status (If Applicable):  No results found for: HIV, HEPCAB    COVID-19 Screening (If Applicable):   Lab Results   Component Value Date    COVID19 Not Detected 02/16/2021 COVID19 Not Detected 11/25/2020         Anesthesia Evaluation  Patient summary reviewed and Nursing notes reviewed   history of anesthetic complications: PONV. Airway: Mallampati: II  TM distance: >3 FB   Neck ROM: full  Mouth opening: > = 3 FB Dental:          Pulmonary:Negative Pulmonary ROS                              Cardiovascular:  Exercise tolerance: good (>4 METS),                     Neuro/Psych:   (+) depression/anxiety              ROS comment: LE neuropathy  S/p T7 epidural tumor resection 2/2021 GI/Hepatic/Renal:   (+) liver disease:,           Endo/Other:    (+) blood dyscrasia: anemia:., malignancy/cancer. ROS comment: Sarcoma  Last chemotherapy 2 weeks ago Abdominal:             Vascular: Other Findings:               Anesthesia Plan      general and TIVA     ASA 3     (PIV X 2  Received scopolamine, emend, versed in SDS   )  Induction: intravenous. MIPS: Prophylactic antiemetics administered. Anesthetic plan and risks discussed with patient. Plan discussed with CRNA.             Dimas Weeks DO   9/15/2021

## 2021-09-15 NOTE — PROGRESS NOTES
Patient to pacu 16 s/p T4-T7 LAMINECTOMY, DECOMPRESSION, FORAMINOTOMY, FUSION , FIXATION, RHIZOTOMY, REMOVAL OF EPIDURAL MASS, report received from CRNA, reported hemodynamically stable intra op. Oral airway intact.  Simple mask applied

## 2021-09-15 NOTE — PROGRESS NOTES
Patient admitted to 1. VSS on 3 nasal cannula. Oriented to room, call light, and fall precautions. 4 Eyes Skin Assessment completed with RN x2. Surgical incision covered with medipore tape, CD&I. SCDs in place. All fall precautions in place.

## 2021-09-15 NOTE — ANESTHESIA POSTPROCEDURE EVALUATION
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 Sunita Silvestre MD Responsible Provider: Bernard Murillo DO    Anesthesia Type: general, TIVA ASA Status: 3          Anesthesia Type: general, TIVA    Adalgisa Phase I: Adalgisa Score: 9    Adalgisa Phase II:      Last vitals: Reviewed and per EMR flowsheets.        Anesthesia Post Evaluation    Patient location during evaluation: PACU  Patient participation: complete - patient participated  Level of consciousness: awake and alert  Pain score: 2  Airway patency: patent  Nausea & Vomiting: no nausea and no vomiting  Complications: no  Cardiovascular status: hemodynamically stable  Respiratory status: acceptable  Hydration status: stable

## 2021-09-15 NOTE — H&P
Darby Hussein    [id number]    [facility] Same Day Surgery Update H & P  Department of General Surgery   Surgical Service   Pre-operative History and Physical  Last H & P within the last 30 days. 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 The symptoms have been recalcitrant to conservative treatment and the patient presents today for the above procedure. Covid 19:  Patient denies fever, chills, worsening cough, or known exposure to Covid-19. 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     Crohn's disease ([address]     Depression     Early menopause     follows with endocrine/gyne    History of blood transfusion     IBS (irritable bowel syndrome)     IBS (irritable bowel syndrome)     Mixed hyperlipidemia 06/22/2017    Obesity     Osteoarthritis, knee     PONV (postoperative nausea and vomiting)     Prolonged emergence from general anesthesia     panic attacks on awakening, requests anxiety med on awakening    Sarcoma of left lower extremity ([address] 11/24/2017    s/p XRT, followed by surgery     Past Surgical History:        Procedure Laterality Date    ANKLE SURGERY Left     APPENDECTOMY      BREAST REDUCTION SURGERY      CARPAL TUNNEL RELEASE Right 07/2020    KNEE ARTHROSCOPY Right 01/2015    KNEE ARTHROSCOPY Right 07/2015    LAMINECTOMY N/A 2/17/2021    T7 LAMINECTOMY WITH T5-9 PEDICALE FIXATION AND REMOVAL OF EPIDURAL TUMOR AT T7 performed by Mary Abernathy MD at [address] [place] [address] N/A 07/22/2019    ROBOTIC ASSISTED PARTIAL LIVER RESECTION AND ABDOMINAL MASS RESECTION performed by Paige Mar MD at [address] [place] [address] MUSCLE BIOPSY Left 2017    for sarcoma    OTHER SURGICAL HISTORY Left     biopsy of left thigh mass    OTHER SURGICAL HISTORY Left 2018    WIDE EXCISION LEFT THIGH SARCOMA          OTHER SURGICAL HISTORY Left 2018    INCISION AND DRAINAGE OF LEFT THIGH       OTHER SURGICAL HISTORY Left 2018    incision and drainage left posterior thigh    PORT SURGERY Right 2020    PORT REMOVAL performed by Anabel Will MD at [address] SALPINGECTOMY Left     d/t scar tissue    TOTAL KNEE ARTHROPLASTY Right 2015     Past Social History:  Social History     Socioeconomic History    Marital status:      Spouse name: RERE    Number of children: 2    Years of education: college    Highest education level: None   Occupational History     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[address] [employer] Use    Smoking status: Former Smoker     Packs/day: 0.50     Years: 15.00     Pack years: 7.50     Types: Cigarettes     Quit date: 2010     Years since quittin.0    Smokeless tobacco: Never Used   Substance and Sexual Activity    Alcohol use: Yes     Comment: rare    Drug use: Yes     Types: Marijuana     Comment: medical marijuana    Sexual activity: Yes     Partners: Male     Comment:     Other Topics Concern    None   Social History Narrative    None     Social Determinants of Health     Financial Resource Strain:     Difficulty of Paying Living Expenses:    Food Insecurity:     Worried About Running Out of Food in the Last Year:     Ran Out of Food in the Last Year:    Transportation Needs:     Lack of Transportation (Medical):      Lack of Transportation (Non-Medical):    Physical Activity:     Days of Exercise per Week:     Minutes of Exercise per Session:    Stress:     Feeling of Stress :    Social Connections:     Frequency of Communication with Friends and Family:     Frequency of Social Gatherings with Friends and Family:     Attends [employer] Services:     Pulse 88   Temp 97.8 °F (36.6 °C) (Oral)   Resp 16   Ht 5' 9\" (1.753 m)   Wt 250 lb (113.4 kg)   LMP  (LMP Unknown)   SpO2 94%   BMI 36.92 kg/m²      Airway:  Airway patent with no audible stridor    Heart:  Regular rate and rhythm, No murmur noted    Lungs:  No increased work of breathing, good air exchange, clear to auscultation bilaterally, no crackles or wheezing    Abdomen:  Soft, non-distended, non-tender, no masses palpated    ASSESSMENT AND PLAN    Patient is a 35 y.o. female with above specified procedure planned. 1.  The patients history and physical was obtained and signed off by the pre-admission testing department. Patient seen and focused exam done today- no new changes since last physical exam on 9/13/21    2. Access to ancillary services are available per request of the provider.     TRACY Martini - LORI     9/15/2021

## 2021-09-16 LAB
BLOOD BANK DISPENSE STATUS: NORMAL
BLOOD BANK PRODUCT CODE: NORMAL
BPU ID: NORMAL
DESCRIPTION BLOOD BANK: NORMAL
HCT VFR BLD CALC: 20.7 % (ref 36–48)
HCT VFR BLD CALC: 25.2 % (ref 36–48)
HEMOGLOBIN: 7.1 G/DL (ref 12–16)
HEMOGLOBIN: 8.2 G/DL (ref 12–16)
MCH RBC QN AUTO: 33.8 PG (ref 26–34)
MCHC RBC AUTO-ENTMCNC: 34.3 G/DL (ref 31–36)
MCV RBC AUTO: 98.6 FL (ref 80–100)
PDW BLD-RTO: 19.4 % (ref 12.4–15.4)
PLATELET # BLD: 161 K/UL (ref 135–450)
PMV BLD AUTO: 9 FL (ref 5–10.5)
RBC # BLD: 2.1 M/UL (ref 4–5.2)
WBC # BLD: 6.8 K/UL (ref 4–11)

## 2021-09-16 PROCEDURE — 1200000000 HC SEMI PRIVATE

## 2021-09-16 PROCEDURE — 97535 SELF CARE MNGMENT TRAINING: CPT

## 2021-09-16 PROCEDURE — 6370000000 HC RX 637 (ALT 250 FOR IP): Performed by: PHYSICIAN ASSISTANT

## 2021-09-16 PROCEDURE — 2580000003 HC RX 258: Performed by: PHYSICIAN ASSISTANT

## 2021-09-16 PROCEDURE — 97166 OT EVAL MOD COMPLEX 45 MIN: CPT

## 2021-09-16 PROCEDURE — 36415 COLL VENOUS BLD VENIPUNCTURE: CPT

## 2021-09-16 PROCEDURE — 97162 PT EVAL MOD COMPLEX 30 MIN: CPT

## 2021-09-16 PROCEDURE — 85027 COMPLETE CBC AUTOMATED: CPT

## 2021-09-16 PROCEDURE — 85018 HEMOGLOBIN: CPT

## 2021-09-16 PROCEDURE — 6360000002 HC RX W HCPCS: Performed by: PHYSICIAN ASSISTANT

## 2021-09-16 PROCEDURE — 97530 THERAPEUTIC ACTIVITIES: CPT

## 2021-09-16 PROCEDURE — 6370000000 HC RX 637 (ALT 250 FOR IP): Performed by: NURSE PRACTITIONER

## 2021-09-16 PROCEDURE — 36430 TRANSFUSION BLD/BLD COMPNT: CPT

## 2021-09-16 PROCEDURE — 85014 HEMATOCRIT: CPT

## 2021-09-16 PROCEDURE — 97116 GAIT TRAINING THERAPY: CPT

## 2021-09-16 RX ORDER — ACETAMINOPHEN 500 MG
1000 TABLET ORAL EVERY 6 HOURS
Status: DISCONTINUED | OUTPATIENT
Start: 2021-09-16 | End: 2021-09-19 | Stop reason: HOSPADM

## 2021-09-16 RX ORDER — SODIUM CHLORIDE 9 MG/ML
INJECTION, SOLUTION INTRAVENOUS PRN
Status: DISCONTINUED | OUTPATIENT
Start: 2021-09-16 | End: 2021-09-19 | Stop reason: HOSPADM

## 2021-09-16 RX ORDER — PANTOPRAZOLE SODIUM 40 MG/1
40 TABLET, DELAYED RELEASE ORAL
Status: DISCONTINUED | OUTPATIENT
Start: 2021-09-17 | End: 2021-09-19 | Stop reason: HOSPADM

## 2021-09-16 RX ADMIN — SODIUM CHLORIDE 25 ML: 9 INJECTION, SOLUTION INTRAVENOUS at 02:02

## 2021-09-16 RX ADMIN — OXYCODONE 15 MG: 5 TABLET ORAL at 17:47

## 2021-09-16 RX ADMIN — CEFAZOLIN 2000 MG: 10 INJECTION, POWDER, FOR SOLUTION INTRAVENOUS at 17:51

## 2021-09-16 RX ADMIN — HYDROMORPHONE HYDROCHLORIDE 0.5 MG: 1 INJECTION, SOLUTION INTRAMUSCULAR; INTRAVENOUS; SUBCUTANEOUS at 22:15

## 2021-09-16 RX ADMIN — HYDROMORPHONE HYDROCHLORIDE 0.5 MG: 1 INJECTION, SOLUTION INTRAMUSCULAR; INTRAVENOUS; SUBCUTANEOUS at 07:21

## 2021-09-16 RX ADMIN — Medication 10 ML: at 08:38

## 2021-09-16 RX ADMIN — ACETAMINOPHEN 1000 MG: 500 TABLET, FILM COATED ORAL at 10:20

## 2021-09-16 RX ADMIN — METHOCARBAMOL 1000 MG: 100 INJECTION, SOLUTION INTRAMUSCULAR; INTRAVENOUS at 09:15

## 2021-09-16 RX ADMIN — GABAPENTIN 400 MG: 400 CAPSULE ORAL at 08:37

## 2021-09-16 RX ADMIN — MORPHINE SULFATE 60 MG: 30 TABLET, FILM COATED, EXTENDED RELEASE ORAL at 21:07

## 2021-09-16 RX ADMIN — OXYCODONE 15 MG: 5 TABLET ORAL at 11:00

## 2021-09-16 RX ADMIN — ACETAMINOPHEN 1000 MG: 500 TABLET, FILM COATED ORAL at 15:36

## 2021-09-16 RX ADMIN — CEFAZOLIN 2000 MG: 10 INJECTION, POWDER, FOR SOLUTION INTRAVENOUS at 08:42

## 2021-09-16 RX ADMIN — ACETAMINOPHEN 1000 MG: 500 TABLET, FILM COATED ORAL at 22:15

## 2021-09-16 RX ADMIN — SODIUM CHLORIDE: 9 INJECTION, SOLUTION INTRAVENOUS at 12:35

## 2021-09-16 RX ADMIN — SODIUM CHLORIDE: 9 INJECTION, SOLUTION INTRAVENOUS at 02:51

## 2021-09-16 RX ADMIN — SERTRALINE HYDROCHLORIDE 100 MG: 100 TABLET ORAL at 08:37

## 2021-09-16 RX ADMIN — CEFAZOLIN 2000 MG: 10 INJECTION, POWDER, FOR SOLUTION INTRAVENOUS at 00:00

## 2021-09-16 RX ADMIN — METHOCARBAMOL 1000 MG: 100 INJECTION, SOLUTION INTRAMUSCULAR; INTRAVENOUS at 02:03

## 2021-09-16 RX ADMIN — ENOXAPARIN SODIUM 40 MG: 40 INJECTION SUBCUTANEOUS at 17:47

## 2021-09-16 RX ADMIN — OXYCODONE 15 MG: 5 TABLET ORAL at 02:35

## 2021-09-16 RX ADMIN — HYDROMORPHONE HYDROCHLORIDE 0.5 MG: 1 INJECTION, SOLUTION INTRAMUSCULAR; INTRAVENOUS; SUBCUTANEOUS at 12:31

## 2021-09-16 RX ADMIN — Medication 10 ML: at 22:16

## 2021-09-16 RX ADMIN — DOCUSATE SODIUM 50 MG AND SENNOSIDES 8.6 MG 4 TABLET: 8.6; 5 TABLET, FILM COATED ORAL at 21:07

## 2021-09-16 RX ADMIN — HYDROMORPHONE HYDROCHLORIDE 0.5 MG: 1 INJECTION, SOLUTION INTRAMUSCULAR; INTRAVENOUS; SUBCUTANEOUS at 18:48

## 2021-09-16 RX ADMIN — POTASSIUM CHLORIDE 20 MEQ: 1500 TABLET, EXTENDED RELEASE ORAL at 08:37

## 2021-09-16 RX ADMIN — DIAZEPAM 5 MG: 5 TABLET ORAL at 21:10

## 2021-09-16 RX ADMIN — HYDROMORPHONE HYDROCHLORIDE 0.5 MG: 1 INJECTION, SOLUTION INTRAMUSCULAR; INTRAVENOUS; SUBCUTANEOUS at 15:36

## 2021-09-16 RX ADMIN — POTASSIUM CHLORIDE 20 MEQ: 1500 TABLET, EXTENDED RELEASE ORAL at 21:07

## 2021-09-16 RX ADMIN — GABAPENTIN 400 MG: 400 CAPSULE ORAL at 21:07

## 2021-09-16 RX ADMIN — MORPHINE SULFATE 60 MG: 30 TABLET, FILM COATED, EXTENDED RELEASE ORAL at 08:37

## 2021-09-16 RX ADMIN — GABAPENTIN 400 MG: 400 CAPSULE ORAL at 14:54

## 2021-09-16 ASSESSMENT — PAIN DESCRIPTION - PROGRESSION
CLINICAL_PROGRESSION: NOT CHANGED

## 2021-09-16 ASSESSMENT — PAIN SCALES - GENERAL
PAINLEVEL_OUTOF10: 7
PAINLEVEL_OUTOF10: 8
PAINLEVEL_OUTOF10: 7
PAINLEVEL_OUTOF10: 4
PAINLEVEL_OUTOF10: 8
PAINLEVEL_OUTOF10: 8
PAINLEVEL_OUTOF10: 4
PAINLEVEL_OUTOF10: 5
PAINLEVEL_OUTOF10: 9
PAINLEVEL_OUTOF10: 6
PAINLEVEL_OUTOF10: 7
PAINLEVEL_OUTOF10: 6
PAINLEVEL_OUTOF10: 6
PAINLEVEL_OUTOF10: 7
PAINLEVEL_OUTOF10: 4
PAINLEVEL_OUTOF10: 6
PAINLEVEL_OUTOF10: 8
PAINLEVEL_OUTOF10: 8
PAINLEVEL_OUTOF10: 6
PAINLEVEL_OUTOF10: 7
PAINLEVEL_OUTOF10: 6

## 2021-09-16 ASSESSMENT — PAIN DESCRIPTION - PAIN TYPE
TYPE: SURGICAL PAIN

## 2021-09-16 ASSESSMENT — PAIN - FUNCTIONAL ASSESSMENT
PAIN_FUNCTIONAL_ASSESSMENT: PREVENTS OR INTERFERES SOME ACTIVE ACTIVITIES AND ADLS

## 2021-09-16 ASSESSMENT — PAIN DESCRIPTION - ONSET
ONSET: ON-GOING

## 2021-09-16 ASSESSMENT — PAIN DESCRIPTION - ORIENTATION
ORIENTATION: MID

## 2021-09-16 ASSESSMENT — PAIN DESCRIPTION - DESCRIPTORS
DESCRIPTORS: ACHING

## 2021-09-16 ASSESSMENT — PAIN DESCRIPTION - LOCATION
LOCATION: BACK

## 2021-09-16 ASSESSMENT — PAIN DESCRIPTION - FREQUENCY
FREQUENCY: CONTINUOUS

## 2021-09-16 NOTE — PROGRESS NOTES
Patient alert and oriented x4, VSS on room air with slightly soft BP, patient asymptomatic. Incision to lower back now BIENVENIDO with staples, cleaned this shift with soap and water and CHG swabs. Hemovac in place with bloody output. Patient c/o pain to back - medicated per [facility] with prn PO and IV pain medication to good effect. Patient up to chair and bathroom this shift, worked with PT/OT, tolerating ambulation well with x1 SBA w/ rolling walker and GB. Able to void adequately after removal of Tabares. Tolerating PO fluids and meals well and denies nausea. All fall precautions in place. SCDs in place. Will continue to monitor.

## 2021-09-16 NOTE — PROGRESS NOTES
Patient is A/O x4, VSS, and pain is being managed per the [facility]. Patient is tolerating PO liquids and diet well. Patient's cain was pulled at 7259 without complications. Patient educated on need to void within 8 hours. Patient's IV fluids continue to infuse. Incision is CD/I with hemovac drain compressed. Fall precautions are in place, will continue to monitor and assess patient.

## 2021-09-16 NOTE — PROGRESS NOTES
NEUROSURGERY PROGRESS NOTE    9/16/2021 1:28 PM                               Brian Ba                      LOS: 1 day   POD#1 s/p Procedure(s) (LRB):  T4-T7 LAMINECTOMY, DECOMPRESSION, FORAMINOTOMY, FUSION , FIXATION, RHIZOTOMY, REMOVAL OF EPIDURAL MASS (N/A)    Subjective: Patient sitting up in bed upon entering the room. No acute events overnight. Patient c/o incisional pain, but wrapping chest and upper abdominal pain resolved. Physical Exam:  Patient seen and examined    Vitals:    09/16/21 1218   BP: 101/62   Pulse: 78   Resp: 16   Temp: 97.4 °F (36.3 °C)   SpO2: 97%     GCS:  4 - Opens eyes on own  5 - Alert and oriented  6 - Follows simple motor commands  General: Well developed. Alert and cooperative in no acute distress. HENT: atraumatic, neck supple  Eyes: Optic discs: Not tested  Pulmonary: unlabored respiratory effort  Cardiovascular:  Warm well perfused. No peripheral edema  Gastrointestinal: abdomen soft, NT, ND    Neurological:  Mental Status: Awake, alert, oriented x 4, speech clear and appropriate  Attention: Intact  Language: No aphasia or dysarthria noted  Sensation: Intact to all extremities to light touch, but numbness in BLE, improving  Coordination: Intact  DTRs:    Right  Left    ankle clonus  Neg Neg   toes (babinski)  Down Down     Musculoskeletal:   Gait: Not tested   Assist devices: None   Tone: Normal  Motor strength:    Right  Left    Right  Left    Deltoid  5 5  Hip Flex  4 4   Biceps  5 5  Knee Extensors  4 4   Triceps  5 5  Knee Flexors  4 4   Wrist Ext  5 5  Ankle Dorsiflex. 4 4   Wrist Flex  5 5  Ankle Plantarflex.   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    Status post thoracic spinal fusion 02/19/2021    Sarcoma ([facility] 02/15/2021    Neutropenic fever ([address] 10/10/2019    Liver mass 07/22/2019    Weight loss counseling, encounter for     Moderate malnutrition ([address] 03/23/2018    Class 2 obesity due to excess calories with body mass index (BMI) of 39.0 to 39.9 in adult     Chronic depression     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 female s/p Procedure(s) (LRB):  T4-T7 LAMINECTOMY, DECOMPRESSION, FORAMINOTOMY, FUSION , FIXATION, RHIZOTOMY, REMOVAL OF EPIDURAL MASS (N/A)    Plan:  1. Neurologically stable  2. Neurologic exams frequency: Q4H  3. For change in exam MUST contact neurosurgery team along with critical care or primary team  4. Epidural Tumor:  - s/p T4-T7 F&F w/resection of tumor  - Antibiotics: Cefazolin x 3 doses post op  - Drain: Continue drain until output <30 mL Q8H  - Muscle spasms: Robaxin & PRN Valium  - Pain control: Tylenol and MS Contin & PRN Roxicodone and Dilaudid  - Acute Blood Loss Anemia: H/H 7.1/20.7 this morning, 1 unit PRBC ordered and continue to monitor H/H  - Incisional Care: Open to air. Wash incision daily with warm soapy water or shower daily, and pat dry with clean dry towel. Boyle with CHG swab. 5. GI Prophylaxis: Protonix  6. Bowel Regimen: Senokot-S  7.  Mobility: PT/OT as tolerates  8. Diet: Advance as tolerates  9. DVT Prophylaxis: SCD's & Lovenox  10. Please call with any questions or decline in neurological status    DISPO: Remain inpatient until tolerating PO, pain controlled with PO meds and evaluated by PT/OT from neurosurgery standpoint. Patient was seen and examined with Dr. Liberty Umanzor who agrees with above assessment and plan.      Electronically signed by: TRACY Joy CNP, APRN-CNP, 9/16/2021 1:28 PM  [phone]

## 2021-09-16 NOTE — PROGRESS NOTES
Occupational Therapy   Occupational Therapy Initial Assessment and Tx  Date: 2021   Patient Name: Sara Ferrer  MRN: [id number]     : 1980    Date of Service: 2021    Discharge Recommendations: Sara Ferrer scored a 17/24 on the AM-PAC ADL Inpatient form. Current research shows that an AM-PAC score of 18 or greater is typically associated with a discharge to the patient's home setting. Based on the patient's AM-PAC score, and their current ADL deficits, it is recommended that the patient have 2-3 sessions per week of Occupational Therapy at d/c to increase the patient's independence. At this time, this patient demonstrates the endurance and safety to discharge home with (home vs OP services) and a follow up treatment frequency of 2-3x/wk. Please see assessment section for further patient specific details. If patient discharges prior to next session this note will serve as a discharge summary. Please see below for the latest assessment towards goals. OT Equipment Recommendations  Equipment Needed: Yes  Mobility Devices: ADL Assistive Devices  ADL Assistive Devices: Shower Chair with back; Reacher;Sock-Aid Hard    Assessment   Performance deficits / Impairments: Decreased functional mobility ; Decreased ADL status; Decreased endurance  Assessment: Pt from home with spouse reporting primarily IND pta. Pt currently requires CGA for fx mobility and transfers, slow and effortful, use of RW. Pt with significant pain this session. Pt ambulating to and from bathroom, toileting, UB/LB dressing, education on AE, UB bathing, grooming. Pt would benefit from cont skilled OT while in acute care. Treatment Diagnosis: impaired mobility, transfers, ADL  Decision Making: Medium Complexity  OT Education: OT Role;Plan of Care;Precautions; ADL Adaptive Strategies;Transfer Training;Energy Conservation;Equipment;IADL Safety; Family Education  Patient Education: cont to reinforce  Barriers to Learning: none  REQUIRES OT FOLLOW UP: Yes  Activity Tolerance  Activity Tolerance: Patient Tolerated treatment well;Patient limited by pain; Patient limited by fatigue  Safety Devices  Safety Devices in place: Yes  Type of devices: All fall risk precautions in place; Left in chair;Call light within reach; Chair alarm in place;Nurse notified;Gait belt           Patient Diagnosis(es): There were no encounter diagnoses. has a past medical history of Anxiety, 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, Cro[facility]'s disease ([facility] Depression, Early menopause, History of blood transfusion, IBS (irritable bowel syndrome), IBS (irritable bowel syndrome), Mixed hyperlipidemia, Obesity, Osteoarthritis, knee, PONV (postoperative nausea and vomiting), Prolonged emergence from general anesthesia, and Sarcoma of left lower extremity ([facility] has a past surgical history that includes salpingectomy (Left, 2009); Appendectomy; Ankle surgery (Left); Breast reduction surgery; Knee arthroscopy (Right, 01/2015); Knee arthroscopy (Right, 07/2015); Total knee arthroplasty (Right, 09/2015); other surgical history (Left); Muscle biopsy (Left, 11/2017); other surgical history (Left, 02/26/2018); other surgical history (Left, 03/14/2018); other surgical history (Left, 03/22/2018); Liver Resection (N/A, 07/22/2019); Carpal tunnel release (Right, 07/2020); Port Surgery (Right, 11/30/2020); laminectomy (N/A, 2/17/2021); and laminectomy (N/A, 9/15/2021).     Treatment Diagnosis: impaired mobility, transfers, ADL      Restrictions  Position Activity Restriction  Other position/activity restrictions: ambulate pt, activity as tolerated, call physician if Hgb <8    Subjective   General  Chart Reviewed: Yes  Patient assessed for rehabilitation services?: Yes  Additional Pertinent Hx: T4-T7 LAMINECTOMY, DECOMPRESSION, FORAMINOTOMY, FUSION , FIXATION, RHIZOTOMY, REMOVAL OF EPIDURAL MASS (N/A Spine Thoracic) PMH Anxiety, 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, Crohn's disease  Referring Practitioner: INEZ Dangelo  Diagnosis: mass of spine  Subjective  Subjective: In bed upon arrival, agreeable to session, reporting pain 8/10, nurse providing pain meds in session  Pain Assessment  Pain Level: 7  Vital Signs  Temp: 97.9 °F (36.6 °C)  Temp Source: Oral  Pulse: 77  Heart Rate Source: Monitor  Resp: 18  BP: 102/65  BP Location: Left upper arm  MAP (mmHg): 77  Patient Position: Semi fowlers  Level of Consciousness: Alert (0)  MEWS Score: 1  Oxygen Therapy  SpO2: 92 %  Pulse Oximeter Device Mode: Intermittent  Pulse Oximeter Device Location: Finger  O2 Device: None (Room air)  Social/Functional History  Social/Functional History  Lives With: Spouse (two children, 16 and 11)  Type of Home: House  Home Layout: Two level, Bed/Bath upstairs (16 stairs with RHR to reach bedrooms)  Home Access: Stairs to enter without rails  Entrance Stairs - Number of Steps: 2 DEL through garage, pt states that she can hold doorframe, shoe rack, or freezer  Bathroom Shower/Tub: Walk-in shower  Bathroom Toilet: Standard  Bathroom Equipment: Grab bars in shower, Hand-held shower  Bathroom Accessibility: Walker accessible  Home Equipment: 4 wheeled walker  ADL Assistance: Independent (independent up until the last couple days PTA, needed help with dressing)  Homemaking Assistance: Needs assistance ( and kids perform)  Ambulation Assistance: Independent (typically without AD, last few days PTA using 4WW)  Transfer Assistance: Independent (typically able to perform wihtout help, but needed assist for a few days PTA)  Active : Yes  Occupation: On disability  Leisure & Hobbies: sewing, reading  Additional Comments: Pt reports that her family is able to provide near 24 hr supervision/assist at d/c. Pt denies recent falls.        Objective        Orientation  Overall Orientation Status: Within Normal Limits  Observation/Palpation  Observation: no drainage from incision, drain and IV intact throughout 4:  grooming spvn in stance at sink  Short term goal 5: LB dressing Elin       Therapy Time   Individual Concurrent Group Co-treatment   Time In 1038         Time Out 1131         Minutes 53              Timed Code Treatment Minutes:   40    Total Treatment Minutes:  [address], MOT, OTR/L

## 2021-09-16 NOTE — PROGRESS NOTES
Physical Therapy    Facility/Department: [facility] 5T ORTHO/NEURO  Initial Assessment/Treatment Note    NAME: Brian Ba  : 1980  MRN: [id number]    Date of Service: 2021    Discharge Recommendations:  Brian Ba scored a 18/24 on the AM-PAC short mobility form. Current research shows that an AM-PAC score of 18 or greater is typically associated with a discharge to the patient's home setting. Based on the patient's AM-PAC score and their current functional mobility deficits, it is recommended that the patient have 2-3 sessions per week of Physical Therapy at d/c to increase the patient's independence. At this time, this patient demonstrates the endurance and safety to discharge home with HHPT and a follow up treatment frequency of 2-3x/wk. Please see assessment section for further patient specific details. If patient discharges prior to next session this note will serve as a discharge summary. Please see below for the latest assessment towards goals. HOME HEALTH CARE: LEVEL 1 STANDARD    - Initial home health evaluation to occur within 24-48 hours, in patient home   - Therapy to evaluate with goal of regaining prior level of functioning   - Therapy to evaluate if patient has [address] needs for personal care          PT Equipment Recommendations  Equipment Needed: No  Other: pt owns RW    Assessment   Body structures, Functions, Activity limitations: Decreased functional mobility ; Increased pain;Decreased posture;Decreased strength;Decreased endurance  Assessment: Pt is a 35 y.o. female s/p thoracic laminectomy/fusion with removal of epidural mass presenting with decreased functional mobility. Pt is currently requiring CGA-Anna for all functional mobility, which is a decline from reported baseline. Pt will benefit from skilled therapy to maximize safety and independence.   Treatment Diagnosis: decreased functional mobility s/p thoracic sx  Prognosis: Good  Decision Making: Medium Complexity  Patient Education: Role of PT, goals, d/c recommendations, pt verbalized understanding  REQUIRES PT FOLLOW UP: Yes  Activity Tolerance  Activity Tolerance: Patient limited by fatigue;Patient limited by endurance       Patient Diagnosis(es): There were no encounter diagnoses. has a past medical history of Anxiety, 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, Crohn's disease ([facility] Depression, Early menopause, History of blood transfusion, IBS (irritable bowel syndrome), IBS (irritable bowel syndrome), Mixed hyperlipidemia, Obesity, Osteoarthritis, knee, PONV (postoperative nausea and vomiting), Prolonged emergence from general anesthesia, and Sarcoma of left lower extremity ([facility] has a past surgical history that includes salpingectomy (Left, 2009); Appendectomy; Ankle surgery (Left); Breast reduction surgery; Knee arthroscopy (Right, 01/2015); Knee arthroscopy (Right, 07/2015); Total knee arthroplasty (Right, 09/2015); other surgical history (Left); Muscle biopsy (Left, 11/2017); other surgical history (Left, 02/26/2018); other surgical history (Left, 03/14/2018); other surgical history (Left, 03/22/2018); Liver Resection (N/A, 07/22/2019); Carpal tunnel release (Right, 07/2020); Port Surgery (Right, 11/30/2020); laminectomy (N/A, 2/17/2021); and laminectomy (N/A, 9/15/2021). Restrictions  Position Activity Restriction  Other position/activity restrictions: ambulate pt, activity as tolerated, call physician if Hgb <8  Vision/Hearing  Vision: Impaired  Vision Exceptions:  (nighttime driving glasses)  Hearing: Within functional limits     Subjective  General  Chart Reviewed: Yes  Patient assessed for rehabilitation services?: Yes  Additional Pertinent Hx: Pt is a 35 y.o. female admitted to [facility] on [facility]/15/21. Pt s/p T4-7 laminectomy, decompression, foraminotomy, fusian, fixation, rhizotomy, and removal of epidural mass. PMH: hx of blood transfusion, mixed hyperlipedemia, OA knee, sarcoma LLE.   Family / Caregiver (degrees)  RLE AROM: WFL  AROM LLE (degrees)  LLE AROM : WFL  Strength RLE  R Hip Flexion: 3+/5  R Knee Flexion: 4/5  R Knee Extension: 4+/5  R Ankle Dorsiflexion: 4+/5  Strength LLE  L Hip Flexion: 3+/5  L Knee Flexion: 4/5  L Knee Extension: 4+/5  L Ankle Dorsiflexion: 4+/5     Sensation  Overall Sensation Status: Impaired (intact to light touch over all BLE dermatomes, pt reports decreased sensation LLE>RLE)  Bed mobility  Supine to Sit: Contact guard assistance (cues for log roll, HOB elevated, heavy use of bedrails, increased time, effortful)  Comment: /75 sitting EOB  Transfers  Sit to Stand: Minimal Assistance (from EOB, toilet, and recliner to RW, cues for hand placement)  Stand to sit: Minimal Assistance (cues for hand placement)  Ambulation  Ambulation?: Yes  Ambulation 1  Surface: level tile  Device: Rolling Walker  Assistance: Contact guard assistance  Quality of Gait: decreased lily, forward trunk flexion, antalgia LLE, decreased B step height  Distance: 10' (into bathroom) + 25'  Comments: Cues for upright posture. Stairs/Curb  Stairs?: No     Balance  Comments: 8 min sitting balance EOB with BUE support with SBA to prep for transfer. CGA for standing balance for pericare and pants management. Plan   Plan  Times per week: 5-7  Times per day: Daily  Current Treatment Recommendations: Strengthening, Neuromuscular Re-education, Home Exercise Program, ROM, Safety Education & Training, Balance Training, Endurance Training, Patient/Caregiver Education & Training, Functional Mobility Training, Equipment Evaluation, Education, & procurement, Transfer Training, Gait Training, Stair training  Safety Devices  Type of devices:  All fall risk precautions in place, Call light within reach, Chair alarm in place, Gait belt, Left in chair, Nurse notified    G-Code       OutComes Score                                                  AM-PAC Score  AM-PAC Inpatient Mobility Raw Score : 18 (09/16/21 1317)  AM-PAC Inpatient T-Scale Score : 43.63 (09/16/21 1317)  Mobility Inpatient CMS 0-100% Score: 46.58 (09/16/21 1317)  Mobility Inpatient CMS G-Code Modifier : CK (09/16/21 1317)          Goals  Short term goals  Time Frame for Short term goals: Discharge  Short term goal 1: Pt will perform all bed mobility with SBA  Short term goal 2: Pt will perform sit to/from stand with RW and SBA  Short term goal 3: Pt will ambulate 100' with RW and SBA  Short term goal 4: Pt will ascend/descend 16 stairs with unilateral HR and CGA  Patient Goals   Patient goals : \"To be able to get up on my own and go to the bathroom by myself. To be able to take care of myself. \"       Therapy Time   Individual Concurrent Group Co-treatment   Time In 0421         Time Out 1133         Minutes 55              Timed Code Treatment Minutes:   40    Total Treatment Minutes:  [address], PT    This note to serve as discharge summary if patient discharged before next session.

## 2021-09-16 NOTE — PROGRESS NOTES
Physical Therapy/Occupational Therapy  HOLD    Attempted to see pt at 825 on 9/16/21. Pt's Hgb noted to be 7.1. Pt with orders to notify physician for Hgb less than 8.  RN awaiting word from neurosurgery if pt requires blood transfusion and if pt is ok to proceed with PT/OT. Will re-attempt as schedule allows.     Daniel Cano, PT  [employer], MOT, OTR/L

## 2021-09-16 NOTE — PLAN OF CARE
Problem: Falls - Risk of:  Goal: Will remain free from falls  Description: Will remain free from falls  Outcome: Ongoing  All fall precautions in place. Bed locked and in lowest position with alarm on. Over-bed table and personal belongings within reach. Patient instructed to use call light for assistance. Non-skids socks on. Will continue to monitor. Problem: Pain:  Goal: Control of acute pain  Description: Control of acute pain  Outcome: Ongoing   Patient complains of surgical pain to back. Patient medicated per [facility] with prn PO and IV pain medication. Patient resting comfortably at reassessment. Will continue to assess and monitor.

## 2021-09-16 NOTE — FLOWSHEET NOTE
09/16/21 1304   Encounter Summary   Services provided to: Patient and family together   Referral/Consult From: Rounding  (for [place] holiday)   Continue Visiting   (es 9/16)   Complexity of Encounter Moderate   Length of Encounter 15 minutes   Routine   Type Initial   Assessment Approachable   Intervention Active listening;Explored feelings, thoughts, concerns;Explored coping resources;Nurtured hope;Prayer;Discussed illness/injury and it's impact   Outcome Engaged in conversation;Receptive

## 2021-09-16 NOTE — PROGRESS NOTES
Patient received 1 unit PRBC. Patient tolerated procedure well, with stable vital signs throughout transfusion. No signs or symptoms of transfusion reaction.      Prior to transfusion:  Hgb 7.1  Hct 20.7    Post-transfusion:  Hgb 8.2  Hct 25.2

## 2021-09-17 PROBLEM — Z98.1 S/P FUSION OF THORACIC SPINE: Status: ACTIVE | Noted: 2021-09-17

## 2021-09-17 LAB
ANION GAP SERPL CALCULATED.3IONS-SCNC: 9 MMOL/L (ref 3–16)
BUN BLDV-MCNC: 7 MG/DL (ref 7–20)
CALCIUM SERPL-MCNC: 8.4 MG/DL (ref 8.3–10.6)
CHLORIDE BLD-SCNC: 107 MMOL/L (ref 99–110)
CO2: 25 MMOL/L (ref 21–32)
CREAT SERPL-MCNC: 0.5 MG/DL (ref 0.6–1.1)
GFR AFRICAN AMERICAN: >60
GFR NON-AFRICAN AMERICAN: >60
GLUCOSE BLD-MCNC: 99 MG/DL (ref 70–99)
HCT VFR BLD CALC: 25.4 % (ref 36–48)
HEMOGLOBIN: 8.3 G/DL (ref 12–16)
MCH RBC QN AUTO: 30.7 PG (ref 26–34)
MCHC RBC AUTO-ENTMCNC: 32.8 G/DL (ref 31–36)
MCV RBC AUTO: 93.8 FL (ref 80–100)
PDW BLD-RTO: 20.3 % (ref 12.4–15.4)
PLATELET # BLD: 154 K/UL (ref 135–450)
PMV BLD AUTO: 8.2 FL (ref 5–10.5)
POTASSIUM SERPL-SCNC: 3.7 MMOL/L (ref 3.5–5.1)
RBC # BLD: 2.7 M/UL (ref 4–5.2)
SODIUM BLD-SCNC: 141 MMOL/L (ref 136–145)
WBC # BLD: 7.5 K/UL (ref 4–11)

## 2021-09-17 PROCEDURE — 6370000000 HC RX 637 (ALT 250 FOR IP): Performed by: PHYSICIAN ASSISTANT

## 2021-09-17 PROCEDURE — 6370000000 HC RX 637 (ALT 250 FOR IP): Performed by: NURSE PRACTITIONER

## 2021-09-17 PROCEDURE — 97530 THERAPEUTIC ACTIVITIES: CPT

## 2021-09-17 PROCEDURE — 2580000003 HC RX 258: Performed by: PHYSICIAN ASSISTANT

## 2021-09-17 PROCEDURE — 1200000000 HC SEMI PRIVATE

## 2021-09-17 PROCEDURE — 36415 COLL VENOUS BLD VENIPUNCTURE: CPT

## 2021-09-17 PROCEDURE — 80048 BASIC METABOLIC PNL TOTAL CA: CPT

## 2021-09-17 PROCEDURE — 85027 COMPLETE CBC AUTOMATED: CPT

## 2021-09-17 PROCEDURE — 97116 GAIT TRAINING THERAPY: CPT

## 2021-09-17 PROCEDURE — 6360000002 HC RX W HCPCS: Performed by: PHYSICIAN ASSISTANT

## 2021-09-17 RX ORDER — DIAZEPAM 5 MG/1
5 TABLET ORAL EVERY 6 HOURS PRN
Qty: 40 TABLET | Refills: 0 | Status: SHIPPED | OUTPATIENT
Start: 2021-09-17 | End: 2021-09-27

## 2021-09-17 RX ORDER — METHOCARBAMOL 750 MG/1
750 TABLET, FILM COATED ORAL EVERY 6 HOURS PRN
Qty: 40 TABLET | Refills: 0 | Status: SHIPPED | OUTPATIENT
Start: 2021-09-17 | End: 2021-09-27

## 2021-09-17 RX ADMIN — ENOXAPARIN SODIUM 40 MG: 40 INJECTION SUBCUTANEOUS at 08:28

## 2021-09-17 RX ADMIN — ACETAMINOPHEN 1000 MG: 500 TABLET, FILM COATED ORAL at 15:55

## 2021-09-17 RX ADMIN — GABAPENTIN 400 MG: 400 CAPSULE ORAL at 14:40

## 2021-09-17 RX ADMIN — HYDROMORPHONE HYDROCHLORIDE 0.5 MG: 1 INJECTION, SOLUTION INTRAMUSCULAR; INTRAVENOUS; SUBCUTANEOUS at 12:30

## 2021-09-17 RX ADMIN — POTASSIUM CHLORIDE 20 MEQ: 1500 TABLET, EXTENDED RELEASE ORAL at 22:01

## 2021-09-17 RX ADMIN — OXYCODONE 15 MG: 5 TABLET ORAL at 23:32

## 2021-09-17 RX ADMIN — OXYCODONE 15 MG: 5 TABLET ORAL at 15:55

## 2021-09-17 RX ADMIN — HYDROMORPHONE HYDROCHLORIDE 0.5 MG: 1 INJECTION, SOLUTION INTRAMUSCULAR; INTRAVENOUS; SUBCUTANEOUS at 17:02

## 2021-09-17 RX ADMIN — MORPHINE SULFATE 60 MG: 30 TABLET, FILM COATED, EXTENDED RELEASE ORAL at 22:00

## 2021-09-17 RX ADMIN — HYDROMORPHONE HYDROCHLORIDE 0.5 MG: 1 INJECTION, SOLUTION INTRAMUSCULAR; INTRAVENOUS; SUBCUTANEOUS at 04:05

## 2021-09-17 RX ADMIN — SERTRALINE HYDROCHLORIDE 100 MG: 100 TABLET ORAL at 08:29

## 2021-09-17 RX ADMIN — DOCUSATE SODIUM 50 MG AND SENNOSIDES 8.6 MG 4 TABLET: 8.6; 5 TABLET, FILM COATED ORAL at 22:04

## 2021-09-17 RX ADMIN — ACETAMINOPHEN 1000 MG: 500 TABLET, FILM COATED ORAL at 22:01

## 2021-09-17 RX ADMIN — OXYCODONE 15 MG: 5 TABLET ORAL at 00:33

## 2021-09-17 RX ADMIN — MORPHINE SULFATE 60 MG: 30 TABLET, FILM COATED, EXTENDED RELEASE ORAL at 08:30

## 2021-09-17 RX ADMIN — Medication 10 ML: at 08:31

## 2021-09-17 RX ADMIN — ACETAMINOPHEN 1000 MG: 500 TABLET, FILM COATED ORAL at 04:05

## 2021-09-17 RX ADMIN — GABAPENTIN 400 MG: 400 CAPSULE ORAL at 08:29

## 2021-09-17 RX ADMIN — GABAPENTIN 400 MG: 400 CAPSULE ORAL at 22:01

## 2021-09-17 RX ADMIN — Medication 10 ML: at 20:28

## 2021-09-17 RX ADMIN — POTASSIUM CHLORIDE 20 MEQ: 1500 TABLET, EXTENDED RELEASE ORAL at 08:29

## 2021-09-17 RX ADMIN — OXYCODONE 15 MG: 5 TABLET ORAL at 06:49

## 2021-09-17 RX ADMIN — PANTOPRAZOLE SODIUM 40 MG: 40 TABLET, DELAYED RELEASE ORAL at 06:49

## 2021-09-17 RX ADMIN — HYDROMORPHONE HYDROCHLORIDE 0.5 MG: 1 INJECTION, SOLUTION INTRAMUSCULAR; INTRAVENOUS; SUBCUTANEOUS at 20:28

## 2021-09-17 ASSESSMENT — PAIN DESCRIPTION - FREQUENCY
FREQUENCY: CONTINUOUS
FREQUENCY: INTERMITTENT
FREQUENCY: CONTINUOUS

## 2021-09-17 ASSESSMENT — PAIN DESCRIPTION - PROGRESSION
CLINICAL_PROGRESSION: NOT CHANGED

## 2021-09-17 ASSESSMENT — PAIN DESCRIPTION - LOCATION
LOCATION: BACK

## 2021-09-17 ASSESSMENT — PAIN DESCRIPTION - DESCRIPTORS
DESCRIPTORS: ACHING

## 2021-09-17 ASSESSMENT — PAIN DESCRIPTION - ONSET
ONSET: ON-GOING

## 2021-09-17 ASSESSMENT — PAIN SCALES - GENERAL
PAINLEVEL_OUTOF10: 4
PAINLEVEL_OUTOF10: 4
PAINLEVEL_OUTOF10: 7
PAINLEVEL_OUTOF10: 3
PAINLEVEL_OUTOF10: 8
PAINLEVEL_OUTOF10: 7
PAINLEVEL_OUTOF10: 3
PAINLEVEL_OUTOF10: 4
PAINLEVEL_OUTOF10: 6
PAINLEVEL_OUTOF10: 7
PAINLEVEL_OUTOF10: 8
PAINLEVEL_OUTOF10: 7
PAINLEVEL_OUTOF10: 8

## 2021-09-17 ASSESSMENT — PAIN DESCRIPTION - PAIN TYPE
TYPE: SURGICAL PAIN

## 2021-09-17 ASSESSMENT — PAIN DESCRIPTION - ORIENTATION
ORIENTATION: MID

## 2021-09-17 ASSESSMENT — PAIN - FUNCTIONAL ASSESSMENT
PAIN_FUNCTIONAL_ASSESSMENT: PREVENTS OR INTERFERES SOME ACTIVE ACTIVITIES AND ADLS

## 2021-09-17 NOTE — PROGRESS NOTES
Patient is A/O x4, VSS, and pain is being managed per the [facility]. Patient is tolerating PO liquids and diet well. Patient is ambulating to the bathroom to void with walker/gait belt and x1 CGA. Patient hemovac drain is in place, compressed and returning bloody drainage. Patient's incision is CD/I and BIENVENIDO. Fall precautions are in place, will continue to monitor and assess patient.

## 2021-09-17 NOTE — CARE COORDINATION
Case Management Daily Note                    Date: 9/17/2021     Patient Name: Kamini Gomez    Date of Admission: 9/15/2021 11:14 AM  YOB: 1980    Length of Stay: 2  GMLOS: 3.5         Patient Admission Status: Inpatient  Diagnosis:Mass of spine [M89.8X8]     ________________________________________________________________________________________  Discharge Plan: Home  Home w/spouse     Insurance: Payor: Lizeth Ramos / Plan: Lizeth Ramos NAP CHOICE POS II / Product Type: *No Product type* /   Is pre-cert/notification needed: waived     Tentative discharge date: 9/19    Current barriers: None     Referrals completed: Not Applicable    Resources/ information provided: Not indicated at this time   ________________________________________________________________________________________  PT AM-PAC: 18 / 24 per last evaluation on: 9/17    OT AM-PAC: 17 / 24 per last evaluation on: 9/17    DME Needs for discharge: none  ________________________________________________________________________________________  Notes/Plan of Care:   SW met with patient at bedside. Patient is from home with spouse. Patient has a rolling walker ans is mostly independent at baseline. Patient plans to return home at discharge. Patient denies any home health care needs or concerns about returning home. No further SW needs at this time.      Care Transition Patient: LISSY Hopper  [facility]   Case Management Department  Ph: [phone]

## 2021-09-17 NOTE — PROGRESS NOTES
Occupational Therapy  Facility/Department: [facility] 5T ORTHO/NEURO  Daily Treatment Note  NAME: Elias Lilly  : 1980  MRN: [id number]    Date of Service: 2021    Discharge Recommendations:    Elias Lilly scored a 17/24 on the AM-PAC ADL Inpatient form. Current research shows that an AM-PAC score of 18 or greater is typically associated with a discharge to the patient's home setting. Based on the patient's AM-PAC score, and their current ADL deficits, it is recommended that the patient have 2-3 sessions per week of Occupational Therapy at d/c to increase the patient's independence. At this time, this patient demonstrates the endurance and safety to discharge home with home services and a follow up treatment frequency of 2-3x/wk. Please see assessment section for further patient specific details. If patient discharges prior to next session this note will serve as a discharge summary. Please see below for the latest assessment towards goals. Assessment   Performance deficits / Impairments: Decreased functional mobility ; Decreased ADL status; Decreased endurance  Assessment: Session limited by pain and fatigue this date. Unable to participate in out of bed activities 2/2 fatigue after using restroom with nursing. Pt participated in passive and active BUE exercises while supine in bed for increased joint mobility and circulation. Pt educated on benefits of movement of extremeties while in hospital. Pt would benefit from cont skilled OT while in acute care. Cont POC. Treatment Diagnosis: impaired mobility, transfers, ADL  OT Education: OT Role;Plan of Care;Precautions; ADL Adaptive Strategies;Transfer Training;Energy Conservation;Equipment;IADL Safety; Family Education  Patient Education: cont to reinforce  Barriers to Learning: none  Activity Tolerance  Activity Tolerance: Patient limited by fatigue;Patient limited by pain  Safety Devices  Safety Devices in place: Yes  Type of devices:  All fall risk precautions in place;Call light within reach;Nurse notified;Gait belt;Left in bed;Bed alarm in place         Patient Diagnosis(es): The encounter diagnosis was S/P fusion of thoracic spine. has a past medical history of Anxiety, 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, Crohn's disease ([facility] [address] Depression, Early menopause, History of blood transfusion, IBS (irritable bowel syndrome), IBS (irritable bowel syndrome), Mixed hyperlipidemia, Obesity, Osteoarthritis, knee, PONV (postoperative nausea and vomiting), Prolonged emergence from general anesthesia, and Sarcoma of left lower extremity ([facility] [address] has a past surgical history that includes salpingectomy (Left, 2009); Appendectomy; Ankle surgery (Left); Breast reduction surgery; Knee arthroscopy (Right, 01/2015); Knee arthroscopy (Right, 07/2015); Total knee arthroplasty (Right, 09/2015); other surgical history (Left); Muscle biopsy (Left, 11/2017); other surgical history (Left, 02/26/2018); other surgical history (Left, 03/14/2018); other surgical history (Left, 03/22/2018); Liver Resection (N/A, 07/22/2019); Carpal tunnel release (Right, 07/2020); Port Surgery (Right, 11/30/2020); laminectomy (N/A, 2/17/2021); and laminectomy (N/A, 9/15/2021). Restrictions  Position Activity Restriction  Other position/activity restrictions: ambulate pt, activity as tolerated, call physician if Hgb <8  Subjective   General  Chart Reviewed: Yes  Patient assessed for rehabilitation services?: Yes  Additional Pertinent Hx: T4-T7 LAMINECTOMY, DECOMPRESSION, FORAMINOTOMY, FUSION , FIXATION, RHIZOTOMY, REMOVAL OF EPIDURAL MASS (N/A Spine Thoracic) PMH Anxiety, 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, Crohn's disease  Family / Caregiver Present: Yes  Referring Practitioner: INEZ Bates  Diagnosis: mass of spine  Subjective  Subjective: In bed upon OT arrival, agreeable to session. Pt politely refused getting out of bed, recently used bathroom with nursing.  8/10 pain, nurse provided

## 2021-09-17 NOTE — PLAN OF CARE
Problem: Falls - Risk of:  Goal: Will remain free from falls  Description: Will remain free from falls  Outcome: Ongoing  Note: Patient will remain free from falls. Patient will use call light to notify staff of needs prior to exiting the bed. Patient's bed will remain in lowest position with wheels locked and bed alarm engaged. Problem: Mobility - Impaired:  Goal: Mobility will improve to maximum level  Description: Mobility will improve to maximum level  Outcome: Ongoing  Note: Patient's mobility will continue to improve to max level. Patient will ambulate to the bathroom to void using a walker and gait belt with x1 assist.     Problem: Pain:  Goal: Pain level will decrease  Description: Pain level will decrease  Outcome: Ongoing  Note: Patient's pain will continue to improve and continue to be managed per the [facility]. Problem: Pain:  Goal: Pain level will decrease  Description: Pain level will decrease  Outcome: Ongoing  Note: Patient's pain will continue to improve and continue to be managed per the [facility].

## 2021-09-17 NOTE — PROGRESS NOTES
107   CO2 23   < > 25   BUN 8   < > 7   CREATININE 0.5*   < > 0.5*   GLUCOSE 108*   < > 99   CALCIUM 9.2   < > 8.4   MG 2.00  --   --     < > = values in this interval not displayed. 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    Status post thoracic spinal fusion 02/19/2021    Sarcoma ([address] 02/15/2021    Neutropenic fever ([address] 10/10/2019    Liver mass 07/22/2019    Weight loss counseling, encounter for     Moderate malnutrition ([address] 03/23/2018    Class 2 obesity due to excess calories with body mass index (BMI) of 39.0 to 39.9 in adult     Chronic depression     Sarcoma of left lower extremity ([facility] [address] 11/24/2017    Small bowel obstruction ([address] 10/11/2017    Vitamin B12 deficiency 07/10/2017    Morbid obesity with BMI of 40.0-44.9, adult ([address] 06/22/2017    Mixed hyperlipidemia 06/22/2017    IBS (irritable bowel syndrome)     Crohn's disease ([address]     Osteoarthritis, knee     Premature menopause 02/04/2011       Assessment:  Patient is a 35 y.o. female s/p Procedure(s) (LRB):  T4-T7 LAMINECTOMY, DECOMPRESSION, FORAMINOTOMY, FUSION , FIXATION, RHIZOTOMY, REMOVAL OF EPIDURAL MASS (N/A)    Plan:  1. Neurologically stable  2. Neurologic exams frequency: Q4H  3. For change in exam MUST contact neurosurgery team along with critical care or primary team  4. Epidural Tumor:  - s/p T4-T7 F&F w/resection of tumor  - Antibiotics: Cefazolin x 3 doses post op  - Drain: Continue drain until output <30 mL Q8H  - Muscle spasms: Robaxin & PRN Valium  - Pain control: Tylenol and MS Contin & PRN Roxicodone and Dilaudid  - Acute Blood Loss Anemia: H/H 8.3/25.4 this morning, continue to monitor H/H  - Incisional Care: Open to air. Wash incision daily with warm soapy water or shower daily, and pat dry with clean dry towel.  Paint with CHG swab. 5. GI Prophylaxis: Protonix  6. Bowel Regimen: Senokot-S  7. Mobility: PT/OT as tolerates  8. Diet: Advance as tolerates  9. DVT Prophylaxis: SCD's & Lovenox  10. Please call with any questions or decline in neurological status    DISPO: Remain inpatient until tolerating PO, pain controlled with PO meds and evaluated by PT/OT from neurosurgery standpoint. Patient was seen and examined with Dr. Gavin Ch who agrees with above assessment and plan.      Electronically signed by: Elder Ramirez, TRACY - LORI, TRACY-CNP, 9/17/2021 9:53 AM  [phone]

## 2021-09-17 NOTE — PROGRESS NOTES
Pt is A&O, VSS, pain is being managed as per MAR. Closed suction drainage is in place and draining serosanguinous bloody drain. Incision OPA and is CD. Would care provided and pt tolerated well. All fall precaution is in place. Call light is within the reach.

## 2021-09-17 NOTE — PROGRESS NOTES
bedrooms. Can stay on the main level initially if needed. Pain: 6/10 back. RN has been addressing. Objective:    Bed mobility  Supine to sit: SBA, HOB up partially with use of rail  Sit to Supine: SBA, HOB up partially    Transfers  Sit to stand: SBA from bed (x 3 times); SBA from commode with grab bar; SBA from wheelchair. Stand to sit: SBA onto commode; SBA onto bed (x 3 times); SBA into wheelchair  Other: Transfers slow and guarded, but steady. Safe hand placement noted with transfers. Ambulation  Assistance Level: SBA  Assistive device: Wheeled walker  Distance: 10 ft, 5 ft, 10 ft in room (to/from bathroom/sink); 70 ft in napier  Quality of gait: Step-through pattern; decreased pace; moderate reliance on walker for support; guarded    Balance  Sat EOB x several minutes with Supervision  Sat on commode x several minutes with Supervision  Static stance with walker SBA  Ambulation with wheeled walker SBA    Other  Pt declined stairs today due to fatigue after going to/from bathroom and walking in napier. Patient Education  Role of PT. Calling for assist with needs. Expressed understanding. Safety Devices  Pt left with needs in reach. In bed with bed alarm on. RN updated.      AM-PAC score  AM-PAC Inpatient Mobility Raw Score : 18  AM-PAC Inpatient T-Scale Score : 43.63  Mobility Inpatient CMS 0-100% Score: 46.58  Mobility Inpatient CMS G-Code Modifier : CK    Goals: (as determined and assessed by primary PT)  Time Frame for Short term goals: Discharge  Short term goal 1: Pt will perform all bed mobility with SBA   Short term goal 2: Pt will perform sit to/from stand with RW and SBA   Short term goal 3: Pt will ambulate 100' with RW and SBA  Short term goal 4: Pt will ascend/descend 16 stairs with unilateral HR and CGA     Plan:  Times per week: 5-7; Times per day: Daily  Current Treatment Recommendations: Strengthening, Neuromuscular Re-education, Home Exercise Program, ROM, Safety Education &

## 2021-09-17 NOTE — PLAN OF CARE
Problem: Falls - Risk of:  Goal: Will remain free from falls  Description: Will remain free from falls  9/17/2021 0715 by Desmond Dias RN  Outcome: Ongoing    Fall risk precaution in place. Bed is locked and in lowest position. 2/4 side rails are up. Call light with in reach. Fall risk bracelet in place, non slip socks on.frequent check on patient. free from falls at this time. will continue to monitor.      Problem: Cerebrospinal Fluid Leakage - Risk Of:  Goal: Absence of restraint indications  Description: Absence of cerebrospinal fluid drainage at surgical site  Outcome: Ongoing    Problem: Infection - Surgical Site:  Goal: Will show no infection signs and symptoms  Description: Will show no infection signs and symptoms  Outcome: Ongoing     Problem: Mobility - Impaired:  Goal: Mobility will improve to maximum level  Description: Mobility will improve to maximum level  9/17/2021 0715 by Desmond Dias RN  Outcome: Ongoing     Problem: Pain:  Goal: Pain level will decrease  Description: Pain level will decrease  9/17/2021 0715 by Desmond Dias RN  Outcome: Ongoing

## 2021-09-18 LAB
ANION GAP SERPL CALCULATED.3IONS-SCNC: 10 MMOL/L (ref 3–16)
BUN BLDV-MCNC: 6 MG/DL (ref 7–20)
CALCIUM SERPL-MCNC: 8.9 MG/DL (ref 8.3–10.6)
CHLORIDE BLD-SCNC: 104 MMOL/L (ref 99–110)
CO2: 25 MMOL/L (ref 21–32)
CREAT SERPL-MCNC: <0.5 MG/DL (ref 0.6–1.1)
GFR AFRICAN AMERICAN: >60
GFR NON-AFRICAN AMERICAN: >60
GLUCOSE BLD-MCNC: 100 MG/DL (ref 70–99)
HCT VFR BLD CALC: 27.3 % (ref 36–48)
HEMOGLOBIN: 9 G/DL (ref 12–16)
MCH RBC QN AUTO: 30.5 PG (ref 26–34)
MCHC RBC AUTO-ENTMCNC: 32.8 G/DL (ref 31–36)
MCV RBC AUTO: 93 FL (ref 80–100)
PDW BLD-RTO: 20.2 % (ref 12.4–15.4)
PLATELET # BLD: 175 K/UL (ref 135–450)
PMV BLD AUTO: 8.8 FL (ref 5–10.5)
POTASSIUM SERPL-SCNC: 4 MMOL/L (ref 3.5–5.1)
RBC # BLD: 2.94 M/UL (ref 4–5.2)
SODIUM BLD-SCNC: 139 MMOL/L (ref 136–145)
WBC # BLD: 8.8 K/UL (ref 4–11)

## 2021-09-18 PROCEDURE — 2580000003 HC RX 258: Performed by: INTERNAL MEDICINE

## 2021-09-18 PROCEDURE — 97116 GAIT TRAINING THERAPY: CPT

## 2021-09-18 PROCEDURE — 6360000002 HC RX W HCPCS: Performed by: PHYSICIAN ASSISTANT

## 2021-09-18 PROCEDURE — 6370000000 HC RX 637 (ALT 250 FOR IP): Performed by: NURSE PRACTITIONER

## 2021-09-18 PROCEDURE — 6370000000 HC RX 637 (ALT 250 FOR IP): Performed by: PHYSICIAN ASSISTANT

## 2021-09-18 PROCEDURE — 36415 COLL VENOUS BLD VENIPUNCTURE: CPT

## 2021-09-18 PROCEDURE — 80048 BASIC METABOLIC PNL TOTAL CA: CPT

## 2021-09-18 PROCEDURE — 97530 THERAPEUTIC ACTIVITIES: CPT

## 2021-09-18 PROCEDURE — 1200000000 HC SEMI PRIVATE

## 2021-09-18 PROCEDURE — 85027 COMPLETE CBC AUTOMATED: CPT

## 2021-09-18 PROCEDURE — 2580000003 HC RX 258: Performed by: PHYSICIAN ASSISTANT

## 2021-09-18 PROCEDURE — 6370000000 HC RX 637 (ALT 250 FOR IP): Performed by: NEUROLOGICAL SURGERY

## 2021-09-18 RX ORDER — METHOCARBAMOL 750 MG/1
750 TABLET, FILM COATED ORAL EVERY 8 HOURS
Status: DISCONTINUED | OUTPATIENT
Start: 2021-09-18 | End: 2021-09-19 | Stop reason: HOSPADM

## 2021-09-18 RX ORDER — 0.9 % SODIUM CHLORIDE 0.9 %
500 INTRAVENOUS SOLUTION INTRAVENOUS ONCE
Status: COMPLETED | OUTPATIENT
Start: 2021-09-18 | End: 2021-09-19

## 2021-09-18 RX ADMIN — POTASSIUM CHLORIDE 20 MEQ: 1500 TABLET, EXTENDED RELEASE ORAL at 09:23

## 2021-09-18 RX ADMIN — Medication 10 ML: at 20:49

## 2021-09-18 RX ADMIN — MORPHINE SULFATE 60 MG: 30 TABLET, FILM COATED, EXTENDED RELEASE ORAL at 09:23

## 2021-09-18 RX ADMIN — GABAPENTIN 400 MG: 400 CAPSULE ORAL at 09:23

## 2021-09-18 RX ADMIN — SERTRALINE HYDROCHLORIDE 100 MG: 100 TABLET ORAL at 09:23

## 2021-09-18 RX ADMIN — ENOXAPARIN SODIUM 40 MG: 40 INJECTION SUBCUTANEOUS at 09:24

## 2021-09-18 RX ADMIN — OXYCODONE 15 MG: 5 TABLET ORAL at 22:59

## 2021-09-18 RX ADMIN — POTASSIUM CHLORIDE 20 MEQ: 1500 TABLET, EXTENDED RELEASE ORAL at 20:48

## 2021-09-18 RX ADMIN — ACETAMINOPHEN 1000 MG: 500 TABLET, FILM COATED ORAL at 09:23

## 2021-09-18 RX ADMIN — GABAPENTIN 400 MG: 400 CAPSULE ORAL at 20:48

## 2021-09-18 RX ADMIN — DIAZEPAM 5 MG: 5 TABLET ORAL at 14:47

## 2021-09-18 RX ADMIN — SODIUM CHLORIDE 500 ML: 9 INJECTION, SOLUTION INTRAVENOUS at 20:55

## 2021-09-18 RX ADMIN — DOCUSATE SODIUM 50 MG AND SENNOSIDES 8.6 MG 4 TABLET: 8.6; 5 TABLET, FILM COATED ORAL at 20:48

## 2021-09-18 RX ADMIN — METHOCARBAMOL 750 MG: 750 TABLET ORAL at 17:02

## 2021-09-18 RX ADMIN — PANTOPRAZOLE SODIUM 40 MG: 40 TABLET, DELAYED RELEASE ORAL at 05:35

## 2021-09-18 RX ADMIN — Medication 10 ML: at 09:24

## 2021-09-18 RX ADMIN — ACETAMINOPHEN 1000 MG: 500 TABLET, FILM COATED ORAL at 20:48

## 2021-09-18 RX ADMIN — OXYCODONE 15 MG: 5 TABLET ORAL at 11:15

## 2021-09-18 RX ADMIN — OXYCODONE 15 MG: 5 TABLET ORAL at 17:03

## 2021-09-18 RX ADMIN — DIAZEPAM 5 MG: 5 TABLET ORAL at 09:23

## 2021-09-18 RX ADMIN — OXYCODONE 15 MG: 5 TABLET ORAL at 05:35

## 2021-09-18 RX ADMIN — GABAPENTIN 400 MG: 400 CAPSULE ORAL at 14:47

## 2021-09-18 RX ADMIN — DIAZEPAM 5 MG: 5 TABLET ORAL at 20:48

## 2021-09-18 RX ADMIN — ACETAMINOPHEN 1000 MG: 500 TABLET, FILM COATED ORAL at 05:35

## 2021-09-18 RX ADMIN — MORPHINE SULFATE 60 MG: 30 TABLET, FILM COATED, EXTENDED RELEASE ORAL at 20:48

## 2021-09-18 ASSESSMENT — PAIN SCALES - GENERAL
PAINLEVEL_OUTOF10: 7
PAINLEVEL_OUTOF10: 6
PAINLEVEL_OUTOF10: 5
PAINLEVEL_OUTOF10: 6
PAINLEVEL_OUTOF10: 5
PAINLEVEL_OUTOF10: 7

## 2021-09-18 ASSESSMENT — PAIN DESCRIPTION - FREQUENCY
FREQUENCY: CONTINUOUS

## 2021-09-18 ASSESSMENT — PAIN DESCRIPTION - PAIN TYPE
TYPE: SURGICAL PAIN

## 2021-09-18 ASSESSMENT — PAIN DESCRIPTION - ORIENTATION
ORIENTATION: MID

## 2021-09-18 ASSESSMENT — PAIN DESCRIPTION - LOCATION
LOCATION: BACK

## 2021-09-18 ASSESSMENT — PAIN DESCRIPTION - ONSET
ONSET: GRADUAL
ONSET: GRADUAL
ONSET: ON-GOING

## 2021-09-18 ASSESSMENT — PAIN DESCRIPTION - DESCRIPTORS
DESCRIPTORS: ACHING

## 2021-09-18 ASSESSMENT — PAIN DESCRIPTION - PROGRESSION
CLINICAL_PROGRESSION: GRADUALLY WORSENING
CLINICAL_PROGRESSION: GRADUALLY WORSENING
CLINICAL_PROGRESSION: NOT CHANGED

## 2021-09-18 NOTE — PROGRESS NOTES
Patient is alert and oriented. Vital signs are stable. Patient's pain is controlled with medication per MAR. Incision is CDI. Hemovc remains in place. Patient ambulates x1 with a walker. Patient tolerates ambulation well. Patient voiding adequate amount of urine. Bed is in the lowest position. Bed alarm is activated. Call light is within reach. Will continue to monitor and reassess.

## 2021-09-18 NOTE — PROGRESS NOTES
Physical Therapy  Facility/Department: [facility] 5T ORTHO/NEURO  Daily Treatment Note  NAME: Feli Hayes  : 1980  MRN: [id number]    Date of Service: 2021    Discharge Recommendations: Feli Hayes scored a 18/24 on the AM-PAC short mobility form. Current research shows that an AM-PAC score of 18 or greater is typically associated with a discharge to the patient's home setting. Based on the patient's AM-PAC score and their current functional mobility deficits, it is recommended that the patient have 2-3 sessions per week of Physical Therapy at d/c to increase the patient's independence. At this time, this patient demonstrates the endurance and safety to discharge home with ** (home PT services) and a follow up treatment frequency of 2-3x/wk. Please see assessment section for further patient specific details. If patient discharges prior to next session this note will serve as a discharge summary. Please see below for the latest assessment towards goals. HOME HEALTH CARE: LEVEL 1 STANDARD    - Initial home health evaluation to occur within 24-48 hours, in patient home   - Therapy to evaluate with goal of regaining prior level of functioning   - Therapy to evaluate if patient has [address] needs for personal care      PT Equipment Recommendations  Equipment Needed: No  Other: pt owns RW    Assessment   Body structures, Functions, Activity limitations: Decreased functional mobility ; Increased pain;Decreased posture;Decreased strength;Decreased endurance  Assessment: Progressing well with therapy although reports significant pain with activity. Able to go up/down few stairs with A of 1 for safety. Spouse advised to provide physical A with gait belt on at all times initially. Pt has walker at home to use at all times. REcommend home PT due to LLE weakness & difficulty with mobility.   Treatment Diagnosis: decreased functional mobility s/p thoracic sx  Prognosis: Good  Patient Education: spouse to provide A with mobility at all times w/ gait belt; sequencing on stairs- verb understanding  REQUIRES PT FOLLOW UP: Yes  Activity Tolerance  Activity Tolerance: Patient limited by fatigue;Patient limited by pain  Activity Tolerance: seated rest breaks needed prior to stairs & after; required wc ride back from stairs due to fatigue; c/o mm spasm L post shoulder- ice pack applied- RN aware     Patient Diagnosis(es): The encounter diagnosis was S/P fusion of thoracic spine. has a past medical history of Anxiety, 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, Crohn's disease ([facility] [address] Depression, Early menopause, History of blood transfusion, IBS (irritable bowel syndrome), IBS (irritable bowel syndrome), Mixed hyperlipidemia, Obesity, Osteoarthritis, knee, PONV (postoperative nausea and vomiting), Prolonged emergence from general anesthesia, and Sarcoma of left lower extremity ([facility] [address] has a past surgical history that includes salpingectomy (Left, 2009); Appendectomy; Ankle surgery (Left); Breast reduction surgery; Knee arthroscopy (Right, 01/2015); Knee arthroscopy (Right, 07/2015); Total knee arthroplasty (Right, 09/2015); other surgical history (Left); Muscle biopsy (Left, 11/2017); other surgical history (Left, 02/26/2018); other surgical history (Left, 03/14/2018); other surgical history (Left, 03/22/2018); Liver Resection (N/A, 07/22/2019); Carpal tunnel release (Right, 07/2020); Port Surgery (Right, 11/30/2020); laminectomy (N/A, 2/17/2021); and laminectomy (N/A, 9/15/2021). Restrictions  Position Activity Restriction  Other position/activity restrictions: ambulate pt, activity as tolerated, call physician if Hgb <8  Subjective   General  Chart Reviewed: Yes  Additional Pertinent Hx: Pt is a 35 y.o. female admitted to [facility] on 9/15/21. Pt s/p T4-7 laminectomy, decompression, foraminotomy, fusian, fixation, rhizotomy, and removal of epidural mass.   PMH: hx of blood transfusion, mixed hyperlipedemia, OA knee, sarcoma LLE.  Family / Caregiver Present: Yes (spouse)  Referring Practitioner: INEZ Guzman  Subjective  Subjective: Found in bed, agreeable to PT. Reports lots of pain but able to participate. Wants to try stairs due to likely dc home tomorrow. Spouse able to provide 24 hr A at dc. Pain Screening  Patient Currently in Pain: Yes (6/10 RN aware)  Vital Signs  Patient Currently in Pain: Yes (6/10 RN aware)       Orientation  Orientation  Overall Orientation Status: Within Functional Limits     Objective   Bed mobility  Supine to Sit: Contact guard assistance (via logroll with rail- does require cues for proper logroll)  Sit to Supine: Contact guard assistance  Transfers  Sit to Stand: Stand by assistance (from bed & chair 3x)  Stand to sit: Stand by assistance  Ambulation  Ambulation?: Yes  Ambulation 1  Surface: level tile  Device: Rolling Walker  Assistance: Stand by assistance  Quality of Gait: heavily relying on walker for support; weakness noted L knee with occasional slight buckle  Distance: 65', 35'  Stairs/Curb  Stairs?: Yes (up/down 5 steps with B rails CGA; L knee guarded when descending steps for safety but did not display any buckling- spouse advised in how to A on stairs)     Balance  Sitting - Static: Good                                                                                AM-PAC Score  AM-PAC Inpatient Mobility Raw Score : 18 (09/18/21 1537)  AM-PAC Inpatient T-Scale Score : 43.63 (09/18/21 1537)  Mobility Inpatient CMS 0-100% Score: 46.58 (09/18/21 1537)  Mobility Inpatient CMS G-Code Modifier : CK (09/18/21 1537)          Goals- ongoing today  Short term goals  Time Frame for Short term goals: Discharge  Short term goal 1: Pt will perform all bed mobility with SBA. MET 9/17. REvised goal:  Pt will perform bed mobility independently  Short term goal 2: Pt will perform sit to/from stand with RW and SBA. MET 9/17.   Pt will transfer sit to stand supervision  Short term goal 3: Pt will ambulate 100' with RW and SBA  Short term goal 4: Pt will ascend/descend 16 stairs with unilateral HR and CGA  Patient Goals   Patient goals : \"To be able to get up on my own and go to the bathroom by myself. To be able to take care of myself. \"    Plan    Plan  Times per week: 5-7  Times per day: Daily  Current Treatment Recommendations: Strengthening, Neuromuscular Re-education, Home Exercise Program, ROM, Safety Education & Training, Balance Training, Endurance Training, Patient/Caregiver Education & Training, Functional Mobility Training, Equipment Evaluation, Education, & procurement, Transfer Training, Gait Training, Stair training  Safety Devices  Type of devices: Call light within reach, Nurse notified, Left in bed, Bed alarm in place     Therapy Time   Individual Concurrent Group Co-treatment   Time In 1403         Time Out 1432         Minutes 29              Timed Code Treatment Minutes:  29 Minutes    Total Treatment Minutes:  [address] [facility], [address] [facility]

## 2021-09-18 NOTE — PROGRESS NOTES
Secure message sent to [facility].   patient is post op #3 of back surgery and when just did vitals was tachy in 120. Blood pressure 101/65. Dr. Silvestre Tinsley would like hospitalist to see patient. Per patient she drank some caffeine that she normally doesn't drink but not sure if that's why or something else. She is also very anxious and tearful now asking if anxiety could cause it. She been getting valium. Current heart rate 125.

## 2021-09-18 NOTE — PROGRESS NOTES
Cleansed incision with soap and water then painted with CHG swab. Dr. Smith Plenty aware of hemovac 20 output and patient requesting ensure.

## 2021-09-18 NOTE — PROGRESS NOTES
NEUROSURGERY PROGRESS NOTE    9/18/2021 10:07 AM                               [place]                      LOS: 3 days   POD#3 s/p Procedure(s) (LRB):  T4-T7 LAMINECTOMY, DECOMPRESSION, FORAMINOTOMY, FUSION , FIXATION, RHIZOTOMY, REMOVAL OF EPIDURAL MASS (N/A)    Subjective: Patient sitting up in bed upon entering the room. No acute events overnight. Patient continues to c/o incisional pain. Physical Exam:  Patient seen and examined    Vitals:    09/18/21 0711   BP: 113/73   Pulse: 92   Resp: 14   Temp: 99.1 °F (37.3 °C)   SpO2: 93%     GCS:  4 - Opens eyes on own  5 - Alert and oriented  6 - Follows simple motor commands  General: Well developed. Alert and cooperative in no acute distress. HENT: atraumatic, neck supple  Eyes: Optic discs: Not tested  Pulmonary: unlabored respiratory effort  Cardiovascular:  Warm well perfused. No peripheral edema  Gastrointestinal: abdomen soft, NT, ND    Neurological:  Mental Status: Awake, alert, oriented x 4, speech clear and appropriate  Attention: Intact  Language: No aphasia or dysarthria noted  Sensation: Intact to all extremities to light touch, but numbness in BLE, improving  Coordination: Intact  DTRs:    Right  Left    ankle clonus  Neg Neg   toes (babinski)  Down Down     Musculoskeletal:   Gait: Not tested   Assist devices: None   Tone: Normal  Motor strength:    Right  Left    Right  Left    Deltoid  5 5  Hip Flex  4 4   Biceps  5 5  Knee Extensors  5 5   Triceps  5 5  Knee Flexors  5 5   Wrist Ext  5 5  Ankle Dorsiflex. 5 5   Wrist Flex  5 5  Ankle Plantarflex.   5 5   Handgrip  5 5  Ext Clayton Longus  5 5   Thumb Ext  5 5         Incision: CDI and staples in place    Drain: 165 mL in past 24 hours    Radiological Findings:  No post-op Images ordered      Labs:  Recent Labs     09/18/21  0716   WBC 8.8   HGB 9.0*   HCT 27.3*          Recent Labs     09/15/21  1200 09/17/21  0601 09/18/21  0716      < > 139   K 3.5   < > 4.0      < > 104   CO2 23   < > 25   BUN 8   < > 6*   CREATININE 0.5*   < > <0.5*   GLUCOSE 108*   < > 100*   CALCIUM 9.2   < > 8.9   MG 2.00  --   --     < > = values in this interval not displayed. Recent Labs     09/15/21  1200   PROTIME 12.7   INR 1.12   APTT 27.0       Patient Active Problem List    Diagnosis Date Noted    S/P fusion of thoracic spine 09/17/2021    Mass of spine 09/15/2021    Intractable pain 06/23/2021    Clear cell sarcoma ([address] 03/22/2021    Status post thoracic spinal fusion 02/19/2021    Sarcoma ([address] 02/15/2021    Neutropenic fever ([address] 10/10/2019    Liver mass 07/22/2019    Weight loss counseling, encounter for     Moderate malnutrition ([address] 03/23/2018    Class 2 obesity due to excess calories with body mass index (BMI) of 39.0 to 39.9 in adult     Chronic depression     Sarcoma of left lower extremity ([address] 11/24/2017    Small bowel obstruction ([address] 10/11/2017    Vitamin B12 deficiency 07/10/2017    Morbid obesity with BMI of 40.0-44.9, adult ([address] 06/22/2017    Mixed hyperlipidemia 06/22/2017    IBS (irritable bowel syndrome)     Crohn's disease ([address]     Osteoarthritis, knee     Premature menopause 02/04/2011       Assessment:  Patient is a 35 y.o. female s/p Procedure(s) (LRB):  T4-T7 LAMINECTOMY, DECOMPRESSION, FORAMINOTOMY, FUSION , FIXATION, RHIZOTOMY, REMOVAL OF EPIDURAL MASS (N/A)    Plan:  1. Neurologically stable  2. Neurologic exams frequency: Q4H  3. For change in exam MUST contact neurosurgery team along with critical care or primary team  4. Epidural Tumor:  - s/p T4-T7 F&F w/resection of tumor  - Antibiotics: Cefazolin x 3 doses post op  - Drain: Continue drain until output <30 mL Q8H  - Muscle spasms: Robaxin & PRN Valium  - Pain control: Tylenol and MS Contin & PRN Roxicodone and Dilaudid  - Acute Blood Loss Anemia: H/H 8.3/25.4 this morning, continue to monitor H/H  - Incisional Care: Open to air.  Wash incision daily with warm soapy water or shower daily, and pat dry with clean dry towel. [place] with CHG swab. 5. GI Prophylaxis: Protonix  6. Bowel Regimen: Senokot-S  7. Mobility: PT/OT as tolerates  8. Diet: Advance as tolerates  9. DVT Prophylaxis: SCD's & Lovenox  10. Please call with any questions or decline in neurological status    DISPO: Remain inpatient until tolerating PO, pain controlled with PO meds and evaluated by PT/OT from neurosurgery standpoint.      Drain output remains elevated, will remain inpatient until less than 50 mL    Nani Chang MD, PhD  Deangelo Us  Director, [address] [place] [address], [address] (c), [phone] (o)

## 2021-09-19 VITALS
RESPIRATION RATE: 16 BRPM | OXYGEN SATURATION: 96 % | HEIGHT: 69 IN | SYSTOLIC BLOOD PRESSURE: 119 MMHG | TEMPERATURE: 97.7 F | HEART RATE: 94 BPM | WEIGHT: 250 LBS | BODY MASS INDEX: 37.03 KG/M2 | DIASTOLIC BLOOD PRESSURE: 79 MMHG

## 2021-09-19 LAB
ANION GAP SERPL CALCULATED.3IONS-SCNC: 11 MMOL/L (ref 3–16)
BUN BLDV-MCNC: 7 MG/DL (ref 7–20)
CALCIUM SERPL-MCNC: 8.8 MG/DL (ref 8.3–10.6)
CHLORIDE BLD-SCNC: 103 MMOL/L (ref 99–110)
CO2: 24 MMOL/L (ref 21–32)
CREAT SERPL-MCNC: <0.5 MG/DL (ref 0.6–1.1)
GFR AFRICAN AMERICAN: >60
GFR NON-AFRICAN AMERICAN: >60
GLUCOSE BLD-MCNC: 116 MG/DL (ref 70–99)
HCT VFR BLD CALC: 26.3 % (ref 36–48)
HEMOGLOBIN: 8.7 G/DL (ref 12–16)
MCH RBC QN AUTO: 30.8 PG (ref 26–34)
MCHC RBC AUTO-ENTMCNC: 33.2 G/DL (ref 31–36)
MCV RBC AUTO: 92.7 FL (ref 80–100)
PDW BLD-RTO: 20.1 % (ref 12.4–15.4)
PLATELET # BLD: 176 K/UL (ref 135–450)
PMV BLD AUTO: 8.7 FL (ref 5–10.5)
POTASSIUM SERPL-SCNC: 3.6 MMOL/L (ref 3.5–5.1)
RBC # BLD: 2.83 M/UL (ref 4–5.2)
SODIUM BLD-SCNC: 138 MMOL/L (ref 136–145)
WBC # BLD: 8.5 K/UL (ref 4–11)

## 2021-09-19 PROCEDURE — 2580000003 HC RX 258: Performed by: PHYSICIAN ASSISTANT

## 2021-09-19 PROCEDURE — 85027 COMPLETE CBC AUTOMATED: CPT

## 2021-09-19 PROCEDURE — 6370000000 HC RX 637 (ALT 250 FOR IP): Performed by: NEUROLOGICAL SURGERY

## 2021-09-19 PROCEDURE — 6360000002 HC RX W HCPCS: Performed by: PHYSICIAN ASSISTANT

## 2021-09-19 PROCEDURE — 6370000000 HC RX 637 (ALT 250 FOR IP): Performed by: NURSE PRACTITIONER

## 2021-09-19 PROCEDURE — 36415 COLL VENOUS BLD VENIPUNCTURE: CPT

## 2021-09-19 PROCEDURE — 6370000000 HC RX 637 (ALT 250 FOR IP): Performed by: PHYSICIAN ASSISTANT

## 2021-09-19 PROCEDURE — 80048 BASIC METABOLIC PNL TOTAL CA: CPT

## 2021-09-19 RX ADMIN — ACETAMINOPHEN 1000 MG: 500 TABLET, FILM COATED ORAL at 08:31

## 2021-09-19 RX ADMIN — Medication 10 ML: at 08:31

## 2021-09-19 RX ADMIN — OXYCODONE 15 MG: 5 TABLET ORAL at 10:48

## 2021-09-19 RX ADMIN — MORPHINE SULFATE 60 MG: 30 TABLET, FILM COATED, EXTENDED RELEASE ORAL at 08:29

## 2021-09-19 RX ADMIN — METHOCARBAMOL 750 MG: 750 TABLET ORAL at 08:30

## 2021-09-19 RX ADMIN — DIAZEPAM 5 MG: 5 TABLET ORAL at 02:59

## 2021-09-19 RX ADMIN — SERTRALINE HYDROCHLORIDE 100 MG: 100 TABLET ORAL at 08:30

## 2021-09-19 RX ADMIN — ENOXAPARIN SODIUM 40 MG: 40 INJECTION SUBCUTANEOUS at 08:29

## 2021-09-19 RX ADMIN — OXYCODONE 15 MG: 5 TABLET ORAL at 05:13

## 2021-09-19 RX ADMIN — GABAPENTIN 400 MG: 400 CAPSULE ORAL at 08:30

## 2021-09-19 RX ADMIN — METHOCARBAMOL 750 MG: 750 TABLET ORAL at 01:09

## 2021-09-19 RX ADMIN — PANTOPRAZOLE SODIUM 40 MG: 40 TABLET, DELAYED RELEASE ORAL at 05:13

## 2021-09-19 RX ADMIN — ACETAMINOPHEN 1000 MG: 500 TABLET, FILM COATED ORAL at 05:12

## 2021-09-19 RX ADMIN — DIAZEPAM 5 MG: 5 TABLET ORAL at 10:48

## 2021-09-19 RX ADMIN — POTASSIUM CHLORIDE 20 MEQ: 1500 TABLET, EXTENDED RELEASE ORAL at 08:30

## 2021-09-19 ASSESSMENT — PAIN DESCRIPTION - PROGRESSION: CLINICAL_PROGRESSION: GRADUALLY WORSENING

## 2021-09-19 ASSESSMENT — PAIN SCALES - GENERAL
PAINLEVEL_OUTOF10: 5
PAINLEVEL_OUTOF10: 7
PAINLEVEL_OUTOF10: 7
PAINLEVEL_OUTOF10: 6

## 2021-09-19 ASSESSMENT — PAIN - FUNCTIONAL ASSESSMENT: PAIN_FUNCTIONAL_ASSESSMENT: PREVENTS OR INTERFERES SOME ACTIVE ACTIVITIES AND ADLS

## 2021-09-19 ASSESSMENT — PAIN DESCRIPTION - ORIENTATION: ORIENTATION: MID

## 2021-09-19 ASSESSMENT — PAIN DESCRIPTION - ONSET: ONSET: GRADUAL

## 2021-09-19 ASSESSMENT — PAIN DESCRIPTION - DESCRIPTORS: DESCRIPTORS: ACHING

## 2021-09-19 ASSESSMENT — PAIN DESCRIPTION - PAIN TYPE: TYPE: SURGICAL PAIN

## 2021-09-19 ASSESSMENT — PAIN DESCRIPTION - LOCATION: LOCATION: BACK

## 2021-09-19 ASSESSMENT — PAIN DESCRIPTION - FREQUENCY: FREQUENCY: CONTINUOUS

## 2021-09-19 NOTE — PROGRESS NOTES
Neurosurgery Progress Note    2021 9:58 AM                               Lacretia Conner                      LOS: 4 days             POD#4 s/p   T4-T7 LAMINECTOMY, DECOMPRESSION, FORAMINOTOMY, FUSION , FIXATION, RHIZOTOMY, REMOVAL OF EPIDURAL MASS     Subjective:  Pt was tachycardiac yesterday. Vitals improved today . Patient has no specific complaints this am.                                                 Physical Exam:    Temp (24hrs), Av.4 °F (36.9 °C), Min:97.9 °F (36.6 °C), Max:99.2 °F (37.3 °C)      Neuro Exam  The patient is well-appearing and is in no acute distress. Alert and oriented ×4, appropriate, conversant with normal mood and affect. Incision: CDI and staples in place  Drain: 20/30/10         Labs:  Recent Labs     21  0643   WBC 8.5   HGB 8.7*   HCT 26.3*          Recent Labs     21  0643      K 3.6      CO2 24   BUN 7   CREATININE <0.5*   GLUCOSE 116*   CALCIUM 8.8          No results for input(s): PROTIME, INR, APTT in the last 72 hours. Assessment:  Patient is a 35 y.o. female s/p Procedure  T4-T7 LAMINECTOMY, DECOMPRESSION, FORAMINOTOMY, FUSION , FIXATION, RHIZOTOMY, REMOVAL OF EPIDURAL MASS (    Assessment and Plan: Ok to dc drain and discharge home.  Pt will follow up in 10 days in the office with Dr. Gabriela Lewis PA-C

## 2021-09-19 NOTE — DISCHARGE SUMMARY
Discharge Summary    Date of Admission: 9/15/2021 11:14 AM  Date of Discharge: 9/19/21  Admission Diagnosis: spinal mass  Patient Active Problem List   Diagnosis    Premature menopause    Osteoarthritis, knee    [facility]'s disease ([facility]    IBS (irritable bowel syndrome)    Morbid obesity with BMI of 40.0-44.9, adult ([facility]    Mixed hyperlipidemia    Vitamin B12 deficiency    Small bowel obstruction ([facility]    Sarcoma of left lower extremity (HCC)    Moderate malnutrition (HCC)    Class 2 obesity due to excess calories with body mass index (BMI) of 39.0 to 39.9 in adult    Chronic depression    Weight loss counseling, encounter for    Liver mass    Neutropenic fever ([facility]    Sarcoma ([facility]    Status post thoracic spinal fusion    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 Tee Gil   Procedures: T4-T7 LAMINECTOMY, DECOMPRESSION, FORAMINOTOMY, FUSION , FIXATION, RHIZOTOMY, REMOVAL OF EPIDURAL MA  Consults: PT/OT    Reason for Admission: Maribel Flynn is a 35 y.o. female patient who was admitted to the hospital for complaints of back pain . She underwent the procedure listed above on 9/15/2021. Hospital Course: After surgery, her pain was well-controlled on oral medications. The dressing was clean, dry and intact. There was no erythema or edema around the surgical site. Prior to discharge She was eating well, urinating and ambulating with a steady gait with PT/OT.      Discharge Vitals/Labs: /79   Pulse 90   Temp 97.9 °F (36.6 °C) (Oral)   Resp 16   Ht 5' 9\" (1.753 m)   Wt 250 lb (113.4 kg)   LMP  (LMP Unknown)   SpO2 95%   BMI 36.92 kg/m²   CBC:   Lab Results   Component Value Date    WBC 8.5 09/19/2021    RBC 2.83 09/19/2021    HGB 8.7 09/19/2021    HCT 26.3 09/19/2021    MCV 92.7 09/19/2021    MCH 30.8 09/19/2021    MCHC 33.2 09/19/2021    RDW 20.1 09/19/2021     09/19/2021    MPV 8.7 09/19/2021      Discharge Medications:      Medication List      START taking these medications    diazePAM 5 MG tablet  Commonly known as: VALIUM  Take 1 tablet by mouth every 6 hours as needed (muscle spasms) for up to 10 days. CHANGE how you take these medications    methocarbamol 750 MG tablet  Commonly known as: ROBAXIN  Take 1 tablet by mouth every 6 hours as needed (muscle spasms)  What changed:   · when to take this  · reasons to take this        CONTINUE taking these medications    famotidine 20 MG tablet  Commonly known as: PEPCID     gabapentin 300 MG capsule  Commonly known as: NEURONTIN     granisetron 1 MG tablet  Commonly known as: KYTRIL     morphine 60 MG extended release tablet  Commonly known as: MS CONTIN     omeprazole 20 MG delayed release capsule  Commonly known as: PRILOSEC     oxyCODONE 5 MG capsule     polyethylene glycol 17 g packet  Commonly known as: GLYCOLAX     potassium chloride 20 MEQ extended release tablet  Commonly known as: KLOR-CON M     promethazine 12.5 MG tablet  Commonly known as: PHENERGAN     sennosides-docusate sodium 8.6-50 MG tablet  Commonly known as: SENOKOT-S     sertraline 100 MG tablet  Commonly known as: ZOLOFT     simethicone 80 MG chewable tablet  Commonly known as: MYLICON           Where to Get Your Medications      You can get these medications from any pharmacy    Bring a paper prescription for each of these medications  · diazePAM 5 MG tablet  · methocarbamol 750 MG tablet       Discharge Destination: The patient was discharged to Home. Follow-up: The patient is to follow-up with in the office in 12 weeks { xrays. Discharge Instructions:  Verbal and written discharge instructions as well as dressing change instructions were given to the patient at the time of consent. No NSAIDS or anticoagulation for 1 week post-operatively. No driving or riding in a motor vehicle. No lifting or bending. Brian Pryor PA-C

## 2021-09-19 NOTE — PLAN OF CARE
Problem: Falls - Risk of:  Goal: Will remain free from falls  Description: Will remain free from falls  Outcome: Met This Shift   Pt free from injury this shift and free of falls. 2/4 rails up on bed and bed is in the lowest position. Wheels locked and bed alarm set. Socks on pt and ID bands on pt. Call light in reach of pt and pt educated to call out to get up sba walker. Will continue to monitor for safety. Problem: Infection - Surgical Site:  Goal: Will show no infection signs and symptoms  Description: Will show no infection signs and symptoms  Outcome: Met This Shift   Afebrile. No unusual redness, swelling or drainage noted.

## 2021-09-19 NOTE — CARE COORDINATION
Case Management Assessment            Discharge Note                    Date / Time of Note: 9/19/2021 11:18 AM                  Discharge Note Completed by: Susan Finn RN    Patient Name: Mena Kirkpatrick   YOB: 1980  Diagnosis: Mass of spine [M89.8X8]   Date / Time: 9/15/2021 11:14 AM    Current PCP: Eriberto Figueroa MD  Clinic patient: No    Hospitalization in the last 30 days: No    Advance Directives:  Code Status: Full Code  [place] DNR form completed and on chart: Not Indicated    Financial:  Payor: Gabi Emery / Plan: Gabi Emery NAP CHOICE POS II / Product Type: *No Product type* /      Pharmacy:    [address], [address]  [address] [address]  Phone: [phone] Fax: [phone]      Assistance purchasing medications?: Potential Assistance Purchasing Medications: No  Assistance provided by Case Management: None at this time    Does patient want to participate in local refill/ meds to beds program?: Yes    Meds To Beds General Rules:  1. Can ONLY be done Monday- Friday between 8:30am-5pm  2. Prescription(s) must be in pharmacy by 3pm to be filled same day  3. Copy of patient's insurance/ prescription drug card and patient face sheet must be sent along with the prescription(s)  4. Cost of Rx cannot be added to hospital bill. If financial assistance is needed, please contact unit  or ;  or  CANNOT provide pharmacy voucher for patients co-pays  5.  Patients can then  the prescription on their way out of the hospital at discharge, or pharmacy can deliver to the bedside if staff is available. (payment due at time of pick-up or delivery - cash, check, or card accepted)     Able to afford home medications/ co-pay costs: Yes    ADLS:  Current PT AM-PAC Score: 18 /24  Current OT AM-PAC Score: 17 /24      DISCHARGE Disposition: Home- No Services Needed    LOC at discharge: Not Applicable  LUIS ANTONIO Completed: Not Indicated    Notification completed in HENS/PAS?:  Not Applicable    IMM Completed:   Not Indicated    Transportation:  Transportation PLAN for discharge: family   Mode of Transport: [address] ordered at discharge: Not [address]: Not Applicable  Orders faxed: No    Durable Medical Equipment:  DME Provider:    Equipment obtained during hospitalization: none    Home Oxygen and Respiratory Equipment:  Oxygen needed at discharge?: Not [address]: Not Applicable  Portable tank available for discharge?: Not Indicated    Dialysis:  Dialysis patient: No    Dialysis Center:  Not Applicable    Hospice Services:  Location: Not Applicable  Agency: Not Applicable    Consents signed: Not Indicated    Referrals made at [facility] for outpatient continued care:  Not Applicable    Additional CM Notes:  Cm spoke with nurse, Kaye Ogden. No needs for dc. Family to transport home. The Plan for Transition of Care is related to the following treatment goals of Mass of spine [M89.8X8]    The Patient and/or patient representative Brian Suazo and her family were provided with a choice of provider and agrees with the discharge plan Not Indicated    Freedom of choice list was provided with basic dialogue that supports the patient's individualized plan of care/goals and shares the quality data associated with the providers.  Not Indicated    Care Transitions patient: No    Porsha Ang RN  [facility]  Case Management Department  Ph: [phone]

## 2021-09-19 NOTE — PROGRESS NOTES
VSS. Bolus infusing and HR improved at 99. Pain medicated per orders with PO interventions and pt voiding WNL. Incision CDI. 30 cc serosanguinous output from Hemovac for second shift. Pt SBA with walker and ambulated several times in room overnight which was tolerated well. Pt anticipates d/c plans. Bed alarm set. Call light in reach. Will continue to monitor. Addendum: 10 cc from Hemovac for third shift.

## 2021-09-19 NOTE — PROGRESS NOTES
Hemovac was removed along with sutures for it. Gauze and tegaderm applied. Reviewed all discharge instructions. Educated on new medications purpose and possible side effects. Prescriptions given. Instructed on when to follow up and with who. Also instructed when to call the physician. IV removed. Waiting on ride home for discharge.

## 2021-09-22 ENCOUNTER — TELEPHONE (OUTPATIENT)
Dept: FAMILY MEDICINE CLINIC | Age: 41
End: 2021-09-22

## 2021-09-22 NOTE — TELEPHONE ENCOUNTER
[address] Transitions Initial Follow Up Call    Outreach made within 2 business days of discharge: Yes    Patient: Ame Rodriguez Patient : 1980   MRN: [id number]  Reason for Admission: There are no discharge diagnoses documented for the most recent discharge. Discharge Date: 21       Spoke with: Left voicemail for patient to call office with questions, concerns and to schedule HFU. Discharge department/facility: [facility] Interactive Patient Contact:  Was patient able to fill all prescriptions:   Was patient instructed to bring all medications to the follow-up visit:   Is patient taking all medications as directed in the discharge summary? Does patient understand their discharge instructions:   Does patient have questions or concerns that need addressed prior to 7-14 day follow up office visit:     Scheduled appointment with PCP within 7-14 days    Follow Up  No future appointments.     Bong Khoury

## 2021-09-23 NOTE — OP NOTE
conservative management and experiencing persistent symptoms, the patient elected to proceed ahead with T4-6 laminectomy and decompression, foraminal decompression, rhizotomy and fusion. The patient was brought to the operating room and placed under general anesthesia and neuromonitoring initiated. She was then placed prone on a Gonzalo frame. A time out was carried out, antibiotics were administered by anesthesia. All bony prominences were inspected and padded prior to sterile draping. The incision was planned appropriately using her p[rior incision and opened using a #15 blade knife, the skin was incised in the midline and monopolar cautery was used to dissect through the subcutaneous tissue to open the fascia and reflect the paraspinal muscles laterally exposing the T3-T9 interspaces. The prior pedicle screws were exposed bilaterally down to T9, the caps and rods were then removed. The stereotactic array was attached to the spinous process and the intraoperative CT brought in to obtain imaging. This was loaded into the Talkray curve system. The T3 pedicle on the left side was targeted anatomically and using stereotactic navigation. The Midas Dru drill was used to create a  hole and the pedicel cannulated with the pedicle probe. A ball tipped explorer was used to palpate the boundaries of the pedicle to ensure no breach was present and to size the length. An appropriately sized Synthes pedicle screw was then selected and placed into the pedicle. This procedure was repeated at T4 and in mirror fashion on the right. Excellent bony fixation was achieved. A carmela was sized and placed temporarily on the right side to maintain spinal alignment. The rongeur was then used to remove the spinous process of T4 and T5. A T4-T6 laminectomy was performed using the midas Dru drill to create a trough along the lamina-facet junction, allowing the lamina to be removed in a single piece.  This connected with her prior laminectomy at the bottom aspect. The underlying ligamentum was then carefully  from the underlying dura and removed with a 2 mm micropunch. A wide central decompression was accomplished. There was thick, grey appearing tumor lining the dorsal epidural space, this was removed. The Zack Miguelito was then used to remove the facet joint, carrying the bony removal out over the foramina at the T4-T7 level bilaterally. This allowed for a far lateral approach to the ventral epidural space. The thecal sac was carefully dissected away from the posterior longitudinal ligament using the [facility], the ventral tumor was removed. The ventral surface of the thecal sac was palpate with the dental and excellent decompression was achieved. There was significant involvement of the left sided nerve roots within the foramen by the cancer. The cancer was removed and the nerve roots T4-T7 on the left were tied off at the junction with the dura, just proximal to the DRG. The nerves were then bi-polared and sectioned. The lamina and transverse processes were decorticated with the Midas johann drill. The wound was copiously irrigated with antibiotic solution. Autograft bone and allograft was packed over the lamina and transverse processes establishing a posterolateral arthrodesis and fusion. An appropriately sized carmela was place into the pedicle screw heads and locked into place with titanium head caps. Final tightening was performed bilataterally. Two subfacial Hemovac drains were placed. The fascia was then reapproximated using interrupted 0 Vicryl sutures and interrupted 3-0 Vicryl sutures were used in the deep dermis. A 4-0 Monocryl was used to reapproximate the subcuticular layer and a dermabond and steri-strip dressing was then applied. The patient was extubated in the operating room and transferred to the recovery room in stable condition. There were no complications.  Monitoring remained stable throughout the operation. In accordance with CMS guidelines, I attest that I was present for the entire procedure from the creation of the skin incision to the closure.      DRAINS: Medium hemovac drain in subfacial space

## 2021-10-04 ENCOUNTER — HOSPITAL ENCOUNTER (OUTPATIENT)
Dept: MRI IMAGING | Age: 41
Discharge: HOME | DRG: 543 | End: 2021-10-04
Payer: COMMERCIAL

## 2021-10-04 DIAGNOSIS — G82.22 INCOMPLETE PARAPLEGIA (HCC): ICD-10-CM

## 2021-10-04 PROCEDURE — A9579 GAD-BASE MR CONTRAST NOS,1ML: HCPCS | Performed by: NURSE PRACTITIONER

## 2021-10-04 PROCEDURE — 72157 MRI CHEST SPINE W/O & W/DYE: CPT

## 2021-10-04 PROCEDURE — 72158 MRI LUMBAR SPINE W/O & W/DYE: CPT

## 2021-10-04 PROCEDURE — 6360000004 HC RX CONTRAST MEDICATION: Performed by: NURSE PRACTITIONER

## 2021-10-04 RX ADMIN — GADOTERIDOL 20 ML: 279.3 INJECTION, SOLUTION INTRAVENOUS at 16:00

## 2021-10-07 ENCOUNTER — HOSPITAL ENCOUNTER (INPATIENT)
Age: 41
LOS: 4 days | Discharge: HOME | DRG: 543 | End: 2021-10-11
Attending: EMERGENCY MEDICINE | Admitting: INTERNAL MEDICINE
Payer: COMMERCIAL

## 2021-10-07 DIAGNOSIS — C79.89 METASTATIC SARCOMA (HCC): ICD-10-CM

## 2021-10-07 DIAGNOSIS — G95.20 SPINAL CORD COMPRESSION (HCC): Primary | ICD-10-CM

## 2021-10-07 LAB
ANION GAP SERPL CALCULATED.3IONS-SCNC: 12 MMOL/L (ref 3–16)
ANISOCYTOSIS: ABNORMAL
BANDED NEUTROPHILS RELATIVE PERCENT: 1 % (ref 0–7)
BASOPHILS ABSOLUTE: 0 K/UL (ref 0–0.2)
BASOPHILS RELATIVE PERCENT: 0 %
BUN BLDV-MCNC: 12 MG/DL (ref 7–20)
CALCIUM SERPL-MCNC: 8.5 MG/DL (ref 8.3–10.6)
CHLORIDE BLD-SCNC: 99 MMOL/L (ref 99–110)
CO2: 24 MMOL/L (ref 21–32)
CREAT SERPL-MCNC: <0.5 MG/DL (ref 0.6–1.1)
EOSINOPHILS ABSOLUTE: 0 K/UL (ref 0–0.6)
EOSINOPHILS RELATIVE PERCENT: 0 %
GFR AFRICAN AMERICAN: >60
GFR NON-AFRICAN AMERICAN: >60
GLUCOSE BLD-MCNC: 114 MG/DL (ref 70–99)
HCT VFR BLD CALC: 24.4 % (ref 36–48)
HEMOGLOBIN: 7.6 G/DL (ref 12–16)
HYPOCHROMIA: ABNORMAL
LYMPHOCYTES ABSOLUTE: 0.2 K/UL (ref 1–5.1)
LYMPHOCYTES RELATIVE PERCENT: 2 %
MCH RBC QN AUTO: 29 PG (ref 26–34)
MCHC RBC AUTO-ENTMCNC: 31.1 G/DL (ref 31–36)
MCV RBC AUTO: 93.3 FL (ref 80–100)
MONOCYTES ABSOLUTE: 0.4 K/UL (ref 0–1.3)
MONOCYTES RELATIVE PERCENT: 4 %
NEUTROPHILS ABSOLUTE: 10.4 K/UL (ref 1.7–7.7)
NEUTROPHILS RELATIVE PERCENT: 93 %
PDW BLD-RTO: 21.8 % (ref 12.4–15.4)
PLATELET # BLD: 138 K/UL (ref 135–450)
PMV BLD AUTO: 8.3 FL (ref 5–10.5)
POLYCHROMASIA: ABNORMAL
POTASSIUM REFLEX MAGNESIUM: 4.2 MMOL/L (ref 3.5–5.1)
RBC # BLD: 2.62 M/UL (ref 4–5.2)
SARS-COV-2, NAAT: NOT DETECTED
SODIUM BLD-SCNC: 135 MMOL/L (ref 136–145)
TROPONIN: <0.01 NG/ML
WBC # BLD: 11.1 K/UL (ref 4–11)

## 2021-10-07 PROCEDURE — 99284 EMERGENCY DEPT VISIT MOD MDM: CPT

## 2021-10-07 PROCEDURE — 6360000002 HC RX W HCPCS: Performed by: INTERNAL MEDICINE

## 2021-10-07 PROCEDURE — 6370000000 HC RX 637 (ALT 250 FOR IP): Performed by: EMERGENCY MEDICINE

## 2021-10-07 PROCEDURE — 87635 SARS-COV-2 COVID-19 AMP PRB: CPT

## 2021-10-07 PROCEDURE — 85025 COMPLETE CBC W/AUTO DIFF WBC: CPT

## 2021-10-07 PROCEDURE — 1200000000 HC SEMI PRIVATE

## 2021-10-07 PROCEDURE — 96374 THER/PROPH/DIAG INJ IV PUSH: CPT

## 2021-10-07 PROCEDURE — 84484 ASSAY OF TROPONIN QUANT: CPT

## 2021-10-07 PROCEDURE — 6360000002 HC RX W HCPCS: Performed by: EMERGENCY MEDICINE

## 2021-10-07 PROCEDURE — 6370000000 HC RX 637 (ALT 250 FOR IP): Performed by: INTERNAL MEDICINE

## 2021-10-07 PROCEDURE — 36415 COLL VENOUS BLD VENIPUNCTURE: CPT

## 2021-10-07 PROCEDURE — 80048 BASIC METABOLIC PNL TOTAL CA: CPT

## 2021-10-07 PROCEDURE — 51702 INSERT TEMP BLADDER CATH: CPT

## 2021-10-07 RX ORDER — ONDANSETRON 2 MG/ML
4 INJECTION INTRAMUSCULAR; INTRAVENOUS EVERY 6 HOURS PRN
Status: DISCONTINUED | OUTPATIENT
Start: 2021-10-07 | End: 2021-10-11 | Stop reason: HOSPADM

## 2021-10-07 RX ORDER — DEXAMETHASONE SODIUM PHOSPHATE 4 MG/ML
10 INJECTION, SOLUTION INTRA-ARTICULAR; INTRALESIONAL; INTRAMUSCULAR; INTRAVENOUS; SOFT TISSUE ONCE
Status: COMPLETED | OUTPATIENT
Start: 2021-10-07 | End: 2021-10-07

## 2021-10-07 RX ORDER — SODIUM CHLORIDE 0.9 % (FLUSH) 0.9 %
10 SYRINGE (ML) INJECTION PRN
Status: DISCONTINUED | OUTPATIENT
Start: 2021-10-07 | End: 2021-10-11 | Stop reason: HOSPADM

## 2021-10-07 RX ORDER — OXYCODONE HYDROCHLORIDE 5 MG/1
15 TABLET ORAL EVERY 6 HOURS PRN
Status: DISCONTINUED | OUTPATIENT
Start: 2021-10-07 | End: 2021-10-11 | Stop reason: HOSPADM

## 2021-10-07 RX ORDER — MORPHINE SULFATE 30 MG/1
60 TABLET, FILM COATED, EXTENDED RELEASE ORAL 2 TIMES DAILY
Status: DISCONTINUED | OUTPATIENT
Start: 2021-10-07 | End: 2021-10-11 | Stop reason: HOSPADM

## 2021-10-07 RX ORDER — SODIUM CHLORIDE 0.9 % (FLUSH) 0.9 %
10 SYRINGE (ML) INJECTION EVERY 12 HOURS SCHEDULED
Status: DISCONTINUED | OUTPATIENT
Start: 2021-10-07 | End: 2021-10-11 | Stop reason: HOSPADM

## 2021-10-07 RX ORDER — ACETAMINOPHEN 650 MG/1
650 SUPPOSITORY RECTAL EVERY 6 HOURS PRN
Status: DISCONTINUED | OUTPATIENT
Start: 2021-10-07 | End: 2021-10-11 | Stop reason: HOSPADM

## 2021-10-07 RX ORDER — OXYCODONE HYDROCHLORIDE 15 MG/1
15 TABLET ORAL ONCE
Status: COMPLETED | OUTPATIENT
Start: 2021-10-07 | End: 2021-10-07

## 2021-10-07 RX ORDER — SODIUM CHLORIDE 9 MG/ML
25 INJECTION, SOLUTION INTRAVENOUS PRN
Status: DISCONTINUED | OUTPATIENT
Start: 2021-10-07 | End: 2021-10-11 | Stop reason: HOSPADM

## 2021-10-07 RX ORDER — SERTRALINE HYDROCHLORIDE 100 MG/1
100 TABLET, FILM COATED ORAL DAILY
Status: DISCONTINUED | OUTPATIENT
Start: 2021-10-07 | End: 2021-10-11 | Stop reason: HOSPADM

## 2021-10-07 RX ORDER — GABAPENTIN 300 MG/1
300 CAPSULE ORAL 3 TIMES DAILY
Status: DISCONTINUED | OUTPATIENT
Start: 2021-10-07 | End: 2021-10-11 | Stop reason: HOSPADM

## 2021-10-07 RX ORDER — SODIUM CHLORIDE 9 MG/ML
1000 INJECTION, SOLUTION INTRAVENOUS ONCE
Status: COMPLETED | OUTPATIENT
Start: 2021-10-07 | End: 2021-10-08

## 2021-10-07 RX ORDER — ACETAMINOPHEN 325 MG/1
650 TABLET ORAL EVERY 6 HOURS PRN
Status: DISCONTINUED | OUTPATIENT
Start: 2021-10-07 | End: 2021-10-11 | Stop reason: HOSPADM

## 2021-10-07 RX ORDER — PROMETHAZINE HYDROCHLORIDE 25 MG/1
12.5 TABLET ORAL EVERY 6 HOURS PRN
Status: DISCONTINUED | OUTPATIENT
Start: 2021-10-07 | End: 2021-10-11 | Stop reason: HOSPADM

## 2021-10-07 RX ORDER — MAGNESIUM SULFATE IN WATER 40 MG/ML
2000 INJECTION, SOLUTION INTRAVENOUS PRN
Status: DISCONTINUED | OUTPATIENT
Start: 2021-10-07 | End: 2021-10-11 | Stop reason: HOSPADM

## 2021-10-07 RX ORDER — POTASSIUM CHLORIDE 7.45 MG/ML
10 INJECTION INTRAVENOUS PRN
Status: DISCONTINUED | OUTPATIENT
Start: 2021-10-07 | End: 2021-10-11 | Stop reason: HOSPADM

## 2021-10-07 RX ORDER — OXYCODONE HYDROCHLORIDE 5 MG/1
15 CAPSULE ORAL EVERY 6 HOURS PRN
Status: DISCONTINUED | OUTPATIENT
Start: 2021-10-07 | End: 2021-10-07

## 2021-10-07 RX ADMIN — SERTRALINE HYDROCHLORIDE 100 MG: 100 TABLET ORAL at 22:38

## 2021-10-07 RX ADMIN — GABAPENTIN 300 MG: 300 CAPSULE ORAL at 22:30

## 2021-10-07 RX ADMIN — HYDROMORPHONE HYDROCHLORIDE 1 MG: 1 INJECTION, SOLUTION INTRAMUSCULAR; INTRAVENOUS; SUBCUTANEOUS at 18:15

## 2021-10-07 RX ADMIN — DEXAMETHASONE SODIUM PHOSPHATE 10 MG: 4 INJECTION, SOLUTION INTRAMUSCULAR; INTRAVENOUS at 22:31

## 2021-10-07 RX ADMIN — OXYCODONE HYDROCHLORIDE 15 MG: 15 TABLET ORAL at 19:59

## 2021-10-07 RX ADMIN — MORPHINE SULFATE 60 MG: 30 TABLET, FILM COATED, EXTENDED RELEASE ORAL at 22:30

## 2021-10-07 ASSESSMENT — PAIN DESCRIPTION - FREQUENCY: FREQUENCY: CONTINUOUS

## 2021-10-07 ASSESSMENT — PAIN SCALES - GENERAL
PAINLEVEL_OUTOF10: 6

## 2021-10-07 ASSESSMENT — PAIN DESCRIPTION - ORIENTATION: ORIENTATION: UPPER

## 2021-10-07 ASSESSMENT — PAIN DESCRIPTION - ONSET: ONSET: AWAKENED FROM SLEEP

## 2021-10-07 ASSESSMENT — PAIN DESCRIPTION - LOCATION: LOCATION: BACK;SHOULDER

## 2021-10-07 ASSESSMENT — PAIN DESCRIPTION - DESCRIPTORS: DESCRIPTORS: CONSTANT

## 2021-10-07 ASSESSMENT — PAIN DESCRIPTION - PAIN TYPE: TYPE: CHRONIC PAIN

## 2021-10-07 NOTE — Clinical Note
Patient Class: Inpatient [101]   REQUIRED: Diagnosis: Sarcoma ([facility] [[id number]]   Estimated Length of Stay: Estimated stay of more than 2 midnights   Admitting Provider: Tete Noland [[id number]]   Telemetry/Cardiac Monitoring Required?: Yes

## 2021-10-07 NOTE — ED NOTES
Bed: [facility]  Expected date:   Expected time:   Means of arrival:   Comments:  MEDIC radiation      Briana Myers RN  10/07/21 7099

## 2021-10-07 NOTE — ED PROVIDER NOTES
[address] [facility]          ATTENDING PHYSICIAN NOTE       Date of evaluation: 10/7/2021    Chief Complaint     Extremity Weakness      History of Present Illness     Loc Fountain is a 35 y.o. 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 Patient states over the past 18 to 24 hours she has had worsening bilateral lower extremity weakness. She has had difficulty urinating and has been unable to urinate for approximately 12 hours currently. She denies fevers. Her pain is relatively well controlled on her home MS Contin. She has had thoracic spine laminectomies for her spinal cord tumor and also has a known lumbar tumor burden for which she is getting radiation by Dr. Karan Ware. She follows with Dr. Jimena Queen at [address] [place] [address] as well. Review of Systems     Pertinent positive and negative findings as documented in the HPI. Otherwise all other systems were reviewed and were negative. Physical Exam     INITIAL VITALS: BP: 105/73, Temp: 98.4 °F (36.9 °C), Pulse: 102, Resp: 20, SpO2: 98 %     Nursing note and vitals reviewed. General:  Adult female, alert and appropriately interactive. In no distress. HENT: Normocephalic and atraumatic. External ears normal. Nose appears normal externally. Eyes: Conjunctivae normal. No scleral icterus. Neck: Neck supple. No tracheal deviation present. CV: Normal rate. Regular rhythm. S1/S2 auscultated. No murmurs, gallops or rubs. Pulm: Effort normal. Breath sounds clear to auscultation bilaterally. No wheezes. No rales or rhonchi. GI: Soft. No distension. No tenderness. No rebound or guarding. No masses. No peritoneal signs. Musculoskeletal: No edema. No gross deformities. Neurological: Alert and appropriately interactive.  Face symmetric, speech without dysarthria or obvious aphasia. Sensation grossly intact in all extremities. Strength 4/5 in bilateral lower extremities distally, 3/5 in bilateral lower extremities proximally. Skin: Warm, dry. No rash. No diaphoresis or erythema. Psychiatric: Calm and cooperative with appropriate mood and affect. Procedures   Procedures    MEDICAL DECISION MAKING     MDM: Jayde Brooks is a 35 y.o. female with history as above presenting to the emergency department today for lower extremity weakness in the setting of known metastatic sarcoma with thoracic and lumbar spinal mets. On arrival, she is in no distress, vital signs with borderline tachycardia. She does have decreased strength in the bilateral lower extremities with intact sensation. She also has urinary retention and a Tabares catheter was placed. I discussed with both her oncologist (Angy) and radiation oncologist (Dr. Néstor Mancilla) potential care options. They do not recommend MRI in the setting. They would like her to continue 4 times daily Decadron to see if this improves her weakness as the symptoms may be secondary to radiation side effects rather than worsening compression. Her pain is relatively well controlled on her home regimen. The patient has met with palliative care as well as hospice today and is understanding of the trajectory of her disease. She would like to continue to try to pursue palliative radiation, however may not be able to do so if her symptoms do not improve with steroids per radiation oncology. Her family does not feel comfortable with her at home as she is unable to ambulate or care for herself with this new neurologic change. She has met with hospice but is not officially enrolled as she is receiving palliative radiation and they are unable to provide more than weekly visits to her home at this time. They apparently do not have an inpatient hospice option currently available.   As such, I spoke with the hospitalist who has accepted the patient for admission for further care and management. Clinical Impression     1. Spinal cord compression ([facility] [address]    2. Metastatic sarcoma [facility])        Disposition     DISPOSITION Admitted 10/07/2021 08:17:25 Cortney Adamson MD  8:55 PM                     Past Medical, Surgical, Family, and Social History     She has a past medical history of Anxiety, 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, Crohn's disease ([facility] [address] Depression, Early menopause, History of blood transfusion, IBS (irritable bowel syndrome), IBS (irritable bowel syndrome), Mixed hyperlipidemia, Obesity, Osteoarthritis, knee, PONV (postoperative nausea and vomiting), Prolonged emergence from general anesthesia, and Sarcoma of left lower extremity ([facility] [address] She has a past surgical history that includes salpingectomy (Left, 2009); Appendectomy; Ankle surgery (Left); Breast reduction surgery; Knee arthroscopy (Right, 01/2015); Knee arthroscopy (Right, 07/2015); Total knee arthroplasty (Right, 09/2015); other surgical history (Left); Muscle biopsy (Left, 11/2017); other surgical history (Left, 02/26/2018); other surgical history (Left, 03/14/2018); other surgical history (Left, 03/22/2018); Liver Resection (N/A, 07/22/2019); Carpal tunnel release (Right, 07/2020); Port Surgery (Right, 11/30/2020); laminectomy (N/A, 2/17/2021); and laminectomy (N/A, 9/15/2021). Her family history includes Coronary Art Dis in her maternal uncle; Coronary Art Dis (age of onset: 36) in her maternal grandfather; Elevated Lipids in her father; Heart Failure (age of onset: 76) in her paternal grandfather; Hypertension in her brother and father. She reports that she quit smoking about 11 years ago. Her smoking use included cigarettes. She has a 7.50 pack-year smoking history. She has never used smokeless tobacco. She reports current alcohol use. She reports current drug use. Drug: Marijuana.     Medications     Previous Medications    FAMOTIDINE (PEPCID) 20 MG TABLET    Take 20 mg by mouth daily    GABAPENTIN (NEURONTIN) 300 MG CAPSULE    Take 400 mg by mouth 3 times daily. GRANISETRON (KYTRIL) 1 MG TABLET    Take 1 mg by mouth every 12 hours as needed for Nausea    MORPHINE (MS CONTIN) 60 MG EXTENDED RELEASE TABLET    Take 60 mg by mouth 2 times daily. OMEPRAZOLE (PRILOSEC) 20 MG DELAYED RELEASE CAPSULE    Take 20 mg by mouth daily    OXYCODONE 5 MG CAPSULE    Take 15 mg by mouth every 6 hours as needed for Pain. POLYETHYLENE GLYCOL (GLYCOLAX) 17 G PACKET    Take 17 g by mouth daily    POTASSIUM CHLORIDE (KLOR-CON M) 20 MEQ EXTENDED RELEASE TABLET    Take 20 mEq by mouth 2 times daily    PROMETHAZINE (PHENERGAN) 12.5 MG TABLET    Take 12.5 mg by mouth every 6 hours as needed for Nausea    SENNOSIDES-DOCUSATE SODIUM (SENOKOT-S) 8.6-50 MG TABLET    Take 4 tablets by mouth nightly    SERTRALINE (ZOLOFT) 100 MG TABLET        SIMETHICONE (MYLICON) 80 MG CHEWABLE TABLET    Take 80 mg by mouth 4 times daily (after meals and at bedtime)       Allergies     She is allergic to ketamine, nsaids, ondansetron hcl, and topamax [topiramate]. ED Course     Nursing Notes, Past Medical Hx, Past Surgical Hx, Social Hx,Allergies, and Family Hx were reviewed.     Patient was given the following medications:  Orders Placed This Encounter   Medications    HYDROmorphone (DILAUDID) injection 1 mg    oxyCODONE (OXY-IR) immediate release tablet 15 mg    HYDROmorphone (DILAUDID) injection 1 mg    gabapentin (NEURONTIN) capsule 400 mg    morphine (MS CONTIN) extended release tablet 60 mg    DISCONTD: oxyCODONE capsule 15 mg    sertraline (ZOLOFT) tablet 100 mg    oxyCODONE capsule 15 mg    dexamethasone (DECADRON) injection 10 mg       Diagnostic Results       RECENT VITALS:  BP: 105/73,Temp: 98.4 °F (36.9 °C), Pulse: 102, Resp: 20, SpO2: 98 %     RADIOLOGY:  No orders to display       LABS:   Results for orders placed or performed during the hospital encounter of 10/07/21   CBC Auto Differential   Result Value Ref Range    WBC 11.1 (H) 4.0 - 11.0 K/uL    RBC 2.62 (L) 4.00 - 5.20 M/uL    Hemoglobin 7.6 (L) 12.0 - 16.0 g/dL    Hematocrit 24.4 (L) 36.0 - 48.0 %    MCV 93.3 80.0 - 100.0 fL    MCH 29.0 26.0 - 34.0 pg    MCHC 31.1 31.0 - 36.0 g/dL    RDW 21.8 (H) 12.4 - 15.4 %    Platelets 093 538 - 050 K/uL    MPV 8.3 5.0 - 10.5 fL    Neutrophils % 93.0 %    Lymphocytes % 2.0 %    Monocytes % 4.0 %    Eosinophils % 0.0 %    Basophils % 0.0 %    Neutrophils Absolute 10.4 (H) 1.7 - 7.7 K/uL    Lymphocytes Absolute 0.2 (L) 1.0 - 5.1 K/uL    Monocytes Absolute 0.4 0.0 - 1.3 K/uL    Eosinophils Absolute 0.0 0.0 - 0.6 K/uL    Basophils Absolute 0.0 0.0 - 0.2 K/uL    Bands Relative 1 0 - 7 %    Anisocytosis 1+ (A)     Polychromasia 1+ (A)     Hypochromia Occasional (A)    Basic Metabolic Panel w/ Reflex to MG   Result Value Ref Range    Sodium 135 (L) 136 - 145 mmol/L    Potassium reflex Magnesium 4.2 3.5 - 5.1 mmol/L    Chloride 99 99 - 110 mmol/L    CO2 24 21 - 32 mmol/L    Anion Gap 12 3 - 16    Glucose 114 (H) 70 - 99 mg/dL    BUN 12 7 - 20 mg/dL    CREATININE <0.5 (L) 0.6 - 1.1 mg/dL    GFR Non-African American >60 >60    GFR African American >60 >60    Calcium 8.5 8.3 - 10.6 mg/dL       CONSULTS:  IP CONSULT TO ONCOLOGY  IP CONSULT TO RADIATION ONCOLOGY  IP CONSULT TO HOSPITALIST    PATIENT REFERRED TO:  No follow-up provider specified.     DISCHARGE MEDICATIONS:  New Prescriptions    No medications on file           Shefali Scales MD  10/07/21 [id number]

## 2021-10-08 LAB
A/G RATIO: 1.3 (ref 1.1–2.2)
ALBUMIN SERPL-MCNC: 3.3 G/DL (ref 3.4–5)
ALP BLD-CCNC: 407 U/L (ref 40–129)
ALT SERPL-CCNC: 49 U/L (ref 10–40)
ANION GAP SERPL CALCULATED.3IONS-SCNC: 12 MMOL/L (ref 3–16)
AST SERPL-CCNC: 93 U/L (ref 15–37)
BASOPHILS ABSOLUTE: 0 K/UL (ref 0–0.2)
BASOPHILS RELATIVE PERCENT: 0.5 %
BILIRUB SERPL-MCNC: 0.5 MG/DL (ref 0–1)
BUN BLDV-MCNC: 8 MG/DL (ref 7–20)
CALCIUM SERPL-MCNC: 8.8 MG/DL (ref 8.3–10.6)
CHLORIDE BLD-SCNC: 102 MMOL/L (ref 99–110)
CO2: 24 MMOL/L (ref 21–32)
CREAT SERPL-MCNC: <0.5 MG/DL (ref 0.6–1.1)
EOSINOPHILS ABSOLUTE: 0 K/UL (ref 0–0.6)
EOSINOPHILS RELATIVE PERCENT: 0 %
GFR AFRICAN AMERICAN: >60
GFR NON-AFRICAN AMERICAN: >60
GLOBULIN: 2.6 G/DL
GLUCOSE BLD-MCNC: 130 MG/DL (ref 70–99)
HCT VFR BLD CALC: 23.6 % (ref 36–48)
HEMOGLOBIN: 7.5 G/DL (ref 12–16)
LYMPHOCYTES ABSOLUTE: 0.2 K/UL (ref 1–5.1)
LYMPHOCYTES RELATIVE PERCENT: 2.4 %
MCH RBC QN AUTO: 28.9 PG (ref 26–34)
MCHC RBC AUTO-ENTMCNC: 31.6 G/DL (ref 31–36)
MCV RBC AUTO: 91.6 FL (ref 80–100)
MONOCYTES ABSOLUTE: 0.5 K/UL (ref 0–1.3)
MONOCYTES RELATIVE PERCENT: 5.1 %
NEUTROPHILS ABSOLUTE: 8.9 K/UL (ref 1.7–7.7)
NEUTROPHILS RELATIVE PERCENT: 92 %
PDW BLD-RTO: 22.2 % (ref 12.4–15.4)
PLATELET # BLD: 121 K/UL (ref 135–450)
PMV BLD AUTO: 8.6 FL (ref 5–10.5)
POTASSIUM REFLEX MAGNESIUM: 4.2 MMOL/L (ref 3.5–5.1)
RBC # BLD: 2.58 M/UL (ref 4–5.2)
SODIUM BLD-SCNC: 138 MMOL/L (ref 136–145)
TOTAL PROTEIN: 5.9 G/DL (ref 6.4–8.2)
TROPONIN: <0.01 NG/ML
WBC # BLD: 9.7 K/UL (ref 4–11)

## 2021-10-08 PROCEDURE — 36415 COLL VENOUS BLD VENIPUNCTURE: CPT

## 2021-10-08 PROCEDURE — 2580000003 HC RX 258: Performed by: INTERNAL MEDICINE

## 2021-10-08 PROCEDURE — 2060000000 HC ICU INTERMEDIATE R&B

## 2021-10-08 PROCEDURE — 85025 COMPLETE CBC W/AUTO DIFF WBC: CPT

## 2021-10-08 PROCEDURE — 77014 HC CT TREATMENT PLAN: CPT

## 2021-10-08 PROCEDURE — 6360000002 HC RX W HCPCS: Performed by: RADIOLOGY

## 2021-10-08 PROCEDURE — 6360000002 HC RX W HCPCS: Performed by: INTERNAL MEDICINE

## 2021-10-08 PROCEDURE — 84484 ASSAY OF TROPONIN QUANT: CPT

## 2021-10-08 PROCEDURE — 77412 RADIATION TX DELIVERY LVL 3: CPT

## 2021-10-08 PROCEDURE — 36591 DRAW BLOOD OFF VENOUS DEVICE: CPT

## 2021-10-08 PROCEDURE — 80053 COMPREHEN METABOLIC PANEL: CPT

## 2021-10-08 PROCEDURE — 6370000000 HC RX 637 (ALT 250 FOR IP): Performed by: INTERNAL MEDICINE

## 2021-10-08 RX ORDER — GRANISETRON HYDROCHLORIDE 1 MG/1
1 TABLET, FILM COATED ORAL EVERY 12 HOURS SCHEDULED
Status: DISCONTINUED | OUTPATIENT
Start: 2021-10-08 | End: 2021-10-08

## 2021-10-08 RX ORDER — SENNA AND DOCUSATE SODIUM 50; 8.6 MG/1; MG/1
4 TABLET, FILM COATED ORAL NIGHTLY
Status: DISCONTINUED | OUTPATIENT
Start: 2021-10-08 | End: 2021-10-08

## 2021-10-08 RX ORDER — METHOCARBAMOL 500 MG/1
500 TABLET, FILM COATED ORAL 3 TIMES DAILY
COMMUNITY

## 2021-10-08 RX ORDER — POTASSIUM CHLORIDE 20 MEQ/1
20 TABLET, EXTENDED RELEASE ORAL 2 TIMES DAILY
Status: DISCONTINUED | OUTPATIENT
Start: 2021-10-08 | End: 2021-10-11 | Stop reason: HOSPADM

## 2021-10-08 RX ORDER — BUPROPION HYDROCHLORIDE 100 MG/1
100 TABLET ORAL DAILY
Status: DISCONTINUED | OUTPATIENT
Start: 2021-10-08 | End: 2021-10-11 | Stop reason: HOSPADM

## 2021-10-08 RX ORDER — PROMETHAZINE HYDROCHLORIDE 12.5 MG/1
12.5 TABLET ORAL EVERY 6 HOURS PRN
Status: DISCONTINUED | OUTPATIENT
Start: 2021-10-08 | End: 2021-10-08

## 2021-10-08 RX ORDER — FAMOTIDINE 20 MG/1
20 TABLET, FILM COATED ORAL DAILY
Status: DISCONTINUED | OUTPATIENT
Start: 2021-10-08 | End: 2021-10-08

## 2021-10-08 RX ORDER — POLYETHYLENE GLYCOL 3350 17 G/17G
17 POWDER, FOR SOLUTION ORAL DAILY
Status: DISCONTINUED | OUTPATIENT
Start: 2021-10-08 | End: 2021-10-08

## 2021-10-08 RX ORDER — LORAZEPAM 0.5 MG/1
0.5 TABLET ORAL EVERY 6 HOURS PRN
Status: DISCONTINUED | OUTPATIENT
Start: 2021-10-08 | End: 2021-10-11 | Stop reason: HOSPADM

## 2021-10-08 RX ORDER — BUPROPION HYDROCHLORIDE 100 MG/1
100 TABLET ORAL DAILY
COMMUNITY

## 2021-10-08 RX ORDER — SIMETHICONE 80 MG
80 TABLET,CHEWABLE ORAL
Status: DISCONTINUED | OUTPATIENT
Start: 2021-10-08 | End: 2021-10-08

## 2021-10-08 RX ORDER — DEXAMETHASONE SODIUM PHOSPHATE 4 MG/ML
4 INJECTION, SOLUTION INTRA-ARTICULAR; INTRALESIONAL; INTRAMUSCULAR; INTRAVENOUS; SOFT TISSUE EVERY 6 HOURS
Status: DISCONTINUED | OUTPATIENT
Start: 2021-10-08 | End: 2021-10-11 | Stop reason: HOSPADM

## 2021-10-08 RX ORDER — METHOCARBAMOL 500 MG/1
500 TABLET, FILM COATED ORAL 3 TIMES DAILY
Status: DISCONTINUED | OUTPATIENT
Start: 2021-10-08 | End: 2021-10-11 | Stop reason: HOSPADM

## 2021-10-08 RX ORDER — PANTOPRAZOLE SODIUM 40 MG/1
40 TABLET, DELAYED RELEASE ORAL
Status: DISCONTINUED | OUTPATIENT
Start: 2021-10-09 | End: 2021-10-08

## 2021-10-08 RX ORDER — LORAZEPAM 0.5 MG/1
0.5 TABLET ORAL EVERY 6 HOURS PRN
COMMUNITY

## 2021-10-08 RX ADMIN — DEXAMETHASONE SODIUM PHOSPHATE 4 MG: 4 INJECTION, SOLUTION INTRAMUSCULAR; INTRAVENOUS at 16:42

## 2021-10-08 RX ADMIN — BUPROPION HYDROCHLORIDE 100 MG: 100 TABLET, FILM COATED ORAL at 11:27

## 2021-10-08 RX ADMIN — MORPHINE SULFATE 60 MG: 30 TABLET, FILM COATED, EXTENDED RELEASE ORAL at 20:40

## 2021-10-08 RX ADMIN — SODIUM CHLORIDE, PRESERVATIVE FREE 10 ML: 5 INJECTION INTRAVENOUS at 20:41

## 2021-10-08 RX ADMIN — OXYCODONE 15 MG: 5 TABLET ORAL at 19:35

## 2021-10-08 RX ADMIN — POTASSIUM CHLORIDE 20 MEQ: 1500 TABLET, EXTENDED RELEASE ORAL at 20:40

## 2021-10-08 RX ADMIN — GABAPENTIN 300 MG: 300 CAPSULE ORAL at 20:40

## 2021-10-08 RX ADMIN — SODIUM CHLORIDE, PRESERVATIVE FREE 10 ML: 5 INJECTION INTRAVENOUS at 09:51

## 2021-10-08 RX ADMIN — METHOCARBAMOL 500 MG: 500 TABLET ORAL at 11:23

## 2021-10-08 RX ADMIN — METHOCARBAMOL 500 MG: 500 TABLET ORAL at 13:53

## 2021-10-08 RX ADMIN — SERTRALINE HYDROCHLORIDE 100 MG: 100 TABLET ORAL at 09:26

## 2021-10-08 RX ADMIN — SODIUM CHLORIDE 1000 ML: 9 INJECTION, SOLUTION INTRAVENOUS at 01:39

## 2021-10-08 RX ADMIN — LORAZEPAM 0.5 MG: 0.5 TABLET ORAL at 11:23

## 2021-10-08 RX ADMIN — GABAPENTIN 300 MG: 300 CAPSULE ORAL at 09:26

## 2021-10-08 RX ADMIN — GABAPENTIN 300 MG: 300 CAPSULE ORAL at 13:53

## 2021-10-08 RX ADMIN — HYDROMORPHONE HYDROCHLORIDE 1 MG: 1 INJECTION, SOLUTION INTRAMUSCULAR; INTRAVENOUS; SUBCUTANEOUS at 09:28

## 2021-10-08 RX ADMIN — SODIUM CHLORIDE, PRESERVATIVE FREE 10 ML: 5 INJECTION INTRAVENOUS at 01:36

## 2021-10-08 RX ADMIN — DEXAMETHASONE SODIUM PHOSPHATE 4 MG: 4 INJECTION, SOLUTION INTRAMUSCULAR; INTRAVENOUS at 23:37

## 2021-10-08 RX ADMIN — MORPHINE SULFATE 60 MG: 30 TABLET, FILM COATED, EXTENDED RELEASE ORAL at 09:26

## 2021-10-08 RX ADMIN — METHOCARBAMOL 500 MG: 500 TABLET ORAL at 20:40

## 2021-10-08 RX ADMIN — DEXAMETHASONE SODIUM PHOSPHATE 4 MG: 4 INJECTION, SOLUTION INTRAMUSCULAR; INTRAVENOUS at 11:32

## 2021-10-08 RX ADMIN — POTASSIUM CHLORIDE 20 MEQ: 1500 TABLET, EXTENDED RELEASE ORAL at 11:23

## 2021-10-08 RX ADMIN — PROMETHAZINE HYDROCHLORIDE 12.5 MG: 25 TABLET ORAL at 11:23

## 2021-10-08 ASSESSMENT — PAIN DESCRIPTION - PAIN TYPE
TYPE: CHRONIC PAIN

## 2021-10-08 ASSESSMENT — PAIN DESCRIPTION - ORIENTATION
ORIENTATION: UPPER

## 2021-10-08 ASSESSMENT — PAIN DESCRIPTION - PROGRESSION
CLINICAL_PROGRESSION: GRADUALLY IMPROVING
CLINICAL_PROGRESSION: GRADUALLY IMPROVING

## 2021-10-08 ASSESSMENT — PAIN DESCRIPTION - ONSET
ONSET: ON-GOING

## 2021-10-08 ASSESSMENT — PAIN DESCRIPTION - LOCATION
LOCATION: BACK

## 2021-10-08 ASSESSMENT — PAIN SCALES - GENERAL
PAINLEVEL_OUTOF10: 2
PAINLEVEL_OUTOF10: 5
PAINLEVEL_OUTOF10: 7
PAINLEVEL_OUTOF10: 5
PAINLEVEL_OUTOF10: 0
PAINLEVEL_OUTOF10: 7
PAINLEVEL_OUTOF10: 7
PAINLEVEL_OUTOF10: 0
PAINLEVEL_OUTOF10: 4
PAINLEVEL_OUTOF10: 7

## 2021-10-08 ASSESSMENT — PAIN DESCRIPTION - DESCRIPTORS
DESCRIPTORS: CONSTANT
DESCRIPTORS: CONSTANT;HEAVINESS;PRESSURE
DESCRIPTORS: CONSTANT;HEAVINESS;PRESSURE
DESCRIPTORS: CONSTANT
DESCRIPTORS: CONSTANT;HEAVINESS;PRESSURE

## 2021-10-08 ASSESSMENT — PAIN DESCRIPTION - FREQUENCY
FREQUENCY: CONTINUOUS

## 2021-10-08 NOTE — ONCOLOGY
[address]    [phone]    DATE:10/8/21    AREA TREATED: T-Spine & L-Spine    DAILY DOSE: 600 cGy For Each Site    CUMULATIVE DOSE:600 cGy For Each Site    # 2  OUT OF 8 TREATMENTS    NEXT PLANNED TREATMENT  DATE: Monday 10/8    Daily Treatments are Monday through Friday      INPATIENT CODING:  Beam Radiation   Photons  Photon energy : 15 mv

## 2021-10-08 NOTE — H&P
Hospital Medicine History & Physical      PCP: Jairon Dominguez MD    Date of Admission: 10/7/2021    Date of Service: Pt seen/examined on 10/7/2021  Pt seen/examined face to face on and admitted as inpatient with expected LOS greater than two midnights due to medical therapy    Chief Complaint:    Chief Complaint   Patient presents with    Extremity Weakness        History Of Present Illness:      35 y.o. female who presented to [facility] with past medical history of depression, Crohn's disease, fragile X, depression, class II obesity, IBS presented to the ED with chief complaint of lower extremity weakness and retention and back pain. Patient reported that she has known sarcoma and has been receiving radiation therapy by . Patient reports that she also follows up with oncology at [facility] and is currently enrolled in palliative care. Plan is to continue with radiation therapy for 7 therapies and check response. Patient reported however that weaknss started around 24 hours ago progressively worsening. No known alleviating or exacerbating factor. Associated with unable to even ambulate in addition to difficulty with urinating and having urinary retention. Patient reported that she has had laminectomies to her spinal cord for tumors and known to have tumor burden on the lumbar spine. Patient reported that she has met with hospice nurse this morning and wanted to proceed with continuation of radiation therapy and depending on response we will proceed with hospice at this time.   No fever chills nausea vomiting chest pain shortness of        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     [facility]'s disease ([facility] [address]     Depression     Early menopause     follows with endocrine/gyne    History of blood transfusion     IBS (irritable bowel syndrome)     IBS (irritable bowel syndrome)     Mixed hyperlipidemia 06/22/2017    Obesity     extended release tablet Take 60 mg by mouth 2 times daily. Historical Provider, MD   famotidine (PEPCID) 20 MG tablet Take 20 mg by mouth daily    Historical Provider, MD   promethazine (PHENERGAN) 12.5 MG tablet Take 12.5 mg by mouth every 6 hours as needed for Nausea    Historical Provider, MD   granisetron (KYTRIL) 1 MG tablet Take 1 mg by mouth every 12 hours as needed for Nausea    Historical Provider, MD   omeprazole (PRILOSEC) 20 MG delayed release capsule Take 20 mg by mouth daily    Historical Provider, MD   gabapentin (NEURONTIN) 300 MG capsule Take 300 mg by mouth 3 times daily. Historical Provider, MD   sennosides-docusate sodium (SENOKOT-S) 8.6-50 MG tablet Take 4 tablets by mouth nightly    Historical Provider, MD   polyethylene glycol (GLYCOLAX) 17 g packet Take 17 g by mouth daily    Historical Provider, MD   simethicone (MYLICON) 80 MG chewable tablet Take 80 mg by mouth 4 times daily (after meals and at bedtime)    Historical Provider, MD   oxyCODONE 5 MG capsule Take 15 mg by mouth every 6 hours as needed for Pain. Historical Provider, MD       Allergies:  Ketamine, Nsaids, Ondansetron hcl, and Topamax [topiramate]    Social History:          TOBACCO:   reports that she quit smoking about 11 years ago. Her smoking use included cigarettes. She has a 7.50 pack-year smoking history. She has never used smokeless tobacco.  ETOH:   reports current alcohol use.   E-Cigarettes/Vaping Use     Questions Responses    E-Cigarette/Vaping Use Never User    Start Date     Passive Exposure     Quit Date     Counseling Given     Comments             Family History:      Reviewed in detail         Problem Relation Age of Onset    Hypertension Father     Elevated Lipids Father     Coronary Art Dis Maternal Grandfather 36    Heart Failure Paternal Grandfather 76    Hypertension Brother     Coronary Art Dis Maternal Uncle        REVIEW OF SYSTEMS:     Constitutional:  No Fever, No Chills, No Night Sweats  ENT/Mouth:  No Nasal Congestion,  No Hoarseness, No new mouth lesion  Eyes:  No Eye Pain, No Redness, No Discharge  Cardiovascular:  No Chest Pain, No Orthopnea, No Palpitations  Respiratory:  No Cough, No Sputum, No Dyspnea  Gastrointestinal: No Vomiting, No Diarrhea, No abdominal pain  Genitourinary: + Urinary Frequency, No Hematuria, No Urinary pain  Musculoskeletal:  + worsening Arthralgias, + worsening Myalgias  Skin:  No new Skin Lesions, No new skin rash  Neuro:  + new weakness, + new numbness. Psych:  No suicial ideation, No Violence ideation    PHYSICAL EXAM PERFORMED:    /76   Pulse 102   Temp 98.4 °F (36.9 °C) (Oral)   Resp 20   Ht 5' 9\" (1.753 m)   Wt 250 lb (113.4 kg)   LMP  (LMP Unknown)   SpO2 96%   BMI 36.92 kg/m²     General appearance:  mild acute distress, appears older than stated age  HEENT:   atraumatic, sclera anicteric, Conjunctivae clear. Neck: Supple,Trachea midline, no goiter  Respiratory:minimal accessory muscle usage, Normal respiratory effort. Clear to auscultation, bilaterally without wheezing  Cardiovascular:  Regular rate and rhythm, capillary refill 2 seconds  Abdomen: Soft, non-tender, non-distended with normal bowel sounds. Musculoskeletal: Limited range of motion, strength 3/5 bilateral lower extremity  Skin: turgor normal.  No new rashes or lesions. Neurologic: Alert and oriented x4, lower extremity weakness  Labs:     Recent Labs     10/07/21  1921   WBC 11.1*   HGB 7.6*   HCT 24.4*        Recent Labs     10/07/21  1921   *   K 4.2   CL 99   CO2 24   BUN 12   CREATININE <0.5*   CALCIUM 8.5     No results for input(s): AST, ALT, BILIDIR, BILITOT, ALKPHOS in the last 72 hours. No results for input(s): INR in the last 72 hours.   Recent Labs     10/07/21  1921   TROPONINI <0.01       Urinalysis:      Lab Results   Component Value Date    NITRU Negative 06/23/2021    WBCUA 3-5 06/23/2021    BACTERIA Rare 03/27/2021    RBCUA None seen 06/23/2021    BLOODU Negative 06/23/2021    SPECGRAV 1.015 06/23/2021    GLUCOSEU Negative 06/23/2021       Radiology:       No orders to display       ASSESSMENT AND PLAN:    Active Hospital Problems    Diagnosis Date Noted    Sarcoma [facility]) [C49.9] 02/15/2021     Metastatic sarcoma:  Palliative care consulted, much appreciated  Oncology consulted, much appreciated    Suspected cauda equina:   In the setting of metastatic disease  Decadron given  Radiation oncology consulted, much appreciated    Normocytic anemia: No signs or symptoms of bleeding  Type and screen, continue to trend    Chronic medical conditions:  Hyperlipidemia: Continue home medication  Class II obesity:  Anxiety: Continue medication    Diet: NPO except meds ordered    DVT Prophylaxis: held    Dispo:   Expected LOS greater than two DO Sara

## 2021-10-08 NOTE — PLAN OF CARE
Problem: Falls - Risk of:  Goal: Will remain free from falls  Description: Will remain free from falls  Outcome: Ongoing  Goal: Absence of physical injury  Description: Absence of physical injury  Outcome: Ongoing     Problem: Bleeding:  Goal: Will show no signs and symptoms of excessive bleeding  Description: Will show no signs and symptoms of excessive bleeding  Outcome: Ongoing     Problem: Infection - Central Venous Catheter-Associated Bloodstream Infection:  Goal: Will show no infection signs and symptoms  Description: Will show no infection signs and symptoms  Outcome: Ongoing     Problem: Discharge Planning:  Goal: Discharged to appropriate level of care  Description: Discharged to appropriate level of care  Outcome: Ongoing

## 2021-10-08 NOTE — CONSULTS
Department of Radiation Oncology  Attending Consult Note      Reason for Consult:  Progressive lower extremity weakness  Requesting Physician:  Wagner Nieves MD      CHIEF COMPLAINT:  Cant walk or empty my bladder    History Obtained From:  patient, spouse, electronic medical record    HISTORY OF PRESENT ILLNESS:      The patient is a 35 y.o. with significant past medical history of metastatic sarcoma who presents with increasing LE weakness and inability to empty bladder. She started palliative XRT to the spine on Wednesday and had progressive LE weakness the following day along with urinary retention. She presented to the ER for cain placement and was admitted for evaluation. RO is consulted to advise on radiation treatment and steroid mgmt. 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     Crohn's disease ([facility] [address]     Depression     Early menopause     follows with endocrine/gyne    History of blood transfusion     IBS (irritable bowel syndrome)     IBS (irritable bowel syndrome)     Mixed hyperlipidemia 06/22/2017    Obesity     Osteoarthritis, knee     PONV (postoperative nausea and vomiting)     Prolonged emergence from general anesthesia     panic attacks on awakening, requests anxiety med on awakening    Sarcoma of left lower extremity ([facility] [address] 11/24/2017    s/p XRT, followed by surgery     Past Surgical History:        Procedure Laterality Date    ANKLE SURGERY Left     APPENDECTOMY      BREAST REDUCTION SURGERY      CARPAL TUNNEL RELEASE Right 07/2020    KNEE ARTHROSCOPY Right 01/2015    KNEE ARTHROSCOPY Right 07/2015    LAMINECTOMY N/A 2/17/2021    T7 LAMINECTOMY WITH T5-9 PEDICALE FIXATION AND REMOVAL OF EPIDURAL TUMOR AT T7 performed by Anthony Leon.  Armando Chaidez MD at [facility] 9/15/2021    T4-T7 LAMINECTOMY, DECOMPRESSION, FORAMINOTOMY, FUSION , FIXATION, RHIZOTOMY, REMOVAL OF EPIDURAL MASS performed by Gi Zuluaga.  Sigifredo Salcedo MD at [facility] N/A 07/22/2019    ROBOTIC ASSISTED PARTIAL LIVER RESECTION AND ABDOMINAL MASS RESECTION performed by Aura Hanna MD at [address] [facility] Left 11/2017    for sarcoma    OTHER SURGICAL HISTORY Left     biopsy of left thigh mass    OTHER SURGICAL HISTORY Left 02/26/2018    WIDE EXCISION LEFT THIGH SARCOMA          OTHER SURGICAL HISTORY Left 03/14/2018    INCISION AND DRAINAGE OF LEFT THIGH       OTHER SURGICAL HISTORY Left 03/22/2018    incision and drainage left posterior thigh    PORT SURGERY Right 11/30/2020    PORT REMOVAL performed by Aura Hanna MD at [address] SALPINGECTOMY Left 2009    d/t scar tissue    TOTAL KNEE ARTHROPLASTY Right 09/2015       Current Medications:    Current Facility-Administered Medications: potassium chloride (KLOR-CON M) extended release tablet 20 mEq, 20 mEq, Oral, BID  LORazepam (ATIVAN) tablet 0.5 mg, 0.5 mg, Oral, Q6H PRN  methocarbamol (ROBAXIN) tablet 500 mg, 500 mg, Oral, TID  buPROPion (WELLBUTRIN) tablet 100 mg, 100 mg, Oral, Daily  dexamethasone (DECADRON) injection 4 mg, 4 mg, IntraVENous, Q6H  HYDROmorphone (DILAUDID) injection 1 mg, 1 mg, IntraVENous, Q3H PRN  gabapentin (NEURONTIN) capsule 300 mg, 300 mg, Oral, TID  morphine (MS CONTIN) extended release tablet 60 mg, 60 mg, Oral, BID  sertraline (ZOLOFT) tablet 100 mg, 100 mg, Oral, Daily  oxyCODONE (ROXICODONE) immediate release tablet 15 mg, 15 mg, Oral, Q6H PRN  sodium chloride flush 0.9 % injection 10 mL, 10 mL, IntraVENous, 2 times per day  sodium chloride flush 0.9 % injection 10 mL, 10 mL, IntraVENous, PRN  0.9 % sodium chloride infusion, 25 mL, IntraVENous, PRN  potassium chloride 10 mEq/100 mL IVPB (Peripheral Line), 10 mEq, IntraVENous, PRN  magnesium sulfate 2000 mg in 50 mL IVPB premix, 2,000 mg, IntraVENous, PRN  promethazine (PHENERGAN) tablet 12.5 mg, 12.5 mg, Oral, Q6H PRN **OR** ondansetron (ZOFRAN) injection 4 mg, 4 mg, IntraVENous, Q6H PRN  acetaminophen (TYLENOL) tablet 650 mg, 650 mg, Oral, Q6H PRN **OR** acetaminophen (TYLENOL) suppository 650 mg, 650 mg, Rectal, Q6H PRN    Allergies:  Review of patient's allergies indicates no known allergies. 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[place], [place] [address]way Use    Smoking status: Former Smoker     Packs/day: 0.50     Years: 15.00     Pack years: 7.50     Types: Cigarettes     Quit date: 2010     Years since quittin.0    Smokeless tobacco: Never Used   Vaping Use    Vaping Use: Never used   Substance and Sexual Activity    Alcohol use: Yes     Comment: rare    Drug use: Yes     Types: Marijuana     Comment: medical marijuana    Sexual activity: Yes     Partners: Male     Comment:     Other Topics Concern    Not on file   Social History Narrative    Not on file     Social Determinants of Health     Financial Resource Strain:     Difficulty of Paying Living Expenses:    Food Insecurity:     Worried About Running Out of Food in the Last Year:     Ran Out of Food in the Last Year:    Transportation Needs:     Lack of Transportation (Medical):      Lack of Transportation (Non-Medical):    Physical Activity:     Days of Exercise per Week:     Minutes of Exercise per Session:    Stress:     Feeling of Stress :    Social Connections:     Frequency of Communication with Friends and Family:     Frequency of Social Gatherings with Friends and Family:     Attends Yazidi Services:     Active Member of Clubs or Organizations:     Attends Club or Organization Meetings:     Marital Status:    Intimate Partner Violence:     Fear of Current or Ex-Partner:     Emotionally Abused:     Physically Abused:     Sexually Abused:        Family History:       Problem Relation Age of Onset    Hypertension Father     Elevated Lipids Father     Coronary Art Dis Maternal Grandfather 36    Heart Failure Paternal Grandfather 76    Hypertension Brother     Coronary Art Dis Maternal Uncle      REVIEW OF SYSTEMS:    As outlined in HPI and chart review, otherwise review of general, eyes, nose, throat, pulmonary, cardiac, GI, , musculoskeletal, neurological, and integumentary systems is negative. PHYSICAL EXAM:      Vitals:    Vitals:    10/08/21 1137   BP: 103/73   Pulse: 87   Resp: 15   Temp: 97.7 °F (36.5 °C)   SpO2:        Laying in bed, can move bilat LE but not lift against gravity. Has cain cath in place. Is A&O x 3, has insight into disease and situation. DATA:      XR MANDIBLE (<4 VIEWS)    Result Date: 9/27/2021  Site: Brodie Sifuentes #: [id number] #: [id number]: [id number] #: [de-identified] #: [id number] #: [id number]: XR MANDIBLEExam Date/Time: 09/27/2021 12:15 PMAdmitting Diagnosis: metastatic soft tissue sarcoma, numbness R V3 distribution, concern for lytic lesionReason for Exam: metastatic soft tissue sarcoma, numbness R V3 distribution, concern for lytic lesion Dictated by: ROX CHEN [facility] MISTY: 09/27/2021 01:19 PMT: This document is confidential medical information. Unauthorized disclosure or use of this information is prohibited by law. If you are not the intended recipient of this document, please advise us  by calling immediately [phone].  Impression/Conclusion below HISTORY:  metastatic soft tissue sarcoma, numbness R V3 distribution, concern for lytic lesion  metastatic soft tissue sarcoma, numbness R V3 distribution, concern for lytic lesion  Malignant neoplasm of connective and soft tissue of left lower limb, including hip;Secondary malignant neoplasm of bone;Secondary malignant neoplasm of other parts of nervous system COMPARISON: None NOTE:  If there are questions about the content of this report, please contact [facility] radiology by calling [phone] FINDINGS:                 MANDIBLE:  Unremarkable. No evidence of fracture or lytic lesion. TMJs:  Unremarkable PARANASAL SINUSES: Unremarkable. No gross opacification or fluid level. ORBITS: Unremarkable OTHER:  None IMPRESSION:  No significant abnormality SIGNED BY: Keila Matamoros. Himanshu Poe MD on 9/27/2021  1:16 PM   [address] [facility] [phone] - 2011 [facility]: [phone]       CT FACIAL BONES W CONTRAST    Result Date: 9/29/2021  Site: Courtney Lindsay #: [id number] #: [id number]: TCCTAccount #: [de-identified] #: [id number] #: [id number]: CT SINUS FACIAL BONES W CONTRASTExam Date/Time: 09/29/2021 10:40 AMAdmitting Diagnosis: Soft tissue sarcoma, monitorReason for Exam: Soft tissue sarcoma, monitor Dictated by: Marisel Rich MISTY: 09/29/2021 11:57 AMT: This document is confidential medical information. Unauthorized disclosure or use of this information is prohibited by law. If you are not the intended recipient of this document, please advise us by calling immediately [phone]. Impression/Conclusion below 75 HISTORY: pain and numbness , mainly on the right side of the jaw/face for about 2 weeks. 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   TECHNIQUE:  Multiplanar CT images of the facial bones NOTE:  If there are questions about the content of this report, please contact [facility] radiology by calling [phone] FINDINGS: FACIAL BONES:  There is mild asymmetric widening of the right mental foramen (example series 903, image 159) and an area of lucency in the adjacent mandible with soft tissue density. (Example series 900, image 158 and series 903, image 159). No soft tissue component is seen, however scattered enhancing osseous metastases are noted on the previous MRI study. Thus, a osseous metastasis at the right mandibular body is suspected. ORBITS:  Unremarkable. No orbital fracture or retrobulbar hematoma PARANASAL SINUSES:  Unremarkable. No paranasal sinus fracture or fluid level OTHER:  Vague focus of enhancement in the inferior right frontal lobe corresponds to a developmental venous anomaly when correlating with previous MRI. IMPRESSION: Probable right mandibular body metastatic lesion expanding the right mental foramen. Additional mandibular metastases are also suspected when correlating with previous brain MRI but are not visualized on CT. For example, on the previous MRI a suspected  metastasis is noted at the left mandible near the mandibular angle just distal to the last left mandibular molar. SIGNED BY: Lela Foster MD on 9/29/2021 11:54 AM   [facility] [phone] - 2011 [facility]: [phone]       CT GUIDED STEREOTACTIC LOCALIZATION    Result Date: 9/15/2021  EXAM: CT guided stereotactic localization INDICATION: T4-T7 LAMINECTOMY, DECOMPRESSION, FORAMINOTOMY, POSSIBLE FUSION , FIXATION, POSSIBLE RHIZOTOMY, COMPARISON: MRI of thoracic spine 6/22/2021 TECHNIQUE: Two axial CT acquisitions were obtained through the spine for the purposes of spinal level localization and surgical fusion hardware localization. CT radiation dose optimization techniques (automated exposure control, and use of iterative reconstruction techniques, or adjustment of the mA and/or kV according to patient size) were used to limit patient radiation dose. FINDINGS: The initial CT acquisition demonstrates pedicle screws at T5-T9.  There is a posterior soft tissue surgical dissection extending deep to the level of the spine. There is some atelectasis or scarring in the lung bases, but no evidence for dense consolidation, pleural effusion or pneumothorax. The patient is intubated. The second CT acquisition demonstrates additional pedicle screws placed at T3 and T4 and connecting rods spanning the pedicle screws at T3-T9. There is partial closure of the surgical dissection soft tissue wound overlying the spine. A surgical drain is present. CT-guided stereotactic localization for thoracic posterior surgical fusion    CT GUIDED STEREOTACTIC LOCALIZATION    Result Date: 9/15/2021  EXAM: CT guided stereotactic localization INDICATION: T4-T7 LAMINECTOMY, DECOMPRESSION, FORAMINOTOMY, POSSIBLE FUSION , FIXATION, POSSIBLE RHIZOTOMY, COMPARISON: MRI of thoracic spine 6/22/2021 TECHNIQUE: Two axial CT acquisitions were obtained through the spine for the purposes of spinal level localization and surgical fusion hardware localization. CT radiation dose optimization techniques (automated exposure control, and use of iterative reconstruction techniques, or adjustment of the mA and/or kV according to patient size) were used to limit patient radiation dose. FINDINGS: The initial CT acquisition demonstrates pedicle screws at T5-T9. There is a posterior soft tissue surgical dissection extending deep to the level of the spine. There is some atelectasis or scarring in the lung bases, but no evidence for dense consolidation, pleural effusion or pneumothorax. The patient is intubated. The second CT acquisition demonstrates additional pedicle screws placed at T3 and T4 and connecting rods spanning the pedicle screws at T3-T9. There is partial closure of the surgical dissection soft tissue wound overlying the spine. A surgical drain is present.      CT-guided stereotactic localization for thoracic posterior surgical fusion    MRI THORACIC SPINE W WO CONTRAST    Result Date: 10/5/2021  Exam:MRI THORACIC SPINE W WO CONTRAST, MRI Gonzalo Carty 157 Date:10/4/2021 2:49 PM Indication: Incomplete paraplegia Comparison: 3/24/2021, 9/4/2021 Technique: Multiplanar multisequence MR images obtained of the thoracic and lumbar spine. Contrast:  20 mL ProHance intravenous FINDINGS: THORACIC SPINE: Localizer images and extraspinal structures: Multifocal lung nodules and hepatic masses are again noted, compatible with metastatic disease. Although limited and direct comparison to prior CT from 6/23/2021, these appear increased. Filling defect in the infrarenal IVC will be reported on the lumbar portion of the study below. Marrow signal/bony structures: Diffuse marrow signal abnormality throughout the thoracic spine, compatible with extensive metastatic disease. This is progressed from 9/4/2021, for example now there is diffuse involvement of the T10 vertebral body with increased size of a lesion involving the T11 vertebral body. There is unchanged minimal compression deformity of the inferior endplate of T7. No recent pathologic fracture identified. Alignment: Normal. Thoracic cord and spinal canal: Limited assessment of cord signal secondary to artifact from extensive metallic hardware. No definite intramedullary abnormal enhancement given the limitations of artifact. There is multifocal epidural extension of tumor, noted minimally in the left ventral epidural space at T1-T2 and within the left lateral and ventral epidural space from T2-T8, extending several of the left sided foramina. This is somewhat limited and direct assessment but appears mildly decreased from the prior study of T3-T6 given the limitations of artifact. There is bilateral epidural tumor at T6-T7 without significant change. Paravertebral soft tissues: There is new extraosseous extension of tumor at the T2 level into the left lateral and anterior paravertebral soft tissues into the posterior mediastinum.  There is new extraosseous extension of tumor laterally from the T7 vertebral body into the posterior mediastinum and abutting the pleural surface, right greater than left. Postoperative findings: Compared to the prior study, there is been interval extension of posterior thoracic fusion superiorly to the T3 level, now spanning T3-T9, previously T5-T9. Degenerative findings: No high-grade central canal stenosis status post posterior decompression. LUMBAR SPINE: Localizer images and extraspinal structures: Nearly occlusive filling defect in the infrarenal IVC measures at least 2.0 x 1.7 x 5.2 cm with mild internal enhancement (series 17, image 21) and suggested extension toward the right paravertebral mass at L3, suspicious for tumor thrombus extension through a paravertebral vein. This previously measured 1.2 cm on prior CT from 6/23/2021. There is questionable signal abnormality involving the extrahepatic portal vein and SMV, which is favored to represent flow related artifact but indeterminate. Questionable central mesenteric mass versus bowel. Marrow signal/bony structures: Progression of diffuse osseous metastatic disease involving the lumbar spine and sacrum as well as the visualized bony pelvis. There is unchanged mild superior endplate compression deformity of L3 on the left, which may represent a subacute to chronic pathologic fracture. No other pathologic fracture identified. Alignment: Minimal grade 1 retrolisthesis at L5-S1. Conus, cauda equina, and spinal canal: Conus terminates normally at the L1 level. Cauda equina nerve roots are normal. No abnormal intrathecal enhancement. There is bilateral ventral epidural extension of tumor at L2, similar to the clinical history increased from the prior study. This contributes to mild thecal sac narrowing. There is ventral extension of tumor into the epidural space at L3 with extension into the right L3 foramen, mildly decreased from the prior study.  This contributes to moderate thecal sac narrowing. Paravertebral soft tissues: Right paraspinal mass at the inferior L3 level measures approximately 3.0 x 1.7 cm (series 17, image 28). , previously 2.7 x 1.3 cm on the 9/4/2021 study. There is a suspected linear area of enhancement extending toward the region of the IVC thrombus (series 17, images 25-26). Postoperative changes: None identified. Degenerative findings: Multilevel disc desiccation with height loss and endplate spurring, significant and moderate at L5-S1 and mild at other levels. Suggested vacuum disc phenomenon at L5-S1. L1-L2: No compressive findings. L2-L3: Minimal disc bulge. Mild bilateral facet hypertrophy. No compressive findings. L3-L4: Mild disc bulge. Small broad-based left central disc protrusion. This contacts the extraforaminal left L3 nerve root. Mild bilateral facet hypertrophy. The posterior epidural fat. Mild central canal stenosis. Epidural tumor extends into the right L3 foramen and surrounds the right L3 nerve root without bony central canal stenosis. L4-L5: Moderate disc bulge with annular fissuring. Mild bilateral facet hypertrophy. Prominent posterior epidural fat. Moderate central canal stenosis. Mild bilateral L4 foraminal stenosis. L5-S1: Mild disc bulge. Superimposed small left central and subarticular disc extrusion. Mild bilateral facet hypertrophy. Epidural lipomatosis. Moderate thecal sac narrowing without significant bony central canal stenosis. THORACIC SPINE: Progression of extensive thoracic spine metastatic disease as detailed. Increased involvement of the thoracic vertebral bodies as well as new paravertebral extension of tumor at T2 and T7. Persistent epidural tumor throughout the thoracic spinal canal, somewhat limited and direct comparison given significant artifact from hardware. This may be mildly decreased at T3-T6 compared to the prior study. Interval extension of posterior thoracic fusion superiorly to T3, now spanning T3-T9. is progressed from 9/4/2021, for example now there is diffuse involvement of the T10 vertebral body with increased size of a lesion involving the T11 vertebral body. There is unchanged minimal compression deformity of the inferior endplate of T7. No recent pathologic fracture identified. Alignment: Normal. Thoracic cord and spinal canal: Limited assessment of cord signal secondary to artifact from extensive metallic hardware. No definite intramedullary abnormal enhancement given the limitations of artifact. There is multifocal epidural extension of tumor, noted minimally in the left ventral epidural space at T1-T2 and within the left lateral and ventral epidural space from T2-T8, extending several of the left sided foramina. This is somewhat limited and direct assessment but appears mildly decreased from the prior study of T3-T6 given the limitations of artifact. There is bilateral epidural tumor at T6-T7 without significant change. Paravertebral soft tissues: There is new extraosseous extension of tumor at the T2 level into the left lateral and anterior paravertebral soft tissues into the posterior mediastinum. There is new extraosseous extension of tumor laterally from the T7 vertebral body into the posterior mediastinum and abutting the pleural surface, right greater than left. Postoperative findings: Compared to the prior study, there is been interval extension of posterior thoracic fusion superiorly to the T3 level, now spanning T3-T9, previously T5-T9. Degenerative findings: No high-grade central canal stenosis status post posterior decompression. LUMBAR SPINE: Localizer images and extraspinal structures: Nearly occlusive filling defect in the infrarenal IVC measures at least 2.0 x 1.7 x 5.2 cm with mild internal enhancement (series 17, image 21) and suggested extension toward the right paravertebral mass at L3, suspicious for tumor thrombus extension through a paravertebral vein.  This previously measured 1.2 cm on prior CT from 6/23/2021. There is questionable signal abnormality involving the extrahepatic portal vein and SMV, which is favored to represent flow related artifact but indeterminate. Questionable central mesenteric mass versus bowel. Marrow signal/bony structures: Progression of diffuse osseous metastatic disease involving the lumbar spine and sacrum as well as the visualized bony pelvis. There is unchanged mild superior endplate compression deformity of L3 on the left, which may represent a subacute to chronic pathologic fracture. No other pathologic fracture identified. Alignment: Minimal grade 1 retrolisthesis at L5-S1. Conus, cauda equina, and spinal canal: Conus terminates normally at the L1 level. Cauda equina nerve roots are normal. No abnormal intrathecal enhancement. There is bilateral ventral epidural extension of tumor at L2, similar to the clinical history increased from the prior study. This contributes to mild thecal sac narrowing. There is ventral extension of tumor into the epidural space at L3 with extension into the right L3 foramen, mildly decreased from the prior study. This contributes to moderate  thecal sac narrowing. Paravertebral soft tissues: Right paraspinal mass at the inferior L3 level measures approximately 3.0 x 1.7 cm (series 17, image 28). , previously 2.7 x 1.3 cm on the 9/4/2021 study. There is a suspected linear area of enhancement extending toward the region of the IVC thrombus (series 17, images 25-26). Postoperative changes: None identified. Degenerative findings: Multilevel disc desiccation with height loss and endplate spurring, significant and moderate at L5-S1 and mild at other levels. Suggested vacuum disc phenomenon at L5-S1. L1-L2: No compressive findings. L2-L3: Minimal disc bulge. Mild bilateral facet hypertrophy. No compressive findings. L3-L4: Mild disc bulge. Small broad-based left central disc protrusion. This contacts the extraforaminal left L3 nerve root. Mild bilateral facet hypertrophy. The posterior epidural fat. Mild central canal stenosis. Epidural tumor extends into the right L3 foramen and surrounds the right L3 nerve root without bony central canal stenosis. L4-L5: Moderate disc bulge with annular fissuring. Mild bilateral facet hypertrophy. Prominent posterior epidural fat. Moderate central canal stenosis. Mild bilateral L4 foraminal stenosis. L5-S1: Mild disc bulge. Superimposed small left central and subarticular disc extrusion. Mild bilateral facet hypertrophy. Epidural lipomatosis. Moderate thecal sac narrowing without significant bony central canal stenosis. THORACIC SPINE: Progression of extensive thoracic spine metastatic disease as detailed. Increased involvement of the thoracic vertebral bodies as well as new paravertebral extension of tumor at T2 and T7. Persistent epidural tumor throughout the thoracic spinal canal, somewhat limited and direct comparison given significant artifact from hardware. This may be mildly decreased at T3-T6 compared to the prior study. Interval extension of posterior thoracic fusion superiorly to T3, now spanning T3-T9. Extraspinal metastatic disease, with lung nodules and hepatic masses, limited and direct comparison to prior study from 6/23/2021 but appearing increased. This would be better evaluated by dedicated CT of the chest, abdomen, and pelvis. LUMBAR SPINE: Progression of extensive lumbar spine metastatic disease as detailed, as well as involving the sacrum and pelvis. There is increased ventral epidural extension of tumor at L2 causing mild thecal sac narrowing. There is slight decreased ventral epidural tumor extension at L3 causing moderate thecal sac narrowing. This also extends into the right L3 foramen.  Increased size of the right paraspinal mass at the inferior L3 level with suspected extension along a right paravertebral vein into a 2.0 x 1.7 x 5.2 cm enhancing filling defect in the IVC compatible with thrombosis, potentially tumor thrombus, nearly occlusive. Unchanged pathologic fracture involving L3 without further height loss. Questionable signal abnormality in the extrahepatic portal vein and SMV, favored to represent flow-related artifact. This could be better evaluated on CT.     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 Unauthorized disclosure or use of this information is prohibited by law. If you are not the intended recipient of this document, please advise us by  calling immediately [phone]. 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 No mass, infarct, infection, or hemorrhage VENTRICLES/SULCI: Unremarkable. No hydrocephalus or midline shift EXTRA-AXIAL SPACES:  Unremarkable FLOW VOIDS: Unremarkable. Normal signal flow voids in the larger intracranial vessels PITUITARY: Unremarkable  PARANASAL SINUSES/MASTOIDS: Unremarkable BONES:  Diffuse calvarial metastases.  OTHER:  None IMPRESSION:  While there are diffuse calvarial metastases, a

## 2021-10-08 NOTE — CARE COORDINATION
11:57 AM   Met with patient and family at bedside. Patient's  had several questions about hospice and private duty care. Explained hospice services, including levels of care with regard to hospice. Discussed in home hospice services that can be provided. They had several questions about [facility] care over the next several months. Explained that, unfortunately, [facility] care is not covered by insurance even in hospice situations. Informed that there are private duty companies or the possibility of zahida a nurse/nursing student out of pocket. Patient has been working with [facility]. They plan to enroll next Friday after she has completed her final radiation treatments. There is not a need for a new hospice referral at this time as the patient and her family have a plan with regard to hospice. They more so just had questions about day to day help at home. Provided support and encouragement. Encouraged family to reach out with any further questions.

## 2021-10-08 NOTE — PROGRESS NOTES
Met with patient briefly. Patient denied behavioral health needs currently. She was provided with writer's business card if she or her family would like to meet in the near future. Also notified family that they can request to speak with psychologist while on the unit.

## 2021-10-08 NOTE — PLAN OF CARE
Problem: Falls - Risk of:  Goal: Will remain free from falls  Description: Will remain free from falls  10/8/2021 1338 by Angela Olmstead RN  Outcome: Ongoing  Note: Orthostatic vital signs obtained at start of shift - see flowsheet for details. Pt does not meet criteria for orthostasis. Pt is a High fall risk. See Andrew Chalk Fall Score and ABCDS Injury Risk assessments.   + Screening for Orthostasis and/or + High Fall Risk per ARREAGA/ABCDS: Explained fall risk precautions to pt and family and rationale behind their use to keep the patient safe. Pt bed is in low position, side rails up, call light and belongings are in reach. Fall wristband applied and present on pts wrist.  Bed alarm on. Pt encouraged to call for assistance. Will continue with hourly rounds for PO intake, pain needs, toileting and repositioning as needed. 10/8/2021 0554 by Milagros York RN  Outcome: Ongoing  Goal: Absence of physical injury  Description: Absence of physical injury  10/8/2021 1338 by Angela Olmstead RN  Outcome: Ongoing  Note: No new signs of physical injury noted this shift. Nurse will continue to monitor. 10/8/2021 0554 by Milagros York RN  Outcome: Ongoing     Problem: Bleeding:  Goal: Will show no signs and symptoms of excessive bleeding  Description: Will show no signs and symptoms of excessive bleeding  10/8/2021 1338 by Angela Olmstead RN  Outcome: Ongoing  Note: Patient's hemoglobin this AM:   Recent Labs     10/08/21  0449   HGB 7.5*     Patient's platelet count this AM:   Recent Labs     10/08/21  0449   *    Thrombocytopenia Precautions in place. Patient showing no signs or symptoms of active bleeding. Transfusion not indicated at this time. Patient verbalizes understanding of all instructions. Will continue to assess and implement POC. Call light within reach and hourly rounding in place.      10/8/2021 0554 by Milagros York RN  Outcome: Ongoing     Problem: Infection - Central Venous Catheter-Associated Bloodstream Infection:  Goal: Will show no infection signs and symptoms  Description: Will show no infection signs and symptoms  10/8/2021 0554 by Stas Leal RN  Outcome: Ongoing     Problem: Discharge Planning:  Goal: Discharged to appropriate level of care  Description: Discharged to appropriate level of care  10/8/2021 0554 by Stas Leal RN  Outcome: Ongoing     Problem: Pain:  Goal: Pain level will decrease  Description: Pain level will decrease  Outcome: Ongoing  Note: Pt reported pain throughout shift - see flowsheet for details. Nurse offered medicinal and nonpharmacologic relief options and provided appropriately per pt request and physician orders. Pt reported temporary relief but that relief did not last long term. Nurse will continue to monitor.

## 2021-10-09 LAB
A/G RATIO: 1.4 (ref 1.1–2.2)
ALBUMIN SERPL-MCNC: 3.4 G/DL (ref 3.4–5)
ALP BLD-CCNC: 425 U/L (ref 40–129)
ALT SERPL-CCNC: 60 U/L (ref 10–40)
ANION GAP SERPL CALCULATED.3IONS-SCNC: 9 MMOL/L (ref 3–16)
AST SERPL-CCNC: 96 U/L (ref 15–37)
BASOPHILS ABSOLUTE: 0 K/UL (ref 0–0.2)
BASOPHILS RELATIVE PERCENT: 0 %
BILIRUB SERPL-MCNC: 0.4 MG/DL (ref 0–1)
BUN BLDV-MCNC: 11 MG/DL (ref 7–20)
CALCIUM SERPL-MCNC: 8.7 MG/DL (ref 8.3–10.6)
CHLORIDE BLD-SCNC: 101 MMOL/L (ref 99–110)
CO2: 26 MMOL/L (ref 21–32)
CREAT SERPL-MCNC: <0.5 MG/DL (ref 0.6–1.1)
EOSINOPHILS ABSOLUTE: 0 K/UL (ref 0–0.6)
EOSINOPHILS RELATIVE PERCENT: 0.1 %
GFR AFRICAN AMERICAN: >60
GFR NON-AFRICAN AMERICAN: >60
GLOBULIN: 2.5 G/DL
GLUCOSE BLD-MCNC: 117 MG/DL (ref 70–99)
HCT VFR BLD CALC: 23.4 % (ref 36–48)
HEMOGLOBIN: 7.3 G/DL (ref 12–16)
LYMPHOCYTES ABSOLUTE: 0.2 K/UL (ref 1–5.1)
LYMPHOCYTES RELATIVE PERCENT: 1.9 %
MCH RBC QN AUTO: 29.1 PG (ref 26–34)
MCHC RBC AUTO-ENTMCNC: 31.4 G/DL (ref 31–36)
MCV RBC AUTO: 92.6 FL (ref 80–100)
MONOCYTES ABSOLUTE: 0.4 K/UL (ref 0–1.3)
MONOCYTES RELATIVE PERCENT: 4.4 %
NEUTROPHILS ABSOLUTE: 7.9 K/UL (ref 1.7–7.7)
NEUTROPHILS RELATIVE PERCENT: 93.6 %
PDW BLD-RTO: 22.4 % (ref 12.4–15.4)
PLATELET # BLD: 119 K/UL (ref 135–450)
PMV BLD AUTO: 8.8 FL (ref 5–10.5)
POTASSIUM REFLEX MAGNESIUM: 4.4 MMOL/L (ref 3.5–5.1)
RBC # BLD: 2.52 M/UL (ref 4–5.2)
SODIUM BLD-SCNC: 136 MMOL/L (ref 136–145)
TOTAL PROTEIN: 5.9 G/DL (ref 6.4–8.2)
WBC # BLD: 8.4 K/UL (ref 4–11)

## 2021-10-09 PROCEDURE — 6370000000 HC RX 637 (ALT 250 FOR IP): Performed by: INTERNAL MEDICINE

## 2021-10-09 PROCEDURE — 85025 COMPLETE CBC W/AUTO DIFF WBC: CPT

## 2021-10-09 PROCEDURE — 77014 HC CT TREATMENT PLAN: CPT

## 2021-10-09 PROCEDURE — 2580000003 HC RX 258: Performed by: INTERNAL MEDICINE

## 2021-10-09 PROCEDURE — 77412 RADIATION TX DELIVERY LVL 3: CPT

## 2021-10-09 PROCEDURE — 36591 DRAW BLOOD OFF VENOUS DEVICE: CPT

## 2021-10-09 PROCEDURE — 6360000002 HC RX W HCPCS: Performed by: INTERNAL MEDICINE

## 2021-10-09 PROCEDURE — 80053 COMPREHEN METABOLIC PANEL: CPT

## 2021-10-09 PROCEDURE — 2060000000 HC ICU INTERMEDIATE R&B

## 2021-10-09 PROCEDURE — 6360000002 HC RX W HCPCS: Performed by: RADIOLOGY

## 2021-10-09 RX ORDER — SENNA PLUS 8.6 MG/1
2 TABLET ORAL 2 TIMES DAILY
Status: DISCONTINUED | OUTPATIENT
Start: 2021-10-09 | End: 2021-10-11 | Stop reason: HOSPADM

## 2021-10-09 RX ADMIN — SODIUM CHLORIDE, PRESERVATIVE FREE 10 ML: 5 INJECTION INTRAVENOUS at 11:58

## 2021-10-09 RX ADMIN — LORAZEPAM 0.5 MG: 0.5 TABLET ORAL at 18:27

## 2021-10-09 RX ADMIN — OXYCODONE 15 MG: 5 TABLET ORAL at 17:01

## 2021-10-09 RX ADMIN — DEXAMETHASONE SODIUM PHOSPHATE 4 MG: 4 INJECTION, SOLUTION INTRAMUSCULAR; INTRAVENOUS at 17:01

## 2021-10-09 RX ADMIN — GABAPENTIN 300 MG: 300 CAPSULE ORAL at 20:06

## 2021-10-09 RX ADMIN — SODIUM CHLORIDE, PRESERVATIVE FREE 10 ML: 5 INJECTION INTRAVENOUS at 20:07

## 2021-10-09 RX ADMIN — HYDROMORPHONE HYDROCHLORIDE 1 MG: 1 INJECTION, SOLUTION INTRAMUSCULAR; INTRAVENOUS; SUBCUTANEOUS at 11:15

## 2021-10-09 RX ADMIN — PROMETHAZINE HYDROCHLORIDE 12.5 MG: 25 TABLET ORAL at 18:27

## 2021-10-09 RX ADMIN — METHOCARBAMOL 500 MG: 500 TABLET ORAL at 09:58

## 2021-10-09 RX ADMIN — SERTRALINE HYDROCHLORIDE 100 MG: 100 TABLET ORAL at 09:58

## 2021-10-09 RX ADMIN — OXYCODONE 15 MG: 5 TABLET ORAL at 07:57

## 2021-10-09 RX ADMIN — BUPROPION HYDROCHLORIDE 100 MG: 100 TABLET, FILM COATED ORAL at 09:57

## 2021-10-09 RX ADMIN — SENNOSIDES 17.2 MG: 8.6 TABLET ORAL at 14:31

## 2021-10-09 RX ADMIN — POTASSIUM CHLORIDE 20 MEQ: 1500 TABLET, EXTENDED RELEASE ORAL at 09:58

## 2021-10-09 RX ADMIN — METHOCARBAMOL 500 MG: 500 TABLET ORAL at 20:06

## 2021-10-09 RX ADMIN — DEXAMETHASONE SODIUM PHOSPHATE 4 MG: 4 INJECTION, SOLUTION INTRAMUSCULAR; INTRAVENOUS at 05:50

## 2021-10-09 RX ADMIN — MORPHINE SULFATE 60 MG: 30 TABLET, FILM COATED, EXTENDED RELEASE ORAL at 20:06

## 2021-10-09 RX ADMIN — DEXAMETHASONE SODIUM PHOSPHATE 4 MG: 4 INJECTION, SOLUTION INTRAMUSCULAR; INTRAVENOUS at 23:49

## 2021-10-09 RX ADMIN — SENNOSIDES 17.2 MG: 8.6 TABLET ORAL at 20:05

## 2021-10-09 RX ADMIN — MORPHINE SULFATE 60 MG: 30 TABLET, FILM COATED, EXTENDED RELEASE ORAL at 09:57

## 2021-10-09 RX ADMIN — POTASSIUM CHLORIDE 20 MEQ: 1500 TABLET, EXTENDED RELEASE ORAL at 20:06

## 2021-10-09 RX ADMIN — METHOCARBAMOL 500 MG: 500 TABLET ORAL at 14:31

## 2021-10-09 RX ADMIN — GABAPENTIN 300 MG: 300 CAPSULE ORAL at 09:58

## 2021-10-09 RX ADMIN — DEXAMETHASONE SODIUM PHOSPHATE 4 MG: 4 INJECTION, SOLUTION INTRAMUSCULAR; INTRAVENOUS at 11:14

## 2021-10-09 RX ADMIN — LORAZEPAM 0.5 MG: 0.5 TABLET ORAL at 11:14

## 2021-10-09 RX ADMIN — GABAPENTIN 300 MG: 300 CAPSULE ORAL at 14:31

## 2021-10-09 ASSESSMENT — PAIN DESCRIPTION - LOCATION
LOCATION: BACK

## 2021-10-09 ASSESSMENT — PAIN DESCRIPTION - ORIENTATION
ORIENTATION: UPPER

## 2021-10-09 ASSESSMENT — PAIN SCALES - GENERAL
PAINLEVEL_OUTOF10: 5
PAINLEVEL_OUTOF10: 0
PAINLEVEL_OUTOF10: 5
PAINLEVEL_OUTOF10: 7
PAINLEVEL_OUTOF10: 0
PAINLEVEL_OUTOF10: 5
PAINLEVEL_OUTOF10: 0
PAINLEVEL_OUTOF10: 5
PAINLEVEL_OUTOF10: 4
PAINLEVEL_OUTOF10: 0

## 2021-10-09 ASSESSMENT — PAIN - FUNCTIONAL ASSESSMENT: PAIN_FUNCTIONAL_ASSESSMENT: PREVENTS OR INTERFERES SOME ACTIVE ACTIVITIES AND ADLS

## 2021-10-09 ASSESSMENT — PAIN DESCRIPTION - PROGRESSION
CLINICAL_PROGRESSION: NOT CHANGED
CLINICAL_PROGRESSION: NOT CHANGED

## 2021-10-09 ASSESSMENT — PAIN DESCRIPTION - PAIN TYPE
TYPE: CHRONIC PAIN

## 2021-10-09 ASSESSMENT — PAIN DESCRIPTION - FREQUENCY: FREQUENCY: CONTINUOUS

## 2021-10-09 ASSESSMENT — PAIN DESCRIPTION - DESCRIPTORS: DESCRIPTORS: CONSTANT;DISCOMFORT

## 2021-10-09 ASSESSMENT — PAIN DESCRIPTION - ONSET: ONSET: ON-GOING

## 2021-10-09 NOTE — PROGRESS NOTES
Hospitalist Progress Note      PCP: Kevin Sweet MD    Date of Admission: 10/7/2021    Chief Complaint: *Bilateral lower extremity weakness  Hospital Course: 68-year-old female with metastatic sarcoma presented with bilateral lower extremity weakness and urinary retention    Subjective: No acute events reported overnight. Continues to endorse bilateral lower extremity weakness. Denies any improvement so far. Medications:  Reviewed    Infusion Medications    sodium chloride       Scheduled Medications    potassium chloride  20 mEq Oral BID    methocarbamol  500 mg Oral TID    buPROPion  100 mg Oral Daily    dexamethasone  4 mg IntraVENous Q6H    gabapentin  300 mg Oral TID    morphine  60 mg Oral BID    sertraline  100 mg Oral Daily    sodium chloride flush  10 mL IntraVENous 2 times per day     PRN Meds: LORazepam, HYDROmorphone, oxyCODONE, sodium chloride flush, sodium chloride, potassium chloride, magnesium sulfate, promethazine **OR** ondansetron, acetaminophen **OR** acetaminophen      Intake/Output Summary (Last 24 hours) at 10/8/2021 2222  Last data filed at 10/8/2021 1641  Gross per 24 hour   Intake 1923.25 ml   Output 1170 ml   Net 753.25 ml       Physical Exam Performed:    /78   Pulse 95   Temp 98.1 °F (36.7 °C) (Oral)   Resp 15   Ht 5' 9\" (1.753 m)   Wt 252 lb 6.8 oz (114.5 kg)   LMP  (LMP Unknown)   SpO2 94%   BMI 37.28 kg/m²     General appearance: No apparent distress, appears stated age and cooperative. HEENT: Pupils equal, round, and reactive to light. Conjunctivae/corneas clear. Neck: Supple, with full range of motion. No jugular venous distention. Trachea midline. Respiratory:  Normal respiratory effort. Clear to auscultation, bilaterally without Rales/Wheezes/Rhonchi. Cardiovascular: Regular rate and rhythm with normal S1/S2 without murmurs, rubs or gallops. Abdomen: Soft, non-tender, non-distended with normal bowel sounds.   Musculoskeletal: No clubbing, cyanosis or edema bilaterally. Full range of motion without deformity. Skin: Skin color, texture, turgor normal.  No rashes or lesions. Neurologic: Cranial nerves II-XII intact, bilateral lower extremity 3/5, bilateral upper extremity 5/5  Psychiatric: Alert and oriented, thought content appropriate, normal insight  Capillary Refill: Brisk,3 seconds, normal   Peripheral Pulses: +2 palpable, equal bilaterally       Labs:   Recent Labs     10/07/21  1921 10/08/21  0449   WBC 11.1* 9.7   HGB 7.6* 7.5*   HCT 24.4* 23.6*    121*     Recent Labs     10/07/21  1921 10/08/21  0449   * 138   K 4.2 4.2   CL 99 102   CO2 24 24   BUN 12 8   CREATININE <0.5* <0.5*   CALCIUM 8.5 8.8     Recent Labs     10/08/21  0449   AST 93*   ALT 49*   BILITOT 0.5   ALKPHOS 407*     No results for input(s): INR in the last 72 hours. Recent Labs     10/07/21  1921 10/08/21  0449   TROPONINI <0.01 <0.01       Urinalysis:      Lab Results   Component Value Date    NITRU Negative 06/23/2021    WBCUA 3-5 06/23/2021    BACTERIA Rare 03/27/2021    RBCUA None seen 06/23/2021    BLOODU Negative 06/23/2021    SPECGRAV 1.015 06/23/2021    GLUCOSEU Negative 06/23/2021       Radiology:  No orders to display           Assessment/Plan:    Active Hospital Problems    Diagnosis     Sarcoma ([facility] [address] [C49.9]      Bilateral lower extremity weakness  Urinary retention  Metastatic sarcoma with cord compression  Normocytic anemia  Anxiety  Hyperlipidemia    Plan:  -Radiation oncology has been consulted, continue radiation treatment to the spine  -Continue steroids  -Urinary catheter placed for urinary retention  -PT/OT  -Continue with pain control  -Continue Zoloft, Wellbutrin      DVT Prophylaxis: SCD  Diet: ADULT DIET;  Regular  Code Status: DNR-CC    PT/OT Eval Status: Pending    Dispo -pending clinical improvement    Mónica Campbell MD

## 2021-10-09 NOTE — PLAN OF CARE
Problem: Falls - Risk of:  Goal: Will remain free from falls  Description: Will remain free from falls  10/8/2021 2215 by Kusum Lee RN  Outcome: Ongoing  Note: Pt is a High fall risk. Explained fall risk precautions to pt and rationale behind their use to keep the patient safe. Belongings are in reach. Pt encouraged to notify staff for any and all assistance. Problem: Bleeding:  Goal: Will show no signs and symptoms of excessive bleeding  Description: Will show no signs and symptoms of excessive bleeding  10/8/2021 2215 by Kusum Lee RN  Outcome: Ongoing  Note: Patient's hemoglobin this AM:   Recent Labs     10/08/21  0449   HGB 7.5*     Patient's platelet count this AM:   Recent Labs     10/08/21  0449   *    Thrombocytopenia Precautions in place. Patient showing no signs or symptoms of active bleeding. Transfusion not indicated at this time. Patient verbalizes understanding of all instructions. Will continue to assess and implement POC. Call light within reach and hourly rounding in place. Problem: Infection - Central Venous Catheter-Associated Bloodstream Infection:  Goal: Will show no infection signs and symptoms  Description: Will show no infection signs and symptoms  Outcome: Ongoing  Note: CVC site remains free of signs/symptoms of infection. No drainage, edema, erythema, pain, or warmth noted at site. Dressing changes continue per protocol and on an as needed basis - see flowsheet. Problem: Discharge Planning:  Goal: Discharged to appropriate level of care  Description: Discharged to appropriate level of care  Outcome: Ongoing  Note: Patient up to date on discharge plans. Problem: Pain:  Goal: Pain level will decrease  Description: Pain level will decrease  10/8/2021 2215 by Kusum Lee RN  Outcome: Ongoing  Note: Patient has iv dilaudid every 3 hours, oxycodone every 6 hours as needed for pain. Patient has scheduled morphine.  Patient satisfied with treatment. Will continue to monitor. Problem: Skin Integrity:  Goal: Will show no infection signs and symptoms  Description: Will show no infection signs and symptoms  Outcome: Ongoing  Note: CVC site remains free of signs/symptoms of infection. No drainage, edema, erythema, pain, or warmth noted at site. Dressing changes continue per protocol and on an as needed basis - see flowsheet.

## 2021-10-09 NOTE — ONCOLOGY
[address]    [phone]    DATE: 10-9-21    AREA TREATED: T1-9 & L1-5    DAILY DOSE:300 cGy    CUMULATIVE DOSE: 900cGy    #  3  OUT OF 8 TREATMENTS    NEXT PLANNED TREATMENT  DATE: 10-11-21    Daily Treatments are Monday through Friday:       INPATIENT CODING:  Beam Radiation   Photons  Photon energy : >10mv

## 2021-10-09 NOTE — PROGRESS NOTES
Hospitalist Progress Note      PCP: Yesica Cabrera MD    Date of Admission: 10/7/2021    Chief Complaint: *Bilateral lower extremity weakness  Hospital Course: 47-year-old female with metastatic sarcoma presented with bilateral lower extremity weakness and urinary retention    Subjective: No acute events reported overnight. Patient doing radiation this morning. She reports some improvement in her bilateral lower extremity weakness      Medications:  Reviewed    Infusion Medications    sodium chloride       Scheduled Medications    potassium chloride  20 mEq Oral BID    methocarbamol  500 mg Oral TID    buPROPion  100 mg Oral Daily    dexamethasone  4 mg IntraVENous Q6H    gabapentin  300 mg Oral TID    morphine  60 mg Oral BID    sertraline  100 mg Oral Daily    sodium chloride flush  10 mL IntraVENous 2 times per day     PRN Meds: LORazepam, HYDROmorphone, oxyCODONE, sodium chloride flush, sodium chloride, potassium chloride, magnesium sulfate, promethazine **OR** ondansetron, acetaminophen **OR** acetaminophen      Intake/Output Summary (Last 24 hours) at 10/9/2021 1240  Last data filed at 10/9/2021 0921  Gross per 24 hour   Intake 1631.36 ml   Output 870 ml   Net 761.36 ml       Physical Exam Performed:    /73   Pulse 95   Temp 97.8 °F (36.6 °C) (Oral)   Resp 16   Ht 5' 9\" (1.753 m)   Wt 249 lb 9 oz (113.2 kg)   LMP  (LMP Unknown)   SpO2 98%   BMI 36.85 kg/m²     General appearance: No apparent distress, appears stated age and cooperative. HEENT: Pupils equal, round, and reactive to light. Conjunctivae/corneas clear. Neck: Supple, with full range of motion. No jugular venous distention. Trachea midline. Respiratory:  Normal respiratory effort. Clear to auscultation, bilaterally without Rales/Wheezes/Rhonchi. Cardiovascular: Regular rate and rhythm with normal S1/S2 without murmurs, rubs or gallops.   Abdomen: Soft, non-tender, non-distended with normal bowel sounds. Musculoskeletal: No clubbing, cyanosis or edema bilaterally. Full range of motion without deformity. Skin: Skin color, texture, turgor normal.  No rashes or lesions. Neurologic: Cranial nerves II-XII intact, bilateral lower extremity 4/5, bilateral upper extremity 5/5  Psychiatric: Alert and oriented, thought content appropriate, normal insight  Capillary Refill: Brisk,3 seconds, normal   Peripheral Pulses: +2 palpable, equal bilaterally       Labs:   Recent Labs     10/07/21  1921 10/08/21  0449 10/09/21  0319   WBC 11.1* 9.7 8.4   HGB 7.6* 7.5* 7.3*   HCT 24.4* 23.6* 23.4*    121* 119*     Recent Labs     10/07/21  1921 10/08/21  0449 10/09/21  0319   * 138 136   K 4.2 4.2 4.4   CL 99 102 101   CO2 24 24 26   BUN 12 8 11   CREATININE <0.5* <0.5* <0.5*   CALCIUM 8.5 8.8 8.7     Recent Labs     10/08/21  0449 10/09/21  0319   AST 93* 96*   ALT 49* 60*   BILITOT 0.5 0.4   ALKPHOS 407* 425*     No results for input(s): INR in the last 72 hours. Recent Labs     10/07/21  1921 10/08/21  0449   TROPONINI <0.01 <0.01       Urinalysis:      Lab Results   Component Value Date    NITRU Negative 06/23/2021    WBCUA 3-5 06/23/2021    BACTERIA Rare 03/27/2021    RBCUA None seen 06/23/2021    BLOODU Negative 06/23/2021    SPECGRAV 1.015 06/23/2021    GLUCOSEU Negative 06/23/2021       Radiology:  No orders to display           Assessment/Plan:    Active Hospital Problems    Diagnosis     Sarcoma ([facility] [address] [C49.9]      Bilateral lower extremity weakness  Urinary retention  Metastatic sarcoma with cord compression  Normocytic anemia  Anxiety  Hyperlipidemia    Plan:  -Radiation oncology following, underwent radiation this morning. Continue for 8 treatments Monday through Friday. Today was treatment #3  -Continue steroids  -Urinary catheter placed for urinary retention  -PT/OT  -Continue with pain control  -Continue Zoloft, Wellbutrin      DVT Prophylaxis: SCD  Diet: ADULT DIET;  Regular  Code Status: DNR-CC    PT/OT Eval Status: Pending    Dispo -pending clinical improvement    [facility], MD

## 2021-10-10 PROCEDURE — 6370000000 HC RX 637 (ALT 250 FOR IP): Performed by: INTERNAL MEDICINE

## 2021-10-10 PROCEDURE — 2580000003 HC RX 258: Performed by: INTERNAL MEDICINE

## 2021-10-10 PROCEDURE — 6360000002 HC RX W HCPCS: Performed by: RADIOLOGY

## 2021-10-10 PROCEDURE — 2060000000 HC ICU INTERMEDIATE R&B

## 2021-10-10 RX ORDER — POLYETHYLENE GLYCOL 3350 17 G/17G
17 POWDER, FOR SOLUTION ORAL DAILY
Status: DISCONTINUED | OUTPATIENT
Start: 2021-10-10 | End: 2021-10-11 | Stop reason: HOSPADM

## 2021-10-10 RX ADMIN — DEXAMETHASONE SODIUM PHOSPHATE 4 MG: 4 INJECTION, SOLUTION INTRAMUSCULAR; INTRAVENOUS at 17:01

## 2021-10-10 RX ADMIN — METHOCARBAMOL 500 MG: 500 TABLET ORAL at 14:24

## 2021-10-10 RX ADMIN — METHOCARBAMOL 500 MG: 500 TABLET ORAL at 21:04

## 2021-10-10 RX ADMIN — OXYCODONE 15 MG: 5 TABLET ORAL at 18:40

## 2021-10-10 RX ADMIN — SENNOSIDES 17.2 MG: 8.6 TABLET ORAL at 09:26

## 2021-10-10 RX ADMIN — OXYCODONE 15 MG: 5 TABLET ORAL at 12:04

## 2021-10-10 RX ADMIN — LORAZEPAM 0.5 MG: 0.5 TABLET ORAL at 17:01

## 2021-10-10 RX ADMIN — PROMETHAZINE HYDROCHLORIDE 12.5 MG: 25 TABLET ORAL at 14:26

## 2021-10-10 RX ADMIN — MORPHINE SULFATE 60 MG: 30 TABLET, FILM COATED, EXTENDED RELEASE ORAL at 21:04

## 2021-10-10 RX ADMIN — SODIUM CHLORIDE, PRESERVATIVE FREE 10 ML: 5 INJECTION INTRAVENOUS at 21:04

## 2021-10-10 RX ADMIN — SODIUM CHLORIDE, PRESERVATIVE FREE 10 ML: 5 INJECTION INTRAVENOUS at 09:33

## 2021-10-10 RX ADMIN — BUPROPION HYDROCHLORIDE 100 MG: 100 TABLET, FILM COATED ORAL at 09:26

## 2021-10-10 RX ADMIN — POLYETHYLENE GLYCOL 3350 17 G: 17 POWDER, FOR SOLUTION ORAL at 17:07

## 2021-10-10 RX ADMIN — METHOCARBAMOL 500 MG: 500 TABLET ORAL at 09:25

## 2021-10-10 RX ADMIN — DEXAMETHASONE SODIUM PHOSPHATE 4 MG: 4 INJECTION, SOLUTION INTRAMUSCULAR; INTRAVENOUS at 05:51

## 2021-10-10 RX ADMIN — DEXAMETHASONE SODIUM PHOSPHATE 4 MG: 4 INJECTION, SOLUTION INTRAMUSCULAR; INTRAVENOUS at 12:04

## 2021-10-10 RX ADMIN — SERTRALINE HYDROCHLORIDE 100 MG: 100 TABLET ORAL at 09:25

## 2021-10-10 RX ADMIN — DEXAMETHASONE SODIUM PHOSPHATE 4 MG: 4 INJECTION, SOLUTION INTRAMUSCULAR; INTRAVENOUS at 23:48

## 2021-10-10 RX ADMIN — SENNOSIDES 17.2 MG: 8.6 TABLET ORAL at 21:04

## 2021-10-10 RX ADMIN — POTASSIUM CHLORIDE 20 MEQ: 1500 TABLET, EXTENDED RELEASE ORAL at 09:25

## 2021-10-10 RX ADMIN — MORPHINE SULFATE 60 MG: 30 TABLET, FILM COATED, EXTENDED RELEASE ORAL at 09:25

## 2021-10-10 RX ADMIN — POTASSIUM CHLORIDE 20 MEQ: 1500 TABLET, EXTENDED RELEASE ORAL at 21:04

## 2021-10-10 RX ADMIN — GABAPENTIN 300 MG: 300 CAPSULE ORAL at 14:24

## 2021-10-10 RX ADMIN — LORAZEPAM 0.5 MG: 0.5 TABLET ORAL at 09:42

## 2021-10-10 RX ADMIN — GABAPENTIN 300 MG: 300 CAPSULE ORAL at 21:04

## 2021-10-10 RX ADMIN — GABAPENTIN 300 MG: 300 CAPSULE ORAL at 09:25

## 2021-10-10 ASSESSMENT — PAIN DESCRIPTION - ONSET: ONSET: ON-GOING

## 2021-10-10 ASSESSMENT — PAIN SCALES - GENERAL
PAINLEVEL_OUTOF10: 3
PAINLEVEL_OUTOF10: 5
PAINLEVEL_OUTOF10: 3
PAINLEVEL_OUTOF10: 4
PAINLEVEL_OUTOF10: 7
PAINLEVEL_OUTOF10: 0

## 2021-10-10 ASSESSMENT — PAIN DESCRIPTION - LOCATION
LOCATION: BACK
LOCATION: BACK

## 2021-10-10 ASSESSMENT — PAIN DESCRIPTION - PROGRESSION
CLINICAL_PROGRESSION: NOT CHANGED

## 2021-10-10 ASSESSMENT — PAIN DESCRIPTION - ORIENTATION
ORIENTATION: UPPER
ORIENTATION: UPPER

## 2021-10-10 ASSESSMENT — PAIN DESCRIPTION - PAIN TYPE
TYPE: CHRONIC PAIN
TYPE: CHRONIC PAIN

## 2021-10-10 ASSESSMENT — PAIN - FUNCTIONAL ASSESSMENT: PAIN_FUNCTIONAL_ASSESSMENT: PREVENTS OR INTERFERES SOME ACTIVE ACTIVITIES AND ADLS

## 2021-10-10 ASSESSMENT — PAIN DESCRIPTION - DESCRIPTORS: DESCRIPTORS: CONSTANT;ACHING

## 2021-10-10 ASSESSMENT — PAIN DESCRIPTION - FREQUENCY: FREQUENCY: CONTINUOUS

## 2021-10-10 NOTE — PLAN OF CARE
Problem: Falls - Risk of:  Goal: Will remain free from falls  Description: Will remain free from falls  10/10/2021 1815 by Dejuan Roman RN  Outcome: Ongoing  Note: [place] Risk per ARREAGA/ABCDS: Pt bed is in low position, side rails up, call light and belongings are in reach. Fall risk light is on outside pts room. Pt encouraged to call for assistance as needed. Will continue with hourly rounds for PO intake, pain needs, toileting and repositioning as needed. Problem: Bleeding:  Goal: Will show no signs and symptoms of excessive bleeding  Description: Will show no signs and symptoms of excessive bleeding  Outcome: Ongoing  Note: Patient's hemoglobin this AM:   Recent Labs     10/09/21  0319   HGB 7.3*     Patient's platelet count this AM:   Recent Labs     10/09/21  0319   *    Thrombocytopenia Precautions in place. Patient showing no signs or symptoms of active bleeding. Transfusion not indicated at this time. Patient verbalizes understanding of all instructions. Will continue to assess and implement POC. Call light within reach and hourly rounding in place. Problem: Infection - Central Venous Catheter-Associated Bloodstream Infection:  Goal: Will show no infection signs and symptoms  Description: Will show no infection signs and symptoms  Outcome: Ongoing  Note: CVC site remains free of signs/symptoms of infection. No drainage, edema, erythema, pain, or warmth noted at site. Dressing changes continue per protocol and on an as needed basis - see flowsheet. Compliant with [facility] Bath Protocol:  Performed CHG bath today per [facility] protocol utilizing CHG solution in the shower. CVC site cleansed with CHG wipe over dressing, skin surrounding dressing, and first 6\" of IV tubing. Pt tolerated well. Continued to encourage daily CHG bathing per [employer] protocol.        Problem: Discharge Planning:  Goal: Discharged to appropriate level of care  Description: Discharged to appropriate level of care  10/10/2021 1815 by Dejuan Roman RN  Outcome: Ongoing  Note: Pt has been updated on POC and has no questions at this time. Problem: Pain:  Goal: Pain level will decrease  Description: Pain level will decrease  10/10/2021 1815 by Dejuan Roman RN  Outcome: Ongoing  Note: Pt has back pain related to disease process. Medicated with pain medication. Pt temporarily satisfied. Will continue to monitor. Problem: Skin Integrity:  Goal: Will show no infection signs and symptoms  Description: Will show no infection signs and symptoms  Outcome: Ongoing  Note: CVC site remains free of signs/symptoms of infection. No drainage, edema, erythema, pain, or warmth noted at site. Dressing changes continue per protocol and on an as needed basis - see flowsheet. Compliant with [facility] Bath Protocol:  Performed CHG bath today per [facility] protocol utilizing CHG solution in the shower. CVC site cleansed with CHG wipe over dressing, skin surrounding dressing, and first 6\" of IV tubing. Pt tolerated well. Continued to encourage daily CHG bathing per [employer] protocol.

## 2021-10-10 NOTE — PROGRESS NOTES
Hospitalist Progress Note      PCP: Sukhdev Beauchamp MD    Date of Admission: 10/7/2021    Chief Complaint: *Bilateral lower extremity weakness  Hospital Course: 61-year-old female with metastatic sarcoma presented with bilateral lower extremity weakness and urinary retention    Subjective: No acute events reported overnight. BLE weakness about the same as yesterday. Pt also complains of some bloating. No BM since thursday      Medications:  Reviewed    Infusion Medications    sodium chloride       Scheduled Medications    senna  2 tablet Oral BID    potassium chloride  20 mEq Oral BID    methocarbamol  500 mg Oral TID    buPROPion  100 mg Oral Daily    dexamethasone  4 mg IntraVENous Q6H    gabapentin  300 mg Oral TID    morphine  60 mg Oral BID    sertraline  100 mg Oral Daily    sodium chloride flush  10 mL IntraVENous 2 times per day     PRN Meds: LORazepam, HYDROmorphone, oxyCODONE, sodium chloride flush, sodium chloride, potassium chloride, magnesium sulfate, promethazine **OR** ondansetron, acetaminophen **OR** acetaminophen      Intake/Output Summary (Last 24 hours) at 10/10/2021 1635  Last data filed at 10/10/2021 1554  Gross per 24 hour   Intake 510 ml   Output 1200 ml   Net -690 ml       Physical Exam Performed:    /80   Pulse 87   Temp 98.4 °F (36.9 °C) (Oral)   Resp 18   Ht 5' 9\" (1.753 m)   Wt 249 lb 9 oz (113.2 kg)   LMP  (LMP Unknown)   SpO2 96%   BMI 36.85 kg/m²     General appearance: No apparent distress, appears stated age and cooperative. HEENT: Pupils equal, round, and reactive to light. Conjunctivae/corneas clear. Neck: Supple, with full range of motion. No jugular venous distention. Trachea midline. Respiratory:  Normal respiratory effort. Clear to auscultation, bilaterally without Rales/Wheezes/Rhonchi. Cardiovascular: Regular rate and rhythm with normal S1/S2 without murmurs, rubs or gallops.   Abdomen: Soft, non-tender, non-distended with normal bowel sounds. Musculoskeletal: No clubbing, cyanosis or edema bilaterally. Full range of motion without deformity. Skin: Skin color, texture, turgor normal.  No rashes or lesions. Neurologic: Cranial nerves II-XII intact, bilateral lower extremity 4/5, bilateral upper extremity 5/5  Psychiatric: Alert and oriented, thought content appropriate, normal insight  Capillary Refill: Brisk,3 seconds, normal   Peripheral Pulses: +2 palpable, equal bilaterally       Labs:   Recent Labs     10/07/21  1921 10/08/21  0449 10/09/21  0319   WBC 11.1* 9.7 8.4   HGB 7.6* 7.5* 7.3*   HCT 24.4* 23.6* 23.4*    121* 119*     Recent Labs     10/07/21  1921 10/08/21  0449 10/09/21  0319   * 138 136   K 4.2 4.2 4.4   CL 99 102 101   CO2 24 24 26   BUN 12 8 11   CREATININE <0.5* <0.5* <0.5*   CALCIUM 8.5 8.8 8.7     Recent Labs     10/08/21  0449 10/09/21  0319   AST 93* 96*   ALT 49* 60*   BILITOT 0.5 0.4   ALKPHOS 407* 425*     No results for input(s): INR in the last 72 hours. Recent Labs     10/07/21  1921 10/08/21  0449   TROPONINI <0.01 <0.01       Urinalysis:      Lab Results   Component Value Date    NITRU Negative 06/23/2021    WBCUA 3-5 06/23/2021    BACTERIA Rare 03/27/2021    RBCUA None seen 06/23/2021    BLOODU Negative 06/23/2021    SPECGRAV 1.015 06/23/2021    GLUCOSEU Negative 06/23/2021       Radiology:  No orders to display           Assessment/Plan:    Active Hospital Problems    Diagnosis     Sarcoma ([address] [C49.9]      Bilateral lower extremity weakness  Urinary retention  Metastatic sarcoma with cord compression  Normocytic anemia  Anxiety  Hyperlipidemia    Plan:  -Radiation oncology following, underwent radiation this morning. Continue for 8 treatments Monday through Friday.    -Continue steroids  -Urinary catheter placed for urinary retention  -PT/OT  -Continue with pain control  -Continue Zoloft, Wellbutrin      DVT Prophylaxis: SCD  Diet: ADULT DIET;  Regular  Code Status: DNR-CC    PT/OT Eval Status: Pending    Dispo -pending clinical improvement    Roger Russell MD

## 2021-10-10 NOTE — PLAN OF CARE
Problem: Falls - Risk of:  Goal: Will remain free from falls  Description: Will remain free from falls  Outcome: Ongoing  Note: Orthostatic vital signs obtained at start of shift - see flowsheet for details. Pt meets criteria for orthostasis. Pt is a High fall risk. See Alberto Bajwan Fall Score and ABCDS Injury Risk assessments.   + Screening for Orthostasis and/or + High Fall Risk per ARREAGA/ABCDS: Explained fall risk precautions to pt and family and rationale behind their use to keep the patient safe. Pt bed is in low position, side rails up, call light and belongings are in reach. Fall wristband applied and present on pts wrist.  Bed alarm on. Pt encouraged to call for assistance. Will continue with hourly rounds for PO intake, pain needs, toileting and repositioning as needed. Problem: Discharge Planning:  Goal: Discharged to appropriate level of care  Description: Discharged to appropriate level of care  Outcome: Ongoing  Note: Patient understanding and cooperative of discharge plans. Problem: Pain:  Goal: Pain level will decrease  Description: Pain level will decrease  Outcome: Ongoing  Note: Pt able to appropriately rate pain on scale of 0-10. C/o chronic disease associated pain in back, PRN pain meds given per [facility] with moderate relief per pt. Educated on pain control measures, verbalized understanding.

## 2021-10-11 VITALS
OXYGEN SATURATION: 100 % | DIASTOLIC BLOOD PRESSURE: 72 MMHG | HEIGHT: 69 IN | WEIGHT: 249.56 LBS | SYSTOLIC BLOOD PRESSURE: 112 MMHG | RESPIRATION RATE: 17 BRPM | HEART RATE: 83 BPM | BODY MASS INDEX: 36.96 KG/M2 | TEMPERATURE: 97.4 F

## 2021-10-11 LAB
ANION GAP SERPL CALCULATED.3IONS-SCNC: 11 MMOL/L (ref 3–16)
ANISOCYTOSIS: ABNORMAL
BASOPHILS ABSOLUTE: 0 K/UL (ref 0–0.2)
BASOPHILS RELATIVE PERCENT: 0 %
BUN BLDV-MCNC: 12 MG/DL (ref 7–20)
CALCIUM SERPL-MCNC: 8.5 MG/DL (ref 8.3–10.6)
CHLORIDE BLD-SCNC: 102 MMOL/L (ref 99–110)
CO2: 24 MMOL/L (ref 21–32)
CREAT SERPL-MCNC: <0.5 MG/DL (ref 0.6–1.1)
EOSINOPHILS ABSOLUTE: 0 K/UL (ref 0–0.6)
EOSINOPHILS RELATIVE PERCENT: 0 %
GFR AFRICAN AMERICAN: >60
GFR NON-AFRICAN AMERICAN: >60
GLUCOSE BLD-MCNC: 116 MG/DL (ref 70–99)
HCT VFR BLD CALC: 24.1 % (ref 36–48)
HEMOGLOBIN: 7.6 G/DL (ref 12–16)
LYMPHOCYTES ABSOLUTE: 0.2 K/UL (ref 1–5.1)
LYMPHOCYTES RELATIVE PERCENT: 3 %
MCH RBC QN AUTO: 29.2 PG (ref 26–34)
MCHC RBC AUTO-ENTMCNC: 31.6 G/DL (ref 31–36)
MCV RBC AUTO: 92.3 FL (ref 80–100)
MONOCYTES ABSOLUTE: 0 K/UL (ref 0–1.3)
MONOCYTES RELATIVE PERCENT: 0 %
NEUTROPHILS ABSOLUTE: 6.6 K/UL (ref 1.7–7.7)
NEUTROPHILS RELATIVE PERCENT: 97 %
PDW BLD-RTO: 21.9 % (ref 12.4–15.4)
PLATELET # BLD: 102 K/UL (ref 135–450)
PMV BLD AUTO: 8.9 FL (ref 5–10.5)
POTASSIUM REFLEX MAGNESIUM: 4.6 MMOL/L (ref 3.5–5.1)
RBC # BLD: 2.61 M/UL (ref 4–5.2)
SODIUM BLD-SCNC: 137 MMOL/L (ref 136–145)
WBC # BLD: 6.8 K/UL (ref 4–11)

## 2021-10-11 PROCEDURE — 6370000000 HC RX 637 (ALT 250 FOR IP): Performed by: INTERNAL MEDICINE

## 2021-10-11 PROCEDURE — 77412 RADIATION TX DELIVERY LVL 3: CPT

## 2021-10-11 PROCEDURE — 6360000002 HC RX W HCPCS: Performed by: RADIOLOGY

## 2021-10-11 PROCEDURE — 77014 HC CT TREATMENT PLAN: CPT

## 2021-10-11 PROCEDURE — 80048 BASIC METABOLIC PNL TOTAL CA: CPT

## 2021-10-11 PROCEDURE — 85025 COMPLETE CBC W/AUTO DIFF WBC: CPT

## 2021-10-11 PROCEDURE — 2580000003 HC RX 258: Performed by: INTERNAL MEDICINE

## 2021-10-11 PROCEDURE — 36591 DRAW BLOOD OFF VENOUS DEVICE: CPT

## 2021-10-11 RX ORDER — HEPARIN SODIUM (PORCINE) LOCK FLUSH IV SOLN 100 UNIT/ML 100 UNIT/ML
SOLUTION INTRAVENOUS
Status: DISCONTINUED
Start: 2021-10-11 | End: 2021-10-11 | Stop reason: HOSPADM

## 2021-10-11 RX ORDER — DEXAMETHASONE 4 MG/1
4 TABLET ORAL SEE ADMIN INSTRUCTIONS
Qty: 50 TABLET | Refills: 0 | Status: SHIPPED | OUTPATIENT
Start: 2021-10-11 | End: 2021-11-10

## 2021-10-11 RX ADMIN — GABAPENTIN 300 MG: 300 CAPSULE ORAL at 09:20

## 2021-10-11 RX ADMIN — DEXAMETHASONE SODIUM PHOSPHATE 4 MG: 4 INJECTION, SOLUTION INTRAMUSCULAR; INTRAVENOUS at 04:48

## 2021-10-11 RX ADMIN — SENNOSIDES 17.2 MG: 8.6 TABLET ORAL at 09:20

## 2021-10-11 RX ADMIN — GABAPENTIN 300 MG: 300 CAPSULE ORAL at 14:36

## 2021-10-11 RX ADMIN — PROMETHAZINE HYDROCHLORIDE 12.5 MG: 25 TABLET ORAL at 11:04

## 2021-10-11 RX ADMIN — POTASSIUM CHLORIDE 20 MEQ: 1500 TABLET, EXTENDED RELEASE ORAL at 09:19

## 2021-10-11 RX ADMIN — DEXAMETHASONE SODIUM PHOSPHATE 4 MG: 4 INJECTION, SOLUTION INTRAMUSCULAR; INTRAVENOUS at 11:04

## 2021-10-11 RX ADMIN — MORPHINE SULFATE 60 MG: 30 TABLET, FILM COATED, EXTENDED RELEASE ORAL at 09:20

## 2021-10-11 RX ADMIN — BUPROPION HYDROCHLORIDE 100 MG: 100 TABLET, FILM COATED ORAL at 09:39

## 2021-10-11 RX ADMIN — SERTRALINE HYDROCHLORIDE 100 MG: 100 TABLET ORAL at 09:19

## 2021-10-11 RX ADMIN — LORAZEPAM 0.5 MG: 0.5 TABLET ORAL at 14:36

## 2021-10-11 RX ADMIN — DEXAMETHASONE SODIUM PHOSPHATE 4 MG: 4 INJECTION, SOLUTION INTRAMUSCULAR; INTRAVENOUS at 18:47

## 2021-10-11 RX ADMIN — SODIUM CHLORIDE, PRESERVATIVE FREE 10 ML: 5 INJECTION INTRAVENOUS at 11:03

## 2021-10-11 RX ADMIN — POLYETHYLENE GLYCOL 3350 17 G: 17 POWDER, FOR SOLUTION ORAL at 09:19

## 2021-10-11 RX ADMIN — OXYCODONE 15 MG: 5 TABLET ORAL at 15:48

## 2021-10-11 RX ADMIN — METHOCARBAMOL 500 MG: 500 TABLET ORAL at 14:36

## 2021-10-11 RX ADMIN — METHOCARBAMOL 500 MG: 500 TABLET ORAL at 09:18

## 2021-10-11 RX ADMIN — OXYCODONE 15 MG: 5 TABLET ORAL at 09:39

## 2021-10-11 ASSESSMENT — PAIN SCALES - GENERAL
PAINLEVEL_OUTOF10: 8
PAINLEVEL_OUTOF10: 7

## 2021-10-11 ASSESSMENT — PAIN DESCRIPTION - PAIN TYPE: TYPE: ACUTE PAIN

## 2021-10-11 ASSESSMENT — PAIN DESCRIPTION - LOCATION: LOCATION: BACK

## 2021-10-11 ASSESSMENT — PAIN DESCRIPTION - PROGRESSION: CLINICAL_PROGRESSION: NOT CHANGED

## 2021-10-11 NOTE — PLAN OF CARE
Problem: Falls - Risk of:  Goal: Will remain free from falls  Description: Will remain free from falls  10/11/2021 0618 by Chang Ahn RN  Outcome: Ongoing  Note: Pt remains free of falls; Bed in lowest position, wheels locked, side rails up 2/4. Possessions and call light within reach; pt uses call light appropriately. Problem: Infection - Central Venous Catheter-Associated Bloodstream Infection:  Goal: Will show no infection signs and symptoms  Description: Will show no infection signs and symptoms  10/11/2021 0618 by Chang Ahn RN  Outcome: Ongoing  Note: CVC site remains free of signs/symptoms of infection. No drainage, edema, erythema, pain, or warmth noted at site. Dressing changes continue per protocol and on an as needed basis - see flowsheet. Problem: Pain:  Goal: Pain level will decrease  Description: Pain level will decrease  10/11/2021 0618 by Chang Ahn RN  Outcome: Ongoing  Note: Pt able to appropriately rate pain on scale of 0-10. C/o chronic pain in back , PRN pain meds given per [facility] with moderate relief per pt. Educated on pain control measures, verbalized understanding. Problem: Skin Integrity:  Goal: Will show no infection signs and symptoms  Description: Will show no infection signs and symptoms  10/11/2021 0618 by Chang Ahn RN  Outcome: Ongoing  Note: CVC site remains free of signs/symptoms of infection. No drainage, edema, erythema, pain, or warmth noted at site. Dressing changes continue per protocol and on an as needed basis - see flowsheet.

## 2021-10-11 NOTE — PROGRESS NOTES
Hospitalist Progress Note      PCP: Kristopher Guido MD    Date of Admission: 10/7/2021    Chief Complaint: *Bilateral lower extremity weakness  Hospital Course: 24-year-old female with metastatic sarcoma presented with bilateral lower extremity weakness and urinary retention    Subjective: No acute events reported overnight. No new changes. Continues to have bilateral lower extremity weakness. Medications:  Reviewed    Infusion Medications    sodium chloride       Scheduled Medications    polyethylene glycol  17 g Oral Daily    senna  2 tablet Oral BID    potassium chloride  20 mEq Oral BID    methocarbamol  500 mg Oral TID    buPROPion  100 mg Oral Daily    dexamethasone  4 mg IntraVENous Q6H    gabapentin  300 mg Oral TID    morphine  60 mg Oral BID    sertraline  100 mg Oral Daily    sodium chloride flush  10 mL IntraVENous 2 times per day     PRN Meds: glycerin (ADULT), LORazepam, HYDROmorphone, oxyCODONE, sodium chloride flush, sodium chloride, potassium chloride, magnesium sulfate, promethazine **OR** ondansetron, acetaminophen **OR** acetaminophen      Intake/Output Summary (Last 24 hours) at 10/11/2021 1211  Last data filed at 10/11/2021 1103  Gross per 24 hour   Intake 760 ml   Output 1775 ml   Net -1015 ml       Physical Exam Performed:    /72   Pulse 83   Temp 97.4 °F (36.3 °C) (Oral)   Resp 17   Ht 5' 9\" (1.753 m)   Wt 249 lb 9 oz (113.2 kg)   LMP  (LMP Unknown)   SpO2 100%   BMI 36.85 kg/m²     General appearance: No apparent distress, appears stated age and cooperative. HEENT: Pupils equal, round, and reactive to light. Conjunctivae/corneas clear. Neck: Supple, with full range of motion. No jugular venous distention. Trachea midline. Respiratory:  Normal respiratory effort. Clear to auscultation, bilaterally without Rales/Wheezes/Rhonchi. Cardiovascular: Regular rate and rhythm with normal S1/S2 without murmurs, rubs or gallops.   Abdomen: Soft, non-tender, non-distended with normal bowel sounds. Musculoskeletal: No clubbing, cyanosis or edema bilaterally. Full range of motion without deformity. Skin: Skin color, texture, turgor normal.  No rashes or lesions. Neurologic: Cranial nerves II-XII intact, bilateral lower extremity 3+/5, bilateral upper extremity 5/5  Psychiatric: Alert and oriented, thought content appropriate, normal insight  Capillary Refill: Brisk,3 seconds, normal   Peripheral Pulses: +2 palpable, equal bilaterally       Labs:   Recent Labs     10/09/21  0319 10/11/21  0504   WBC 8.4 6.8   HGB 7.3* 7.6*   HCT 23.4* 24.1*   * 102*     Recent Labs     10/09/21  0319 10/11/21  0504    137   K 4.4 4.6    102   CO2 26 24   BUN 11 12   CREATININE <0.5* <0.5*   CALCIUM 8.7 8.5     Recent Labs     10/09/21  0319   AST 96*   ALT 60*   BILITOT 0.4   ALKPHOS 425*     No results for input(s): INR in the last 72 hours. No results for input(s): [place] in the last 72 hours. Urinalysis:      Lab Results   Component Value Date    NITRU Negative 06/23/2021    WBCUA 3-5 06/23/2021    BACTERIA Rare 03/27/2021    RBCUA None seen 06/23/2021    BLOODU Negative 06/23/2021    SPECGRAV 1.015 06/23/2021    GLUCOSEU Negative 06/23/2021       Radiology:  No orders to display           Assessment/Plan:    Active Hospital Problems    Diagnosis     Sarcoma ([facility] [C49.9]      Bilateral lower extremity weakness  Urinary retention  Metastatic sarcoma with cord compression  Normocytic anemia  Anxiety  Hyperlipidemia    Plan:  -Radiation oncology following, continue radiation to spine for total of 8 treatments. Day 4/8 today  -Continue steroids. Will taper Decadron as 4 mg 3 times daily x5 days, 4 mg twice daily day 5 days and then continue 4 mg daily  -Urinary catheter placed for urinary retention  -PT/OT  -Continue with pain control  -Continue Zoloft, Wellbutrin    DVT Prophylaxis: SCD  Diet: ADULT DIET;  Regular  Code Status: DNR-CC    PT/OT Eval Status: Pending    Dispo - discussed with  at length. Patient will have to pay out-of-pocket for transportation back and forth for radiation. Patient is unable to transport herself. She is overall medically ready for discharge awaiting delivery of DME including hospital bed and bedside commode.   Awaiting further information from  for possible discharge today    Teresa Dhillon MD

## 2021-10-11 NOTE — ONCOLOGY
[address] 45    [phone]    DATE: 10-11-21    AREA TREATED:  T1-9 & L1-5    DAILY DOSE: 300cGy    CUMULATIVE DOSE: 1200cGy    # 4  OUT OF 8 TREATMENTS    NEXT PLANNED TREATMENT  DATE:10-12-21    Daily Treatments are Monday through Friday:       INPATIENT CODING:  Beam Radiation  Photons   Photon energy :  >10mv

## 2021-10-11 NOTE — PROGRESS NOTES
Occupational/Physical Therapy    PT/OT orders received. Attempted evaluations but pt too nauseated to participate. Will f/u later today as appropriate. Addendum 3:11 PM  Noted pt with d/c orders for home today under hospice services. No PT/OT evaluations indicated per case management. Will sign off.      Nicolas Crisostomo, OTR/L, [facility]

## 2021-10-11 NOTE — PROGRESS NOTES
Arrived in pt room to complete medication administration/PT extremely tearful frustrated and expressing irritability with having ti be in the hospital and anger with being ill pt stts\"Im dying\". Comforted pt at bedside. Administered medications per orders pt requested addition pain medication for back pain.

## 2021-10-11 NOTE — DISCHARGE INSTR - COC
Continuity of Care Form    Patient Name: Camden Perez   :  1980  MRN:  [id number]    Admit date:  10/7/2021  Discharge date:  ***    Code Status Order: DNR-CC   Advance Directives:      Admitting Physician:  Toño Gaviria DO  PCP: Diamond Renteria MD    Discharging Nurse: [facility] Unit/Room#: 1929/1352-94  Discharging Unit Phone Number: ***    Emergency Contact:   Extended Emergency Contact Information  Primary Emergency Contact: Cornell Ellis  Home Phone: [phone]  Relation: Spouse  Secondary Emergency Contact: Carolyn Cortes  Mobile Phone: [phone]  Relation: Parent   needed? No    Past Surgical History:  Past Surgical History:   Procedure Laterality Date    ANKLE SURGERY Left     APPENDECTOMY      BREAST REDUCTION SURGERY      CARPAL TUNNEL RELEASE Right 2020    KNEE ARTHROSCOPY Right 2015    KNEE ARTHROSCOPY Right 2015    LAMINECTOMY N/A 2021    T7 LAMINECTOMY WITH T5-9 PEDICALE FIXATION AND REMOVAL OF EPIDURAL TUMOR AT T7 performed by Imelda Gil MD at [facility] N/A 9/15/2021    T4-T7 LAMINECTOMY, DECOMPRESSION, FORAMINOTOMY, FUSION , FIXATION, RHIZOTOMY, REMOVAL OF EPIDURAL MASS performed by Em Anderson.  Tee Gil MD at [address] N/A 2019    ROBOTIC ASSISTED PARTIAL LIVER RESECTION AND ABDOMINAL MASS RESECTION performed by Adolfo Salgado MD at [address] [facility] Left 2017    for sarcoma    OTHER SURGICAL HISTORY Left     biopsy of left thigh mass    OTHER SURGICAL HISTORY Left 2018    WIDE EXCISION LEFT THIGH SARCOMA          OTHER SURGICAL HISTORY Left 2018    INCISION AND DRAINAGE OF LEFT THIGH       OTHER SURGICAL HISTORY Left 2018    incision and drainage left posterior thigh    PORT SURGERY Right 2020    PORT REMOVAL performed by Adolfo Salgado MD at [facility] Left     d/t scar tissue    TOTAL KNEE ARTHROPLASTY Right 2015 Immunization History:   Immunization History   Administered Date(s) Administered    COVID-19, Moderna, PF, 100mcg/0.5mL 02/24/2021, 04/06/2021, 08/25/2021    Influenza 10/11/2010, 10/04/2011    Influenza Vaccine, unspecified formulation 10/01/2016    Influenza Virus Vaccine 10/08/2014, 10/13/2017, 10/01/2019    Influenza, Intradermal, Preservative free 10/26/2012, 11/01/2013    Influenza, Quadv, IM, PF (6 mo and older Fluzone, Flulaval, Fluarix, and 3 yrs and older Afluria) 10/12/2018    PPD Test 10/19/2016, 10/19/2016    Tdap (Boostrix, Adacel) 03/08/2013       Active Problems:  Patient Active Problem List   Diagnosis Code    Premature menopause E28.319    Osteoarthritis, knee M17.10    Crohn's disease ([address] K50.90    IBS (irritable bowel syndrome) K58.9    Morbid obesity with BMI of 40.0-44.9, adult ([address] E66.01, Z68.41    Mixed hyperlipidemia E78.2    Vitamin B12 deficiency E53.8    Small bowel obstruction ([address] K56.609    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    Moderate malnutrition (HCC) E44.0    Class 2 obesity due to excess calories with body mass index (BMI) of 39.0 to 39.9 in adult E66.09, Z68.39    Chronic depression F32. 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    Intractable pain R52    Mass of spine M89.8X8    S/P fusion of thoracic spine Z98.1       Isolation/Infection:   Isolation            No Isolation          Patient Infection Status       Infection Onset Added Last Indicated Last Indicated By Review Planned Expiration Resolved Resolved By    None active    Resolved    C-diff Rule Out 06/23/21 06/23/21 06/23/21 Clostridium difficile toxin/antigen (Ordered)   09/15/21 Eduardo Miller RN    From prior admission    COVID-19 Rule Out 02/16/21 02/16/21 02/16/21 COVID-19, Rapid (Ordered)   02/16/21 Rule-Out Test Resulted            Nurse Assessment:  Last Vital Signs: /72   Pulse 83   Temp 97.4 °F (36.3 °C) (Oral)   Resp 17   Ht 5' 9\" (1.753 m)   Wt 249 lb 9 oz (113.2 kg)   LMP  (LMP Unknown)   SpO2 100%   BMI 36.85 kg/m²     Last documented pain score (0-10 scale): Pain Level: 8  Last Weight:   Wt Readings from Last 1 Encounters:   10/09/21 249 lb 9 oz (113.2 kg)     Mental Status:  {IP PT MENTAL STATUS:20030:::0}    IV Access:  { LUIS ANTONIO IV ACCESS:076581553:::0}    Nursing Mobility/ADLs:  Walking   {CHP DME ADLs:792206527:::0}  Transfer  {CHP DME ADLs:395905140:::0}  Bathing  {CHP DME ADLs:275977249:::0}  Dressing  {CHP DME ADLs:393289470:::0}  Toileting  {CHP DME ADLs:972096380:::0}  Feeding  {CHP DME ADLs:049496659:::0}  Med Admin  {CHP DME ADLs:750339888:::0}  Med Delivery   { LUIS ANTONIO MED Delivery:129008060:::0}    Wound Care Documentation and Therapy:        Elimination:  Continence: Bowel: {YES / KL:51312}  Bladder: {YES / US:48318}  Urinary Catheter: {Urinary Catheter:523725320:::0}   Colostomy/Ileostomy/Ileal Conduit: {YES / JZ:37547}       Date of Last BM: ***    Intake/Output Summary (Last 24 hours) at 10/11/2021 1318  Last data filed at 10/11/2021 1103  Gross per 24 hour   Intake 760 ml   Output 1775 ml   Net -1015 ml     I/O last 3 completed shifts:   In: 0 [P.O.:1080; I.V.:30]  Out: 1525 [Urine:1525]    Safety Concerns:     [address] Safety Concerns:055565233:::0}    Impairments/Disabilities:      [address] Impairments/Disabilities:467690375:::0}    Nutrition Therapy:  Current Nutrition Therapy:   { LUIS ANTONIO Diet List:766293176:::0}    Routes of Feeding: {CHP DME Other Feedings:695896979:::0}  Liquids: {Slp liquid thickness:07445}  Daily Fluid Restriction: {CHP DME Yes amt example:410221710:::0}  Last Modified Barium Swallow with Video (Video Swallowing Test): {Done Not Done JTUD:256600034:::8}    Treatments at the Time of Hospital Discharge:   Respiratory Treatments: ***  Oxygen Therapy:  {Therapy; copd oxygen:41316:::0}  Ventilator:    {[facility] Vent List:232989980:::0}    Rehab Therapies: {THERAPEUTIC INTERVENTION:6807848166}  Weight Bearing Status/Restrictions: { CC Weight Bearin:::0}  Other Medical Equipment (for information only, NOT a DME order):  {EQUIPMENT:347231441}  Other Treatments: ***    Patient's personal belongings (please select all that are sent with patient):  {CHP DME Belongings:598715528:::0}    RN SIGNATURE:  {Esignature:557882198:::0}    CASE MANAGEMENT/SOCIAL WORK SECTION    Inpatient Status Date: ***    Readmission Risk Assessment Score:  Readmission Risk              Risk of Unplanned Readmission:  21           Discharging to Facility/ Agency   Name:   Address:  Phone:  Fax:    Dialysis Facility (if applicable)   Name:  Address:  Dialysis Schedule:  Phone:  Fax:    / signature: {Esignature:165845995:::0}    PHYSICIAN SECTION    Prognosis: Guarded    Condition at Discharge: Terminal    Rehab Potential (if transferring to Rehab): Poor    Recommended Labs or Other Treatments After Discharge: hospice    Physician Certification: I certify the above information and transfer of Hima Dickey  is necessary for the continuing treatment of the diagnosis listed and that she requires Hospice for greater 30 days.      Update Admission H&P: No change in H&P    PHYSICIAN SIGNATURE:  Electronically signed by [facility], MD on 10/11/21 at 1:18 PM EDT

## 2021-10-11 NOTE — CARE COORDINATION
2:09 PM  SW notified by patient's nurse and  that the patient has been very tearful over the last couple of days. Met with patient at bedside. She shared that in the morning she wakes up and forgets that she is sick and then shortly after remembers that she is so sick. She became tearful and shared that the mornings are such a shock and are so challenging because she goes through that reminder every morning. Patient shared her fears around the coming months. She shared that she doesn't want her kids to remember her this way. She is scared of being in pain. She is upset that she now requires assistance for her ADLs. Provided support and validation. Reminded patient that these are all normal feelings to have as she is grieving what is to come. Discussed cognitive reframing and mindfulness. Provided patient and  resources around mindfulness. Discussed the importance of daily routine, especially since the patient still has life left to live. She was encouraged to wake up every morning and put a plan together for what her day will look like (ie: sitting in the garden while her kids garden, watching a movie, eating her favorite dessert). Emphasized the importance of enjoying her time and creating memories for her and her family during this time. Reiterated using mindfulness to focus on the day at hand. Patient and  shared concerns over transportation to upcoming radiation appointments. Discussed the option of wheelchair vans vs. Ambulance. Patient and  shared their worries about the cost associated with this. Informed that they could speak to their oncologist about potentially discontinuing radiation if the patient is struggling so much with ambulation. They also needed DME and lizbeth was able to work with [address] [place] [address] on getting their DME delivered to them sooner. After a discussion with Dr. Dylan Bay, patient's  shared that they no longer wish to pursue radiation.  Presented the option of going home with hospice today. Both are in agreement. Hospice [facility] coordinating a home discharge.

## 2021-10-13 NOTE — DISCHARGE SUMMARY
Hospitalist Discharge Summary    Patient ID:  Vern Rowan  [id number]  35 y.o.  1980    Admit date: 10/7/2021    Discharge date: 10/11/2021    Disposition: Home with hospice    Admission Diagnoses:   Patient Active Problem List   Diagnosis    Premature menopause    Osteoarthritis, knee    [facility] disease ([facility]    IBS (irritable bowel syndrome)    Morbid obesity with BMI of 40.0-44.9, adult ([facility]    Mixed hyperlipidemia    Vitamin B12 deficiency    Small bowel obstruction ([facility]    Sarcoma of left lower extremity (HCC)    Moderate malnutrition (HCC)    Class 2 obesity due to excess calories with body mass index (BMI) of 39.0 to 39.9 in adult    Chronic depression    Weight loss counseling, encounter for    Liver mass    Neutropenic fever ([facility]    Sarcoma ([facility]    Status post thoracic spinal fusion    Clear cell sarcoma (HCC)    Intractable pain    Mass of spine    S/P fusion of thoracic spine       Discharge Diagnoses: Active Problems:    Sarcoma ([facility]  Resolved Problems:    * No resolved hospital problems. *      Code Status:  Prior    Condition:  Terminal    Discharge Diet: Diet:  No diet orders on file    PCP to do list: Hospice care    Hospital Course: As per HPI:41 y.o. female who presented to [facility] with past medical history of depression, Crohn's disease, fragile X, depression, class II obesity, IBS presented to the ED with chief complaint of lower extremity weakness and retention and back pain.      Patient reported that she has known sarcoma and has been receiving radiation therapy by . Patient reports that she also follows up with oncology at [facility] and is currently enrolled in palliative care. Plan is to continue with radiation therapy for 7 therapies and check response. Patient reported however that weaknss started around 24 hours ago progressively worsening. No known alleviating or exacerbating factor.   Associated with unable to even ambulate in addition to difficulty with urinating and having urinary retention. Patient reported that she has had laminectomies to her spinal cord for tumors and known to have tumor burden on the lumbar spine. Patient reported that she has met with hospice nurse this morning and wanted to proceed with continuation of radiation therapy and depending on response we will proceed with hospice at this time. No fever chills nausea vomiting chest pain shortness of    Following problems were addressed during her stay    Bilateral lower extremity weakness  Urinary retention  Metastatic sarcoma with cord compression  Normocytic anemia  Anxiety  Hyperlipidemia     Plan:  -Radiation oncology following,  patient was started on radiation treatment. Family ultimately decided to stop radiation on now for hospice care. Patient was discharged to home hospice in stable condition  -Continue steroids. Will taper Decadron as 4 mg 3 times daily x5 days, 4 mg twice daily day 5 days and then continue 4 mg daily  -Urinary catheter placed for urinary retention  -PT/OT  -Continue with pain control  -Continue Zoloft, Wellbutrin     Discharge Medications:   Discharge Medication List as of 10/11/2021  7:15 PM      START taking these medications    Details   dexamethasone (DECADRON) 4 MG tablet Take 1 tablet by mouth See Admin Instructions Take 4 mg thrice daily x 5 days, Then 4 mg twice daily for 5 days, then 4 mg everyday, Disp-50 tablet, R-0Normal           Discharge Medication List as of 10/11/2021  7:15 PM        Discharge Medication List as of 10/11/2021  7:15 PM      CONTINUE these medications which have NOT CHANGED    Details   LORazepam (ATIVAN) 0.5 MG tablet Take 0.5 mg by mouth every 6 hours as needed for Anxiety. Historical Med      methocarbamol (ROBAXIN) 500 MG tablet Take 500 mg by mouth 3 times daily Pt does not know specific dosingHistorical Med      buPROPion (WELLBUTRIN) 100 MG tablet Take 100 mg by mouth daily Pt does not know specific dosingHistorical Med      sertraline (ZOLOFT) 100 MG tablet Historical Med      potassium chloride (KLOR-CON M) 20 MEQ extended release tablet Take 20 mEq by mouth 2 times dailyHistorical Med      morphine (MS CONTIN) 60 MG extended release tablet Take 60 mg by mouth 2 times daily. Historical Med      famotidine (PEPCID) 20 MG tablet Take 20 mg by mouth dailyHistorical Med      promethazine (PHENERGAN) 12.5 MG tablet Take 12.5 mg by mouth every 6 hours as needed for NauseaHistorical Med      granisetron (KYTRIL) 1 MG tablet Take 1 mg by mouth every 12 hours as needed for NauseaHistorical Med      omeprazole (PRILOSEC) 20 MG delayed release capsule Take 20 mg by mouth dailyHistorical Med      gabapentin (NEURONTIN) 300 MG capsule Take 300 mg by mouth 3 times daily. Historical Med      sennosides-docusate sodium (SENOKOT-S) 8.6-50 MG tablet Take 4 tablets by mouth nightlyHistorical Med      polyethylene glycol (GLYCOLAX) 17 g packet Take 17 g by mouth dailyHistorical Med      simethicone (MYLICON) 80 MG chewable tablet Take 80 mg by mouth 4 times daily (after meals and at bedtime)Historical Med      oxyCODONE 5 MG capsule Take 15 mg by mouth every 6 hours as needed for Pain. Historical Med           Discharge Medication List as of 10/11/2021  7:15 PM              Procedures: none    Assessment on Discharge: Stable, improved     Discharge ROS:  A complete review of systems was asked and negative except for none    Discharge Exam:  /72   Pulse 83   Temp 97.4 °F (36.3 °C) (Oral)   Resp 17   Ht 5' 9\" (1.753 m)   Wt 249 lb 9 oz (113.2 kg)   LMP  (LMP Unknown)   SpO2 100%   BMI 36.85 kg/m²     General appearance: No apparent distress, appears stated age and cooperative. HEENT: Pupils equal, round, and reactive to light. Conjunctivae/corneas clear. Neck: Supple, with full range of motion. No jugular venous distention. Trachea midline. Respiratory:  Normal respiratory effort. contact [address] [place] [address] radiology by calling [phone] FINDINGS:                 MANDIBLE:  Unremarkable. No evidence of fracture or lytic lesion. TMJs:  Unremarkable PARANASAL SINUSES: Unremarkable. No gross opacification or fluid level. ORBITS: Unremarkable OTHER:  None IMPRESSION:  No significant abnormality SIGNED BY: Regino Garcia. 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 Unauthorized disclosure or use of this information is prohibited by law. If you are not the intended recipient of this document, please advise us by calling immediately [phone]. Impression/Conclusion below 75 HISTORY: pain and numbness , mainly on the right side of the jaw/face for about 2 weeks. 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   TECHNIQUE:  Multiplanar CT images of the facial bones NOTE:  If there are questions about the content of this report, please contact [address] [place] [address] radiology by calling [phone] FINDINGS: FACIAL BONES:  There is mild asymmetric widening of the right mental foramen (example series 903, image 159) and an area of lucency in the adjacent mandible with soft tissue density. (Example series 900, image 158 and series 903, image 159). No soft tissue component is seen, however scattered enhancing osseous metastases are noted on the previous MRI study. Thus, a osseous metastasis at the right mandibular body is suspected. ORBITS:  Unremarkable. No orbital fracture or retrobulbar hematoma PARANASAL SINUSES:  Unremarkable. No paranasal sinus fracture or fluid level OTHER:  Vague focus of enhancement in the inferior right frontal lobe corresponds to a developmental venous anomaly when correlating with previous MRI. IMPRESSION: Probable right mandibular body metastatic lesion expanding the right mental foramen. Additional mandibular metastases are also suspected when correlating with previous brain MRI but are not visualized on CT. For example, on the previous MRI a suspected  metastasis is noted at the left mandible near the mandibular angle just distal to the last left mandibular molar. SIGNED BY: Helen Lorenz MD on 9/29/2021 11:54 AM   [address] [place] [address] [phone] -  [address] [facility] [address]: [phone]       CT GUIDED STEREOTACTIC LOCALIZATION    Result Date: 9/15/2021  EXAM: CT guided stereotactic localization INDICATION: T4-T7 LAMINECTOMY, DECOMPRESSION, FORAMINOTOMY, POSSIBLE FUSION , FIXATION, POSSIBLE RHIZOTOMY, COMPARISON: MRI of thoracic spine 6/22/2021 TECHNIQUE: Two axial CT acquisitions were obtained through the spine for the purposes of spinal level localization and surgical fusion hardware localization. CT radiation dose optimization techniques (automated exposure control, and use of iterative reconstruction techniques, or adjustment of the mA and/or kV according to patient size) were used to limit patient radiation dose. FINDINGS: The initial CT acquisition demonstrates pedicle screws at T5-T9. There is a posterior soft tissue surgical dissection extending deep to the level of the spine.  There is some atelectasis or scarring in the lung bases, but no evidence for dense consolidation, pleural effusion or pneumothorax. The patient is intubated. The second CT acquisition demonstrates additional pedicle screws placed at T3 and T4 and connecting rods spanning the pedicle screws at T3-T9. There is partial closure of the surgical dissection soft tissue wound overlying the spine. A surgical drain is present. CT-guided stereotactic localization for thoracic posterior surgical fusion    CT GUIDED STEREOTACTIC LOCALIZATION    Result Date: 9/15/2021  EXAM: CT guided stereotactic localization INDICATION: T4-T7 LAMINECTOMY, DECOMPRESSION, FORAMINOTOMY, POSSIBLE FUSION , FIXATION, POSSIBLE RHIZOTOMY, COMPARISON: MRI of thoracic spine 6/22/2021 TECHNIQUE: Two axial CT acquisitions were obtained through the spine for the purposes of spinal level localization and surgical fusion hardware localization. CT radiation dose optimization techniques (automated exposure control, and use of iterative reconstruction techniques, or adjustment of the mA and/or kV according to patient size) were used to limit patient radiation dose. FINDINGS: The initial CT acquisition demonstrates pedicle screws at T5-T9. There is a posterior soft tissue surgical dissection extending deep to the level of the spine. There is some atelectasis or scarring in the lung bases, but no evidence for dense consolidation, pleural effusion or pneumothorax. The patient is intubated. The second CT acquisition demonstrates additional pedicle screws placed at T3 and T4 and connecting rods spanning the pedicle screws at T3-T9. There is partial closure of the surgical dissection soft tissue wound overlying the spine. A surgical drain is present.      CT-guided stereotactic localization for thoracic posterior surgical fusion    MRI THORACIC SPINE W WO CONTRAST    Result Date: 10/5/2021  Exam:MRI THORACIC SPINE W WO CONTRAST, MRI LUMBAR SPINE W WO CONTRAST Date:10/4/2021 2:49 PM Indication: Incomplete paraplegia Comparison: 3/24/2021, 9/4/2021 Technique: Multiplanar multisequence MR images obtained of the thoracic and lumbar spine. Contrast:  20 mL ProHance intravenous FINDINGS: THORACIC SPINE: Localizer images and extraspinal structures: Multifocal lung nodules and hepatic masses are again noted, compatible with metastatic disease. Although limited and direct comparison to prior CT from 6/23/2021, these appear increased. Filling defect in the infrarenal IVC will be reported on the lumbar portion of the study below. Marrow signal/bony structures: Diffuse marrow signal abnormality throughout the thoracic spine, compatible with extensive metastatic disease. This is progressed from 9/4/2021, for example now there is diffuse involvement of the T10 vertebral body with increased size of a lesion involving the T11 vertebral body. There is unchanged minimal compression deformity of the inferior endplate of T7. No recent pathologic fracture identified. Alignment: Normal. Thoracic cord and spinal canal: Limited assessment of cord signal secondary to artifact from extensive metallic hardware. No definite intramedullary abnormal enhancement given the limitations of artifact. There is multifocal epidural extension of tumor, noted minimally in the left ventral epidural space at T1-T2 and within the left lateral and ventral epidural space from T2-T8, extending several of the left sided foramina. This is somewhat limited and direct assessment but appears mildly decreased from the prior study of T3-T6 given the limitations of artifact. There is bilateral epidural tumor at T6-T7 without significant change. Paravertebral soft tissues: There is new extraosseous extension of tumor at the T2 level into the left lateral and anterior paravertebral soft tissues into the posterior mediastinum.  There is new extraosseous extension of tumor laterally from the T7 vertebral body into the posterior vertebral body with increased size of a lesion involving the T11 vertebral body. There is unchanged minimal compression deformity of the inferior endplate of T7. No recent pathologic fracture identified. Alignment: Normal. Thoracic cord and spinal canal: Limited assessment of cord signal secondary to artifact from extensive metallic hardware. No definite intramedullary abnormal enhancement given the limitations of artifact. There is multifocal epidural extension of tumor, noted minimally in the left ventral epidural space at T1-T2 and within the left lateral and ventral epidural space from T2-T8, extending several of the left sided foramina. This is somewhat limited and direct assessment but appears mildly decreased from the prior study of T3-T6 given the limitations of artifact. There is bilateral epidural tumor at T6-T7 without significant change. Paravertebral soft tissues: There is new extraosseous extension of tumor at the T2 level into the left lateral and anterior paravertebral soft tissues into the posterior mediastinum. There is new extraosseous extension of tumor laterally from the T7 vertebral body into the posterior mediastinum and abutting the pleural surface, right greater than left. Postoperative findings: Compared to the prior study, there is been interval extension of posterior thoracic fusion superiorly to the T3 level, now spanning T3-T9, previously T5-T9. Degenerative findings: No high-grade central canal stenosis status post posterior decompression. LUMBAR SPINE: Localizer images and extraspinal structures: Nearly occlusive filling defect in the infrarenal IVC measures at least 2.0 x 1.7 x 5.2 cm with mild internal enhancement (series 17, image 21) and suggested extension toward the right paravertebral mass at L3, suspicious for tumor thrombus extension through a paravertebral vein. This previously measured 1.2 cm on prior CT from 6/23/2021.  There is questionable signal abnormality involving the extrahepatic portal vein and SMV, which is favored to represent flow related artifact but indeterminate. Questionable central mesenteric mass versus bowel. Marrow signal/bony structures: Progression of diffuse osseous metastatic disease involving the lumbar spine and sacrum as well as the visualized bony pelvis. There is unchanged mild superior endplate compression deformity of L3 on the left, which may represent a subacute to chronic pathologic fracture. No other pathologic fracture identified. Alignment: Minimal grade 1 retrolisthesis at L5-S1. Conus, cauda equina, and spinal canal: Conus terminates normally at the L1 level. Cauda equina nerve roots are normal. No abnormal intrathecal enhancement. There is bilateral ventral epidural extension of tumor at L2, similar to the clinical history increased from the prior study. This contributes to mild thecal sac narrowing. There is ventral extension of tumor into the epidural space at L3 with extension into the right L3 foramen, mildly decreased from the prior study. This contributes to moderate  thecal sac narrowing. Paravertebral soft tissues: Right paraspinal mass at the inferior L3 level measures approximately 3.0 x 1.7 cm (series 17, image 28). , previously 2.7 x 1.3 cm on the 9/4/2021 study. There is a suspected linear area of enhancement extending toward the region of the IVC thrombus (series 17, images 25-26). Postoperative changes: None identified. Degenerative findings: Multilevel disc desiccation with height loss and endplate spurring, significant and moderate at L5-S1 and mild at other levels. Suggested vacuum disc phenomenon at L5-S1. L1-L2: No compressive findings. L2-L3: Minimal disc bulge. Mild bilateral facet hypertrophy. No compressive findings. L3-L4: Mild disc bulge. Small broad-based left central disc protrusion. This contacts the extraforaminal left L3 nerve root. Mild bilateral facet hypertrophy. The posterior epidural fat.  Mild central canal stenosis. Epidural tumor extends into the right L3 foramen and surrounds the right L3 nerve root without bony central canal stenosis. L4-L5: Moderate disc bulge with annular fissuring. Mild bilateral facet hypertrophy. Prominent posterior epidural fat. Moderate central canal stenosis. Mild bilateral L4 foraminal stenosis. L5-S1: Mild disc bulge. Superimposed small left central and subarticular disc extrusion. Mild bilateral facet hypertrophy. Epidural lipomatosis. Moderate thecal sac narrowing without significant bony central canal stenosis. THORACIC SPINE: Progression of extensive thoracic spine metastatic disease as detailed. Increased involvement of the thoracic vertebral bodies as well as new paravertebral extension of tumor at T2 and T7. Persistent epidural tumor throughout the thoracic spinal canal, somewhat limited and direct comparison given significant artifact from hardware. This may be mildly decreased at T3-T6 compared to the prior study. Interval extension of posterior thoracic fusion superiorly to T3, now spanning T3-T9. Extraspinal metastatic disease, with lung nodules and hepatic masses, limited and direct comparison to prior study from 6/23/2021 but appearing increased. This would be better evaluated by dedicated CT of the chest, abdomen, and pelvis. LUMBAR SPINE: Progression of extensive lumbar spine metastatic disease as detailed, as well as involving the sacrum and pelvis. There is increased ventral epidural extension of tumor at L2 causing mild thecal sac narrowing. There is slight decreased ventral epidural tumor extension at L3 causing moderate thecal sac narrowing. This also extends into the right L3 foramen. Increased size of the right paraspinal mass at the inferior L3 level with suspected extension along a right paravertebral vein into a 2.0 x 1.7 x 5.2 cm enhancing filling defect in the IVC compatible with thrombosis, potentially tumor thrombus, nearly occlusive.  Unchanged pathologic fracture involving L3 without further height loss. Questionable signal abnormality in the extrahepatic portal vein and SMV, favored to represent flow-related artifact. This could be better evaluated on CT.     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 Unauthorized disclosure or use of this information is prohibited by law. If you are not the intended recipient of this document, please advise us by  calling immediately [phone]. 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 No mass, infarct, infection, or hemorrhage VENTRICLES/SULCI: Unremarkable. No hydrocephalus or midline shift EXTRA-AXIAL SPACES:  Unremarkable FLOW VOIDS: Unremarkable. Normal signal flow voids in the larger intracranial vessels PITUITARY: Unremarkable  PARANASAL SINUSES/MASTOIDS: Unremarkable BONES:  Diffuse calvarial metastases.  OTHER:  None IMPRESSION:  While there are diffuse calvarial metastases, a discrete lesion is not identified in the skull base on the right to account for the patient's reported right V3 distribution symptoms. No perineural enhancement is appreciated. No cerebral metastatic disease. SIGNED BY: Abdoul Bentley MD on 9/27/2021 12:57 PM   [address] [facility] [phone] - 2011 [facility]: [phone]           Last Labs on Discharge:     No results found for this or any previous visit (from the past 24 hour(s)). Follow up: with Jennifer Greene MD    Note that over 30 minutes was spent in preparing discharge papers, discussing discharge with patient, medication review, etc.    Thank you Jennifer Greene MD for the opportunity to be involved in this patient's care. If you have any questions or concerns please feel free to contact me at [phone].     Electronically signed by Gregoria Crandall MD on 10/13/2021 at 1:23 PM

## 2021-10-20 DIAGNOSIS — Z51.5 HOSPICE CARE: ICD-10-CM

## 2021-11-26 NOTE — ONCOLOGY
Lissy 45    [phone]    DATE:  3/26/21    AREA TREATED: L2-SACRUM    DAILY DOSE: 300 cGy    CUMULATIVE DOSE: 2100 cGy    # 7 OUT OF 10  TREATMENTS    NEXT PLANNED TREATMENT  DATE: 3/29/21 11:15AM    Daily Treatments are Monday through Friday:       INPATIENT CODING:  Beam Radiation   Photons   Photon energy : >10mv normal mouth and gums/moist

## 2023-06-30 NOTE — PROGRESS NOTES
Patient admitted to [facility] via direct admit from home for diagnosis of sarcome. Here to receive chemotherapy regimen ifosfamide. Patient and hisband oriented to patient room including call light and bed controls. Admission assessment completed - see admission flowsheet documentation. Patient is a medium fall risk. Safety measures instituted per policy. Psychosocial Distress screening completed, with a score of 4 or greater in no category. The patient has declined a consult regarding their cause of distress. Referral has not been placed. A request for psychological consult has not been placed to the attending physician. Patient and  oriented to unit policies and procedures including: pain management practices, unit safety precautions, family rapid response, q4h vital signs and assessments, daily 4am lab draws, weekly chest x-rays, weekly VRE rectal swabs for surveillance, daily chlorhexidine bathing, standing transfusion orders, and routine central line care. Also discussed use of call light and how to get in touch with nursing staff. Stressed the importance of calling out immediately for any changes in condition including but not limited to: pain, chills, fever, nausea, vomiting, diarrhea, chest pain, sob/fernandez, assistance with toileting, bleeding, or any other symptoms that are out of the ordinary for the patient. Patient verbalizes understanding of all instructions and will call for assistance as needed. Size Of Lesion In Cm: 0

## 2023-07-10 NOTE — TELEPHONE ENCOUNTER
[facility] received a call from:    Name of Caller:  Mayito Condon     Relationship to patient Self     Organization name: NA     Best contact number: [phone]    Reason for call: Requesting a call back from PCP , wants medical advice for carpel tunel surgery . Son showing syptoms of covid teste negative twice.  Please advise patient Patient presents today for a post op exam:   I&D pilonidal abscess     DOS 05/25/2023    Denies any known latex allergies or symptoms of latex sensitivity      Allergies reviewed and updated    Medications reviewed and updated    Smoking history reviewed

## 2025-04-30 NOTE — PLAN OF CARE
April 30, 2025    Patsy Ozuna  [address]  [place] [address]             [facility]  Primary Care  [address]  [address]  [place] [address]   April 30, 2025     Patient: Patsy Ozuna   YOB: 1968   Date of Visit: 4/30/2025       To Whom it May Concern:    Patsy Ozuna was seen in my clinic on 4/30/2025. She may return to work on 05/05/2025 .    Please excuse her from any classes or work missed.    If you have any questions or concerns, please don't hesitate to call.    Sincerely,         Que Wong, NP      Problem: Pain:  Goal: Pain level will decrease  Description: Pain level will decrease  Outcome: Ongoing   Patient complains of pain, patient medicated per mar, will continue to monitor. Problem: Falls - Risk of:  Goal: Will remain free from falls  Description: Will remain free from falls  Outcome: Ongoing   Patient is at risk for falls. Patient is in bed with bed alarm on, fall risk ID, Nonskid socks, Bed in lowest position. Call light and bedside table are within reach. Patient calls out approprietly. Will continue to monitor.

## (undated) DEVICE — TIP COVER ACCESSORY

## (undated) DEVICE — PLATE ES AD W 9FT CRD 2

## (undated) DEVICE — CABLE BPLR L12FT FLYING LD DISPOSABLE

## (undated) DEVICE — SYMMETRY SHARP KERRISON® RONGEUR TIPS, THIN FOOTPLATE, 1 MM TIP, SINGLE USE, (3/BX): Brand: SYMMETRY SHARP KERRISON®

## (undated) DEVICE — CRADLE ARM INDIV PT CARE KT COMP FOR FOR RADLUC WILSON FRME

## (undated) DEVICE — SOLUTION INJ LR VISIV 1000ML BG

## (undated) DEVICE — SPONGE,NEURO,0.5"X3",XR,STRL,LF,10/PK: Brand: MEDLINE

## (undated) DEVICE — CADIERE FORCEPS: Brand: ENDOWRIST

## (undated) DEVICE — APPLICATOR MEDICATED 26 CC SOLUTION HI LT ORNG CHLORAPREP

## (undated) DEVICE — Device

## (undated) DEVICE — GLOVE SURG SZ 7.5 L11.2IN THK9.8MIL STRW LTX POLYMER BEAD

## (undated) DEVICE — SURGICAL SET UP - SURE SET: Brand: MEDLINE INDUSTRIES, INC.

## (undated) DEVICE — ELECTROSURGICAL PENCIL ROCKER SWITCH NON COATED BLADE ELECTRODE 10 FT (3 M) CORD HOLSTER: Brand: MEGADYNE

## (undated) DEVICE — STAPLER SKIN H3.9MM WIRE DIA0.58MM CRWN 6.9MM 35 STPL ROT

## (undated) DEVICE — SUTURE MCRYL SZ 4-0 L27IN ABSRB UD L19MM PS-2 1/2 CIR PRIM Y426H

## (undated) DEVICE — SYSTEM SMK EVAC LAP TBNG FILTER HSNG BENT STYL PNK SEE CLR

## (undated) DEVICE — SOLUTION IV 1000ML 0.9% SOD CHL

## (undated) DEVICE — SYSTEM SKIN CLSR 22CM DERMBND PRINEO

## (undated) DEVICE — TROCAR: Brand: KII OPTICAL ACCESS SYSTEM

## (undated) DEVICE — STANDARD HYPODERMIC NEEDLE,POLYPROPYLENE HUB: Brand: MONOJECT

## (undated) DEVICE — PAD,NON-ADHERENT,3X8,STERILE,LF,1/PK: Brand: MEDLINE

## (undated) DEVICE — SUTURE VCRL SZ 2-0 L18IN ABSRB UD CT-1 L36MM 1/2 CIR J839D

## (undated) DEVICE — DRAPE,LAP,CHOLE,W/TROUGHS,STERILE: Brand: MEDLINE

## (undated) DEVICE — GLOVE SURG SZ 65 L12IN FNGR THK79MIL GRN LTX FREE

## (undated) DEVICE — TOWEL,STOP FLAG GOLD N-W: Brand: MEDLINE

## (undated) DEVICE — SUCTION IRRIGATOR: Brand: ENDOWRIST

## (undated) DEVICE — SUTURE VCRL SZ 0 L54IN ABSRB VLT W/O NDL POLYGLACTIN 910 J616H

## (undated) DEVICE — BLANKET WRM W29.9XL79.1IN UP BODY FORC AIR MISTRAL-AIR

## (undated) DEVICE — KIT HEMVAC INF CTRL 400ML W/ PVC DRN 1/8 IN DIAM TRCR LEN 5

## (undated) DEVICE — TOOL 14MH30 LEGEND 14CM 3MM: Brand: MIDAS REX ™

## (undated) DEVICE — TAPE MED W1/8XL30IN WHT POLY

## (undated) DEVICE — 3M™ WARMING BLANKET, UPPER BODY, 10 PER CASE, 42268: Brand: BAIR HUGGER™

## (undated) DEVICE — ADHESIVE SKIN CLSR 0.7ML TOP DERMBND ADV

## (undated) DEVICE — SEALER/DIVIDER LAP SHFT L44CM JAW APER 11.4MM 315DEG ROT

## (undated) DEVICE — GLOVE SURG SZ 65 CRM LTX FREE POLYISOPRENE POLYMER BEAD ANTI

## (undated) DEVICE — SUTURE VCRL SZ 0 L27IN ABSRB UD L36MM CT-1 1/2 CIR J260H

## (undated) DEVICE — STERILE POLYISOPRENE POWDER-FREE SURGICAL GLOVES WITH EMOLLIENT COATING: Brand: PROTEXIS

## (undated) DEVICE — GLOVE SURG SZ 7 L12IN FNGR THK75MIL WHT LTX POLYMER BEAD

## (undated) DEVICE — DRAPE MICSCP W54XL150IN W/ 4 BINOC GLS LENS LEICA

## (undated) DEVICE — SOLUTION IV 1000ML LAC RINGERS PH 6.5 INJ USP VIAFLX PLAS

## (undated) DEVICE — NEURO SPONGES: Brand: DEROYAL

## (undated) DEVICE — LAMINECTOMY PK

## (undated) DEVICE — C-ARMOR C-ARM EQUIPMENT COVERS CLEAR STERILE UNIVERSAL FIT 12 PER CASE: Brand: C-ARMOR

## (undated) DEVICE — SUTURE PDS II SZ 0 L60IN ABSRB VLT L48MM CTX 1/2 CIR Z990G

## (undated) DEVICE — LAMINECTOMY: Brand: MEDLINE INDUSTRIES, INC.

## (undated) DEVICE — GARMENT,MEDLINE,DVT,INT,CALF,MED, GEN2: Brand: MEDLINE

## (undated) DEVICE — SHEET,T,THYROID,STERILE: Brand: MEDLINE

## (undated) DEVICE — GLOVE SURG SZ 75 L12IN FNGR THK94MIL TRNSLUC YEL LTX

## (undated) DEVICE — UNDERGLOVE SURG SZ 8 BLU LTX FREE SYN POLYISOPRENE POLYMER

## (undated) DEVICE — TIP-UP FENESTRATED GRASPER: Brand: ENDOWRIST

## (undated) DEVICE — SUTURE VCRL SZ 0 L18IN ABSRB UD L36MM CT-1 1/2 CIR J840D

## (undated) DEVICE — SURE SET-DOUBLE BASIN-LF: Brand: MEDLINE INDUSTRIES, INC.

## (undated) DEVICE — VESSEL LOOPS,MAXI, BLUE: Brand: DEVON

## (undated) DEVICE — CONTAINER,SPECIMEN,PNEU TUBE,3OZ,OR STRL: Brand: MEDLINE

## (undated) DEVICE — BOWL MED L 32OZ PLAS W/ MOLD GRAD EZ OPN PEEL PCH

## (undated) DEVICE — SUTURE VCRL SZ 3-0 L18IN ABSRB UD L26MM SH 1/2 CIR J864D

## (undated) DEVICE — STRL PENROSE DRAIN 18" X 1/2": Brand: CARDINAL HEALTH

## (undated) DEVICE — ACCESS PLATFORM FOR MINIMALLY INVASIVE SURGERY.: Brand: GELPORT® LAPAROSCOPIC  SYSTEM

## (undated) DEVICE — E-Z CLEAN, NON-STICK, PTFE COATED, ELECTROSURGICAL BLADE ELECTRODE, MODIFIED EXTENDED INSULATION, 2.5 INCH (6.35 CM): Brand: MEGADYNE

## (undated) DEVICE — SOLUTION ANTIFOG VIS SYS CLEARIFY LAPSCP

## (undated) DEVICE — 3M™ TEGADERM™ TRANSPARENT FILM DRESSING FRAME STYLE, 1624W, 2-3/8 IN X 2-3/4 IN (6 CM X 7 CM), 100/CT 4CT/CASE: Brand: 3M™ TEGADERM™

## (undated) DEVICE — AGENT HEMOSTATIC SURGIFLOW MATRIX KIT W/THROMBIN

## (undated) DEVICE — JEWISH HOSPITAL TURNOVER KIT: Brand: MEDLINE INDUSTRIES, INC.

## (undated) DEVICE — MARYLAND BIPOLAR FORCEPS: Brand: ENDOWRIST

## (undated) DEVICE — SKIN AFFIX SURG ADHESIVE 72/CS 0.55ML: Brand: MEDLINE

## (undated) DEVICE — CHLORAPREP 26ML ORANGE

## (undated) DEVICE — PROBE 8225101 5PK STD PRASS FL TIP ROHS

## (undated) DEVICE — GOWN,SIRUS,POLYRNF,BRTHSLV,LG,30/CS: Brand: MEDLINE

## (undated) DEVICE — SPONGE,NEURO,1"X3",XR,STRL,LF,10/PK: Brand: MEDLINE

## (undated) DEVICE — ENDOPATH ECHELON VASCULAR  RELOADS, WHITE, 35MM: Brand: ECHELON ENDOPATH

## (undated) DEVICE — SYRINGE MED 10ML POLYPR LUERSLIP TIP FLAT TOP W/O SFTY DISP

## (undated) DEVICE — GLOVE SURG SZ 7 L12IN FNGR THK87MIL DK GRN LTX POLYMER W

## (undated) DEVICE — RONGEUR SURG KERRISON 2 MM SHRP THN FTPLT DISP

## (undated) DEVICE — EYE PROTECTOR FOAM MEDICHOICE

## (undated) DEVICE — PUMP SUC IRR TBNG L10FT W/ HNDPC ASSEMB STRYKEFLOW 2

## (undated) DEVICE — PRESSURE MONITORING SET: Brand: TRUWAVE, VAMP PLUS

## (undated) DEVICE — TISSUE RETRIEVAL SYSTEM: Brand: INZII RETRIEVAL SYSTEM

## (undated) DEVICE — 3M™ TEGADERM™ TRANSPARENT FILM DRESSING FRAME STYLE, 1627, 4 IN X 10 IN (10 CM X 25 CM), 20/CT 4CT/CASE: Brand: 3M™ TEGADERM™

## (undated) DEVICE — TRAY CATHETER 16FR F INCLUDE BARDX IC COMPLT CARE DRNGE BG

## (undated) DEVICE — VESSEL SEALER EXTEND: Brand: ENDOWRIST

## (undated) DEVICE — 3M™ TEGADERM™ TRANSPARENT FILM DRESSING FRAME STYLE, 1626W, 4 IN X 4-3/4 IN (10 CM X 12 CM), 50/CT 4CT/CASE: Brand: 3M™ TEGADERM™

## (undated) DEVICE — COLUMN DRAPE

## (undated) DEVICE — SPONGE,LAP,4"X18",XR,ST,5/PK,40PK/CS: Brand: MEDLINE INDUSTRIES, INC.

## (undated) DEVICE — Z INACTIVE USE 2423349 EVICEL RIG LAPSCP AIRLESS SPRY TIP USA

## (undated) DEVICE — AEGIS 1" DISK 4MM HOLE, PEEL OPEN: Brand: MEDLINE

## (undated) DEVICE — TURNOVER KIT RM INF CTRL TECH

## (undated) DEVICE — SUTURE PERMA-HAND SZ 2-0 L30IN NONABSORBABLE BLK L26MM SH K833H

## (undated) DEVICE — ELECTRO LUBE IS A SINGLE PATIENT USE DEVICE THAT IS INTENDED TO BE USED ON ELECTROSURGICAL ELECTRODES TO REDUCE STICKING.: Brand: KEY SURGICAL ELECTRO LUBE

## (undated) DEVICE — COVER LT HNDL BLU PLAS

## (undated) DEVICE — BIPOLAR SEALER 23-317-1 AQM ENDO DBS 8.7: Brand: AQUAMANTYS™

## (undated) DEVICE — SUTURE PERMA-HAND SZ 3-0 L30IN NONABSORBABLE BLK L17MM RB-1 K872H

## (undated) DEVICE — COVER MICSCP W46XL120IN 4 BINOC GLS LENS LEICA

## (undated) DEVICE — SCISSORS SURG DIA8MM MPLR CRV ENDOWRIST

## (undated) DEVICE — TROCAR: Brand: KII FIOS FIRST ENTRY

## (undated) DEVICE — CANNULA SEAL

## (undated) DEVICE — COUNTER NDL 40 COUNT HLD 70 NUM FOAM BLK SGL MAG W BLDE REMV

## (undated) DEVICE — STAPLER SKIN L320MM 35MM VASC TISS 12 FIRING B FRM PWR

## (undated) DEVICE — COVER,TABLE,HEAVY DUTY,77"X90",STRL: Brand: MEDLINE

## (undated) DEVICE — COVER LT HNDL CAM BLU DISP W/ SURG CTRL

## (undated) DEVICE — ARM DRAPE

## (undated) DEVICE — LARGE SUTURE CUT NEEDLE DRIVER: Brand: ENDOWRIST

## (undated) DEVICE — 3M™ IOBAN™ 2 ANTIMICROBIAL INCISE DRAPE 6648EZ: Brand: IOBAN™ 2

## (undated) DEVICE — BLADELESS OBTURATOR: Brand: WECK VISTA

## (undated) DEVICE — CATHETER,URETHRAL,REDRUBBER,STRL,18FR: Brand: MEDLINE

## (undated) DEVICE — [HIGH FLOW INSUFFLATOR,  DO NOT USE IF PACKAGE IS DAMAGED,  KEEP DRY,  KEEP AWAY FROM SUNLIGHT,  PROTECT FROM HEAT AND RADIOACTIVE SOURCES.]: Brand: PNEUMOSURE

## (undated) DEVICE — KIT CATHETER 20GA L12CM ART CUST

## (undated) DEVICE — 40583 XL ADVANCED TRENDELENBURG POSITIONING KIT: Brand: 40583 XL ADVANCED TRENDELENBURG POSITIONING KIT

## (undated) DEVICE — SYMMETRY SHARP KERRISON® RONGEUR TIPS, THIN FOOTPLATE, 3 MM TIP, SINGLE USE, (3/BX): Brand: SYMMETRY SHARP KERRISON